# Patient Record
Sex: FEMALE | Race: WHITE | NOT HISPANIC OR LATINO | Employment: OTHER | URBAN - METROPOLITAN AREA
[De-identification: names, ages, dates, MRNs, and addresses within clinical notes are randomized per-mention and may not be internally consistent; named-entity substitution may affect disease eponyms.]

---

## 2017-09-19 ENCOUNTER — OFFICE VISIT (OUTPATIENT)
Dept: FAMILY MEDICINE | Facility: CLINIC | Age: 67
End: 2017-09-19
Payer: COMMERCIAL

## 2017-09-19 VITALS
SYSTOLIC BLOOD PRESSURE: 138 MMHG | WEIGHT: 129.6 LBS | TEMPERATURE: 97.7 F | HEIGHT: 63 IN | BODY MASS INDEX: 22.96 KG/M2 | OXYGEN SATURATION: 99 % | DIASTOLIC BLOOD PRESSURE: 72 MMHG | HEART RATE: 77 BPM

## 2017-09-19 DIAGNOSIS — H81.10 BPPV (BENIGN PAROXYSMAL POSITIONAL VERTIGO), UNSPECIFIED LATERALITY: ICD-10-CM

## 2017-09-19 DIAGNOSIS — E78.5 HYPERLIPIDEMIA LDL GOAL <130: Primary | ICD-10-CM

## 2017-09-19 DIAGNOSIS — Z12.31 ENCOUNTER FOR SCREENING MAMMOGRAM FOR BREAST CANCER: ICD-10-CM

## 2017-09-19 DIAGNOSIS — Z11.59 ENCOUNTER FOR HEPATITIS C SCREENING TEST FOR LOW RISK PATIENT: ICD-10-CM

## 2017-09-19 DIAGNOSIS — M85.89 OSTEOPENIA OF MULTIPLE SITES: ICD-10-CM

## 2017-09-19 DIAGNOSIS — Z23 NEED FOR PROPHYLACTIC VACCINATION AND INOCULATION AGAINST INFLUENZA: ICD-10-CM

## 2017-09-19 DIAGNOSIS — Z12.11 SPECIAL SCREENING FOR MALIGNANT NEOPLASMS, COLON: ICD-10-CM

## 2017-09-19 LAB
CHOLEST SERPL-MCNC: 183 MG/DL
GLUCOSE SERPL-MCNC: 94 MG/DL (ref 70–99)
HDLC SERPL-MCNC: 81 MG/DL
LDLC SERPL CALC-MCNC: 89 MG/DL
NONHDLC SERPL-MCNC: 102 MG/DL
TRIGL SERPL-MCNC: 67 MG/DL

## 2017-09-19 PROCEDURE — 86803 HEPATITIS C AB TEST: CPT | Performed by: FAMILY MEDICINE

## 2017-09-19 PROCEDURE — G0008 ADMIN INFLUENZA VIRUS VAC: HCPCS | Performed by: FAMILY MEDICINE

## 2017-09-19 PROCEDURE — 82947 ASSAY GLUCOSE BLOOD QUANT: CPT | Performed by: FAMILY MEDICINE

## 2017-09-19 PROCEDURE — 80061 LIPID PANEL: CPT | Performed by: FAMILY MEDICINE

## 2017-09-19 PROCEDURE — 90662 IIV NO PRSV INCREASED AG IM: CPT | Performed by: FAMILY MEDICINE

## 2017-09-19 PROCEDURE — 99214 OFFICE O/P EST MOD 30 MIN: CPT | Performed by: FAMILY MEDICINE

## 2017-09-19 PROCEDURE — 36415 COLL VENOUS BLD VENIPUNCTURE: CPT | Performed by: FAMILY MEDICINE

## 2017-09-19 RX ORDER — ALENDRONATE SODIUM 35 MG/1
35 TABLET ORAL
Qty: 12 TABLET | Refills: 3 | Status: SHIPPED | OUTPATIENT
Start: 2017-09-19 | End: 2018-09-21

## 2017-09-19 RX ORDER — SIMVASTATIN 10 MG
10 TABLET ORAL AT BEDTIME
Qty: 90 TABLET | Refills: 3 | Status: SHIPPED | OUTPATIENT
Start: 2017-09-19 | End: 2018-09-21

## 2017-09-19 NOTE — PROGRESS NOTES
Injectable Influenza Immunization Documentation    1.  Is the person to be vaccinated sick today? no    2. Does the person to be vaccinated have an allergy to a component   of the vaccine? no    3. Has the person to be vaccinated ever had a serious reaction   to influenza vaccine in the past? no  4. Has the person to be vaccinated ever had Guillain-Barré syndrome? no    Form completed by Katie Sutton [facility]

## 2017-09-19 NOTE — MR AVS SNAPSHOT
After Visit Summary   9/19/2017    Tri Walden    MRN: [id number]           Patient Information     Date Of Birth          1950        Visit Information        Provider Department      9/19/2017 9:40 AM Nehemias Wallace MD [facility]        Today's Diagnoses     Encounter for screening mammogram for breast cancer    -  1    Hyperlipidemia LDL goal <130        Special screening for malignant neoplasms, colon        Osteopenia of multiple sites          Care Instructions    1. To lab for blood work  Call if the vertigo worsens  Can do the home vertigo exercises Sit on Bed then go side to side with head down to the bed. (start slow, then increase speed as you can)  Call if you would like the referral to Physical therapy.    Benign Paroxysmal Positional Vertigo     Your health care provider may move your head in certain ways to treat your BPPV.     Benign paroxysmal positional vertigo (BPPV) is a problem with the inner ear. The inner ear contains the vestibular system. This system is what helps you keep your balance. BPPV causes a feeling of spinning. It is a common problem of the vestibular system.  Understanding the vestibular system  The vestibular system of the ear is made up of very tiny parts. They include the utricle, saccule, and semicircular canals. The utricle is a tiny organ that contains calcium crystals. In some people, the crystals can move into the semicircular canals. When this happens, the system no longer works as it should. This causes BPPV. Benign means it is not life-threatening. Paroxysmal means it happens suddenly. Positional means that it happens when you move your head. Vertigo is a feeling of spinning.  What causes BPPV?  Causes include injury to your head or neck. Other problems with the vestibular system may cause BPPV. In many people, the cause of BPPV is not known.  Symptoms of BPPV  You many have repeated feelings of spinning (vertigo). The vertigo  usually lasts less than 1 minute. Some movements, suchas rolling over in bed, can bring on vertigo.  Diagnosing BPPV  Your primary health care provider may diagnose and treat your BPPV. Or you may see an ear, nose, and throat doctor (otolaryngologist). In some cases, you may see a nervous system doctor (neurologist).  The health care provider will ask about your symptoms and your medical history. He or she will examine you. You may have hearing and balance tests. As part of the exam, your health care provider may have you move your head and body in certain ways. If you have BPPV, the movements can bring on vertigo. Your provider will also look for abnormal movements of your eyes. You may have other tests to check your vestibular or nervous systems.  Treatment for BPPV  Your health care provider may try to move the calcium crystals. This is done by having you move your head and neck in certain ways. This treatment is safe and often works well. You may also be told to do these movements at home. You may still have vertigo for a few weeks. Your health care provider will recheck your symptoms, usually in about a month. Special physical therapy may also be part of treatment. In rare cases surgery may be needed for BPPV that does not go away.     When to call the health care provider  Call your health care provider right away if you have any of these:    Symptoms that do not go away with treatment    Symptoms that get worse    New symptoms   Date Last Reviewed: 3/19/2015    4449-8286 The [employer]. [address] [place] [address]. All rights reserved. This information is not intended as a substitute for professional medical care. Always follow your healthcare professional's instructions.                Follow-ups after your visit        Future tests that were ordered for you today     Open Future Orders        Priority Expected Expires Ordered    *MA Screening Digital Bilateral Routine  9/19/2018  "9/19/2017    Fecal colorectal cancer screen (FIT) Routine 10/10/2017 12/12/2017 9/19/2017            Who to contact     If you have questions or need follow up information about today's clinic visit or your schedule please contact [facility] directly at [phone].  Normal or non-critical lab and imaging results will be communicated to you by MyChart, letter or phone within 4 business days after the clinic has received the results. If you do not hear from us within 7 days, please contact the clinic through [employer] or phone. If you have a critical or abnormal lab result, we will notify you by phone as soon as possible.  Submit refill requests through [employer] or call your pharmacy and they will forward the refill request to us. Please allow 3 business days for your refill to be completed.          Additional Information About Your Visit        MyChart Information     [employer] gives you secure access to your electronic health record. If you see a primary care provider, you can also send messages to your care team and make appointments. If you have questions, please call your primary care clinic.  If you do not have a primary care provider, please call [phone] and they will assist you.        Care EveryWhere ID     This is your Care EveryWhere ID. This could be used by other organizations to access your [place] medical records  [id number]        Your Vitals Were     Pulse Temperature Height Pulse Oximetry BMI (Body Mass Index)       77 97.7  F (36.5  C) (Tympanic) 5' 2.75\" (1.594 m) 99% 23.14 kg/m2        Blood Pressure from Last 3 Encounters:   09/19/17 138/72   08/29/16 150/82   04/21/16 (!) 156/92    Weight from Last 3 Encounters:   09/19/17 129 lb 9.6 oz (58.8 kg)   08/29/16 131 lb (59.4 kg)   04/21/16 139 lb 12.8 oz (63.4 kg)              We Performed the Following     Glucose     Lipid panel reflex to direct LDL          Where to get your medicines      These medications were sent to " [facility] [place], [place] - [address]  [address] [facility] [address]     Phone:  [phone]     simvastatin 10 MG tablet         Some of these will need a paper prescription and others can be bought over the counter.  Ask your nurse if you have questions.     Bring a paper prescription for each of these medications     alendronate 35 MG tablet          Primary Care Provider Office Phone # Fax #    BHARAT Funez [phone]       [address] [address]        Equal Access to Services     [facility] ZITA : Hadii aad ku hadasho Soomaali, waaxda luqadaha, qaybta kaalmada adeegyada, liza gutierrez haytimothy cherry . So [facility] [phone].    ATENCIÓN: Si habla español, tiene a joseph disposición servicios gratuitos de asistencia lingüística. [facility] [phone].    We comply with applicable federal civil rights laws and [place] laws. We do not discriminate on the basis of race, color, national origin, age, disability sex, sexual orientation or gender identity.            Thank you!     Thank you for choosing [facility]  for your care. Our goal is always to provide you with excellent care. Hearing back from our patients is one way we can continue to improve our services. Please take a few minutes to complete the written survey that you may receive in the mail after your visit with us. Thank you!             Your Updated Medication List - Protect others around you: Learn how to safely use, store and throw away your medicines at www.disposemymeds.org.          This list is accurate as of: 9/19/17 10:05 AM.  Always use your most recent med list.                   Brand Name Dispense Instructions for use Diagnosis    alendronate 35 MG tablet    FOSAMAX    12 tablet    Take 1 tablet (35 mg) by mouth every 7 days Take with over 8 ounces water and stay upright for at least 30 minutes after dose.  Take at least 60 minutes before  breakfast    Osteopenia of multiple sites       ASPIRIN PO      Take 81 mg by mouth daily        CALTRATE 600+D PLUS MINERALS 600-800 MG-UNIT Tabs      Take 1 tablet by mouth 2 times daily    Osteopenia       CRANBERRY PO           GORDON PO           simvastatin 10 MG tablet    ZOCOR    90 tablet    Take 1 tablet (10 mg) by mouth At Bedtime at bedtime.    Hyperlipidemia LDL goal <130       VITAMIN D (CHOLECALCIFEROL) PO      Take by mouth daily

## 2017-09-19 NOTE — PROGRESS NOTES
"  SUBJECTIVE:   Tri Walden is a 67 year old female who presents to clinic today for the following health issues:    Chief Complaint   Patient presents with     Refill Request     fosamax, simvastatin; FASTING       Hyperlipidemia Follow-Up      Rate your low fat/cholesterol diet?: good    Taking statin?  Yes, no muscle aches from statin    Other lipid medications/supplements?:  none        Amount of exercise or physical activity: 6-7 days/week for an average of greater than 60 minutes    Problems taking medications regularly: No    Medication side effects: none  Diet: regular (no restrictions)      2. Osteopenia: Severe in 2014 so started fosamax 35mg weekly, last DEXA 2016 showed slight improvement.    3. Room Spinning: rarely comes on with certain movements with looking up or laying flat in bed then feels like the room is spinning around her and only lasts few seconds.  No related hearing loss or ear ringing or headaches.    Problem list and histories reviewed & adjusted, as indicated.  Additional history: as documented    Labs reviewed in EPIC    Reviewed and updated as needed this visit by clinical staffTobacco  Allergies  Med Hx  Surg Hx  Fam Hx  Soc Hx      Reviewed and updated as needed this visit by Provider         ROS:  C: NEGATIVE for fever, chills, change in weight  E/M: NEGATIVE for ear, mouth and throat problems  R: NEGATIVE for significant cough or SOB  CV: NEGATIVE for chest pain, palpitations or peripheral edema    OBJECTIVE:     /72  Pulse 77  Temp 97.7  F (36.5  C) (Tympanic)  Ht 5' 2.75\" (1.594 m)  Wt 129 lb 9.6 oz (58.8 kg)  SpO2 99%  BMI 23.14 kg/m2  Body mass index is 23.14 kg/(m^2).  GENERAL: healthy, alert and no distress  NECK: no adenopathy, no asymmetry, masses, or scars and thyroid normal to palpation  RESP: lungs clear to auscultation - no rales, rhonchi or wheezes  CV: regular rate and rhythm, normal S1 S2, no S3 or S4, no murmur, click or rub, no peripheral " edema and peripheral pulses strong  MS: no gross musculoskeletal defects noted, no edema    Diagnostic Test Results:  none     ASSESSMENT/PLAN:       Tri was seen today for refill request, flu shot and health maintenance.    Diagnoses and all orders for this visit:    Hyperlipidemia LDL goal <130: stable, refill  -     simvastatin (ZOCOR) 10 MG tablet; Take 1 tablet (10 mg) by mouth At Bedtime at bedtime.  -     Lipid panel reflex to direct LDL  -     Glucose    Osteopenia of multiple sites: stable and improved on fosamax for almost 3 years now  -plan 1 more year then plan to stop medication for few years.  -     alendronate (FOSAMAX) 35 MG tablet; Take 1 tablet (35 mg) by mouth every 7 days Take with over 8 ounces water and stay upright for at least 30 minutes after dose.  Take at least 60 minutes before breakfast    Encounter for screening mammogram for breast cancer  -     *MA Screening Digital Bilateral; Future    Special screening for malignant neoplasms, colon  -     Fecal colorectal cancer screen (FIT); Future    Encounter for hepatitis C screening test for low risk patient  -     Hepatitis C antibody    BPPV:   -discussed home exercises  -patient will call if wanting formal Physical therapy referral  -patient will call if it gets worse or any associated changes like hearing loss or ear ringing.      See Patient Instructions    Nehemias Wallace MD  [facility]

## 2017-09-19 NOTE — PATIENT INSTRUCTIONS
1. To lab for blood work  Call if the vertigo worsens  Can do the home vertigo exercises Sit on Bed then go side to side with head down to the bed. (start slow, then increase speed as you can)  Call if you would like the referral to Physical therapy.    Benign Paroxysmal Positional Vertigo     Your health care provider may move your head in certain ways to treat your BPPV.     Benign paroxysmal positional vertigo (BPPV) is a problem with the inner ear. The inner ear contains the vestibular system. This system is what helps you keep your balance. BPPV causes a feeling of spinning. It is a common problem of the vestibular system.  Understanding the vestibular system  The vestibular system of the ear is made up of very tiny parts. They include the utricle, saccule, and semicircular canals. The utricle is a tiny organ that contains calcium crystals. In some people, the crystals can move into the semicircular canals. When this happens, the system no longer works as it should. This causes BPPV. Benign means it is not life-threatening. Paroxysmal means it happens suddenly. Positional means that it happens when you move your head. Vertigo is a feeling of spinning.  What causes BPPV?  Causes include injury to your head or neck. Other problems with the vestibular system may cause BPPV. In many people, the cause of BPPV is not known.  Symptoms of BPPV  You many have repeated feelings of spinning (vertigo). The vertigo usually lasts less than 1 minute. Some movements, suchas rolling over in bed, can bring on vertigo.  Diagnosing BPPV  Your primary health care provider may diagnose and treat your BPPV. Or you may see an ear, nose, and throat doctor (otolaryngologist). In some cases, you may see a nervous system doctor (neurologist).  The health care provider will ask about your symptoms and your medical history. He or she will examine you. You may have hearing and balance tests. As part of the exam, your health care provider may  have you move your head and body in certain ways. If you have BPPV, the movements can bring on vertigo. Your provider will also look for abnormal movements of your eyes. You may have other tests to check your vestibular or nervous systems.  Treatment for BPPV  Your health care provider may try to move the calcium crystals. This is done by having you move your head and neck in certain ways. This treatment is safe and often works well. You may also be told to do these movements at home. You may still have vertigo for a few weeks. Your health care provider will recheck your symptoms, usually in about a month. Special physical therapy may also be part of treatment. In rare cases surgery may be needed for BPPV that does not go away.     When to call the health care provider  Call your health care provider right away if you have any of these:    Symptoms that do not go away with treatment    Symptoms that get worse    New symptoms   Date Last Reviewed: 3/19/2015    8142-8665 [employer]. [address] [place] [address], [place] [address]. All rights reserved. This information is not intended as a substitute for professional medical care. Always follow your healthcare professional's instructions.

## 2017-09-19 NOTE — LETTER
[facility]  [address] [facility] [place] [address]  Phone: [phone]      9/21/2017     Tri Walden  [address] [facility] [address]      Dear Tri:    Thank you for allowing me to participate in your care. Your recent test results were reviewed and listed below.      No Hepatitis C.   Nehemias Wallace MD     Results for orders placed or performed in visit on 09/19/17   Lipid panel reflex to direct LDL   Result Value Ref Range    Cholesterol 183 <200 mg/dL    Triglycerides 67 <150 mg/dL    HDL Cholesterol 81 >49 mg/dL    LDL Cholesterol Calculated 89 <100 mg/dL    Non HDL Cholesterol 102 <130 mg/dL   Glucose   Result Value Ref Range    Glucose 94 70 - 99 mg/dL   Hepatitis C antibody   Result Value Ref Range    Hepatitis C Antibody Nonreactive NR^Nonreactive             Thank you for choosing [facility]. As a result, please continue with the treatment plan discussed in the office. Return as discussed or sooner if symptoms worsen or fail to improve. If you have any further questions or concerns, please do not hesitate to contact us.      Sincerely,        Dr. Wallace

## 2017-09-19 NOTE — NURSING NOTE
"Chief Complaint   Patient presents with     Refill Request     fosamax, simvastatin; FASTING       Initial /72  Pulse 77  Temp 97.7  F (36.5  C) (Tympanic)  Ht 5' 2.75\" (1.594 m)  Wt 129 lb 9.6 oz (58.8 kg)  SpO2 99%  BMI 23.14 kg/m2 Estimated body mass index is 23.14 kg/(m^2) as calculated from the following:    Height as of this encounter: 5' 2.75\" (1.594 m).    Weight as of this encounter: 129 lb 9.6 oz (58.8 kg).  Medication Reconciliation: complete  "

## 2017-09-20 LAB — HCV AB SERPL QL IA: NONREACTIVE

## 2017-09-23 PROCEDURE — 82274 ASSAY TEST FOR BLOOD FECAL: CPT | Performed by: FAMILY MEDICINE

## 2017-09-28 DIAGNOSIS — Z12.11 SPECIAL SCREENING FOR MALIGNANT NEOPLASMS, COLON: ICD-10-CM

## 2017-09-28 LAB — HEMOCCULT STL QL IA: NEGATIVE

## 2017-10-13 ENCOUNTER — HOSPITAL ENCOUNTER (OUTPATIENT)
Dept: MAMMOGRAPHY | Facility: CLINIC | Age: 67
Discharge: HOME | End: 2017-10-13
Attending: FAMILY MEDICINE | Admitting: FAMILY MEDICINE
Payer: COMMERCIAL

## 2017-10-13 DIAGNOSIS — Z12.31 ENCOUNTER FOR SCREENING MAMMOGRAM FOR BREAST CANCER: ICD-10-CM

## 2017-10-13 PROCEDURE — G0202 SCR MAMMO BI INCL CAD: HCPCS

## 2017-10-13 PROCEDURE — 77063 BREAST TOMOSYNTHESIS BI: CPT

## 2018-07-10 ENCOUNTER — TRANSFERRED RECORDS (OUTPATIENT)
Dept: HEALTH INFORMATION MANAGEMENT | Facility: CLINIC | Age: 68
End: 2018-07-10

## 2018-09-21 ENCOUNTER — OFFICE VISIT (OUTPATIENT)
Dept: FAMILY MEDICINE | Facility: CLINIC | Age: 68
End: 2018-09-21
Payer: COMMERCIAL

## 2018-09-21 VITALS
HEIGHT: 63 IN | SYSTOLIC BLOOD PRESSURE: 162 MMHG | DIASTOLIC BLOOD PRESSURE: 88 MMHG | BODY MASS INDEX: 23.21 KG/M2 | OXYGEN SATURATION: 98 % | HEART RATE: 78 BPM | WEIGHT: 131 LBS | TEMPERATURE: 97.7 F

## 2018-09-21 DIAGNOSIS — M85.89 OSTEOPENIA OF MULTIPLE SITES: Primary | ICD-10-CM

## 2018-09-21 DIAGNOSIS — R39.9 SYMPTOMS INVOLVING URINARY SYSTEM: ICD-10-CM

## 2018-09-21 DIAGNOSIS — Z12.11 SPECIAL SCREENING FOR MALIGNANT NEOPLASMS, COLON: ICD-10-CM

## 2018-09-21 DIAGNOSIS — E78.5 HYPERLIPIDEMIA LDL GOAL <130: ICD-10-CM

## 2018-09-21 DIAGNOSIS — Z23 NEED FOR PROPHYLACTIC VACCINATION AND INOCULATION AGAINST INFLUENZA: ICD-10-CM

## 2018-09-21 LAB
ALBUMIN UR-MCNC: NEGATIVE MG/DL
APPEARANCE UR: CLEAR
BILIRUB UR QL STRIP: NEGATIVE
CHOLEST SERPL-MCNC: 177 MG/DL
COLOR UR AUTO: YELLOW
GLUCOSE SERPL-MCNC: 91 MG/DL (ref 70–99)
GLUCOSE UR STRIP-MCNC: NEGATIVE MG/DL
HDLC SERPL-MCNC: 71 MG/DL
HGB UR QL STRIP: ABNORMAL
KETONES UR STRIP-MCNC: NEGATIVE MG/DL
LDLC SERPL CALC-MCNC: 84 MG/DL
LEUKOCYTE ESTERASE UR QL STRIP: ABNORMAL
NITRATE UR QL: NEGATIVE
NONHDLC SERPL-MCNC: 106 MG/DL
PH UR STRIP: 6 PH (ref 5–7)
RBC #/AREA URNS AUTO: NORMAL /HPF
SOURCE: ABNORMAL
SP GR UR STRIP: 1.01 (ref 1–1.03)
TRIGL SERPL-MCNC: 111 MG/DL
UROBILINOGEN UR STRIP-ACNC: 0.2 EU/DL (ref 0.2–1)
WBC #/AREA URNS AUTO: NORMAL /HPF

## 2018-09-21 PROCEDURE — G0008 ADMIN INFLUENZA VIRUS VAC: HCPCS | Performed by: FAMILY MEDICINE

## 2018-09-21 PROCEDURE — 80061 LIPID PANEL: CPT | Performed by: FAMILY MEDICINE

## 2018-09-21 PROCEDURE — 82947 ASSAY GLUCOSE BLOOD QUANT: CPT | Performed by: FAMILY MEDICINE

## 2018-09-21 PROCEDURE — 99214 OFFICE O/P EST MOD 30 MIN: CPT | Performed by: FAMILY MEDICINE

## 2018-09-21 PROCEDURE — 36415 COLL VENOUS BLD VENIPUNCTURE: CPT | Performed by: FAMILY MEDICINE

## 2018-09-21 PROCEDURE — 90662 IIV NO PRSV INCREASED AG IM: CPT | Performed by: FAMILY MEDICINE

## 2018-09-21 PROCEDURE — 81001 URINALYSIS AUTO W/SCOPE: CPT | Performed by: FAMILY MEDICINE

## 2018-09-21 RX ORDER — SIMVASTATIN 10 MG
10 TABLET ORAL AT BEDTIME
Qty: 90 TABLET | Refills: 3 | Status: SHIPPED | OUTPATIENT
Start: 2018-09-21 | End: 2019-09-27

## 2018-09-21 RX ORDER — ALENDRONATE SODIUM 35 MG/1
35 TABLET ORAL
Qty: 12 TABLET | Refills: 3 | Status: CANCELLED | OUTPATIENT
Start: 2018-09-21

## 2018-09-21 NOTE — PATIENT INSTRUCTIONS
1. To lab for blood work    No UTI.  Push fluids, avoid bladder irritants like caffeine, high citrus foods.    Stop fosamax for 3-5 years and plan to recheck the bone density in 3-4 years.    Thank you for choosing [facility].  You may be receiving a survey in the mail from Carmen Olivo regarding your visit today.  Please take a few minutes to complete and return the survey to let us know how we are doing.      If you have questions or concerns, please contact us via [employer] or you can contact your care team at [phone].    Our Clinic hours are:  Monday 6:40 am  to 7:00 pm  Tuesday -Friday 6:40 am to 5:00 pm    The [place] outpatient lab hours are:  Monday - Friday 6:10 am to 4:45 pm  Saturdays 7:00 am to 11:00 am  Appointments are required, call [phone]    If you have clinical questions after hours or would like to schedule an appointment,  call the clinic at [phone].

## 2018-09-21 NOTE — MR AVS SNAPSHOT
After Visit Summary   9/21/2018    Tri Walden    MRN: [id number]           Patient Information     Date Of Birth          1950        Visit Information        Provider Department      9/21/2018 8:00 AM Nehemias Wallace MD [facility]        Today's Diagnoses     Special screening for malignant neoplasms, colon    -  1    Osteopenia of multiple sites        Hyperlipidemia LDL goal <130        Symptoms involving urinary system          Care Instructions    1. To lab for blood work    No UTI.  Push fluids, avoid bladder irritants like caffeine, high citrus foods.    Stop fosamax for 3-5 years and plan to recheck the bone density in 3-4 years.    Thank you for choosing [facility].  You may be receiving a survey in the mail from [employer] regarding your visit today.  Please take a few minutes to complete and return the survey to let us know how we are doing.      If you have questions or concerns, please contact us via [employer] or you can contact your care team at [phone].    Our Clinic hours are:  Monday 6:40 am  to 7:00 pm  Tuesday -Friday 6:40 am to 5:00 pm    The [place] outpatient lab hours are:  Monday - Friday 6:10 am to 4:45 pm  Saturdays 7:00 am to 11:00 am  Appointments are required, call [phone]    If you have clinical questions after hours or would like to schedule an appointment,  call the clinic at [phone].          Follow-ups after your visit        Future tests that were ordered for you today     Open Future Orders        Priority Expected Expires Ordered    Fecal colorectal cancer screen (FIT) Routine 10/12/2018 12/14/2018 9/21/2018            Who to contact     If you have questions or need follow up information about today's clinic visit or your schedule please contact [facility] directly at [phone].  Normal or non-critical lab and imaging results will be communicated to you by MyChart, letter or phone within 4 business  "days after the clinic has received the results. If you do not hear from us within 7 days, please contact the clinic through [employer] or phone. If you have a critical or abnormal lab result, we will notify you by phone as soon as possible.  Submit refill requests through [employer] or call your pharmacy and they will forward the refill request to us. Please allow 3 business days for your refill to be completed.          Additional Information About Your Visit        [employer] Information     [employer] gives you secure access to your electronic health record. If you see a primary care provider, you can also send messages to your care team and make appointments. If you have questions, please call your primary care clinic.  If you do not have a primary care provider, please call [phone] and they will assist you.        Care EveryWhere ID     This is your Care EveryWhere ID. This could be used by other organizations to access your [place] medical records  [id number]        Your Vitals Were     Pulse Temperature Height Pulse Oximetry BMI (Body Mass Index)       78 97.7  F (36.5  C) (Tympanic) 5' 2.75\" (1.594 m) 98% 23.39 kg/m2        Blood Pressure from Last 3 Encounters:   09/21/18 170/82   09/19/17 138/72   08/29/16 150/82    Weight from Last 3 Encounters:   09/21/18 131 lb (59.4 kg)   09/19/17 129 lb 9.6 oz (58.8 kg)   08/29/16 131 lb (59.4 kg)              We Performed the Following     *UA reflex to Microscopic and Culture ([place] and [facility] (except [place] and [place])     Glucose     Lipid panel reflex to direct LDL Fasting     Urine Microscopic          Today's Medication Changes          These changes are accurate as of 9/21/18  9:02 AM.  If you have any questions, ask your nurse or doctor.               Stop taking these medicines if you haven't already. Please contact your care team if you have questions.     alendronate 35 MG tablet   Commonly known as:  FOSAMAX   Stopped by:  Nehemias Wallace, " MD                Where to get your medicines      These medications [place] [facility] [place] [address]  [address] [address]     Phone:  [phone]     simvastatin 10 MG tablet                Primary Care Provider Office Phone # Fax BHARAT Garcia [phone]       [address] [facility] [address]        Equal Access to Services     BILL AHUMADA : Hadii aad ku hadasho Soomaali, waaxda luqadaha, qaybta kaalmada adeegyada, waxay idiin hayaan adeeg kharash la'timothy . So [facility] [phone].    ATENCIÓN: Si habla español, tiene a joseph disposición servicios gratuitos de asistencia lingüística. Tala al [phone].    We comply with applicable federal civil rights laws and [place] laws. We do not discriminate on the basis of race, color, national origin, age, disability, sex, sexual orientation, or gender identity.            Thank you!     Thank you for choosing [facility]  for your care. Our goal is always to provide you with excellent care. Hearing back from our patients is one way we can continue to improve our services. Please take a few minutes to complete the written survey that you may receive in the mail after your visit with us. Thank you!             Your Updated Medication List - Protect others around you: Learn how to safely use, store and throw away your medicines at www.disposemymeds.org.          This list is accurate as of 9/21/18  9:02 AM.  Always use your most recent med list.                   Brand Name Dispense Instructions for use Diagnosis    ASPIRIN PO      Take 81 mg by mouth daily        CALTRATE 600+D PLUS MINERALS 600-800 MG-UNIT Tabs      Take 1 tablet by mouth 2 times daily    Osteopenia       CRANBERRY PO           GORDON PO           simvastatin 10 MG tablet    ZOCOR    90 tablet    Take 1 tablet (10 mg) by mouth At Bedtime at bedtime.    Hyperlipidemia LDL goal <130       VITAMIN  D (CHOLECALCIFEROL) PO      Take by mouth daily

## 2018-09-21 NOTE — LETTER
September 21, 2018      Tri Walden  [address] [facility] [address]        Dear ,    We are writing to inform you of your test results.    Cholesterol was good on the simvastatin.   Glucose was normal.   Normal urine discussed at appt. Call or return to clinic if not improving.   Nehemias Wallace MD     Resulted Orders   *UA reflex to Microscopic and Culture ([place] and [facility] (except [place] and [place])   Result Value Ref Range    Color Urine Yellow     Appearance Urine Clear     Glucose Urine Negative NEG^Negative mg/dL    Bilirubin Urine Negative NEG^Negative    Ketones Urine Negative NEG^Negative mg/dL    Specific Gravity Urine 1.010 1.003 - 1.035    Blood Urine Moderate (A) NEG^Negative    pH Urine 6.0 5.0 - 7.0 pH    Protein Albumin Urine Negative NEG^Negative mg/dL    Urobilinogen Urine 0.2 0.2 - 1.0 EU/dL    Nitrite Urine Negative NEG^Negative    Leukocyte Esterase Urine Trace (A) NEG^Negative    Source Midstream Urine    Urine Microscopic   Result Value Ref Range    WBC Urine 0 - 5 OTO5^0 - 5 /HPF      Comment:      Urine was tested unconcentrated because <10 ml was received.    RBC Urine O - 2 OTO2^O - 2 /HPF      Comment:      Urine was tested unconcentrated because <10 ml was received.   Lipid panel reflex to direct LDL Fasting   Result Value Ref Range    Cholesterol 177 <200 mg/dL    Triglycerides 111 <150 mg/dL      Comment:      Fasting specimen    HDL Cholesterol 71 >49 mg/dL    LDL Cholesterol Calculated 84 <100 mg/dL      Comment:      Desirable:       <100 mg/dl    Non HDL Cholesterol 106 <130 mg/dL   Glucose   Result Value Ref Range    Glucose 91 70 - 99 mg/dL      Comment:      Fasting specimen       If you have any questions or concerns, please call the clinic at the number listed above.       Sincerely,        Nehemias Wallace MD

## 2018-09-21 NOTE — PROGRESS NOTES

## 2018-09-21 NOTE — PROGRESS NOTES
SUBJECTIVE:   Tri Walden is a 68 year old female who presents to clinic today for the following health issues:      Medication Followup of fosamax    Taking Medication as prescribed: yes    Side Effects:  None    Medication Helping Symptoms:  yes       Medication Followup of simvastatin    Taking Medication as prescribed: yes    Side Effects:  None    Medication Helping Symptoms:  yes     URINARY TRACT SYMPTOMS  Onset: 2 days    Description:   Painful urination (Dysuria): YES- somewhat pressure  Blood in urine (Hematuria): no   Delay in urine (Hesitency): no     Intensity: moderate    Progression of Symptoms:  worsening    Accompanying Signs & Symptoms:  Fever/chills: no   Flank pain YES. Lower back cramping.  Nausea and vomiting: no   Any vaginal symptoms: none  Abdominal/Pelvic Pain: no     History:   History of frequent UTI's: no   History of kidney stones: no   Sexually Active: no   Possibility of pregnancy: No    Precipitating factors:   none    Therapies Tried and outcome: Cranberry juice prn (contraindicated in Coumadin patients) and Increase fluid intake     BP checks at home 120/70s.  Recently with not feeling well having a little higher BP.    Problem list and histories reviewed & adjusted, as indicated.  Additional history: as documented    BP Readings from Last 3 Encounters:   09/21/18 170/82   09/19/17 138/72   08/29/16 150/82    Wt Readings from Last 3 Encounters:   09/21/18 131 lb (59.4 kg)   09/19/17 129 lb 9.6 oz (58.8 kg)   08/29/16 131 lb (59.4 kg)                    Reviewed and updated as needed this visit by clinical staff  Tobacco  Allergies  Meds  Med Hx  Surg Hx  Fam Hx  Soc Hx      Reviewed and updated as needed this visit by Provider         ROS:  CONSTITUTIONAL: NEGATIVE for fever, chills, change in weight  ENT/MOUTH: NEGATIVE for ear, mouth and throat problems  RESP: NEGATIVE for significant cough or SOB  CV: NEGATIVE for chest pain, palpitations or peripheral  "edema    OBJECTIVE:     /88  Pulse 78  Temp 97.7  F (36.5  C) (Tympanic)  Ht 5' 2.75\" (1.594 m)  Wt 131 lb (59.4 kg)  SpO2 98%  BMI 23.39 kg/m2  Body mass index is 23.39 kg/(m^2).  GENERAL: healthy, alert and no distress  NECK: no adenopathy, no asymmetry, masses, or scars and thyroid normal to palpation  RESP: lungs clear to auscultation - no rales, rhonchi or wheezes  CV: regular rate and rhythm, normal S1 S2, no S3 or S4, mild systolic murmur, click or rub, no peripheral edema and peripheral pulses strong  ABDOMEN: soft, nontender, no hepatosplenomegaly, no masses and bowel sounds normal  MS: no gross musculoskeletal defects noted, no edema    Diagnostic Test Results:  Results for orders placed or performed in visit on 09/21/18 (from the past 24 hour(s))   *UA reflex to Microscopic and Culture ([place] and [facility] (except [place] and [place])   Result Value Ref Range    Color Urine Yellow     Appearance Urine Clear     Glucose Urine Negative NEG^Negative mg/dL    Bilirubin Urine Negative NEG^Negative    Ketones Urine Negative NEG^Negative mg/dL    Specific Gravity Urine 1.010 1.003 - 1.035    Blood Urine Moderate (A) NEG^Negative    pH Urine 6.0 5.0 - 7.0 pH    Protein Albumin Urine Negative NEG^Negative mg/dL    Urobilinogen Urine 0.2 0.2 - 1.0 EU/dL    Nitrite Urine Negative NEG^Negative    Leukocyte Esterase Urine Trace (A) NEG^Negative    Source Midstream Urine    Urine Microscopic   Result Value Ref Range    WBC Urine 0 - 5 OTO5^0 - 5 /HPF    RBC Urine O - 2 OTO2^O - 2 /HPF       ASSESSMENT/PLAN:       Tri was seen today for recheck medication, uti, health maintenance and flu shot.    Diagnoses and all orders for this visit:    Osteopenia of multiple sites: plan to stop fosamax  -plan dexa in 2021    Hyperlipidemia LDL goal <130: stable, refill  -     simvastatin (ZOCOR) 10 MG tablet; Take 1 tablet (10 mg) by mouth At Bedtime at bedtime.  -     Lipid panel reflex to direct LDL " Fasting  -     Glucose    Special screening for malignant neoplasms, colon  -     Fecal colorectal cancer screen (FIT); Future  -     Urine Microscopic    Symptoms involving urinary system  -     *UA reflex to Microscopic and Culture ([place] and [facility] (except [place] and [place])    Other orders  -     Cancel: alendronate (FOSAMAX) 35 MG tablet; Take 1 tablet (35 mg) by mouth every 7 days Take with over 8 ounces water and stay upright for at least 30 minutes after dose.  Take at least 60 minutes before breakfast        Patient Instructions   1. To lab for blood work    No UTI.  Push fluids, avoid bladder irritants like caffeine, high citrus foods.    Stop fosamax for 3-5 years and plan to recheck the bone density in 3-4 years.    Thank you for choosing [facility].  You may be receiving a survey in the mail from [employer] regarding your visit today.  Please take a few minutes to complete and return the survey to let us know how we are doing.      If you have questions or concerns, please contact us via [employer] or you can contact your care team at [phone].    Our Clinic hours are:  Monday 6:40 am  to 7:00 pm  Tuesday -Friday 6:40 am to 5:00 pm    The [place] outpatient lab hours are:  Monday - Friday 6:10 am to 4:45 pm  Saturdays 7:00 am to 11:00 am  Appointments are required, call [phone]    If you have clinical questions after hours or would like to schedule an appointment,  call the clinic at [phone].      Nehemias Wallace MD  [facility]

## 2018-10-18 ENCOUNTER — HOSPITAL ENCOUNTER (OUTPATIENT)
Dept: MAMMOGRAPHY | Facility: CLINIC | Age: 68
Discharge: HOME | End: 2018-10-18
Attending: FAMILY MEDICINE | Admitting: FAMILY MEDICINE
Payer: COMMERCIAL

## 2018-10-18 DIAGNOSIS — Z12.31 VISIT FOR SCREENING MAMMOGRAM: ICD-10-CM

## 2018-10-18 PROCEDURE — 77067 SCR MAMMO BI INCL CAD: CPT

## 2018-11-02 DIAGNOSIS — Z12.11 SPECIAL SCREENING FOR MALIGNANT NEOPLASMS, COLON: ICD-10-CM

## 2018-11-02 LAB — HEMOCCULT STL QL IA: NEGATIVE

## 2018-11-02 PROCEDURE — 82274 ASSAY TEST FOR BLOOD FECAL: CPT | Performed by: FAMILY MEDICINE

## 2019-09-27 ENCOUNTER — OFFICE VISIT (OUTPATIENT)
Dept: FAMILY MEDICINE | Facility: CLINIC | Age: 69
End: 2019-09-27
Payer: COMMERCIAL

## 2019-09-27 VITALS
RESPIRATION RATE: 16 BRPM | DIASTOLIC BLOOD PRESSURE: 70 MMHG | OXYGEN SATURATION: 97 % | SYSTOLIC BLOOD PRESSURE: 148 MMHG | TEMPERATURE: 97.9 F | HEIGHT: 63 IN | HEART RATE: 76 BPM | BODY MASS INDEX: 21.97 KG/M2 | WEIGHT: 124 LBS

## 2019-09-27 DIAGNOSIS — Z23 NEED FOR PROPHYLACTIC VACCINATION AND INOCULATION AGAINST INFLUENZA: ICD-10-CM

## 2019-09-27 DIAGNOSIS — Z12.11 SPECIAL SCREENING FOR MALIGNANT NEOPLASMS, COLON: ICD-10-CM

## 2019-09-27 DIAGNOSIS — E78.5 HYPERLIPIDEMIA LDL GOAL <130: Primary | ICD-10-CM

## 2019-09-27 LAB
CHOLEST SERPL-MCNC: 183 MG/DL
HDLC SERPL-MCNC: 79 MG/DL
LDLC SERPL CALC-MCNC: 85 MG/DL
NONHDLC SERPL-MCNC: 104 MG/DL
TRIGL SERPL-MCNC: 93 MG/DL

## 2019-09-27 PROCEDURE — 36415 COLL VENOUS BLD VENIPUNCTURE: CPT | Performed by: FAMILY MEDICINE

## 2019-09-27 PROCEDURE — 99213 OFFICE O/P EST LOW 20 MIN: CPT | Mod: 25 | Performed by: FAMILY MEDICINE

## 2019-09-27 PROCEDURE — 90662 IIV NO PRSV INCREASED AG IM: CPT | Performed by: FAMILY MEDICINE

## 2019-09-27 PROCEDURE — G0008 ADMIN INFLUENZA VIRUS VAC: HCPCS | Performed by: FAMILY MEDICINE

## 2019-09-27 PROCEDURE — 80061 LIPID PANEL: CPT | Performed by: FAMILY MEDICINE

## 2019-09-27 RX ORDER — SIMVASTATIN 10 MG
10 TABLET ORAL AT BEDTIME
Qty: 90 TABLET | Refills: 3 | Status: SHIPPED | OUTPATIENT
Start: 2019-09-27 | End: 2020-09-21

## 2019-09-27 ASSESSMENT — MIFFLIN-ST. JEOR: SCORE: 1043.65

## 2019-09-27 NOTE — PATIENT INSTRUCTIONS
1. To lab    I sent refills for the year.    In a year schedule a medicare physical for refills.    Schedule a free nurse BP check and bring in your home BP cuff to ensure accuracy.

## 2019-09-27 NOTE — PROGRESS NOTES
"Subjective     Tri Walden is a 69 year old female who presents to clinic today for the following health issues:    HPI   Hyperlipidemia Follow-Up      Are you having any of the following symptoms? (Select all that apply)  No complaints of shortness of breath, chest pain or pressure.  No increased sweating or nausea with activity.  No left-sided neck or arm pain.  No complaints of pain in calves when walking 1-2 blocks.    Are you regularly taking any medication or supplement to lower your cholesterol?   Yes- Simvastatin.    Are you having muscle aches or other side effects that you think could be caused by your cholesterol lowering medication?  No        How many servings of fruits and vegetables do you eat daily?  2-3    On average, how many sweetened beverages do you drink each day (soda, juice, sweet tea, etc)?   0    How many days per week do you miss taking your medication? 0    Home BPs are in 120/70s      BP Readings from Last 3 Encounters:   09/27/19 (!) 148/70   09/21/18 162/88   09/19/17 138/72    Wt Readings from Last 3 Encounters:   09/27/19 56.2 kg (124 lb)   09/21/18 59.4 kg (131 lb)   09/19/17 58.8 kg (129 lb 9.6 oz)                 Reviewed and updated as needed this visit by Provider         Review of Systems   ROS COMP: Constitutional, HEENT, cardiovascular, pulmonary, gi and gu systems are negative, except as otherwise noted.      Objective    BP (!) 148/70   Pulse 76   Temp 97.9  F (36.6  C) (Tympanic)   Resp 16   Ht 1.588 m (5' 2.5\")   Wt 56.2 kg (124 lb)   SpO2 97%   BMI 22.32 kg/m    Body mass index is 22.32 kg/m .  Physical Exam   GENERAL: healthy, alert and no distress  NECK: no adenopathy, no asymmetry, masses, or scars and thyroid normal to palpation  RESP: lungs clear to auscultation - no rales, rhonchi or wheezes  CV: regular rate and rhythm, normal S1 S2, no S3 or S4, no murmur, click or rub, no peripheral edema and peripheral pulses strong  ABDOMEN: soft, nontender, no " hepatosplenomegaly, no masses and bowel sounds normal  MS: no gross musculoskeletal defects noted, no edema    Diagnostic Test Results:  Labs reviewed in Epic        Assessment & Plan     Tri was seen today for hyperlipidemia and imm/inj.    Diagnoses and all orders for this visit:    Hyperlipidemia LDL goal <130: stable, refill  -     Lipid Profile (Chol, Trig, HDL, LDL calc)  -     simvastatin (ZOCOR) 10 MG tablet; Take 1 tablet (10 mg) by mouth At Bedtime at bedtime.    Special screening for malignant neoplasms, colon  -     Fecal colorectal cancer screen (FIT); Future    Need for prophylactic vaccination and inoculation against influenza  -     INFLUENZA (HIGH DOSE) 3 VALENT VACCINE [25758]  -     ADMIN INFLUENZA (For MEDICARE Patients ONLY) []           Patient Instructions   1. To lab    I sent refills for the year.    In a year schedule a medicare physical for refills.    Schedule a free nurse BP check and bring in your home BP cuff to ensure accuracy.          Return in about 1 year (around 9/27/2020) for Medicare physical.    Nehemias Wallace MD  [facility]

## 2019-11-02 ENCOUNTER — HEALTH MAINTENANCE LETTER (OUTPATIENT)
Age: 69
End: 2019-11-02

## 2019-11-04 DIAGNOSIS — Z12.11 SPECIAL SCREENING FOR MALIGNANT NEOPLASMS, COLON: ICD-10-CM

## 2019-11-04 PROCEDURE — 82274 ASSAY TEST FOR BLOOD FECAL: CPT | Performed by: FAMILY MEDICINE

## 2019-11-08 LAB — HEMOCCULT STL QL IA: NEGATIVE

## 2019-11-18 ENCOUNTER — HOSPITAL ENCOUNTER (OUTPATIENT)
Dept: MAMMOGRAPHY | Facility: CLINIC | Age: 69
Discharge: HOME | End: 2019-11-18
Attending: FAMILY MEDICINE | Admitting: FAMILY MEDICINE
Payer: COMMERCIAL

## 2019-11-18 DIAGNOSIS — Z12.31 VISIT FOR SCREENING MAMMOGRAM: ICD-10-CM

## 2019-11-18 PROCEDURE — 77063 BREAST TOMOSYNTHESIS BI: CPT

## 2020-02-10 ENCOUNTER — HEALTH MAINTENANCE LETTER (OUTPATIENT)
Age: 70
End: 2020-02-10

## 2020-09-20 DIAGNOSIS — E78.5 HYPERLIPIDEMIA LDL GOAL <130: ICD-10-CM

## 2020-09-21 RX ORDER — SIMVASTATIN 10 MG
TABLET ORAL
Qty: 90 TABLET | Refills: 0 | Status: SHIPPED | OUTPATIENT
Start: 2020-09-21 | End: 2020-10-01

## 2020-09-21 NOTE — TELEPHONE ENCOUNTER
"Requested Prescriptions   Pending Prescriptions Disp Refills     simvastatin (ZOCOR) 10 MG tablet [Pharmacy Med Name: SIMVASTATIN 10MG TABS] 90 tablet 3     Sig: TAKE ONE TABLET BY MOUTH EVERY NIGHT AT BEDTIME       Statins Protocol Passed - 9/20/2020  6:11 PM        Passed - LDL on file in past 12 months     Recent Labs   Lab Test 09/27/19  0846   LDL 85             Passed - No abnormal creatine kinase in past 12 months     No lab results found.             Passed - Recent (12 mo) or future (30 days) visit within the authorizing provider's specialty     Patient has had an office visit with the authorizing provider or a provider within the authorizing providers department within the previous 12 mos or has a future within next 30 days. See \"Patient Info\" tab in inbasket, or \"Choose Columns\" in Meds & Orders section of the refill encounter.              Passed - Medication is active on med list        Passed - Patient is age 18 or older        Passed - No active pregnancy on record        Passed - No positive pregnancy test in past 12 months             "

## 2020-10-01 ENCOUNTER — VIRTUAL VISIT (OUTPATIENT)
Dept: FAMILY MEDICINE | Facility: CLINIC | Age: 70
End: 2020-10-01
Payer: COMMERCIAL

## 2020-10-01 DIAGNOSIS — E78.5 HYPERLIPIDEMIA LDL GOAL <130: ICD-10-CM

## 2020-10-01 PROCEDURE — 99441 PR PHYSICIAN TELEPHONE EVALUATION 5-10 MIN: CPT | Mod: 95 | Performed by: NURSE PRACTITIONER

## 2020-10-01 RX ORDER — SIMVASTATIN 10 MG
TABLET ORAL
Qty: 90 TABLET | Refills: 3 | Status: SHIPPED | OUTPATIENT
Start: 2020-10-01 | End: 2021-10-20

## 2020-10-01 NOTE — PROGRESS NOTES
"Tri Walden is a 70 year old female who is being evaluated via a billable telephone visit.      The patient has been notified of following:     \"This telephone visit will be conducted via a call between you and your physician/provider. We have found that certain health care needs can be provided without the need for a physical exam.  This service lets us provide the care you need with a short phone conversation.  If a prescription is necessary we can send it directly to your pharmacy.  If lab work is needed we can place an order for that and you can then stop by our lab to have the test done at a later time.    Telephone visits are billed at different rates depending on your insurance coverage. During this emergency period, for some insurers they may be billed the same as an in-person visit.  Please reach out to your insurance provider with any questions.    If during the course of the call the physician/provider feels a telephone visit is not appropriate, you will not be charged for this service.\"    Patient has given verbal consent for Telephone visit?  Yes    What phone number would you like to be contacted at? [phone]    How would you like to obtain your AVS? Zhou Martinez     Tri Walden is a 70 year old female who presents via phone visit today for the following health issues:    HPI     Hyperlipidemia Follow-Up      Are you regularly taking any medication or supplement to lower your cholesterol?  Yes simvastatin    Are you having muscle aches or other side effects that you think could be caused by your cholesterol lowering medication?  No      How many servings of fruits and vegetables do you eat daily?  2-3    On average, how many sweetened beverages do you drink each day (Examples: soda, juice, sweet tea, etc.  Do NOT count diet or artificially sweetened beverages)?   0    How many days per week do you exercise enough to make your heart beat faster? 4    How many minutes a day do you " exercise enough to make your heart beat faster? 10 - 19    How many days per week do you miss taking your medication? 0    Medication Followup of simvastatin    Taking Medication as prescribed: yes    Side Effects:  None    Medication Helping Symptoms:  yes           Review of Systems   Constitutional, HEENT, cardiovascular, pulmonary, GI, , musculoskeletal, neuro, skin, endocrine and psych systems are negative, except as otherwise noted.       Objective          Vitals:  No vitals were obtained today due to virtual visit.    healthy, alert and no distress  PSYCH: Alert and oriented times 3; coherent speech, normal   rate and volume, able to articulate logical thoughts, able   to abstract reason, no tangential thoughts, no hallucinations   or delusions  Her affect is normal  RESP: No cough, no audible wheezing, able to talk in full sentences  Remainder of exam unable to be completed due to telephone visits          Assessment/Plan:    Assessment & Plan     Hyperlipidemia LDL goal <130  Tolerating statin  - simvastatin (ZOCOR) 10 MG tablet; TAKE ONE TABLET BY MOUTH EVERY NIGHT AT BEDTIME  - Lipid panel reflex to direct LDL Fasting  - Glucose  - **ALT FUTURE anytime; Future    Reminded patient to get influenza vaccine- she will schedule a nurse visit        No follow-ups on file.    BHARAT Corrigan [facility]    Phone call duration:  5 minutes

## 2020-10-08 ENCOUNTER — MYC MEDICAL ADVICE (OUTPATIENT)
Dept: FAMILY MEDICINE | Facility: CLINIC | Age: 70
End: 2020-10-08

## 2020-10-08 ENCOUNTER — IMMUNIZATION (OUTPATIENT)
Dept: FAMILY MEDICINE | Facility: CLINIC | Age: 70
End: 2020-10-08
Payer: COMMERCIAL

## 2020-10-08 DIAGNOSIS — Z12.11 SPECIAL SCREENING FOR MALIGNANT NEOPLASMS, COLON: Primary | ICD-10-CM

## 2020-10-08 DIAGNOSIS — E78.5 HYPERLIPIDEMIA LDL GOAL <130: ICD-10-CM

## 2020-10-08 LAB
ALT SERPL W P-5'-P-CCNC: 22 U/L (ref 0–50)
CHOLEST SERPL-MCNC: 185 MG/DL
GLUCOSE SERPL-MCNC: 90 MG/DL (ref 70–99)
HDLC SERPL-MCNC: 90 MG/DL
LDLC SERPL CALC-MCNC: 73 MG/DL
NONHDLC SERPL-MCNC: 95 MG/DL
TRIGL SERPL-MCNC: 108 MG/DL

## 2020-10-08 PROCEDURE — G0008 ADMIN INFLUENZA VIRUS VAC: HCPCS

## 2020-10-08 PROCEDURE — 82947 ASSAY GLUCOSE BLOOD QUANT: CPT | Performed by: NURSE PRACTITIONER

## 2020-10-08 PROCEDURE — 80061 LIPID PANEL: CPT | Performed by: NURSE PRACTITIONER

## 2020-10-08 PROCEDURE — 90662 IIV NO PRSV INCREASED AG IM: CPT

## 2020-10-08 PROCEDURE — 84460 ALANINE AMINO (ALT) (SGPT): CPT | Performed by: NURSE PRACTITIONER

## 2020-10-08 NOTE — TELEPHONE ENCOUNTER
Pt asked in MyChart for FIT test?  Last FIT test was 11/4/19.  Is due for updated fit test.    Pt had virtual visit with Rosaura Choi on 10/1/20.    Routed to provider to sign order.    Shannan Montanez RN

## 2020-10-13 ENCOUNTER — TELEPHONE (OUTPATIENT)
Dept: FAMILY MEDICINE | Facility: CLINIC | Age: 70
End: 2020-10-13

## 2020-10-13 NOTE — TELEPHONE ENCOUNTER
Panel Management Review      Patient has the following on her problem list:       Composite cancer screening  Chart review shows that this patient is due/due soon for the following   Summary:    Patient is due/failing the following:   FIT    Action needed:   Patient was sent fit test to complete     Type of outreach:    Phone, spoke to patient.  Patient was sent fit test will call in 1 mo to see if completed     Questions for provider review:    None                                                                                                                                    Kiarra Morfin CMA     Chart routed to Care Team .

## 2020-11-12 NOTE — TELEPHONE ENCOUNTER
Left message for patient to return call.  Remind pt to complete FIT test.  Check expiration date on container and mail inside post office or drop off at clinic location.

## 2020-11-13 DIAGNOSIS — Z12.11 SPECIAL SCREENING FOR MALIGNANT NEOPLASMS, COLON: ICD-10-CM

## 2020-11-13 PROCEDURE — 82274 ASSAY TEST FOR BLOOD FECAL: CPT | Performed by: NURSE PRACTITIONER

## 2020-11-16 LAB — HEMOCCULT STL QL IA: NEGATIVE

## 2020-12-22 ENCOUNTER — HOSPITAL ENCOUNTER (OUTPATIENT)
Dept: MAMMOGRAPHY | Facility: CLINIC | Age: 70
Discharge: HOME | End: 2020-12-22
Attending: FAMILY MEDICINE | Admitting: FAMILY MEDICINE
Payer: COMMERCIAL

## 2020-12-22 DIAGNOSIS — Z12.31 VISIT FOR SCREENING MAMMOGRAM: ICD-10-CM

## 2020-12-22 PROCEDURE — 77067 SCR MAMMO BI INCL CAD: CPT

## 2021-01-06 ENCOUNTER — VIRTUAL VISIT (OUTPATIENT)
Dept: FAMILY MEDICINE | Facility: CLINIC | Age: 71
End: 2021-01-06
Payer: COMMERCIAL

## 2021-01-06 DIAGNOSIS — H10.32 ACUTE CONJUNCTIVITIS OF LEFT EYE, UNSPECIFIED ACUTE CONJUNCTIVITIS TYPE: Primary | ICD-10-CM

## 2021-01-06 PROCEDURE — 99441 PR PHYSICIAN TELEPHONE EVALUATION 5-10 MIN: CPT | Mod: 95 | Performed by: INTERNAL MEDICINE

## 2021-01-06 RX ORDER — POLYMYXIN B SULFATE AND TRIMETHOPRIM 1; 10000 MG/ML; [USP'U]/ML
1-2 SOLUTION OPHTHALMIC 4 TIMES DAILY
Qty: 3 ML | Refills: 0 | Status: SHIPPED | OUTPATIENT
Start: 2021-01-06 | End: 2021-01-13

## 2021-01-06 NOTE — PATIENT INSTRUCTIONS
1.  Symptoms are most consistent with a viral pinkeye.  Because the symptoms are improving, we decided to wait a few more days before starting treatment.  If the symptoms worsen, or do not improve over the next 2 to 3 days, call the pharmacy to fill the antibacterial eyedrop.  2.  Avoid close contact with others, practice good handwashing, avoid touching your eye

## 2021-01-06 NOTE — PROGRESS NOTES
Tri Walden is a 70 year old female who is being evaluated via a billable telephone visit.      What phone number would you like to be contacted at? [phone]  How would you like to obtain your AVS? [facility]  Assessment & Plan   Problem List Items Addressed This Visit     None      Visit Diagnoses     Acute conjunctivitis of left eye, unspecified acute conjunctivitis type    -  Primary    Relevant Medications    trimethoprim-polymyxin b (POLYTRIM) 45431-6.1 UNIT/ML-% ophthalmic solution                      Patient Instructions   1.  Symptoms are most consistent with a viral pinkeye.  Because the symptoms are improving, we decided to wait a few more days before starting treatment.  If the symptoms worsen, or do not improve over the next 2 to 3 days, call the pharmacy to fill the antibacterial eyedrop.  2.  Avoid close contact with others, practice good handwashing, avoid touching your eye      No follow-ups on file.    Geovanna Maier, [facility]    Subjective     Tri Walden is a 70 year old who presents to clinic today for the following health issues     HPI       Eye(s) Problem  Onset/Duration: woke up Monday Morning 1/4 and felt sore and Tuesday got worse with redness, swelling mattering  Description:   Location: YES- left  Pain: YES  Redness: YES  Accompanying Signs & Symptoms:  Discharge/mattering: YES  Swelling: YES  Visual changes: no  Fever: no  Nasal Congestion: no  Bothered by bright lights: no  History:  Trauma: no  Foreign body exposure: no  Wearing contacts: YES  Precipitating or alleviating factors: None  Therapies tried and outcome: None  Has been around 3 year and 6 year old grandchildren daily due to .  No history of indoor or outdoor allergies.  No URI symptoms.          Review of Systems   Constitutional, HEENT, cardiovascular, pulmonary, gi and gu systems are negative, except as otherwise noted.      Objective           Vitals:  No vitals were  obtained today due to virtual visit.    Physical Exam   healthy, alert and no distress  PSYCH: Alert and oriented times 3; coherent speech, normal   rate and volume, able to articulate logical thoughts, able   to abstract reason, no tangential thoughts, no hallucinations   or delusions  Her affect is normal  RESP: No cough, no audible wheezing, able to talk in full sentences  Remainder of exam unable to be completed due to telephone visits                Phone call duration: 8 minutes

## 2021-03-05 ENCOUNTER — E-VISIT (OUTPATIENT)
Dept: FAMILY MEDICINE | Facility: CLINIC | Age: 71
End: 2021-03-05
Payer: COMMERCIAL

## 2021-03-05 ENCOUNTER — TELEPHONE (OUTPATIENT)
Dept: FAMILY MEDICINE | Facility: CLINIC | Age: 71
End: 2021-03-05

## 2021-03-05 DIAGNOSIS — B96.89 BACTERIAL VAGINOSIS: Primary | ICD-10-CM

## 2021-03-05 DIAGNOSIS — N76.0 BACTERIAL VAGINOSIS: Primary | ICD-10-CM

## 2021-03-05 PROCEDURE — 99421 OL DIG E/M SVC 5-10 MIN: CPT | Performed by: FAMILY MEDICINE

## 2021-03-05 RX ORDER — METRONIDAZOLE 7.5 MG/G
1 GEL VAGINAL AT BEDTIME
Qty: 70 G | Refills: 0 | Status: SHIPPED | OUTPATIENT
Start: 2021-03-05 | End: 2021-03-11

## 2021-03-05 NOTE — PATIENT INSTRUCTIONS
Thank you for choosing us for your care. I have placed an order for a prescription so that you can start treatment. View your full visit summary for details by clicking on the link below. Your pharmacist will able to address any questions you may have about the medication.     If you re not feeling better within 2-3 days, please schedule an appointment.  You can schedule an appointment right here in [facility], or call [phone]  If the visit is for the same symptoms as your eVisit, we ll refund the cost of your eVisit if seen within seven days.      Preventing Vaginitis     Use mild, unscented soap when you bathe or shower to avoid irritating your vagina.    Vaginitis is irritation or infection of the vagina or the outside opening of it (vulva). Vaginitis can be caused by bacteria, viruses, parasites, or yeast. Chemicals such as in perfumes or soaps or in spermicides can sometimes be a cause. Vaginitis can be caused by hormone changes in pregnancy or with menopause. You can help prevent vaginitis. Follow the tips below. And see your healthcare provider if you have any symptoms.   Hygiene    Stay away from chemicals. Don't use vaginal sprays. Don't use scented toilet paper or tampons that are scented. Sprays and scents have chemicals that can irritate your vagina.    Don't douche unless you are told to by your healthcare provider. Douching is rarely needed. And it upsets the normal balance in the vagina.    Wash yourself well. Wash the outer vaginal area (vulva) every day with mild, unscented soap. Keep it as dry as possible.    Wipe correctly. Make sure to wipe from front to back after a bowel movement. This helps keep from spreading bacteria from your anus to your vagina.    Change your tampon often. During your period, make sure to change your tampon as often as directed on the package. This allows the normal flow of vaginal discharge and blood.    Lifestyle    Limit your number of sexual partners. The more  partners you have, the greater your risk of infection. Using condoms helps reduce your risk.    Get enough sleep. Sleep helps keep your body s immune system healthy. This helps you fight infection.    Lose weight, if needed. Excess weight can reduce air circulation around your vagina. This can increase your risk of infection.    Exercise regularly. Regular activity helps keep your body healthy.    Take antibiotics only as directed. Antibiotics can change the normal chemical balance in the vagina.      Clothing    Don t sit in wet clothes. Yeast thrives when it s warm and damp.    Don t wear tight pants. And don t wear tights, leggings, or hose without a cotton crotch. These types of clothing trap warmth and moisture.    Wear cotton underwear. Cotton lets air circulate around the vagina.    Symptoms of vaginitis    Irritation, swelling, or itching of the genital area    Vaginal discharge    Bad vaginal odor    Pain or burning during urination    [employer] last reviewed this educational content on 11/1/2019 2000-2020 [employer]. All rights reserved. This information is not intended as a substitute for professional medical care. Always follow your healthcare professional's instructions.    If not improving after 3 days please notify clinic and plan to do the vaginal swab.

## 2021-03-05 NOTE — TELEPHONE ENCOUNTER
Reason for Call:  Medication refill:    Do you use a [facility]?  Name of the pharmacy and phone number for the current request:  [facility] [phone]    Name of the medication requested: For UTI    Other request: Patient had e-visit with Dr. Wallace. Dr. Wallace thought patient had BV, but patient says she has UTI. Dr. Wallace prescribed med for BV. Patient wants med for UTI.    Can we leave a detailed message on this number? YES    Phone number patient can be reached at: Home number on file [phone] (home)    Best Time: Any    Call taken on 3/5/2021 at 1:42 PM by Yesika Dsouza

## 2021-03-11 ENCOUNTER — OFFICE VISIT (OUTPATIENT)
Dept: FAMILY MEDICINE | Facility: CLINIC | Age: 71
End: 2021-03-11
Payer: COMMERCIAL

## 2021-03-11 VITALS
BODY MASS INDEX: 24.8 KG/M2 | HEART RATE: 89 BPM | WEIGHT: 134.8 LBS | HEIGHT: 62 IN | TEMPERATURE: 98.5 F | OXYGEN SATURATION: 99 % | SYSTOLIC BLOOD PRESSURE: 172 MMHG | DIASTOLIC BLOOD PRESSURE: 80 MMHG | RESPIRATION RATE: 12 BRPM

## 2021-03-11 DIAGNOSIS — I10 ESSENTIAL HYPERTENSION: ICD-10-CM

## 2021-03-11 DIAGNOSIS — R82.90 NONSPECIFIC FINDING ON EXAMINATION OF URINE: ICD-10-CM

## 2021-03-11 DIAGNOSIS — N30.00 ACUTE CYSTITIS WITHOUT HEMATURIA: Primary | ICD-10-CM

## 2021-03-11 LAB
ALBUMIN UR-MCNC: NEGATIVE MG/DL
APPEARANCE UR: CLEAR
BACTERIA #/AREA URNS HPF: ABNORMAL /HPF
BILIRUB UR QL STRIP: NEGATIVE
COLOR UR AUTO: YELLOW
GLUCOSE UR STRIP-MCNC: NEGATIVE MG/DL
HGB UR QL STRIP: ABNORMAL
KETONES UR STRIP-MCNC: NEGATIVE MG/DL
LEUKOCYTE ESTERASE UR QL STRIP: ABNORMAL
NITRATE UR QL: NEGATIVE
NON-SQ EPI CELLS #/AREA URNS LPF: ABNORMAL /LPF
PH UR STRIP: 5 PH (ref 5–7)
RBC #/AREA URNS AUTO: ABNORMAL /HPF
SOURCE: ABNORMAL
SP GR UR STRIP: <=1.005 (ref 1–1.03)
SPECIMEN SOURCE: ABNORMAL
UROBILINOGEN UR STRIP-ACNC: 0.2 EU/DL (ref 0.2–1)
WBC #/AREA URNS AUTO: ABNORMAL /HPF
WET PREP SPEC: ABNORMAL

## 2021-03-11 PROCEDURE — 99213 OFFICE O/P EST LOW 20 MIN: CPT | Performed by: INTERNAL MEDICINE

## 2021-03-11 PROCEDURE — 87086 URINE CULTURE/COLONY COUNT: CPT | Performed by: INTERNAL MEDICINE

## 2021-03-11 PROCEDURE — 81001 URINALYSIS AUTO W/SCOPE: CPT | Performed by: INTERNAL MEDICINE

## 2021-03-11 PROCEDURE — 87210 SMEAR WET MOUNT SALINE/INK: CPT | Performed by: INTERNAL MEDICINE

## 2021-03-11 RX ORDER — SULFAMETHOXAZOLE/TRIMETHOPRIM 800-160 MG
1 TABLET ORAL 2 TIMES DAILY
Qty: 6 TABLET | Refills: 0 | Status: SHIPPED | OUTPATIENT
Start: 2021-03-11 | End: 2021-03-14

## 2021-03-11 ASSESSMENT — MIFFLIN-ST. JEOR: SCORE: 1079.7

## 2021-03-11 NOTE — NURSING NOTE
"Initial BP (!) 172/80   Pulse 89   Temp 98.5  F (36.9  C) (Tympanic)   Resp 12   Ht 1.575 m (5' 2\")   Wt 61.1 kg (134 lb 12.8 oz)   SpO2 99%   BMI 24.66 kg/m   Estimated body mass index is 24.66 kg/m  as calculated from the following:    Height as of this encounter: 1.575 m (5' 2\").    Weight as of this encounter: 61.1 kg (134 lb 12.8 oz). .      "

## 2021-03-11 NOTE — PATIENT INSTRUCTIONS
Check some home blood pressures and let us know if they are above 130/80.  Send us a [employer] message in a couple of weeks with those numbers so we can review.      Patient Education     Controlling High Blood Pressure  High blood pressure (hypertension) is often called the silent killer. This is because many people who have it, don t know it. It can be very dangerous. High blood pressure can raise your risk of heart attack, stroke, heart disease, and heart failure. Controlling your blood pressure can decrease your risk of these problems. It's important to know the appropriate blood pressure range and remember to check your blood pressure regularly. Doing so can save your life.  Blood pressure measurements are given as 2 numbers. Systolic blood pressure is the upper number. This is the pressure when the heart contracts. Diastolic blood pressure is the lower number. This is the pressure when the heart relaxes between beats.  Blood pressure is categorized as normal, elevated, or stage 1 or stage 2 high blood pressure:    Normal blood pressure is systolic of less than 120 and diastolic of less than 80 (120/80)    Elevated blood pressure is systolic of 120 to 129 and diastolic less than 80    Stage 1 high blood pressure is systolic of 130 to 139 or diastolic between 80 to 89    Stage 2 high blood pressure is when systolic is 140 or higher or the diastolic is 90 or higher  A heart-healthy lifestyle can help you control your blood pressure without medicines. Here are some things you can do to pursue a heart-healthy lifestyle:    Choose heart-healthy foods    Select low-salt, low-fat foods. Limit sodium intake to 2,400 mg per day or the amount suggested by your healthcare provider.    Limit canned, dried, cured, packaged, and fast foods. These can contain a lot of salt.    Eat 8 to 10 servings of fruits and vegetables every day.    Choose lean meats, fish, or chicken.    Eat whole-grain pasta, brown rice, and beans.    Eat  2 to 3 servings of low-fat or fat-free dairy products.    Ask your doctor about the DASH eating plan. This plan helps reduce blood pressure.    When you go to a restaurant, ask that your meal be prepared with no added salt.    Stay at a healthy weight    Ask your healthcare provider how many calories to eat a day. Then stick to that number.    Ask your healthcare provider what weight range is healthiest for you. If you are overweight, a weight loss of only 3% to 5% of your body weight can help lower blood pressure. Generally, a good weight loss goal is to lose 10% of your body weight in a year.    Limit snacks and sweets.    Get regular exercise.    Get up and get active    Find activities you enjoy that can be done alone or with friends or family. Such activities might include bicycling, dancing, walking, or jogging.    Park farther away from building entrances to walk more.    Use stairs instead of the elevator.    When you can, walk or bike instead of driving.    [place] leaves, garden, or do household repairs.    Be active at a moderate to vigorous level of physical activity for at least 40 minutes for a minimum of 3 to 4 days a week.     Manage stress    Make time to relax and enjoy life. Find time to laugh.    Communicate your concerns with your loved ones and your healthcare provider.    Visit with family and friends, and keep up with hobbies.    Limit alcohol and quit smoking    Men should have no more than 2 drinks per day.    Women should have no more than 1 drink per day.    Talk with your healthcare provider about quitting smoking. Smoking significantly increases your risk for heart disease and stroke. Ask your healthcare provider about community smoking cessation programs and other options.    Medicines  If lifestyle changes aren t enough, your healthcare provider may prescribe high blood pressure medicine. Take all medicines as prescribed. If you have any questions about your medicines, ask your  healthcare provider before stopping or changing them.  [employer] last reviewed this educational content on 6/1/2019 2000-2020 [employer]. All rights reserved. This information is not intended as a substitute for professional medical care. Always follow your healthcare professional's instructions.

## 2021-03-11 NOTE — PROGRESS NOTES
Assessment & Plan     Acute cystitis without hematuria  UA consistent with UTI. Will increase fluid intake. Responded well to bactrim in past. Will repeat and await culture. She will call if sx's do not resolve.     - *UA reflex to Microscopic and Culture ([place] and [facility] (except [place] and [place])  - Wet prep- rare clue cells, just completed vaginal metronidazole gel. Will not treat further. Aaron will call if symptoms persist.  - Urine Microscopic  - sulfamethoxazole-trimethoprim (BACTRIM DS) 800-160 MG tablet; Take 1 tablet by mouth 2 times daily for 3 days    Hypertension:   BP elevated today with past trends rising. Education provided on lifestyle behaviors which she is doing well on in terms of diet/activity/nonsmoker/rare alcohol. High stress environment at home- encouraged stress reduction and self-care. Denies family history of HTN.   -She will take and log BP's for 2 weeks and send over [employer]. Discussed possible options for med- likely start with chlorthalidone, if needed.         Follow up if symptoms do not resolve  Send BP's via [employer] in 2 weeks    Blanche Serrano NP Student  [facility]    Juan Walker is a 70 year old who presents for the following health issues     HPI   Sx's last Thursday of vaginal pain, frequency and flank pain, treated for vaginitis. Completed treatment, today more low pelvic pain, increased frequency and dysyuria. No fevers, mild flank pain continues. Hx UTI 3 years ago, resolved with 1 course of abx. Similar symptoms. Takes cranberry tabs, 2 per day. Drinks lots of fluids.     Genitourinary - Female  Was evaluated virtually 3/5/21, prescribed Metrogel to little effect  Onset/Duration: 1 week  Description:   Painful urination (Dysuria): YES           Frequency: YES  Blood in urine (Hematuria): no  Delay in urine (Hesitency): no  Intensity: moderate  Progression of Symptoms:  intermittent  Accompanying Signs &  "Symptoms:  Fever/chills: no  Flank pain: YES  Nausea and vomiting: no  Vaginal symptoms: none  Abdominal/Pelvic Pain: YES  History:   History of frequent UTI s: no, last one was 3 years ago  History of kidney stones: no  Sexually Active: no  Possibility of pregnancy: No  Precipitating or alleviating factors: None  Therapies tried and outcome: MetroGel 0.75% intermittently helpful. Cranberry pills.     HTN: BP elevated today. Reports increase in stress at home. Has access to neighbor with BP cuff who can take at home.     Review of Systems   Constitutional, HEENT, cardiovascular, pulmonary, gi systems are negative, except as otherwise noted.      Objective    BP (!) 172/80   Pulse 89   Temp 98.5  F (36.9  C) (Tympanic)   Resp 12   Ht 1.575 m (5' 2\")   Wt 61.1 kg (134 lb 12.8 oz)   SpO2 99%   BMI 24.66 kg/m    Body mass index is 24.66 kg/m .     Physical Exam   GENERAL: healthy, alert, pleasant female in no acute distress  RESP: easy, regular RR  MSK; normal gait/movements  NEURO: mentation intact and speech normal  PSYCH: readily engaging, good historian    Results for orders placed or performed in visit on 03/11/21 (from the past 24 hour(s))   *UA reflex to Microscopic and Culture ([place] and [facility] (except [place] and [place])    Specimen: Midstream Urine   Result Value Ref Range    Color Urine Yellow     Appearance Urine Clear     Glucose Urine Negative NEG^Negative mg/dL    Bilirubin Urine Negative NEG^Negative    Ketones Urine Negative NEG^Negative mg/dL    Specific Gravity Urine <=1.005 1.003 - 1.035    Blood Urine Small (A) NEG^Negative    pH Urine 5.0 5.0 - 7.0 pH    Protein Albumin Urine Negative NEG^Negative mg/dL    Urobilinogen Urine 0.2 0.2 - 1.0 EU/dL    Nitrite Urine Negative NEG^Negative    Leukocyte Esterase Urine Moderate (A) NEG^Negative    Source Midstream Urine    Wet prep    Specimen: Vagina   Result Value Ref Range    Specimen Description Vagina     Wet Prep No Trichomonas " seen     Wet Prep Clue cells seen  Rare   (A)     Wet Prep No yeast seen     Wet Prep No WBC's seen    Urine Microscopic   Result Value Ref Range    WBC Urine 10-25 (A) OTO5^0 - 5 /HPF    RBC Urine 2-5 (A) OTO2^O - 2 /HPF    Squamous Epithelial /LPF Urine Few FEW^Few /LPF    Bacteria Urine Few (A) NEG^Negative /HPF     Rare clue cells on wet prep. Just completed vaginal metronidazole gel. Will not treat further. Amanda will let us know if sx's persist after tx today.     Physician Attestation   I, Ravi Lee, was present with the medical/[employer]EN[employer] student who participated in the service and in the documentation of the note.  I have verified the history and personally performed the physical exam and medical decision making.  I agree with the assessment and plan of care as documented in the note.      Items personally reviewed: vitals and labs.    Ravi Lee MD

## 2021-03-12 LAB
BACTERIA SPEC CULT: NORMAL
Lab: NORMAL
SPECIMEN SOURCE: NORMAL

## 2021-04-03 ENCOUNTER — HEALTH MAINTENANCE LETTER (OUTPATIENT)
Age: 71
End: 2021-04-03

## 2021-06-21 ENCOUNTER — OFFICE VISIT (OUTPATIENT)
Dept: FAMILY MEDICINE | Facility: CLINIC | Age: 71
End: 2021-06-21
Payer: COMMERCIAL

## 2021-06-21 VITALS
SYSTOLIC BLOOD PRESSURE: 180 MMHG | BODY MASS INDEX: 24.73 KG/M2 | RESPIRATION RATE: 17 BRPM | HEIGHT: 62 IN | DIASTOLIC BLOOD PRESSURE: 94 MMHG | TEMPERATURE: 98.1 F | WEIGHT: 134.4 LBS | HEART RATE: 89 BPM | OXYGEN SATURATION: 98 %

## 2021-06-21 DIAGNOSIS — I10 ESSENTIAL HYPERTENSION: ICD-10-CM

## 2021-06-21 DIAGNOSIS — R07.0 THROAT PAIN: Primary | ICD-10-CM

## 2021-06-21 LAB
DEPRECATED S PYO AG THROAT QL EIA: NEGATIVE
SPECIMEN SOURCE: NORMAL
SPECIMEN SOURCE: NORMAL
STREP GROUP A PCR: NOT DETECTED

## 2021-06-21 PROCEDURE — 99N1174 PR STATISTIC STREP A RAPID: Performed by: NURSE PRACTITIONER

## 2021-06-21 PROCEDURE — 99213 OFFICE O/P EST LOW 20 MIN: CPT | Performed by: NURSE PRACTITIONER

## 2021-06-21 PROCEDURE — 87651 STREP A DNA AMP PROBE: CPT | Performed by: NURSE PRACTITIONER

## 2021-06-21 ASSESSMENT — MIFFLIN-ST. JEOR: SCORE: 1072.88

## 2021-06-21 NOTE — LETTER
"June 25, 2021      Tri Walden  [address] [facility] [address]        Dear ,        We are writing to inform you of your test results. You have not yet viewed these results on [facility].           \"Your throat culture is negative.   Paula Hanson NP on 6/21/2021 at 5:05 P          Resulted Orders   Streptococcus A Rapid Scr w Reflx to PCR   Result Value Ref Range    Strep Specimen Description Throat     Streptococcus Group A Rapid Screen Negative NEG^Negative      Comment:      No Group A streptococcal antigen detected by immunoassay. Confirmatory testing   in progress.     Group A Streptococcus PCR Throat Swab   Result Value Ref Range    Specimen Description Throat     Strep Group A PCR Not Detected NDET^Not Detected      Comment:      Group A Streptococcus DNA is not detected.  FDA approved assay performed using [employer] GeneXpert real-time PCR.         If you have any questions or concerns, please call the clinic at the number listed above.       Sincerely,      Paula Hanson NP/Sindy MELGAR CMA            "

## 2021-06-21 NOTE — PROGRESS NOTES
Assessment & Plan     Throat pain  Rapid strep is negative, culture is pending.  Patient will be notified if culture is positive for treatment. Recommend use of mucinex/robitussin for congestion and cough, rest and pushing fluids.  Handouts given on sore throat management and Upper Respiratory Infections symptoms management.      - Streptococcus A Rapid Scr w Reflx to PCR    Essential hypertension  Patient tends to have elevated BP in clinic.  It also goes up when she is sick per patient.  Recommending that she takes her BP at home daily for 1 week and if persistently over 140/90 to follow-up with PCP for evaluation and treatment of high blood pressure.    See Patient Instructions    Return in about 1 week (around 6/28/2021), or if symptoms worsen or fail to improve.    Paula Hanson NP  [facility] ALLIE Walker is a 71 year old who presents for the following health issues;     HPI     ENT Symptoms             Symptoms: cc Present Absent Comment   Fever/Chills   x    Fatigue  x     Muscle Aches   x    Eye Irritation   x    Sneezing   x    Nasal Beau/Drg   x    Sinus Pressure/Pain  x     Loss of smell   x    Dental pain   x    Sore Throat x x     Swollen Glands   x    Ear Pain/Fullness   x    Cough  x  Tickle cough, gets really bad (sometimes wet or dry, no sputum coming up)   Wheeze   x    Chest Pain  x  Little bit pain with cough   Shortness of breath   x    Rash   x    Other  x  nausea     Symptom duration:  4 days    Symptom severity:  moderate    Treatments tried:  tylenol and ibuprofen     Contacts:  grandson was tested positive for strep and she baby sits him.      Review of Systems   CONSTITUTIONAL: NEGATIVE for fever, chills, change in weight  ENT/MOUTH: POSITIVE for nasal congestion, postnasal drainage, sinus pressure and sore throat  RESP:POSITIVE for cough-non productive  CV: NEGATIVE for chest pain, palpitations or peripheral edema  GI: POSITIVE for  "nausea  PSYCHIATRIC: NEGATIVE for changes in mood or affect  ROS otherwise negative      Objective    BP (!) 180/94 (BP Location: Right arm, Patient Position: Sitting, Cuff Size: Adult Regular)   Pulse 89   Temp 98.1  F (36.7  C) (Tympanic)   Resp 17   Ht 1.575 m (5' 2\")   Wt 61 kg (134 lb 6.4 oz)   SpO2 98%   BMI 24.58 kg/m    Body mass index is 24.58 kg/m .  Physical Exam   GENERAL: healthy, alert and no distress  HENT: normal cephalic/atraumatic, ear canals and TM's normal, nose and mouth without ulcers or lesions, oropharynx clear, oral mucous membranes moist, sinuses: maxillary tenderness on bilateral and mild peripheral oropharyngeal erythema  NECK: no adenopathy and no asymmetry, masses, or scars  RESP: lungs clear to auscultation - no rales, rhonchi or wheezes  CV: regular rates and rhythm, normal S1 S2, no S3 or S4 and grade 3/6 murmur heard best over the PCI  MS: no gross musculoskeletal defects noted, no edema  PSYCH: mentation appears normal, affect normal/bright    Results for orders placed or performed in visit on 06/21/21 (from the past 24 hour(s))   Streptococcus A Rapid Scr w Reflx to PCR    Specimen: Throat   Result Value Ref Range    Strep Specimen Description Throat     Streptococcus Group A Rapid Screen Negative NEG^Negative       "

## 2021-06-21 NOTE — PATIENT INSTRUCTIONS
1.  Strep testing is negative today.  We do culture this and will be in touch with you if that is positive for treatment tomorrow.  2.  Handout given on viral upper respiratory infection.    3.  Use Mucinex/Robitusson for congestion and cough.  4.  Follow-up with me on Friday if symptoms are not improving to start antibiotic treatment over the weekend if needed.      Patient Education     Self-Care for Sore Throats     Sore throats happen for many reasons, such as colds, allergies, cigarette smoke, air pollution, and infections caused by viruses or bacteria. In any case, your throat becomes red and sore. Your goal for self-care is to reduce your discomfort while giving your throat a chance to heal.  Moisten and soothe your throat  Tips include the following:    Try a sip of water first thing after waking up.    Keep your throat moist by drinking 6 or more glasses of clear liquids every day.    Run a cool-air humidifier in your room overnight.    Stay away from cigarette smoke.     Check the air quality index,if air pollution gives you a sore throat. On high pollution days, try to limit outdoor time.    Suck on throat lozenges, cough drops, hard candy, ice chips, or frozen fruit-juice bars. Use the sugar-free versions if your diet or medical condition requires them.  Gargle to ease irritation  Gargling every hour or 2 can ease irritation. Try gargling with 1 of these solutions:    1/4 teaspoon of salt in 1/2 cup of warm water    An over-the-counter anesthetic gargle  Use medicine for more relief  Over-the-counter medicine can reduce sore throat symptoms. Ask your pharmacist if you have questions about which medicine to use. To prevent possible medicine interactions, let the pharmacist know what medicines you take. To decrease symptoms:    Ease pain with anesthetic sprays. Aspirin or an aspirin substitute also helps. Remember, never give aspirin to anyone 18 or younger. Don't take aspirin if you are already taking  blood thinners.     For sore throats caused by allergies, try antihistamines to block the allergic reaction.  Unless a sore throat is caused by a bacterial infection, antibiotics won t help you.  Prevent future sore throats  Prevention tips include:    Stop smoking or reduce contact with secondhand smoke. Smoke irritates the tender throat lining.    Limit contact with pets and with allergy-causing substances, such as pollen and mold.    Wash your hands often when you re around someone with a sore throat or cold. This will keep viruses or bacteria from spreading.    Limit outdoor time when air pollution is bad.    Don t strain your vocal cords.  When to call your healthcare provider  Contact your healthcare provider if you have:    Fever of 100.4 F (38.0 C) or higher, or as directed by your healthcare provider    White spots on the throat    Great Trouble swallowing    A skin rash    Recent exposure to someone else with strep bacteria    Severe hoarseness and swollen glands in the neck or jaw  Call 911  Call 911 if any of the following occur:    Trouble breathing or catching your breath    Drooling and problems swallowing    Wheezing    Unable to talk    Feeling dizzy or faint    Feeling of doom  Amber last reviewed this educational content on 9/1/2019 2000-2021 [employer]. All rights reserved. This information is not intended as a substitute for professional medical care. Always follow your healthcare professional's instructions.           Patient Education     Viral Upper Respiratory Illness (Adult)    You have a viral upper respiratory illness (URI), which is another term for the common cold. This illness is contagious during the first few days. It is spread through the air by coughing and sneezing. It may also be spread by direct contact (touching the sick person and then touching your own eyes, nose, or mouth). Frequent handwashing will decrease risk of spread. Most viral illnesses go away  within 7 to 10 days with rest and simple home remedies. Sometimes the illness may last for several weeks. Antibiotics will not kill a virus, and they are generally not prescribed for this condition.  Home care    If symptoms are severe, rest at home for the first 2 to 3 days. When you resume activity, don't let yourself get too tired.    Don't smoke. If you need help stopping, talk with your healthcare provider.    Avoid being exposed to cigarette smoke (yours or others ).    You may use acetaminophen or ibuprofen to control pain and fever, unless another medicine was prescribed. If you have chronic liver or kidney disease, have ever had a stomach ulcer or gastrointestinal bleeding, or are taking blood-thinning medicines, talk with your healthcare provider before using these medicines. Aspirin should never be given to anyone under 18 years of age who is ill with a viral infection or fever. It may cause severe liver or brain damage.    Your appetite may be poor, so a light diet is fine. Stay well hydrated by drinking 6 to 8 glasses of fluids per day (water, soft drinks, juices, tea, or soup). Extra fluids will help loosen secretions in the nose and lungs.    Over-the-counter cold medicines will not shorten the length of time you re sick, but they may be helpful for the following symptoms: cough, sore throat, and nasal and sinus congestion. If you take prescription medicines, ask your healthcare provider or pharmacist which over-the-counter medicines are safe to use. (Note: Don't use decongestants if you have high blood pressure.)  Follow-up care  Follow up with your healthcare provider, or as advised.  When to seek medical advice  Call your healthcare provider right away if any of these occur:    Cough with lots of colored sputum (mucus)    Severe headache; face, neck, or ear pain    Difficulty swallowing due to throat pain    Fever of 100.4 F (38 C) or higher, or as directed by your healthcare provider  Call  911  Call 911 if any of these occur:    Chest pain, shortness of breath, wheezing, or difficulty breathing    Coughing up blood    Very severe pain with swallowing, especially if it goes along with a muffled voice   Amber last reviewed this educational content on 6/1/2018 2000-2021 [employer]. All rights reserved. This information is not intended as a substitute for professional medical care. Always follow your healthcare professional's instructions.

## 2021-06-27 ENCOUNTER — NURSE TRIAGE (OUTPATIENT)
Dept: NURSING | Facility: CLINIC | Age: 71
End: 2021-06-27

## 2021-06-27 NOTE — TELEPHONE ENCOUNTER
"\"I was seen about a week ago and th doctor was concerned about my blood pressure.  I was to monitor it for a week , then make an appt.\"   Denies triage, denies any sx   /89 and 154/85   Transferred to scheduling for appt  Call back if needed.  Yanni Todd RN [facility]          Reason for Disposition    Requesting regular office appointment    Protocols used: INFORMATION ONLY CALL - NO TRIAGE-A-AH      "

## 2021-07-07 ENCOUNTER — MEDICAL CORRESPONDENCE (OUTPATIENT)
Dept: HEALTH INFORMATION MANAGEMENT | Facility: CLINIC | Age: 71
End: 2021-07-07

## 2021-07-07 ENCOUNTER — OFFICE VISIT (OUTPATIENT)
Dept: FAMILY MEDICINE | Facility: CLINIC | Age: 71
End: 2021-07-07
Payer: COMMERCIAL

## 2021-07-07 VITALS
SYSTOLIC BLOOD PRESSURE: 142 MMHG | RESPIRATION RATE: 16 BRPM | DIASTOLIC BLOOD PRESSURE: 86 MMHG | BODY MASS INDEX: 24.58 KG/M2 | HEART RATE: 91 BPM | OXYGEN SATURATION: 98 % | TEMPERATURE: 97.2 F | WEIGHT: 134.4 LBS

## 2021-07-07 DIAGNOSIS — I10 ESSENTIAL HYPERTENSION: Primary | ICD-10-CM

## 2021-07-07 DIAGNOSIS — I35.0 AORTIC VALVE STENOSIS, ETIOLOGY OF CARDIAC VALVE DISEASE UNSPECIFIED: ICD-10-CM

## 2021-07-07 DIAGNOSIS — R01.1 SYSTOLIC MURMUR: ICD-10-CM

## 2021-07-07 PROCEDURE — 99214 OFFICE O/P EST MOD 30 MIN: CPT | Performed by: FAMILY MEDICINE

## 2021-07-07 RX ORDER — LOSARTAN POTASSIUM 25 MG/1
25 TABLET ORAL DAILY
Qty: 90 TABLET | Refills: 3 | Status: SHIPPED | OUTPATIENT
Start: 2021-07-07 | End: 2021-07-23

## 2021-07-07 NOTE — PROGRESS NOTES
Assessment & Plan     Essential hypertension  New diagnosis after many years of hovering the borderline HTN, plan to treat with home BPs averaging over 140s systolic  Start losartan 25mg once daily  Check labs in 2 weeks after starting medication.  - **Basic metabolic panel FUTURE 14d; Future  - losartan (COZAAR) 25 MG tablet; Take 1 tablet (25 mg) by mouth daily  - Echocardiogram Complete; Future    Systolic murmur: not new, plan to recheck  Check ECHO. Last check 2006.  - Echocardiogram Complete; Future           Return in about 2 weeks (around 7/21/2021) for nurse only blood prssure recheck and lab recheck appt..    Nehemias Wallace MD  [facility] ALLIE Walker is a 71 year old who presents for the following health issues     HPI     Chief Complaint   Patient presents with     Hypertension     high readings   - Patient brought readings in that Dr. Wallace had told her to keep track of. Patient took it for about 2 weeks. Patients blood pressures are still looking high or right at that borderline. Averaging around 140/82. Most over 140 systolic with few in 130-120.    No swelling in feet or ankles.  No chest pain or shortness of breath with activity.      Review of Systems   Constitutional, HEENT, cardiovascular, pulmonary, gi and gu systems are negative, except as otherwise noted.      Objective    BP (!) 142/86   Pulse 91   Temp 97.2  F (36.2  C) (Tympanic)   Resp 16   Wt 61 kg (134 lb 6.4 oz)   SpO2 98%   BMI 24.58 kg/m    Body mass index is 24.58 kg/m .  Physical Exam   GENERAL: healthy, alert and no distress  RESP: lungs clear to auscultation - no rales, rhonchi or wheezes  CV: regular rate and rhythm, normal S1 S2, no S3 or S4, systolic murmur, click or rub, no peripheral edema and peripheral pulses strong

## 2021-07-07 NOTE — PATIENT INSTRUCTIONS
Start blood pressures lowering medication losartan 25mg once a day, best at nighttime.  In 2 weeks schedule a lab only appt and nurse only appt for BP recheck and bring in your home BP cuff and number.    Call to schedule the ECHO (ultrasound of the heart) at [phone]

## 2021-07-22 ENCOUNTER — HOSPITAL ENCOUNTER (OUTPATIENT)
Dept: CARDIOLOGY | Facility: CLINIC | Age: 71
Discharge: HOME | End: 2021-07-22
Attending: FAMILY MEDICINE | Admitting: FAMILY MEDICINE
Payer: COMMERCIAL

## 2021-07-22 ENCOUNTER — ALLIED HEALTH/NURSE VISIT (OUTPATIENT)
Dept: FAMILY MEDICINE | Facility: CLINIC | Age: 71
End: 2021-07-22
Payer: COMMERCIAL

## 2021-07-22 ENCOUNTER — TELEPHONE (OUTPATIENT)
Dept: FAMILY MEDICINE | Facility: CLINIC | Age: 71
End: 2021-07-22

## 2021-07-22 ENCOUNTER — LAB (OUTPATIENT)
Dept: LAB | Facility: CLINIC | Age: 71
End: 2021-07-22
Payer: COMMERCIAL

## 2021-07-22 VITALS — DIASTOLIC BLOOD PRESSURE: 86 MMHG | SYSTOLIC BLOOD PRESSURE: 154 MMHG | HEART RATE: 68 BPM

## 2021-07-22 DIAGNOSIS — R01.1 SYSTOLIC MURMUR: ICD-10-CM

## 2021-07-22 DIAGNOSIS — I10 ESSENTIAL HYPERTENSION: ICD-10-CM

## 2021-07-22 DIAGNOSIS — I10 ESSENTIAL HYPERTENSION: Primary | ICD-10-CM

## 2021-07-22 LAB
ANION GAP SERPL CALCULATED.3IONS-SCNC: 3 MMOL/L (ref 3–14)
BUN SERPL-MCNC: 23 MG/DL (ref 7–30)
CALCIUM SERPL-MCNC: 9.1 MG/DL (ref 8.5–10.1)
CHLORIDE BLD-SCNC: 107 MMOL/L (ref 94–109)
CO2 SERPL-SCNC: 30 MMOL/L (ref 20–32)
CREAT SERPL-MCNC: 0.73 MG/DL (ref 0.52–1.04)
GFR SERPL CREATININE-BSD FRML MDRD: 83 ML/MIN/1.73M2
GLUCOSE BLD-MCNC: 101 MG/DL (ref 70–99)
LVEF ECHO: NORMAL
POTASSIUM BLD-SCNC: 3.6 MMOL/L (ref 3.4–5.3)
SODIUM SERPL-SCNC: 140 MMOL/L (ref 133–144)

## 2021-07-22 PROCEDURE — 99207 PR NO CHARGE NURSE ONLY: CPT

## 2021-07-22 PROCEDURE — 36415 COLL VENOUS BLD VENIPUNCTURE: CPT

## 2021-07-22 PROCEDURE — 93306 TTE W/DOPPLER COMPLETE: CPT | Mod: 26 | Performed by: INTERNAL MEDICINE

## 2021-07-22 PROCEDURE — 80048 BASIC METABOLIC PNL TOTAL CA: CPT

## 2021-07-22 PROCEDURE — 93306 TTE W/DOPPLER COMPLETE: CPT

## 2021-07-22 NOTE — TELEPHONE ENCOUNTER
Tri Walden is a 71 year old patient who comes in today for a Blood Pressure check because of medication change and ongoing blood pressure monitoring.  Losartan 25 mg started 2 weeks ago. Patient also had BMP drawn today-in process.   Vital Signs as repeated by RN today: /84, 154/86 5 minutes later; Pulse 68.  Patient reports BP was also 150/80 range this morning when she had her echo done. She states she gets nervous coming into the hospital/clinic.  Patient is taking medication as prescribed  Patient is tolerating medications well.  Patient is monitoring Blood Pressure at home.  Average readings are 130's/70's.   Current complaints: none. Patient reports feeling better than previously.  She used to be able to feel her heart pound, and doesn't anymore.   Disposition:  Forwarding to PCP to advise regarding ongoing HTN.     Kandy Murrell RN  [facility]

## 2021-07-22 NOTE — PROGRESS NOTES
Tri Walden is a 71 year old patient who comes in today for a Blood Pressure check because of medication change and ongoing blood pressure monitoring.  Losartan 25 mg started 2 weeks ago. Patient also had BMP drawn today-in process.   Vital Signs as repeated by RN today: /84, 154/86 5 minutes later; Pulse 68.  Patient reports BP was also 150/80 range this morning when she had her echo done.   Patient is taking medication as prescribed  Patient is tolerating medications well.  Patient is monitoring Blood Pressure at home.  Average readings are 130's/70's.   Current complaints: none. Patient reports feeling better than previously.  She used to be able to feel her heart pound, and doesn't anymore.   Disposition:  Forwarding to PCP to advise regarding ongoing HTN.     Kandy Murrell RN  [facility]

## 2021-07-23 RX ORDER — LOSARTAN POTASSIUM 50 MG/1
50 TABLET ORAL DAILY
Qty: 90 TABLET | Refills: 3 | Status: SHIPPED | OUTPATIENT
Start: 2021-07-23 | End: 2021-10-20

## 2021-07-23 NOTE — TELEPHONE ENCOUNTER
Patient notified with understanding voiced, and f/u RN BP appt scheduled for 8/6/21.  Echo results and recommendations also relayed.     Kandy Murrell RN  [facility]

## 2021-07-23 NOTE — TELEPHONE ENCOUNTER
Home blood pressures are improving.  Ideal is <130/<80 so I would like to increase the losartan to 50mg daily.  Best taken before bedtime.  With what you have left take 2 pills of the 25mgs to equal 50mg daily and the next prescription will be for the 50mg pill, one pill per day.    Please schedule a RN BP recheck in 2 weeks and bring in home cuff to compare.    Thank you,  Nehemias Wallace MD

## 2021-08-06 ENCOUNTER — TELEPHONE (OUTPATIENT)
Dept: FAMILY MEDICINE | Facility: CLINIC | Age: 71
End: 2021-08-06

## 2021-08-06 ENCOUNTER — ALLIED HEALTH/NURSE VISIT (OUTPATIENT)
Dept: FAMILY MEDICINE | Facility: CLINIC | Age: 71
End: 2021-08-06
Payer: COMMERCIAL

## 2021-08-06 VITALS — DIASTOLIC BLOOD PRESSURE: 80 MMHG | SYSTOLIC BLOOD PRESSURE: 144 MMHG | HEART RATE: 64 BPM

## 2021-08-06 DIAGNOSIS — I10 ESSENTIAL HYPERTENSION: Primary | ICD-10-CM

## 2021-08-06 PROCEDURE — 99207 PR NO CHARGE NURSE ONLY: CPT

## 2021-08-06 NOTE — TELEPHONE ENCOUNTER
Provider's recommendation relayed, and RN advised continuing to check BP at home and call us if consistently above 140/90. Understanding voiced with no further questions at this time.     Kandy Murrell RN  [facility] [place]

## 2021-08-06 NOTE — TELEPHONE ENCOUNTER
So wrist cuff is accurate.  Home numbers vary, but all in goal, so continue current dose.    Thank you,  Nehemias Wallace MD

## 2021-08-06 NOTE — PROGRESS NOTES
Tri Walden is a 71 year old patient who comes in today for a Blood Pressure check because of medication change and ongoing blood pressure monitoring.  Losartan increased from 25 mg to 50 mg.    Vital Signs as repeated by RN today: /78, 144/80; Pulse 64.  Patient is taking medication as prescribed.  Patient is tolerating medications well.  Patient is monitoring Blood Pressure at home with wrist cuff with varying results, which she attributes to being more stressed some days.  Some levels have been 90's/60's, other days low 100's/70's, highest appears 130's/80's. She did bring her wrist cuff today for comparison with result of: 150/87; Pulse 64.   Current complaints: none  Disposition:  Forwarding to PCP to advise.     Kandy Murrell RN  [facility]

## 2021-08-06 NOTE — TELEPHONE ENCOUNTER
Tri Walden is a 71 year old patient who comes in today for a Blood Pressure check because of medication change and ongoing blood pressure monitoring.  Losartan increased from 25 mg to 50 mg.    Vital Signs as repeated by RN today: /78, 144/80; Pulse 64.  Patient is taking medication as prescribed.  Patient is tolerating medications well.  Patient is monitoring Blood Pressure at home with wrist cuff with varying results, which she attributes to being more stressed some days.  Some levels have been 90's/60's, other days low 100's/70's, highest appears 130's/80's. She did bring her wrist cuff today for comparison with result of: 150/87; Pulse 64.   Current complaints: none  Disposition:  Forwarding to PCP to advise.     Kandy Murrell RN  [facility] [place]

## 2021-09-07 NOTE — PROGRESS NOTES
CARDIOLOGY CONSULT    REASON FOR CONSULT: Aortic stenosis    PRIMARY CARE PHYSICIAN:  Nehemias Wallace    HISTORY OF PRESENT ILLNESS:  71-year-old female seen for aortic stenosis.  She has dyslipidemia, GERD, irritable bowel.    The baseline she is quite active.  She does all the housework and yard work.  She does snowblowing and some shoveling over the winter as well.  She has been feeling quite good this summer.  She does admit to a little more fatigue compared to last summer, but nothing dramatically different.  She has no lightheadedness, dizziness, syncope, chest pain, or edema.    Echo July 2021 showed EF 60%, normal RV, aortic stenosis with mean 21 mmHg, V-max 2.1 m/s, area 0.9 cm , DI 0.27, SVI 37 ml/m2.    PAST MEDICAL HISTORY:  Past Medical History:   Diagnosis Date     Basal cell carcinoma        MEDICATIONS:  Current Outpatient Medications   Medication     ASPIRIN PO     Calcium Carbonate-Vit D-Min (CALTRATE 600+D PLUS MINERALS) 600-800 MG-UNIT TABS     CRANBERRY PO     Erin, Zingiber officinalis, (ERIN PO)     losartan (COZAAR) 50 MG tablet     simvastatin (ZOCOR) 10 MG tablet     VITAMIN D, CHOLECALCIFEROL, PO     No current facility-administered medications for this visit.       ALLERGIES:  Allergies   Allergen Reactions     Nkda [No Known Drug Allergies]        SOCIAL HISTORY:  I have reviewed this patient's social history and updated it with pertinent information if needed. Tri Walden  reports that she has never smoked. She has never used smokeless tobacco. She reports that she does not drink alcohol and does not use drugs.    FAMILY HISTORY:  I have reviewed this patient's family history and updated it with pertinent information if needed.   Family History   Problem Relation Age of Onset     Arthritis Mother      Diabetes Mother         type 2     Heart Disease Father      Coronary Artery Disease Father      Cancer Paternal Grandmother         breast cancer     Heart Disease Paternal  Grandfather      Cancer Sister         right kindney       REVIEW OF SYSTEMS:  Constitutional:  No weight loss, fever, chills, weakness or fatigue.  HEENT:  Eyes:  No visual loss, blurred vision, double vision or yellow sclerae. No hearing loss, sneezing, congestion, runny nose or sore throat.  Skin:  No rash or itching.  Cardiovascular: per HPI  Respiratory: per HPI  GI:  No anorexia, nausea, vomiting or diarrhea. No abdominal pain or blood.  :  No dysurea, hematuria  Neurologic:  No headache, dizziness, syncope, paralysis, ataxia, numbness or tingling in the extremities. No change in bowel or bladder control.  Musculoskeletal:  No muscle, back pain, joint pain or stiffness.  Hematologic:  No anemia, bleeding or bruising.  Lymphatics:  No enlarged nodes. No history of splenectomy.  Psychiatric:  No history of depression or anxiety.  Endocrine:  No reports of sweating, cold or heat intolerance. No polyuria or polydipsia.  Allergies:  No history of asthma, hives, eczema or rhinitis.    PHYSICAL EXAM:  BP (!) 179/88 (BP Location: Left arm, Patient Position: Sitting, Cuff Size: Adult Regular)   Pulse 80   Wt 60.7 kg (133 lb 12.8 oz)   SpO2 99%   BMI 24.47 kg/m      Constitutional: awake, alert, no distress  Eyes: PERRL, sclera nonicteric  ENT: trachea midline  Respiratory: Lungs clear  Cardiovascular: Regular rate and rhythm, 3/6 mid peaking systolic murmur at the upper sternal border  GI: nondistended, nontender, bowel sounds present  Lymph/Hematologic: no lymphadenopathy  Skin: dry, no rash  Musculoskeletal: good muscle tone, strength 5/5 in upper and lower extremities  Neurologic: no focal deficits  Neuropsychiatric: appropriate affact    DATA:  Labs:     Recent Labs   Lab Test 10/08/20  0751 09/27/19  0846 08/24/15  0737 08/21/14  0744   CHOL 185 183 173 166   HDL 90 79 80 75   LDL 73 85 81 79   TRIG 108 93 58 59   CHOLHDLRATIO  --   --  2.2 2.2     EKG: September 8, 2021: Sinus rhythm, normal  EKG    ASSESSMENT:  71-year-old female seen for aortic stenosis.  The aortic stenosis is in the moderately advanced range.  She has no symptoms.  We talked about the natural history of aortic stenosis.  She is probably 1 to 2 years away from needing aortic valve replacement.  She was educated about symptoms to watch out for.  Echo will be repeated in 6 months.    RECOMMENDATIONS:  1.  Moderate aortic stenosis  -Repeat echo in 6 months    Follow-up in 6 months after echo.    Raymundo You MD  Cardiology - [facility]  Pager:  [phone]  Text Page  September 8, 2021

## 2021-09-08 ENCOUNTER — HOSPITAL ENCOUNTER (OUTPATIENT)
Dept: CARDIOLOGY | Facility: CLINIC | Age: 71
Discharge: HOME | End: 2021-09-08
Attending: INTERNAL MEDICINE | Admitting: INTERNAL MEDICINE
Payer: COMMERCIAL

## 2021-09-08 ENCOUNTER — OFFICE VISIT (OUTPATIENT)
Dept: CARDIOLOGY | Facility: CLINIC | Age: 71
End: 2021-09-08
Attending: FAMILY MEDICINE
Payer: COMMERCIAL

## 2021-09-08 VITALS
SYSTOLIC BLOOD PRESSURE: 179 MMHG | OXYGEN SATURATION: 99 % | DIASTOLIC BLOOD PRESSURE: 88 MMHG | BODY MASS INDEX: 24.47 KG/M2 | WEIGHT: 133.8 LBS | HEART RATE: 80 BPM

## 2021-09-08 DIAGNOSIS — I35.0 AORTIC VALVE STENOSIS, ETIOLOGY OF CARDIAC VALVE DISEASE UNSPECIFIED: ICD-10-CM

## 2021-09-08 DIAGNOSIS — R01.1 SYSTOLIC MURMUR: Primary | ICD-10-CM

## 2021-09-08 DIAGNOSIS — R01.1 SYSTOLIC MURMUR: ICD-10-CM

## 2021-09-08 PROCEDURE — 99203 OFFICE O/P NEW LOW 30 MIN: CPT | Performed by: INTERNAL MEDICINE

## 2021-09-08 PROCEDURE — 93005 ELECTROCARDIOGRAM TRACING: CPT | Performed by: CLINICAL EXERCISE PHYSIOLOGIST

## 2021-09-08 PROCEDURE — 93010 ELECTROCARDIOGRAM REPORT: CPT | Performed by: INTERNAL MEDICINE

## 2021-09-08 NOTE — LETTER
9/8/2021    Nehemias Wallace MD  [address] [facility] [address]    RE: Tri FAHAD Walden       Dear Colleague,    I had the pleasure of seeing Tri Walden in the [facility].    CARDIOLOGY CONSULT    REASON FOR CONSULT: Aortic stenosis    PRIMARY CARE PHYSICIAN:  Nehemias Wallace    HISTORY OF PRESENT ILLNESS:  71-year-old female seen for aortic stenosis.  She has dyslipidemia, GERD, irritable bowel.    The baseline she is quite active.  She does all the housework and yard work.  She does snowblowing and some shoveling over the winter as well.  She has been feeling quite good this summer.  She does admit to a little more fatigue compared to last summer, but nothing dramatically different.  She has no lightheadedness, dizziness, syncope, chest pain, or edema.    Echo July 2021 showed EF 60%, normal RV, aortic stenosis with mean 21 mmHg, V-max 2.1 m/s, area 0.9 cm , DI 0.27, SVI 37 ml/m2.    PAST MEDICAL HISTORY:  Past Medical History:   Diagnosis Date     Basal cell carcinoma        MEDICATIONS:  Current Outpatient Medications   Medication     ASPIRIN PO     Calcium Carbonate-Vit D-Min (CALTRATE 600+D PLUS MINERALS) 600-800 MG-UNIT TABS     CRANBERRY PO     Gordon, Zingiber officinalis, (GORDON PO)     losartan (COZAAR) 50 MG tablet     simvastatin (ZOCOR) 10 MG tablet     VITAMIN D, CHOLECALCIFEROL, PO     No current facility-administered medications for this visit.       ALLERGIES:  Allergies   Allergen Reactions     Nkda [No Known Drug Allergies]        SOCIAL HISTORY:  I have reviewed this patient's social history and updated it with pertinent information if needed. Tri Walden  reports that she has never smoked. She has never used smokeless tobacco. She reports that she does not drink alcohol and does not use drugs.    FAMILY HISTORY:  I have reviewed this patient's family history and updated it with pertinent information if needed.    Family History   Problem Relation Age of Onset     Arthritis Mother      Diabetes Mother         type 2     Heart Disease Father      Coronary Artery Disease Father      Cancer Paternal Grandmother         breast cancer     Heart Disease Paternal Grandfather      Cancer Sister         right kindney       REVIEW OF SYSTEMS:  Constitutional:  No weight loss, fever, chills, weakness or fatigue.  HEENT:  Eyes:  No visual loss, blurred vision, double vision or yellow sclerae. No hearing loss, sneezing, congestion, runny nose or sore throat.  Skin:  No rash or itching.  Cardiovascular: per HPI  Respiratory: per HPI  GI:  No anorexia, nausea, vomiting or diarrhea. No abdominal pain or blood.  :  No dysurea, hematuria  Neurologic:  No headache, dizziness, syncope, paralysis, ataxia, numbness or tingling in the extremities. No change in bowel or bladder control.  Musculoskeletal:  No muscle, back pain, joint pain or stiffness.  Hematologic:  No anemia, bleeding or bruising.  Lymphatics:  No enlarged nodes. No history of splenectomy.  Psychiatric:  No history of depression or anxiety.  Endocrine:  No reports of sweating, cold or heat intolerance. No polyuria or polydipsia.  Allergies:  No history of asthma, hives, eczema or rhinitis.    PHYSICAL EXAM:  BP (!) 179/88 (BP Location: Left arm, Patient Position: Sitting, Cuff Size: Adult Regular)   Pulse 80   Wt 60.7 kg (133 lb 12.8 oz)   SpO2 99%   BMI 24.47 kg/m      Constitutional: awake, alert, no distress  Eyes: PERRL, sclera nonicteric  ENT: trachea midline  Respiratory: Lungs clear  Cardiovascular: Regular rate and rhythm, 3/6 mid peaking systolic murmur at the upper sternal border  GI: nondistended, nontender, bowel sounds present  Lymph/Hematologic: no lymphadenopathy  Skin: dry, no rash  Musculoskeletal: good muscle tone, strength 5/5 in upper and lower extremities  Neurologic: no focal deficits  Neuropsychiatric: appropriate affact    DATA:  Labs:     Recent  Labs   Lab Test 10/08/20  0751 09/27/19  0846 08/24/15  0737 08/21/14  0744   CHOL 185 183 173 166   HDL 90 79 80 75   LDL 73 85 81 79   TRIG 108 93 58 59   CHOLHDLRATIO  --   --  2.2 2.2     EKG: September 8, 2021: Sinus rhythm, normal EKG    ASSESSMENT:  71-year-old female seen for aortic stenosis.  The aortic stenosis is in the moderately advanced range.  She has no symptoms.  We talked about the natural history of aortic stenosis.  She is probably 1 to 2 years away from needing aortic valve replacement.  She was educated about symptoms to watch out for.  Echo will be repeated in 6 months.    RECOMMENDATIONS:  1.  Moderate aortic stenosis  -Repeat echo in 6 months    Follow-up in 6 months after echo.    Raymundo You MD  Cardiology - [facility]  Pager:  [phone]  Text Page  September 8, 2021          Thank you for allowing me to participate in the care of your patient.      Sincerely,     Raymundo You MD     [facility]  cc:   Nehemias Wallace MD  [address] [place] [address]

## 2021-09-08 NOTE — PATIENT INSTRUCTIONS
Your echocardiogram shows that one of the valves in your heart, the aortic valve, is thickened and is not opening well.  The medical term for this is aortic stenosis.  This is in the advanced moderate range, meaning that the valve is tight, making it harder for the heart to pump the blood out.     The natural history of aortic stenosis is to gradually worsen over the years.  When it becomes severely tight, it can cause stress on the heart and cause symptoms of shortness of breath, chest pain, or fainting.    When the aortic stenosis becomes severe, the only treatment is to replace the valve with a new one.  There are two ways of doing this.  First option is an open heart surgery where a cardiovascular surgeon replaces the aortic valve.  The second option is a transcatheter aortic valve replacement (TAVR) where a cardiologist puts a new valve inside of the old valve, delivered by a catheter that usually goes through a blood vessel in the leg.     We have a team of people at [facility] who work together that order the necessary tests and then meet in a conference to determine the best option for replacing the aortic valve.

## 2021-09-12 ENCOUNTER — HEALTH MAINTENANCE LETTER (OUTPATIENT)
Age: 71
End: 2021-09-12

## 2021-10-18 NOTE — PROGRESS NOTES
Assessment & Plan     Hyperlipidemia LDL goal <130  Stable, recheck and refill  - simvastatin (ZOCOR) 10 MG tablet; TAKE ONE TABLET BY MOUTH EVERY NIGHT AT BEDTIME  - Lipid panel reflex to direct LDL Fasting; Future    Essential hypertension  Stable, refill and recheck  - Basic metabolic panel  (Ca, Cl, CO2, Creat, Gluc, K, Na, BUN); Future  - losartan (COZAAR) 50 MG tablet; Take 1 tablet (50 mg) by mouth daily    Colon cancer screening  - Fecal colorectal cancer screen (FIT); Future    Encounter for screening mammogram for breast cancer  - MA Screen Bilateral w/Andrew; Future    Asymptomatic menopausal state  - DX Hip/Pelvis/Spine; Future      See Patient Instructions    Return in about 1 year (around 10/20/2022) for Routine preventive, with me, in person.    Nehemias Wallace MD  [facility]UN[facility] Walker is a 71 year old who presents for the following health issues     HPI     Hyperlipidemia Follow-Up      Are you regularly taking any medication or supplement to lower your cholesterol?   Yes- simvastatin    Are you having muscle aches or other side effects that you think could be caused by your cholesterol lowering medication?  No    Hypertension Follow-up      Do you check your blood pressure regularly outside of the clinic? Yes     Are you following a low salt diet? Yes    Are your blood pressures ever more than 140 on the top number (systolic) OR more   than 90 on the bottom number (diastolic), for example 140/90? No   124-134/75-78      How many servings of fruits and vegetables do you eat daily?  2-3    On average, how many sweetened beverages do you drink each day (Examples: soda, juice, sweet tea, etc.  Do NOT count diet or artificially sweetened beverages)?   0    How many days per week do you exercise enough to make your heart beat faster? 3 or less    How many minutes a day do you exercise enough to make your heart beat faster? 20 - 29    How many days per week do you  "miss taking your medication? 0        Review of Systems   Constitutional, HEENT, cardiovascular, pulmonary, gi and gu systems are negative, except as otherwise noted.      Objective    /80   Pulse 66   Temp 97.2  F (36.2  C) (Tympanic)   Resp 12   Ht 1.588 m (5' 2.5\")   Wt 59.9 kg (132 lb)   SpO2 98%   BMI 23.76 kg/m    Body mass index is 23.76 kg/m .  Physical Exam   GENERAL: healthy, alert and no distress  RESP: lungs clear to auscultation - no rales, rhonchi or wheezes  CV: regular rates and rhythm, normal S1 S2, no S3 or S4, grade 3/6 systolic murmur heard best over the left sternal border, peripheral pulses strong and no peripheral edema  PSYCH: mentation appears normal, affect normal/bright                "

## 2021-10-20 ENCOUNTER — OFFICE VISIT (OUTPATIENT)
Dept: FAMILY MEDICINE | Facility: CLINIC | Age: 71
End: 2021-10-20
Payer: COMMERCIAL

## 2021-10-20 VITALS
HEIGHT: 63 IN | TEMPERATURE: 97.2 F | OXYGEN SATURATION: 98 % | DIASTOLIC BLOOD PRESSURE: 80 MMHG | RESPIRATION RATE: 12 BRPM | BODY MASS INDEX: 23.39 KG/M2 | SYSTOLIC BLOOD PRESSURE: 134 MMHG | WEIGHT: 132 LBS | HEART RATE: 66 BPM

## 2021-10-20 DIAGNOSIS — E78.5 HYPERLIPIDEMIA LDL GOAL <130: ICD-10-CM

## 2021-10-20 DIAGNOSIS — Z12.11 COLON CANCER SCREENING: ICD-10-CM

## 2021-10-20 DIAGNOSIS — Z78.0 ASYMPTOMATIC MENOPAUSAL STATE: ICD-10-CM

## 2021-10-20 DIAGNOSIS — I10 ESSENTIAL HYPERTENSION: Primary | ICD-10-CM

## 2021-10-20 DIAGNOSIS — Z12.31 ENCOUNTER FOR SCREENING MAMMOGRAM FOR BREAST CANCER: ICD-10-CM

## 2021-10-20 LAB
ANION GAP SERPL CALCULATED.3IONS-SCNC: 4 MMOL/L (ref 3–14)
BUN SERPL-MCNC: 17 MG/DL (ref 7–30)
CALCIUM SERPL-MCNC: 9.4 MG/DL (ref 8.5–10.1)
CHLORIDE BLD-SCNC: 110 MMOL/L (ref 94–109)
CHOLEST SERPL-MCNC: 176 MG/DL
CO2 SERPL-SCNC: 29 MMOL/L (ref 20–32)
CREAT SERPL-MCNC: 0.7 MG/DL (ref 0.52–1.04)
FASTING STATUS PATIENT QL REPORTED: YES
GFR SERPL CREATININE-BSD FRML MDRD: 87 ML/MIN/1.73M2
GLUCOSE BLD-MCNC: 96 MG/DL (ref 70–99)
HDLC SERPL-MCNC: 91 MG/DL
LDLC SERPL CALC-MCNC: 69 MG/DL
NONHDLC SERPL-MCNC: 85 MG/DL
POTASSIUM BLD-SCNC: 4.2 MMOL/L (ref 3.4–5.3)
SODIUM SERPL-SCNC: 143 MMOL/L (ref 133–144)
TRIGL SERPL-MCNC: 80 MG/DL

## 2021-10-20 PROCEDURE — 80048 BASIC METABOLIC PNL TOTAL CA: CPT | Performed by: FAMILY MEDICINE

## 2021-10-20 PROCEDURE — 80061 LIPID PANEL: CPT | Performed by: FAMILY MEDICINE

## 2021-10-20 PROCEDURE — 36415 COLL VENOUS BLD VENIPUNCTURE: CPT | Performed by: FAMILY MEDICINE

## 2021-10-20 PROCEDURE — 99214 OFFICE O/P EST MOD 30 MIN: CPT | Performed by: FAMILY MEDICINE

## 2021-10-20 RX ORDER — SIMVASTATIN 10 MG
TABLET ORAL
Qty: 90 TABLET | Refills: 4 | Status: SHIPPED | OUTPATIENT
Start: 2021-10-20 | End: 2022-11-15

## 2021-10-20 RX ORDER — LOSARTAN POTASSIUM 50 MG/1
50 TABLET ORAL DAILY
Qty: 90 TABLET | Refills: 4 | Status: SHIPPED | OUTPATIENT
Start: 2021-10-20 | End: 2022-11-15

## 2021-10-20 ASSESSMENT — MIFFLIN-ST. JEOR: SCORE: 1069.94

## 2021-10-20 NOTE — PATIENT INSTRUCTIONS
1. To lab  Go    FIT test screening after Nov 14, 2021    Bone density test [phone]    Shingles vaccination: check insurance pharmacy versus clinic? And cost?

## 2021-11-16 ENCOUNTER — HOSPITAL ENCOUNTER (OUTPATIENT)
Dept: BONE DENSITY | Facility: CLINIC | Age: 71
Discharge: HOME | End: 2021-11-16
Attending: FAMILY MEDICINE | Admitting: FAMILY MEDICINE
Payer: COMMERCIAL

## 2021-11-16 DIAGNOSIS — Z78.0 ASYMPTOMATIC MENOPAUSAL STATE: ICD-10-CM

## 2021-11-16 PROCEDURE — 77080 DXA BONE DENSITY AXIAL: CPT

## 2021-11-21 ENCOUNTER — E-VISIT (OUTPATIENT)
Dept: URGENT CARE | Facility: URGENT CARE | Age: 71
End: 2021-11-21
Payer: COMMERCIAL

## 2021-11-21 DIAGNOSIS — Z20.822 CLOSE EXPOSURE TO 2019 NOVEL CORONAVIRUS: Primary | ICD-10-CM

## 2021-11-21 PROCEDURE — 99421 OL DIG E/M SVC 5-10 MIN: CPT | Performed by: FAMILY MEDICINE

## 2021-11-21 NOTE — PATIENT INSTRUCTIONS
"  Dear Tri Walden,    Based on your exposure to COVID-19 (coronavirus), we would like to test you for this virus. I have placed an order for this test.The best time for testing is 5-7 days after the exposure.    How to schedule:  Go to your [facility] home page and scroll down to the section that says  You have an appointment that needs to be scheduled  and click the large green button that says  Schedule Now  and follow the steps to find the next available opening.     If you are unable to complete these [facility] scheduling steps, please call [phone] to schedule your testing.     Return to work/school/ guidance:   For people with high risk exposures outside the home    Please let your workplace manager and staffing office know when your quarantine ends.     We can not give you an exact date as it depends on the information below. You can calculate this on your own or work with your manager/staffing office to calculate this. (For example if you were exposed on 10/4, you would have to quarantine for 14 full days. That would be through 10/18. You could return on 10/19.)    Quarantine Guidelines:  Patients (\"contacts\") who have been in close prolonged contact of an infected person(s) (within six feet for at least 15 minutes within a 24 hour period), and remain asymptomatic should enter quarantine based on the following options:    14-day quarantine period (this remains the CDC recommendation for the greatest protection against spread of COVID-19) OR    Minimum 7-day quarantine with negative RT-PCR test collected on day 5 or later OR    10-day quarantine with no test  Quarantine Guideline exceptions are as follows:    People who have been fully vaccinated do not need to quarantine if the exposure was at least 2 weeks after the last vaccination. This includes vaccinated health care workers.    Not fully vaccinated and unvaccinated Individuals who work in health care, congregate care, or congregate living " should be off work for 14 days from their last date of exposure. Community activities for this group can be resumed based on options above. Fully vaccinated individuals in this group do not need to quarantine from work after exposure.    Not fully vaccinated and unvaccinated people whose high-risk exposure was a household member should always quarantine for 14 days from their last date of exposure. Fully vaccinated people in this category do not need to quarantine.    Not fully vaccinated or unvaccinated residents of congregate care and congregate living settings should always quarantine for 14 days from their last date of exposure. Fully vaccinated residents do not need to quarantine.  Note: If you have ongoing exposure to the covid positive person, this quarantine period may be more than 14 days. (For example, if you are continued to be exposed to your child who tested positive and cannot isolate from them, then the quarantine of 7-14 days can't start until your child is no longer contagious. This is typically 10 days from onset of the child's symptoms. So the total duration may be 17-24 days in this case.)    You should continue symptom monitoring until day 14 post-exposure. If you develop signs or symptoms of COVID-19, isolate and get tested (even if you have been tested already).    How to quarantine:   Stay home and away from others. Don't go to school or anywhere else. Generally quarantine means staying home from work but there are some exceptions to this. Please contact your workplace.  No hugging, kissing or shaking hands.  Don't let anyone visit.  Cover your mouth and nose with a mask, tissue or washcloth to avoid spreading germs.  Wash your hands and face often. Use soap and water.    What are the symptoms of COVID-19?  The most common symptoms are cough, fever and trouble breathing. Less common symptoms include headache, body aches, fatigue (feeling very tired), chills, sore throat, stuffy or runny nose,  diarrhea (loose poop), loss of taste or smell, belly pain, and nausea or vomiting (feeling sick to your stomach or throwing up).  After 14 days, if you have still don't have symptoms, you likely don't have this virus.  If you develop symptoms, follow these guidelines.  If you're normally healthy: Please start another eVisit.  If you have a serious health problem (like cancer, heart failure, an organ transplant or kidney disease): Call your specialty clinic. Let them know that you might have COVID-19.    Where can I get more information?  [facility] - About COVID-19: [email or web address]  CDC - What to Do If You're Sick: www.cdc.gov/coronavirus/2019-ncov/about/steps-when-sick.html  CDC - Ending Home Isolation: www.cdc.gov/coronavirus/2019-ncov/hcp/disposition-in-home-patients.html  CDC - Caring for Someone: www.cdc.gov/coronavirus/2019-ncov/if-you-are-sick/care-for-someone.html  [facility] clinical trials (COVID-19 research studies): [email or web address]  Below are the COVID-19 hotlines at the [place] [facility] ([facility]). Interpreters are available.  For health questions: Call 934-110-9379 or 1-358.273.4130 (7 a.m. to 7 p.m.)  For questions about schools and childcare: Call 197-522-3191 or 1-938.961.3312 (7 a.m. to 7 p.m.)

## 2021-11-22 ENCOUNTER — LAB (OUTPATIENT)
Dept: LAB | Facility: CLINIC | Age: 71
End: 2021-11-22
Attending: FAMILY MEDICINE
Payer: COMMERCIAL

## 2021-11-22 DIAGNOSIS — Z20.822 CLOSE EXPOSURE TO 2019 NOVEL CORONAVIRUS: ICD-10-CM

## 2021-11-22 PROCEDURE — U0005 INFEC AGEN DETEC AMPLI PROBE: HCPCS

## 2021-11-22 PROCEDURE — U0003 INFECTIOUS AGENT DETECTION BY NUCLEIC ACID (DNA OR RNA); SEVERE ACUTE RESPIRATORY SYNDROME CORONAVIRUS 2 (SARS-COV-2) (CORONAVIRUS DISEASE [COVID-19]), AMPLIFIED PROBE TECHNIQUE, MAKING USE OF HIGH THROUGHPUT TECHNOLOGIES AS DESCRIBED BY CMS-2020-01-R: HCPCS

## 2021-11-23 LAB — SARS-COV-2 RNA RESP QL NAA+PROBE: NEGATIVE

## 2021-11-28 ENCOUNTER — OFFICE VISIT (OUTPATIENT)
Dept: URGENT CARE | Facility: URGENT CARE | Age: 71
End: 2021-11-28
Payer: COMMERCIAL

## 2021-11-28 VITALS
HEART RATE: 75 BPM | RESPIRATION RATE: 16 BRPM | DIASTOLIC BLOOD PRESSURE: 78 MMHG | WEIGHT: 133.4 LBS | SYSTOLIC BLOOD PRESSURE: 148 MMHG | TEMPERATURE: 97.6 F | BODY MASS INDEX: 24.01 KG/M2 | OXYGEN SATURATION: 97 %

## 2021-11-28 DIAGNOSIS — H10.9 BACTERIAL CONJUNCTIVITIS OF LEFT EYE: Primary | ICD-10-CM

## 2021-11-28 PROCEDURE — 99213 OFFICE O/P EST LOW 20 MIN: CPT | Performed by: FAMILY MEDICINE

## 2021-11-28 RX ORDER — CIPROFLOXACIN HYDROCHLORIDE 3.5 MG/ML
1-2 SOLUTION/ DROPS TOPICAL EVERY 4 HOURS
Qty: 3 ML | Refills: 0 | Status: SHIPPED | OUTPATIENT
Start: 2021-11-28 | End: 2021-12-03

## 2021-11-28 NOTE — PROGRESS NOTES
SUBJECTIVE:  Chief Complaint:   Chief Complaint   Patient presents with     Eye Problem     Left eye; running, unable to open eye last night due to drainage. Symptoms began yesterday afternoon.     History of Present Illness:  Tri Walden is a 71 year old female who presents complaining of mild left eye discharge, mattering, burning for 1 day(s).   Onset/timing: gradual.  IN THE AFTERNOON   Associated Signs and Symptoms: none  Treatment measures tried include: warm packs  Contact wearer : Yes     Past Medical History:   Diagnosis Date     Basal cell carcinoma      Current Outpatient Medications   Medication Sig Dispense Refill     ASPIRIN PO Take 81 mg by mouth daily       Calcium Carbonate-Vit D-Min (CALTRATE 600+D PLUS MINERALS) 600-800 MG-UNIT TABS Take 1 tablet by mouth 2 times daily       CRANBERRY PO Take by mouth 2 times daily        Erin, Zingiber officinalis, (ERIN PO) Take by mouth daily        losartan (COZAAR) 50 MG tablet Take 1 tablet (50 mg) by mouth daily 90 tablet 4     simvastatin (ZOCOR) 10 MG tablet TAKE ONE TABLET BY MOUTH EVERY NIGHT AT BEDTIME 90 tablet 4     VITAMIN D, CHOLECALCIFEROL, PO Take by mouth daily          ROS:  CONSTITUTIONAL:NEGATIVE for fever, chills, change in weight  INTEGUMENTARY/SKIN: NEGATIVE for worrisome rashes, moles or lesions  ENT/MOUTH: NEGATIVE for ear, mouth and throat problems  RESP:NEGATIVE for significant cough or SOB    OBJECTIVE:  BP (!) 148/78 (BP Location: Right arm, Patient Position: Sitting, Cuff Size: Adult Regular)   Pulse 75   Temp 97.6  F (36.4  C) (Tympanic)   Resp 16   Wt 60.5 kg (133 lb 6.4 oz)   SpO2 97%   BMI 24.01 kg/m    General: no acute distress  Eye exam: left eye abnormal findings: conjunctivitis with erythema, discharge and matting noted.  Ears: normal canals, TMs bilaterally, normal TM mobility  Nose: NORMAL - no drainage, turbinates normal in size.  Neck: supple, non-tender, free range of motion, no  adenopathy    ASSESSMENT:  Bacterial Conjunctivitis    PLAN:  Warm packs for comfort. Antibiotic ointment per order. Return to clinic in 24 hours if persistent foreign body sensation.  See orders in epic

## 2021-11-28 NOTE — PATIENT INSTRUCTIONS
Patient Education     Bacterial Conjunctivitis    You have an infection in the membranes covering the white part of the eye. This part of the eye is called the conjunctiva. The infection is called conjunctivitis. The most common symptoms of conjunctivitis include a thick, pus-like discharge from the eye, swollen eyelids, redness, eyelids sticking together upon awakening, and a gritty or scratchy feeling in the eye. Your infection was caused by bacteria. It may be treated with medicine. With treatment, the infection takes about 7 to 10 days to resolve.   Home care    Use prescribed antibiotic eye drops or ointment as directed to treat the infection.    Apply a warm compress (towel soaked in warm water) to the affected eye 3 to 4 times a day. Do this just before applying medicine to the eye.    Use a warm, wet cloth to wipe away crusting of the eyelids in the morning. This is caused by mucus drainage during the night. You may also use saline irrigating solution or artificial tears to rinse away mucus in the eye. Do not put a patch over the eye.    Wash your hands before and after touching the infected eye. This is to prevent spreading the infection to the other eye, and to other people. Don't share your towels or washcloths with others.    You may use acetaminophen or ibuprofen to control pain, unless another medicine was prescribed. Talk with your healthcare provider before using these medicines if you have chronic liver or kidney disease. Also talk with your provider if you have ever had a stomach ulcer or digestive bleeding.    Don't wear contact lenses until your eyes have healed and all symptoms are gone.    Follow-up care  Follow up with your healthcare provider, or as advised.  When to seek medical advice  Call your healthcare provider right away if any of these occur:    Worsening vision    Increasing pain in the eye    Increasing swelling or redness of the eyelid    Redness spreading around the  eye  [facility] last reviewed this educational content on 4/1/2020 2000-2021 [employer]. All rights reserved. This information is not intended as a substitute for professional medical care. Always follow your healthcare professional's instructions.

## 2021-11-30 ENCOUNTER — LAB (OUTPATIENT)
Dept: LAB | Facility: CLINIC | Age: 71
End: 2021-11-30
Payer: COMMERCIAL

## 2021-11-30 DIAGNOSIS — Z12.11 COLON CANCER SCREENING: ICD-10-CM

## 2021-11-30 PROCEDURE — 82274 ASSAY TEST FOR BLOOD FECAL: CPT

## 2021-12-04 LAB — HEMOCCULT STL QL IA: NEGATIVE

## 2021-12-23 ENCOUNTER — HOSPITAL ENCOUNTER (OUTPATIENT)
Dept: MAMMOGRAPHY | Facility: CLINIC | Age: 71
Discharge: HOME | End: 2021-12-23
Attending: FAMILY MEDICINE | Admitting: FAMILY MEDICINE
Payer: COMMERCIAL

## 2021-12-23 DIAGNOSIS — Z12.31 ENCOUNTER FOR SCREENING MAMMOGRAM FOR BREAST CANCER: ICD-10-CM

## 2021-12-23 PROCEDURE — 77063 BREAST TOMOSYNTHESIS BI: CPT

## 2022-01-03 ENCOUNTER — VIRTUAL VISIT (OUTPATIENT)
Dept: FAMILY MEDICINE | Facility: CLINIC | Age: 72
End: 2022-01-03
Payer: COMMERCIAL

## 2022-01-03 ENCOUNTER — LAB (OUTPATIENT)
Dept: URGENT CARE | Facility: URGENT CARE | Age: 72
End: 2022-01-03
Attending: FAMILY MEDICINE
Payer: COMMERCIAL

## 2022-01-03 DIAGNOSIS — Z20.822 SUSPECTED 2019 NOVEL CORONAVIRUS INFECTION: ICD-10-CM

## 2022-01-03 DIAGNOSIS — Z20.822 SUSPECTED 2019 NOVEL CORONAVIRUS INFECTION: Primary | ICD-10-CM

## 2022-01-03 PROCEDURE — 99442 PR PHYSICIAN TELEPHONE EVALUATION 11-20 MIN: CPT | Mod: 95 | Performed by: FAMILY MEDICINE

## 2022-01-03 PROCEDURE — U0005 INFEC AGEN DETEC AMPLI PROBE: HCPCS

## 2022-01-03 PROCEDURE — U0003 INFECTIOUS AGENT DETECTION BY NUCLEIC ACID (DNA OR RNA); SEVERE ACUTE RESPIRATORY SYNDROME CORONAVIRUS 2 (SARS-COV-2) (CORONAVIRUS DISEASE [COVID-19]), AMPLIFIED PROBE TECHNIQUE, MAKING USE OF HIGH THROUGHPUT TECHNOLOGIES AS DESCRIBED BY CMS-2020-01-R: HCPCS

## 2022-01-03 NOTE — PATIENT INSTRUCTIONS
Thank you for chatting today Aaron    I hope you feel better    Your symptoms began on January 1  Presuming you have a positive Covid test, you should likely isolate for 5 days at least.  If your symptoms are completely resolved after 5 days you can continue to social distance wash your hands and wear a mask for another 5 days after    I would hold off on sitting your grandchildren for the full 10 days    Let us know if you have any worsening symptoms    Symptomatic therapy such as acetaminophen or Advil may be used for your headache    Get plenty of rest  Drink plenty of water    Let us know if there is any change in your breathing      I hope you are feeling well soon!       Jesse

## 2022-01-04 LAB — SARS-COV-2 RNA RESP QL NAA+PROBE: NEGATIVE

## 2022-02-01 ENCOUNTER — TELEPHONE (OUTPATIENT)
Dept: DERMATOLOGY | Facility: CLINIC | Age: 72
End: 2022-02-01

## 2022-02-01 ENCOUNTER — OFFICE VISIT (OUTPATIENT)
Dept: DERMATOLOGY | Facility: CLINIC | Age: 72
End: 2022-02-01
Payer: COMMERCIAL

## 2022-02-01 VITALS — DIASTOLIC BLOOD PRESSURE: 77 MMHG | SYSTOLIC BLOOD PRESSURE: 150 MMHG | HEART RATE: 83 BPM | OXYGEN SATURATION: 96 %

## 2022-02-01 DIAGNOSIS — L81.4 LENTIGO: ICD-10-CM

## 2022-02-01 DIAGNOSIS — L82.0 INFLAMED SEBORRHEIC KERATOSIS: ICD-10-CM

## 2022-02-01 DIAGNOSIS — D22.9 MULTIPLE BENIGN NEVI: ICD-10-CM

## 2022-02-01 DIAGNOSIS — D18.01 CHERRY ANGIOMA: ICD-10-CM

## 2022-02-01 DIAGNOSIS — C44.619 BASAL CELL CARCINOMA (BCC) OF LEFT UPPER ARM: Primary | ICD-10-CM

## 2022-02-01 DIAGNOSIS — L66.12 FRONTAL FIBROSING ALOPECIA: Primary | ICD-10-CM

## 2022-02-01 DIAGNOSIS — D48.5 NEOPLASM OF UNCERTAIN BEHAVIOR OF SKIN: ICD-10-CM

## 2022-02-01 DIAGNOSIS — L82.1 SEBORRHEIC KERATOSIS: ICD-10-CM

## 2022-02-01 PROCEDURE — 99203 OFFICE O/P NEW LOW 30 MIN: CPT | Mod: 25 | Performed by: PHYSICIAN ASSISTANT

## 2022-02-01 PROCEDURE — 88331 PATH CONSLTJ SURG 1 BLK 1SPC: CPT | Performed by: DERMATOLOGY

## 2022-02-01 PROCEDURE — 88341 IMHCHEM/IMCYTCHM EA ADD ANTB: CPT | Performed by: DERMATOLOGY

## 2022-02-01 PROCEDURE — 88305 TISSUE EXAM BY PATHOLOGIST: CPT | Performed by: DERMATOLOGY

## 2022-02-01 PROCEDURE — 11102 TANGNTL BX SKIN SINGLE LES: CPT | Mod: 59 | Performed by: PHYSICIAN ASSISTANT

## 2022-02-01 PROCEDURE — 88342 IMHCHEM/IMCYTCHM 1ST ANTB: CPT | Performed by: DERMATOLOGY

## 2022-02-01 PROCEDURE — 11103 TANGNTL BX SKIN EA SEP/ADDL: CPT | Mod: 59 | Performed by: PHYSICIAN ASSISTANT

## 2022-02-01 PROCEDURE — 17110 DESTRUCTION B9 LES UP TO 14: CPT | Performed by: PHYSICIAN ASSISTANT

## 2022-02-01 RX ORDER — CLOBETASOL PROPIONATE 0.5 MG/ML
SOLUTION TOPICAL
Qty: 50 ML | Refills: 4 | Status: SHIPPED | OUTPATIENT
Start: 2022-02-01

## 2022-02-01 NOTE — PROGRESS NOTES
Tri Walden is an extremely pleasant 71 year old year old female patient here today for spot on left arm. Present for a few months. She notes that it will scab up, not healing.   Patient has no other skin complaints today.  Remainder of the HPI, Meds, PMH, Allergies, FH, and SH was reviewed in chart.    Pertinent Hx:   History of BCC  Past Medical History:   Diagnosis Date     Basal cell carcinoma        Past Surgical History:   Procedure Laterality Date     ABLATE VEIN VARICOSE RADIO FREQUENCY WITHOUT PHLEBECTOMY MULTIPLE STAB  12/6/2012    Procedure: ABLATE VEIN VARICOSE RADIO FREQUENCY WITHOUT PHLEBECTOMY MULTIPLE STAB;  Bilateral Ablation of Varicose Veins;  Surgeon: Fredis Marks MD;  Location: [facility] OR        Family History   Problem Relation Age of Onset     Arthritis Mother      Diabetes Mother         type 2     Heart Disease Father      Coronary Artery Disease Father      Cancer Paternal Grandmother         breast cancer     Heart Disease Paternal Grandfather      Cancer Sister         right kindney       Social History     Socioeconomic History     Marital status:      Spouse name: Not on file     Number of children: Not on file     Years of education: Not on file     Highest education level: Not on file   Occupational History     Occupation:      Employer: [employer]   Tobacco Use     Smoking status: Never Smoker     Smokeless tobacco: Never Used   Vaping Use     Vaping Use: Never used   Substance and Sexual Activity     Alcohol use: No     Drug use: No     Sexual activity: Not Currently     Partners: Male     Birth control/protection: Surgical     Comment: tubal ligation   Other Topics Concern     Parent/sibling w/ CABG, MI or angioplasty before 65F 55M? Yes   Social History Narrative     Not on file     Social Determinants of Health     Financial Resource Strain: Not on file   Food Insecurity: Not on file   Transportation Needs: Not on file   Physical  Activity: Not on file   Stress: Not on file   Social Connections: Not on file   Intimate Partner Violence: Not on file   Housing Stability: Not on file       Outpatient Encounter Medications as of 2/1/2022   Medication Sig Dispense Refill     ASPIRIN PO Take 81 mg by mouth daily       Calcium Carbonate-Vit D-Min (CALTRATE 600+D PLUS MINERALS) 600-800 MG-UNIT TABS Take 1 tablet by mouth 2 times daily       clobetasol (TEMOVATE) 0.05 % external solution Apply once to twice daily as needed. 50 mL 4     CRANBERRY PO Take by mouth 2 times daily        Ginger, Zingiber officinalis, (GINGER PO) Take by mouth daily        losartan (COZAAR) 50 MG tablet Take 1 tablet (50 mg) by mouth daily 90 tablet 4     simvastatin (ZOCOR) 10 MG tablet TAKE ONE TABLET BY MOUTH EVERY NIGHT AT BEDTIME 90 tablet 4     VITAMIN D, CHOLECALCIFEROL, PO Take by mouth daily       No facility-administered encounter medications on file as of 2/1/2022.             O:   NAD, WDWN, Alert & Oriented, Mood & Affect wnl, Vitals stable   Here today alone   BP (!) 150/77 (BP Location: Left arm, Patient Position: Sitting, Cuff Size: Adult Regular)   Pulse 83   SpO2 96%    General appearance normal   Vitals stable   Alert, oriented and in no acute distress     Brown stuck on papules on nose x 2  Scarring, with perifollicular scaly on frontal scalp  0.8 cm pink scabbed papule on left upper arm  0.7 cm brown irregular macule on right upper arm   Stuck on papules and brown macules on trunk and ext   Red papules on trunk     The remainder of skin exam is normal       Eyes: Conjunctivae/lids:Normal     ENT: Lipsnormal    MSK:Normal    Cardiovascular: peripheral edema none    Pulm: Breathing Normal     Neuro/Psych: Orientation:Alert and Orientedx3 ; Mood/Affect:normal   A/P:  1. R/O MIS vs macular sk on right upper arm  TANGENTIAL BIOPSY SENT OUT:  After consent, anesthesia with LEC and prep, tangential excision performed and specimen sent out for permanent  section histology.  No complications and routine wound care. Patient told to call our office in 1-2 weeks for result.      2. R/O NMSC on left upper arm  TANGENTIAL BIOPSY IN HOUSE:  After consent, anesthesia with LEC and prep, tangential excision performed.  No complications and routine wound care.      3. Inflamed seborrheic keratosis on nose x 2  LN2:  Treated with LN2 for 5s for 1-2 cycles. Warned risks of blistering, pain, pigment change, scarring, and incomplete resolution.  Advised patient to return if lesions do not completely resolve.  Wound care sheet given.  4. Frontal fibrosing alopecia  IL TAC: PGACAC discussed.  Risks including but not limited to injection site reaction, bruising, no resolution.  All questions answered and entertained to patient s satisfaction.  Informed consent obtained.  IL TAC in concentration of 5 mg/ml was injected ID to frontal fibrosing alopecia.  Total injected was  1 ml.  Patient tolerated without complications and given wound care instructions, including not to move product around.  Return in 4 weeks for follow-up and possible additional IL TAC.    5. Seborrheic keratosis, lentigo, angioma,, history of NMSC   BENIGN LESIONS DISCUSSED WITH PATIENT:  I discussed the specifics of tumor, prognosis, and genetics of benign lesions.  I explained that treatment of these lesions would be purely cosmetic and not medically neccessary.  I discussed with patient different removal options including excision, cautery and /or laser.      Nature and genetics of benign skin lesions dicussed with patient.  Signs and Symptoms of skin cancer discussed with patient.  ABCDEs of melanoma reviewed with patient.  Patient encouraged to perform monthly skin exams.  UV precautions reviewed with patient.  Risks of non-melanoma skin cancer discussed with patient   Return to clinic pending biopsy results.

## 2022-02-01 NOTE — PROGRESS NOTES
L upper arm:Orthokeratosis of epidermis with a proliferation of nests of basaloid cells, with peripheral palisading and a haphazard arrangement in the center extending into the dermis, forming nodules.  The tumor cells have hyperchromatic nuclei. Poor cytoplasm and intercellular bridging.      L upper arm basal cell carcinoma schedule excision

## 2022-02-01 NOTE — PATIENT INSTRUCTIONS
Wound Care Instructions     FOR SUPERFICIAL WOUNDS     [facility] [phone]  [facility] [phone]      AFTER 24 HOURS YOU SHOULD REMOVE THE BANDAGE AND BEGIN DAILY DRESSING CHANGES AS FOLLOWS:     1) Remove Dressing.     2) Clean and dry the area with tap water using a Q-tip or sterile gauze pad.     3) Apply Vaseline, Aquaphor, Polysporin ointment or Bacitracin ointment over entire wound.  Do NOT use Neosporin ointment.     4) Cover the wound with a band-aid, or a sterile non-stick gauze pad and micropore paper tape      REPEAT THESE INSTRUCTIONS AT LEAST ONCE A DAY UNTIL THE WOUND HAS COMPLETELY HEALED.    It is an old wives tale that a wound heals better when it is exposed to air and allowed to dry out. The wound will heal faster with a better cosmetic result if it is kept moist with ointment and covered with a bandage.    **Do not let the wound dry out.**      Supplies Needed:      *Cotton tipped applicators (Q-tips)    *Polysporin Ointment or Bacitracin Ointment (NOT NEOSPORIN)    *Band-aids or non-stick gauze pads and micropore paper tape.      PATIENT INFORMATION:    During the healing process you will notice a number of changes. All wounds develop a small halo of redness surrounding the wound.  This means healing is occurring. Severe itching with extensive redness usually indicates sensitivity to the ointment or bandage tape used to dress the wound.  You should call our office if this develops.      Swelling  and/or discoloration around your surgical site is common, particularly when performed around the eye.    All wounds normally drain.  The larger the wound the more drainage there will be.  After 7-10 days, you will notice the wound beginning to shrink and new skin will begin to grow.  The wound is healed when you can see skin has formed over the entire area.  A healed wound has a healthy, shiny look to the surface and is red to dark pink in color to normalize.   Wounds may take approximately 4-6 weeks to heal.  Larger wounds may take 6-8 weeks.  After the wound is healed you may discontinue dressing changes.    You may experience a sensation of tightness as your wound heals. This is normal and will gradually subside.    Your healed wound may be sensitive to temperature changes. This sensitivity improves with time, but if you re having a lot of discomfort, try to avoid temperature extremes.    Patients frequently experience itching after their wound appears to have healed because of the continue healing under the skin.  Plain Vaseline will help relieve the itching.        POSSIBLE COMPLICATIONS    BLEEDIN. Leave the bandage in place.  2. Use tightly rolled up gauze or a cloth to apply direct pressure over the bandage for 30  minutes.  3. Reapply pressure for an additional 30 minutes if necessary  4. Use additional gauze and tape to maintain pressure once the bleeding has stopped.

## 2022-02-01 NOTE — NURSING NOTE
Chief Complaint   Patient presents with     Skin Check     spot left upper arm, nose, and left thigh        Vitals:    02/01/22 0818   BP: (!) 150/77   BP Location: Left arm   Patient Position: Sitting   Cuff Size: Adult Regular   Pulse: 83   SpO2: 96%     Wt Readings from Last 1 Encounters:   11/28/21 60.5 kg (133 lb 6.4 oz)       Cecilia Santana LPN .................2/1/2022

## 2022-02-01 NOTE — LETTER
2/1/2022         RE: Tri Walden  [address]  [facility] [address]        Dear Colleague,    Thank you for referring your patient, Tri Walden, to the [facility]. Please see a copy of my visit note below.    L upper arm:Orthokeratosis of epidermis with a proliferation of nests of basaloid cells, with peripheral palisading and a haphazard arrangement in the center extending into the dermis, forming nodules.  The tumor cells have hyperchromatic nuclei. Poor cytoplasm and intercellular bridging.      L upper arm basal cell carcinoma schedule excision       Again, thank you for allowing me to participate in the care of your patient.        Sincerely,        Armand Wilson MD

## 2022-02-01 NOTE — TELEPHONE ENCOUNTER
----- Message from Armand Wilson MD sent at 2/1/2022 10:01 AM CST -----  L upper arm basal cell carcinoma schedule excision

## 2022-02-01 NOTE — LETTER
[facility] [place]  [address]  [facility] [address]  Phone: [phone]    February 1, 2022    Tri Walden                                                                                                                [address]  [facility] [address]            Dear MsMindy Win,    You are scheduled for Mohs Surgery on:         Please check in at Dermatology Clinic.   (2nd Floor, last  Clinic on right up staircase or elevator -past OB/GYN clinic)    You don't need to arrive more than 5-10 minutes prior to your appointment time.     Be sure to eat a good breakfast and bathe and wash your hair prior to Surgery.    If you are taking any anti-coagulants that are prescribed by your Doctor (such as Coumadin/warfarin, Plavix, Aspirin, Ibuprofen), please continue taking them.     However, If you are taking anti-coagulants over the counter without  a Doctor's order for a Medical condition, please discontinue them 10 days prior to Surgery.      Please wear loose comfortable clothing as it could possibly be 4-6 hours until your surgery is completed depending upon how many layers of tissue need to be removed.     Wi-fi access is available.     Thank you,      Armand Wilson MD/ Jennifer Norwood RN

## 2022-02-01 NOTE — TELEPHONE ENCOUNTER
Called patient and gave results. All questions were answered. Patient was scheduled for Mohs with Dr. Wilson.   Cecilia Santana LPN

## 2022-02-01 NOTE — LETTER
2/1/2022         RE: Tri Walden  [address]  [facility] [address]        Dear Colleague,    Thank you for referring your patient, Tri Walden, to the [facility]. Please see a copy of my visit note below.    Tri Walden is an extremely pleasant 71 year old year old female patient here today for spot on left arm. Present for a few months. She notes that it will scab up, not healing.   Patient has no other skin complaints today.  Remainder of the HPI, Meds, PMH, Allergies, FH, and SH was reviewed in chart.    Pertinent Hx:   History of BCC  Past Medical History:   Diagnosis Date     Basal cell carcinoma        Past Surgical History:   Procedure Laterality Date     ABLATE VEIN VARICOSE RADIO FREQUENCY WITHOUT PHLEBECTOMY MULTIPLE STAB  12/6/2012    Procedure: ABLATE VEIN VARICOSE RADIO FREQUENCY WITHOUT PHLEBECTOMY MULTIPLE STAB;  Bilateral Ablation of Varicose Veins;  Surgeon: Fredis Marks MD;  Location: [facility] OR        Family History   Problem Relation Age of Onset     Arthritis Mother      Diabetes Mother         type 2     Heart Disease Father      Coronary Artery Disease Father      Cancer Paternal Grandmother         breast cancer     Heart Disease Paternal Grandfather      Cancer Sister         right kindney       Social History     Socioeconomic History     Marital status:      Spouse name: Not on file     Number of children: Not on file     Years of education: Not on file     Highest education level: Not on file   Occupational History     Occupation:      Employer: [employer]   Tobacco Use     Smoking status: Never Smoker     Smokeless tobacco: Never Used   Vaping Use     Vaping Use: Never used   Substance and Sexual Activity     Alcohol use: No     Drug use: No     Sexual activity: Not Currently     Partners: Male     Birth control/protection: Surgical     Comment: tubal ligation   Other Topics Concern     Parent/sibling  w/ CABG, MI or angioplasty before 65F 55M? Yes   Social History Narrative     Not on file     Social Determinants of Health     Financial Resource Strain: Not on file   Food Insecurity: Not on file   Transportation Needs: Not on file   Physical Activity: Not on file   Stress: Not on file   Social Connections: Not on file   Intimate Partner Violence: Not on file   Housing Stability: Not on file       Outpatient Encounter Medications as of 2/1/2022   Medication Sig Dispense Refill     ASPIRIN PO Take 81 mg by mouth daily       Calcium Carbonate-Vit D-Min (CALTRATE 600+D PLUS MINERALS) 600-800 MG-UNIT TABS Take 1 tablet by mouth 2 times daily       clobetasol (TEMOVATE) 0.05 % external solution Apply once to twice daily as needed. 50 mL 4     CRANBERRY PO Take by mouth 2 times daily        Ginger, Zingiber officinalis, (GINGER PO) Take by mouth daily        losartan (COZAAR) 50 MG tablet Take 1 tablet (50 mg) by mouth daily 90 tablet 4     simvastatin (ZOCOR) 10 MG tablet TAKE ONE TABLET BY MOUTH EVERY NIGHT AT BEDTIME 90 tablet 4     VITAMIN D, CHOLECALCIFEROL, PO Take by mouth daily       No facility-administered encounter medications on file as of 2/1/2022.             O:   NAD, WDWN, Alert & Oriented, Mood & Affect wnl, Vitals stable   Here today alone   BP (!) 150/77 (BP Location: Left arm, Patient Position: Sitting, Cuff Size: Adult Regular)   Pulse 83   SpO2 96%    General appearance normal   Vitals stable   Alert, oriented and in no acute distress     Brown stuck on papules on nose x 2  Scarring, with perifollicular scaly on frontal scalp  0.8 cm pink scabbed papule on left upper arm  0.7 cm brown irregular macule on right upper arm   Stuck on papules and brown macules on trunk and ext   Red papules on trunk     The remainder of skin exam is normal       Eyes: Conjunctivae/lids:Normal     ENT: Lipsnormal    MSK:Normal    Cardiovascular: peripheral edema none    Pulm: Breathing Normal     Neuro/Psych:  Orientation:Alert and Orientedx3 ; Mood/Affect:normal   A/P:  1. R/O MIS vs macular sk on right upper arm  TANGENTIAL BIOPSY SENT OUT:  After consent, anesthesia with LEC and prep, tangential excision performed and specimen sent out for permanent section histology.  No complications and routine wound care. Patient told to call our office in 1-2 weeks for result.      2. R/O NMSC on left upper arm  TANGENTIAL BIOPSY IN HOUSE:  After consent, anesthesia with LEC and prep, tangential excision performed.  No complications and routine wound care.      3. Inflamed seborrheic keratosis on nose x 2  LN2:  Treated with LN2 for 5s for 1-2 cycles. Warned risks of blistering, pain, pigment change, scarring, and incomplete resolution.  Advised patient to return if lesions do not completely resolve.  Wound care sheet given.  4. Frontal fibrosing alopecia  IL TAC: PGACAC discussed.  Risks including but not limited to injection site reaction, bruising, no resolution.  All questions answered and entertained to patient s satisfaction.  Informed consent obtained.  IL TAC in concentration of 5 mg/ml was injected ID to frontal fibrosing alopecia.  Total injected was  1 ml.  Patient tolerated without complications and given wound care instructions, including not to move product around.  Return in 4 weeks for follow-up and possible additional IL TAC.    5. Seborrheic keratosis, lentigo, angioma,, history of NMSC   BENIGN LESIONS DISCUSSED WITH PATIENT:  I discussed the specifics of tumor, prognosis, and genetics of benign lesions.  I explained that treatment of these lesions would be purely cosmetic and not medically neccessary.  I discussed with patient different removal options including excision, cautery and /or laser.      Nature and genetics of benign skin lesions dicussed with patient.  Signs and Symptoms of skin cancer discussed with patient.  ABCDEs of melanoma reviewed with patient.  Patient encouraged to perform monthly skin  exams.  UV precautions reviewed with patient.  Risks of non-melanoma skin cancer discussed with patient   Return to clinic pending biopsy results.         Again, thank you for allowing me to participate in the care of your patient.        Sincerely,        Jackelyn Erickson PA-C

## 2022-02-02 LAB
PATH REPORT.COMMENTS IMP SPEC: ABNORMAL
PATH REPORT.COMMENTS IMP SPEC: YES
PATH REPORT.FINAL DX SPEC: ABNORMAL
PATH REPORT.GROSS SPEC: ABNORMAL
PATH REPORT.MICROSCOPIC SPEC OTHER STN: ABNORMAL
PATH REPORT.RELEVANT HX SPEC: ABNORMAL

## 2022-02-14 ENCOUNTER — OFFICE VISIT (OUTPATIENT)
Dept: DERMATOLOGY | Facility: CLINIC | Age: 72
End: 2022-02-14
Payer: COMMERCIAL

## 2022-02-14 VITALS — SYSTOLIC BLOOD PRESSURE: 146 MMHG | OXYGEN SATURATION: 97 % | HEART RATE: 90 BPM | DIASTOLIC BLOOD PRESSURE: 79 MMHG

## 2022-02-14 DIAGNOSIS — C43.61 MELANOMA OF RIGHT UPPER ARM (H): Primary | ICD-10-CM

## 2022-02-14 PROCEDURE — 12032 INTMD RPR S/A/T/EXT 2.6-7.5: CPT | Performed by: DERMATOLOGY

## 2022-02-14 PROCEDURE — 17313 MOHS 1 STAGE T/A/L: CPT | Performed by: DERMATOLOGY

## 2022-02-14 PROCEDURE — 88342 IMHCHEM/IMCYTCHM 1ST ANTB: CPT | Mod: 59 | Performed by: DERMATOLOGY

## 2022-02-14 PROCEDURE — 88341 IMHCHEM/IMCYTCHM EA ADD ANTB: CPT | Mod: 59 | Performed by: DERMATOLOGY

## 2022-02-14 PROCEDURE — 99207 PR NO CHARGE LOS: CPT | Performed by: DERMATOLOGY

## 2022-02-14 NOTE — PROGRESS NOTES
Tri Walden is an extremely pleasant 71 year old year old female patient here today for evaluation and managment of 0.2mm melanoma right arm. Patient has no other skin complaints today.  Remainder of the HPI, Meds, PMH, Allergies, FH, and SH was reviewed in chart.      Past Medical History:   Diagnosis Date     Basal cell carcinoma        Past Surgical History:   Procedure Laterality Date     ABLATE VEIN VARICOSE RADIO FREQUENCY WITHOUT PHLEBECTOMY MULTIPLE STAB  12/6/2012    Procedure: ABLATE VEIN VARICOSE RADIO FREQUENCY WITHOUT PHLEBECTOMY MULTIPLE STAB;  Bilateral Ablation of Varicose Veins;  Surgeon: Fredis Marks MD;  Location: [facility] OR        Family History   Problem Relation Age of Onset     Arthritis Mother      Diabetes Mother         type 2     Heart Disease Father      Coronary Artery Disease Father      Cancer Paternal Grandmother         breast cancer     Heart Disease Paternal Grandfather      Cancer Sister         right kindney       Social History     Socioeconomic History     Marital status:      Spouse name: Not on file     Number of children: Not on file     Years of education: Not on file     Highest education level: Not on file   Occupational History     Occupation:      Employer: [employer]   Tobacco Use     Smoking status: Never Smoker     Smokeless tobacco: Never Used   Vaping Use     Vaping Use: Never used   Substance and Sexual Activity     Alcohol use: No     Drug use: No     Sexual activity: Not Currently     Partners: Male     Birth control/protection: Surgical     Comment: tubal ligation   Other Topics Concern     Parent/sibling w/ CABG, MI or angioplasty before 65F 55M? Yes   Social History Narrative     Not on file     Social Determinants of Health     Financial Resource Strain: Not on file   Food Insecurity: Not on file   Transportation Needs: Not on file   Physical Activity: Not on file   Stress: Not on file   Social Connections: Not on  file   Intimate Partner Violence: Not on file   Housing Stability: Not on file       Outpatient Encounter Medications as of 2/14/2022   Medication Sig Dispense Refill     ASPIRIN PO Take 81 mg by mouth daily       Calcium Carbonate-Vit D-Min (CALTRATE 600+D PLUS MINERALS) 600-800 MG-UNIT TABS Take 1 tablet by mouth 2 times daily       clobetasol (TEMOVATE) 0.05 % external solution Apply once to twice daily as needed. 50 mL 4     CRANBERRY PO Take by mouth 2 times daily        Ginger, Zingiber officinalis, (GINGER PO) Take by mouth daily        losartan (COZAAR) 50 MG tablet Take 1 tablet (50 mg) by mouth daily 90 tablet 4     simvastatin (ZOCOR) 10 MG tablet TAKE ONE TABLET BY MOUTH EVERY NIGHT AT BEDTIME 90 tablet 4     VITAMIN D, CHOLECALCIFEROL, PO Take by mouth daily       No facility-administered encounter medications on file as of 2/14/2022.             O:   NAD, WDWN, Alert & Oriented, Mood & Affect wnl, Vitals stable   Here today alone   General appearance normal   Vitals stable   Alert, oriented and in no acute distress      Following lymph nodes palpated: Occipital, Cervical, Supraclavicular ,a xilla no lad  R arm 7mm red macule      Eyes: Conjunctivae/lids:Normal     ENT: Lips, buccal mucosa, tongue: normal    MSK:Normal    Cardiovascular: peripheral edema none    Pulm: Breathing Normal    Neuro/Psych: Orientation:Alert and Orientedx3 ; Mood/Affect:normal       A/P:  1. R arm 0.2mm melanoma stage 1A  MELANOMA DISCUSSED WITH PATIENT:  I discussed the specifics of tumor, prognosis, metachronous melanoma, self exam, and genetics with the patient. I explained the need for monthly skin exams including and taught the patient how to do this. Patient was asked about new or changing moles . I discussed with patient signs and symptoms that could arise in the setting of recurrent locoregional or metastatic disease. In addition, the need to undergo every 4 month dermatologic full skin survey and evaluation given that  patients with a diagnosis of melanoma are at risk of recurrence (local and distant) and of subsequent de rosalee melanoma.    MELANOMA DISCUSSED WITH PATIENT:  I discussed the specifics of tumor, prognosis, metachronous melanoma, self exam, and genetics with the patient. I explained the need for monthly skin exams including and taught the patient how to do this. Patient was asked about new or changing moles . I discussed with patient signs and symptoms that could arise in the setting of recurrent locoregional or metastatic disease. In addition, the need to undergo every 4 month dermatologic full skin survey and evaluation given that patients with a diagnosis of melanoma are at risk of recurrence (local and distant) and of subsequent de rosalee melanoma.  . I reviewed treatment options, including a discussion of wide excision (the gold standard) versus Mohs surgery with MART-1 immunostains.     Note: MART-1 (Melanoma Antigen Recognized by T-cells) antibody immunostaining was used during Mohs surgery as per standard protocol, in addition to routine processing of all specimens with hematoxylin and eosin. The peripheral margins/edges were processed with the MART-1 stain (4 specimens total). The center was examined with hematoxylin & eosin and MART-1 immunostains. The patient was informed of the procedure and its risk/benefits during the consent for the procedure.    One or more of the reagents used in immunohistochemical testing in this case may not have been cleared or approved by the U.S. Food and Drug Administration (FDA). The FDA has determined that such clearance or approval is not necessary. These tests are used for clinical purposes. They should not be regarded as investigational or for research. These reagents  performance characteristics have been determined by [facility]. This laboratory is certified under the Clinical Laboratory Improvement Amendments of 1988 (CLIA-88) as qualified to perform high  complexity clinical laboratory testing.      MOHS:   Aggressive histology    The rationale for Mohs surgery was discussed with the patient and consent was obtained.  The risks and benefits as well as alternatives to therapy were discussed, in detail.  Specifically, the risks of infection, scarring, bleeding, prolonged wound healing, incomplete removal, allergy to anesthesia, nerve injury and recurrence were addressed.  Indication for Mohs was Aggressive histology. Prior to the procedure, the treatment site was clearly identified and, if available, confirmed with previous photos and confirmed by the patient   All components of the Universal Protocol/PAUSE rule were completed.  The Mohs surgeon operated in two distinct and integrated capacities as the surgeon and pathologist.      The area was prepped with Betasept.  A rim of normal appearing skin was marked circumferentially around the lesion.  The area was infiltrated with local anesthesia.  The tumor was first debulked to remove all clinically apparent tumor.  An incision following the standard Mohs approach was done and the specimen was oriented,mapped and placed in 5 block(s).  Each specimen was then chromacoded and processed in the Mohs laboratory using standard Mohs technique and submitted for frozen section histology.  Frozen section analysis showed no residual tumor but CLEAR MARGINS.      The tumor was excised using standard Mohs technique in 1 stages(s).  MART 1 stains were performed on 4 specimens. CLEAR MARGINS OBTAINED and Final defect size was 2 x 1.7 cm.     We discussed the options for wound management in full with the patient including risks/benefits/ possible outcomes.        REPAIR INT: Because of the tightness of the surrounding skin and Because of the size and full thickness nature of the defect, a int closure was planned. After LEC anesthesia and prep, Burow's triangles were excised in the relaxed skin tension lines. Hemostasis was obtained. The  wound edges were closed in a layered fashion using Vicryl and Fast Absorbing Plain Gut sutures. Postoperative length was 5 cm.   EBL minimal; complications none; wound care routine.  The patient was discharged in good condition and will return in one week for wound evaluation.  It was a pleasure speaking to Tri Walden today.  Previous clinic notes and pertinent laboratory tests were reviewed prior to Tri Walden's visit.  Nature and genetics of benign skin lesions dicussed with patient.  Signs and Symptoms of skin cancer discussed with patient.  Patient encouraged to perform monthly skin exams.  UV precautions reviewed with patient.  Return to clinic 4 months     No

## 2022-02-14 NOTE — LETTER
2/14/2022         RE: Tri Walden  [address]  [facility] [address]        Dear Colleague,    Thank you for referring your patient, Tri Walden, to the [facility]. Please see a copy of my visit note below.    Surgical Office Location :   [facility]  [address] [place] [address]      Tri Walden is an extremely pleasant 71 year old year old female patient here today for evaluation and managment of 0.2mm melanoma right arm. Patient has no other skin complaints today.  Remainder of the HPI, Meds, PMH, Allergies, FH, and SH was reviewed in chart.      Past Medical History:   Diagnosis Date     Basal cell carcinoma        Past Surgical History:   Procedure Laterality Date     ABLATE VEIN VARICOSE RADIO FREQUENCY WITHOUT PHLEBECTOMY MULTIPLE STAB  12/6/2012    Procedure: ABLATE VEIN VARICOSE RADIO FREQUENCY WITHOUT PHLEBECTOMY MULTIPLE STAB;  Bilateral Ablation of Varicose Veins;  Surgeon: Fredis Marks MD;  Location: [facility] OR        Family History   Problem Relation Age of Onset     Arthritis Mother      Diabetes Mother         type 2     Heart Disease Father      Coronary Artery Disease Father      Cancer Paternal Grandmother         breast cancer     Heart Disease Paternal Grandfather      Cancer Sister         right kindney       Social History     Socioeconomic History     Marital status:      Spouse name: Not on file     Number of children: Not on file     Years of education: Not on file     Highest education level: Not on file   Occupational History     Occupation:      Employer: [employer]   Tobacco Use     Smoking status: Never Smoker     Smokeless tobacco: Never Used   Vaping Use     Vaping Use: Never used   Substance and Sexual Activity     Alcohol use: No     Drug use: No     Sexual activity: Not Currently     Partners: Male     Birth control/protection: Surgical     Comment: tubal ligation    Other Topics Concern     Parent/sibling w/ CABG, MI or angioplasty before 65F 55M? Yes   Social History Narrative     Not on file     Social Determinants of Health     Financial Resource Strain: Not on file   Food Insecurity: Not on file   Transportation Needs: Not on file   Physical Activity: Not on file   Stress: Not on file   Social Connections: Not on file   Intimate Partner Violence: Not on file   Housing Stability: Not on file       Outpatient Encounter Medications as of 2/14/2022   Medication Sig Dispense Refill     ASPIRIN PO Take 81 mg by mouth daily       Calcium Carbonate-Vit D-Min (CALTRATE 600+D PLUS MINERALS) 600-800 MG-UNIT TABS Take 1 tablet by mouth 2 times daily       clobetasol (TEMOVATE) 0.05 % external solution Apply once to twice daily as needed. 50 mL 4     CRANBERRY PO Take by mouth 2 times daily        Ginger, Zingiber officinalis, (GINGER PO) Take by mouth daily        losartan (COZAAR) 50 MG tablet Take 1 tablet (50 mg) by mouth daily 90 tablet 4     simvastatin (ZOCOR) 10 MG tablet TAKE ONE TABLET BY MOUTH EVERY NIGHT AT BEDTIME 90 tablet 4     VITAMIN D, CHOLECALCIFEROL, PO Take by mouth daily       No facility-administered encounter medications on file as of 2/14/2022.             O:   NAD, WDWN, Alert & Oriented, Mood & Affect wnl, Vitals stable   Here today alone   General appearance normal   Vitals stable   Alert, oriented and in no acute distress      Following lymph nodes palpated: Occipital, Cervical, Supraclavicular ,a xilla no lad  R arm 7mm red macule      Eyes: Conjunctivae/lids:Normal     ENT: Lips, buccal mucosa, tongue: normal    MSK:Normal    Cardiovascular: peripheral edema none    Pulm: Breathing Normal    Neuro/Psych: Orientation:Alert and Orientedx3 ; Mood/Affect:normal       A/P:  1. R arm 0.2mm melanoma stage 1A  MELANOMA DISCUSSED WITH PATIENT:  I discussed the specifics of tumor, prognosis, metachronous melanoma, self exam, and genetics with the patient. I  explained the need for monthly skin exams including and taught the patient how to do this. Patient was asked about new or changing moles . I discussed with patient signs and symptoms that could arise in the setting of recurrent locoregional or metastatic disease. In addition, the need to undergo every 4 month dermatologic full skin survey and evaluation given that patients with a diagnosis of melanoma are at risk of recurrence (local and distant) and of subsequent de rosalee melanoma.    MELANOMA DISCUSSED WITH PATIENT:  I discussed the specifics of tumor, prognosis, metachronous melanoma, self exam, and genetics with the patient. I explained the need for monthly skin exams including and taught the patient how to do this. Patient was asked about new or changing moles . I discussed with patient signs and symptoms that could arise in the setting of recurrent locoregional or metastatic disease. In addition, the need to undergo every 4 month dermatologic full skin survey and evaluation given that patients with a diagnosis of melanoma are at risk of recurrence (local and distant) and of subsequent de rosalee melanoma.  . I reviewed treatment options, including a discussion of wide excision (the gold standard) versus Mohs surgery with MART-1 immunostains.     Note: MART-1 (Melanoma Antigen Recognized by T-cells) antibody immunostaining was used during Mohs surgery as per standard protocol, in addition to routine processing of all specimens with hematoxylin and eosin. The peripheral margins/edges were processed with the MART-1 stain (4 specimens total). The center was examined with hematoxylin & eosin and MART-1 immunostains. The patient was informed of the procedure and its risk/benefits during the consent for the procedure.    One or more of the reagents used in immunohistochemical testing in this case may not have been cleared or approved by the U.S. Food and Drug Administration (FDA). The FDA has determined that such  clearance or approval is not necessary. These tests are used for clinical purposes. They should not be regarded as investigational or for research. These reagents  performance characteristics have been determined by [facility]. This laboratory is certified under the Clinical Laboratory Improvement Amendments of 1988 (CLIA-88) as qualified to perform high complexity clinical laboratory testing.      MOHS:   Aggressive histology    The rationale for Mohs surgery was discussed with the patient and consent was obtained.  The risks and benefits as well as alternatives to therapy were discussed, in detail.  Specifically, the risks of infection, scarring, bleeding, prolonged wound healing, incomplete removal, allergy to anesthesia, nerve injury and recurrence were addressed.  Indication for Mohs was Aggressive histology. Prior to the procedure, the treatment site was clearly identified and, if available, confirmed with previous photos and confirmed by the patient   All components of the Universal Protocol/PAUSE rule were completed.  The Mohs surgeon operated in two distinct and integrated capacities as the surgeon and pathologist.      The area was prepped with Betasept.  A rim of normal appearing skin was marked circumferentially around the lesion.  The area was infiltrated with local anesthesia.  The tumor was first debulked to remove all clinically apparent tumor.  An incision following the standard Mohs approach was done and the specimen was oriented,mapped and placed in 5 block(s).  Each specimen was then chromacoded and processed in the Mohs laboratory using standard Mohs technique and submitted for frozen section histology.  Frozen section analysis showed no residual tumor but CLEAR MARGINS.      The tumor was excised using standard Mohs technique in 1 stages(s).  MART 1 stains were performed on 4 specimens. CLEAR MARGINS OBTAINED and Final defect size was 2 x 1.7 cm.     We discussed the options for  wound management in full with the patient including risks/benefits/ possible outcomes.        REPAIR INT: Because of the tightness of the surrounding skin and Because of the size and full thickness nature of the defect, a int closure was planned. After LEC anesthesia and prep, Burow's triangles were excised in the relaxed skin tension lines. Hemostasis was obtained. The wound edges were closed in a layered fashion using Vicryl and Fast Absorbing Plain Gut sutures. Postoperative length was 5 cm.   EBL minimal; complications none; wound care routine.  The patient was discharged in good condition and will return in one week for wound evaluation.  It was a pleasure speaking to Tri Walden today.  Previous clinic notes and pertinent laboratory tests were reviewed prior to Tri Walden's visit.  Nature and genetics of benign skin lesions dicussed with patient.  Signs and Symptoms of skin cancer discussed with patient.  Patient encouraged to perform monthly skin exams.  UV precautions reviewed with patient.  Return to clinic 4 months        Again, thank you for allowing me to participate in the care of your patient.        Sincerely,        Armand Wilson MD

## 2022-02-14 NOTE — PATIENT INSTRUCTIONS
Sutured Wound Care     [facility]: [phone]    [facility]: [phone]          ? No strenuous activity for 48 hours. Resume moderate activity in 48 hours. No heavy exercising until you are seen for follow up in one week.     ? Take Tylenol as needed for discomfort.                         ? Do not drink alcoholic beverages for 48 hours.     ? Keep the pressure bandage in place for 24 hours. If the bandage becomes blood tinged or loose, reinforce it with gauze and tape.        (Refer to the reverse side of this page for management of bleeding).    ? Remove pressure bandage in 24 hours     ? Leave the flat bandage in place until your follow up appointment.    ? Keep the bandage dry. Wash around it carefully.    ? If the tape becomes soiled or starts to come off, reinforce it with additional paper tape.    ? Do not smoke for 3 weeks; smoking is detrimental to wound healing.    ? It is normal to have swelling and bruising around the surgical site. The bruising will fade in approximately 10-14 days. Elevate the area to reduce swelling.    ? Numbness, itchiness and sensitivity to temperature changes can occur after surgery and may take up to 18 months to normalize.      POSSIBLE COMPLICATIONS    BLEEDIN. Leave the bandage in place.  2. Use tightly rolled up gauze or a cloth to apply direct pressure over the bandage for 20   minutes.  3. Reapply pressure for an additional 20 minutes if necessary  4. Call the office or go to the nearest emergency room if pressure fails to stop the bleeding.  5. Use additional gauze and tape to maintain pressure once the bleeding has stopped.        PAIN:    1. Post operative pain should slowly get better, never worse.  2. A severe increase in pain may indicate a problem. Call the office if this occurs.    In case of emergency phone:Dr Wilson [phone]

## 2022-02-22 ENCOUNTER — ALLIED HEALTH/NURSE VISIT (OUTPATIENT)
Dept: DERMATOLOGY | Facility: CLINIC | Age: 72
End: 2022-02-22
Payer: COMMERCIAL

## 2022-02-22 ENCOUNTER — OFFICE VISIT (OUTPATIENT)
Dept: DERMATOLOGY | Facility: CLINIC | Age: 72
End: 2022-02-22
Payer: COMMERCIAL

## 2022-02-22 VITALS — DIASTOLIC BLOOD PRESSURE: 79 MMHG | OXYGEN SATURATION: 99 % | HEART RATE: 76 BPM | SYSTOLIC BLOOD PRESSURE: 168 MMHG

## 2022-02-22 DIAGNOSIS — C44.612 BASAL CELL CARCINOMA (BCC) OF RIGHT UPPER ARM: Primary | ICD-10-CM

## 2022-02-22 DIAGNOSIS — Z48.01 ENCOUNTER FOR CHANGE OR REMOVAL OF SURGICAL WOUND DRESSING: Primary | ICD-10-CM

## 2022-02-22 PROCEDURE — 99207 PR NO CHARGE LOS: CPT | Performed by: DERMATOLOGY

## 2022-02-22 PROCEDURE — 88332 PATH CONSLTJ SURG EA ADD BLK: CPT | Performed by: DERMATOLOGY

## 2022-02-22 PROCEDURE — 12031 INTMD RPR S/A/T/EXT 2.5 CM/<: CPT | Mod: 79 | Performed by: DERMATOLOGY

## 2022-02-22 PROCEDURE — 11602 EXC TR-EXT MAL+MARG 1.1-2 CM: CPT | Mod: 79 | Performed by: DERMATOLOGY

## 2022-02-22 PROCEDURE — 99207 PR NO CHARGE NURSE ONLY: CPT

## 2022-02-22 PROCEDURE — 88331 PATH CONSLTJ SURG 1 BLK 1SPC: CPT | Performed by: DERMATOLOGY

## 2022-02-22 NOTE — PROGRESS NOTES
Pt returned to clinic for post surgery 1 week follow up bandage change. Pt has no complaints, denies pain. Bandage removed from right upper arm, area cleansed with normal saline. Site is healing and wound edges approximating well. Reapplied new steri strips and paper tape.    Advised to watch for signs/sx of infection; spreading redness, drainage, odor, fever. Call or report promptly to clinic. Pt given written instructions and informed to rtc as needed. Patient verbalized understanding.     Lenora Zurita, CMA

## 2022-02-22 NOTE — PROGRESS NOTES
Surgical Office Location :   [facility]  [address] [place] [address]

## 2022-02-22 NOTE — PATIENT INSTRUCTIONS
Open Wound Care     for left upper arm        ? No strenuous activity for 48 hours    ? Take Tylenol as needed for discomfort.                                                .         ? Do not drink alcoholic beverages for 48 hours.    ? Keep the pressure bandage in place for 24 hours. If the bandage becomes blood tinged or loose, reinforce it with gauze and tape.        (Refer to the reverse side of this page for management of bleeding).    ? Remove bandage in 24 hours and begin wound care as follows:     1. Clean area with tap water using a Q tip or gauze pad, (shower / bathe normally)  2. Dry wound with Q tip or gauze pad  3. Apply Aquaphor, Vaseline, Polysporin or Bacitracin Ointment with a Q tip    Do NOT use Neosporin Ointment *  4. Cover the wound with a band-aid or nonstick gauze pad and paper tape.  5. Repeat wound care once a day until wound is completely healed.    It is an old wives tale that a wound heals better when it is exposed to air and allowed to dry out. The wound will heal faster with a better cosmetic result if it is kept moist with ointment and covered with a bandage.  Do not let the wound dry out.      Supplies Needed:                Qtips or gauze pads                Polysporin or Bacitracin Ointment                Bandaids or nonstick gauze pads and paper tape    Wound care kits and brown paper tape are available for purchase at   the pharmacy.    BLEEDIN. Use tightly rolled up gauze or cloth to apply direct pressure over the bandage for 20   minutes.  2. Reapply pressure for an additional 20 minutes if necessary  3. Call the office or go to the nearest emergency room if pressure fails to stop the bleeding.  4. Use additional gauze and tape to maintain pressure once the bleeding has stopped.  5. Begin wound care 24 hours after surgery as directed.                  WOUND HEALING    1. One week after surgery a pink / red halo will form around the outside of the wound.   This is new  skin.  2. The center of the wound will appear yellowish white and produce some drainage.  3. The pink halo will slowly migrate in toward the center of the wound until the wound is covered with new shiny pink skin.  4. There will be no more drainage when the wound is completely healed.  5. It will take six months to one year for the redness to fade.  6. The scar may be itchy, tight and sensitive to extreme temperatures for a year after the surgery.  7. Massaging the area several times a day for several minutes after the wound is completely healed will help the scar soften and normalize faster. Begin massage only after healing is complete.      In case of emergency call: Dr Wilson: [phone]    [facility]: [phone]    [facility]:[phone]       WOUND CARE INSTRUCTIONS  for  ONE WEEK AFTER SURGERY - right arm          1) Leave flat bandage on your skin for one week after today s bandage change.  2) In one week when you remove the bandage, you may resume your regular skin care routine, including washing with mild soap and water, applying moisturizer, make-up and sunscreen.    3) If there are any open or bleeding areas at the incision/graft site you should begin to cover the area with a bandage daily as follows:    1) Clean and dry the area with plain tap water using a Q-tip or sterile gauze pad.  2) Apply Polysporin or Bacitracin ointment to the open area.  3) Cover the wound with a band-aid or a sterile non-stick gauze pad and micropore paper tape.         SIGNS OF INFECTION  - If you notice any of these signs of infection, call your doctor right away: expanding redness around the wound.  - Yellow or greenish-colored pus or cloudy wound drainage.    - Red streaking spreading from the wound.  - Increased swelling, tenderness, or pain around the wound.   - Fever.    Please remember that yellow and clear drainage from a wound can be normal and related to normal wound healing.  Isolated  drainage from a wound without a combination of the above features does not indicate infection.       *Once the bandages are removed, the scar will be red and firm (especially in the lip/chin area). This is normal and will fade in time. It might take 6-12 months for this to happen.     *Massaging the area will help the scar soften and fade quicker. Begin to massage the area one month after the bandages have been removed. To massage apply pressure directly and firmly over the scar with the fingertips and move in a circular motion. Massage the area for a few minutes several times a day. Continue to massage the site for several months.    *Approximately 6-8 weeks after surgery it is not uncommon to see the formation of  tender pimple-like  bump along the scar. This is normal. As the scar continues to mature and the stitches underneath the skin begin to dissolve, this might occur. Do not pick or squeeze, this will resolve on it s own. Should one break open producing a small amount of drainage, apply Polysporin or Bacitracin ointment a few times a day until the wound is completely healed.    *Numbness in the surgical area is expected. It might take 12-18 months for the feeling to return to normal. During this time sensations of itchiness, tingling and occasional sharp pains might be noted. These feelings are normal and will subside once the nerves have completely healed.         IN CASE OF EMERGENCY: Dr Wilson [phone]     If you were seen in [place] call: [phone]    If you were seen in [place] call: [phone]

## 2022-02-22 NOTE — LETTER
2/22/2022         RE: Tri Walden  [address]  [facility] [address]        Dear Colleague,    Thank you for referring your patient, Tri Walden, to the [facility]. Please see a copy of my visit note below.    Surgical Office Location :   [facility]  [address] [place] [address]      Tri Walden is an extremely pleasant 71 year old year old female patient here today for evaluation and managment of basal cell carcinoma on left arm, R arm healing well.  Patient has no other skin complaints today.  Remainder of the HPI, Meds, PMH, Allergies, FH, and SH was reviewed in chart.      Past Medical History:   Diagnosis Date     Basal cell carcinoma      Malignant melanoma (H)        Past Surgical History:   Procedure Laterality Date     ABLATE VEIN VARICOSE RADIO FREQUENCY WITHOUT PHLEBECTOMY MULTIPLE STAB  12/6/2012    Procedure: ABLATE VEIN VARICOSE RADIO FREQUENCY WITHOUT PHLEBECTOMY MULTIPLE STAB;  Bilateral Ablation of Varicose Veins;  Surgeon: Fredis Marks MD;  Location: [facility] OR        Family History   Problem Relation Age of Onset     Arthritis Mother      Diabetes Mother         type 2     Heart Disease Father      Coronary Artery Disease Father      Cancer Paternal Grandmother         breast cancer     Heart Disease Paternal Grandfather      Cancer Sister         right kindney       Social History     Socioeconomic History     Marital status:      Spouse name: Not on file     Number of children: Not on file     Years of education: Not on file     Highest education level: Not on file   Occupational History     Occupation:      Employer: [employer]   Tobacco Use     Smoking status: Never Smoker     Smokeless tobacco: Never Used   Vaping Use     Vaping Use: Never used   Substance and Sexual Activity     Alcohol use: No     Drug use: No     Sexual activity: Not Currently     Partners: Male     Birth  control/protection: Surgical     Comment: tubal ligation   Other Topics Concern     Parent/sibling w/ CABG, MI or angioplasty before 65F 55M? Yes   Social History Narrative     Not on file     Social Determinants of Health     Financial Resource Strain: Not on file   Food Insecurity: Not on file   Transportation Needs: Not on file   Physical Activity: Not on file   Stress: Not on file   Social Connections: Not on file   Intimate Partner Violence: Not on file   Housing Stability: Not on file       Outpatient Encounter Medications as of 2/22/2022   Medication Sig Dispense Refill     ASPIRIN PO Take 81 mg by mouth daily       Calcium Carbonate-Vit D-Min (CALTRATE 600+D PLUS MINERALS) 600-800 MG-UNIT TABS Take 1 tablet by mouth 2 times daily       clobetasol (TEMOVATE) 0.05 % external solution Apply once to twice daily as needed. 50 mL 4     CRANBERRY PO Take by mouth 2 times daily        Ginger, Zingiber officinalis, (GINGER PO) Take by mouth daily        losartan (COZAAR) 50 MG tablet Take 1 tablet (50 mg) by mouth daily 90 tablet 4     simvastatin (ZOCOR) 10 MG tablet TAKE ONE TABLET BY MOUTH EVERY NIGHT AT BEDTIME 90 tablet 4     VITAMIN D, CHOLECALCIFEROL, PO Take by mouth daily       No facility-administered encounter medications on file as of 2/22/2022.             O:   NAD, WDWN, Alert & Oriented, Mood & Affect wnl, Vitals stable   Here today alone   BP (!) 168/79   Pulse 76   SpO2 99%    General appearance normal   Vitals stable   Alert, oriented and in no acute distress     L arm 8mm pink pearly papule       Eyes: Conjunctivae/lids:Normal     ENT: Lips, buccal mucosa, tongue: normal    MSK:Normal    Cardiovascular: peripheral edema none    Pulm: Breathing Normal    Neuro/Psych: Orientation:Alert and Orientedx3 ; Mood/Affect:normal       MICRO:   L arm:Unremarkable epidermis, dermis and superficial subcutis.  No concerning areas for malignancy.     A/P:  1. L arm basal cell carcinoma   EXCISION OF BASAL CELL  CARCINOMA, Margins confirmed with FROZEN SECTIONS AND Second intent: After thorough discussion of PGACAC, consent obtained, anesthesia and prep, the margins of the lesion were identified and an elliptical incision was made encompassing the lesion with 4mm margin. The incisions were made through the skin and down to and including the superficial subcutis.  The lesion was removed en bloc and submitted for frozen section pathologic review. Clear margins obtained (1.4cm).    REPAIR SECOND INTENT: We discussed the options for wound management in full with the patient including risks/benefits/possible outcomes. Decision made to allow the wound to heal by second intention. EBL minimal; complications none; wound care routine.  The patient was discharged in good condition and will return in one month or prn for wound evaluation.       It was a pleasure speaking to Tri Walden today.  Previous clinic notes and pertinent laboratory tests were reviewed prior to Tri Walden's visit.  Nature and genetics of benign skin lesions dicussed with patient.  Signs and Symptoms of skin cancer discussed with patient.  Patient encouraged to perform monthly skin exams.  UV precautions reviewed with patient.  Risks of non-melanoma skin cancer discussed with patient   Return to clinic 6 months        Again, thank you for allowing me to participate in the care of your patient.        Sincerely,        Armand Wilson MD

## 2022-02-22 NOTE — PROGRESS NOTES
Tri Walden is an extremely pleasant 71 year old year old female patient here today for evaluation and managment of basal cell carcinoma on left arm, R arm healing well.  Patient has no other skin complaints today.  Remainder of the HPI, Meds, PMH, Allergies, FH, and SH was reviewed in chart.      Past Medical History:   Diagnosis Date     Basal cell carcinoma      Malignant melanoma (H)        Past Surgical History:   Procedure Laterality Date     ABLATE VEIN VARICOSE RADIO FREQUENCY WITHOUT PHLEBECTOMY MULTIPLE STAB  12/6/2012    Procedure: ABLATE VEIN VARICOSE RADIO FREQUENCY WITHOUT PHLEBECTOMY MULTIPLE STAB;  Bilateral Ablation of Varicose Veins;  Surgeon: Fredis Marks MD;  Location: [facility] OR        Family History   Problem Relation Age of Onset     Arthritis Mother      Diabetes Mother         type 2     Heart Disease Father      Coronary Artery Disease Father      Cancer Paternal Grandmother         breast cancer     Heart Disease Paternal Grandfather      Cancer Sister         right kindney       Social History     Socioeconomic History     Marital status:      Spouse name: Not on file     Number of children: Not on file     Years of education: Not on file     Highest education level: Not on file   Occupational History     Occupation:      Employer: [employer]   Tobacco Use     Smoking status: Never Smoker     Smokeless tobacco: Never Used   Vaping Use     Vaping Use: Never used   Substance and Sexual Activity     Alcohol use: No     Drug use: No     Sexual activity: Not Currently     Partners: Male     Birth control/protection: Surgical     Comment: tubal ligation   Other Topics Concern     Parent/sibling w/ CABG, MI or angioplasty before 65F 55M? Yes   Social History Narrative     Not on file     Social Determinants of Health     Financial Resource Strain: Not on file   Food Insecurity: Not on file   Transportation Needs: Not on file   Physical Activity: Not on  file   Stress: Not on file   Social Connections: Not on file   Intimate Partner Violence: Not on file   Housing Stability: Not on file       Outpatient Encounter Medications as of 2/22/2022   Medication Sig Dispense Refill     ASPIRIN PO Take 81 mg by mouth daily       Calcium Carbonate-Vit D-Min (CALTRATE 600+D PLUS MINERALS) 600-800 MG-UNIT TABS Take 1 tablet by mouth 2 times daily       clobetasol (TEMOVATE) 0.05 % external solution Apply once to twice daily as needed. 50 mL 4     CRANBERRY PO Take by mouth 2 times daily        Ginger, Zingiber officinalis, (GINGER PO) Take by mouth daily        losartan (COZAAR) 50 MG tablet Take 1 tablet (50 mg) by mouth daily 90 tablet 4     simvastatin (ZOCOR) 10 MG tablet TAKE ONE TABLET BY MOUTH EVERY NIGHT AT BEDTIME 90 tablet 4     VITAMIN D, CHOLECALCIFEROL, PO Take by mouth daily       No facility-administered encounter medications on file as of 2/22/2022.             O:   NAD, WDWN, Alert & Oriented, Mood & Affect wnl, Vitals stable   Here today alone   BP (!) 168/79   Pulse 76   SpO2 99%    General appearance normal   Vitals stable   Alert, oriented and in no acute distress     L arm 8mm pink pearly papule       Eyes: Conjunctivae/lids:Normal     ENT: Lips, buccal mucosa, tongue: normal    MSK:Normal    Cardiovascular: peripheral edema none    Pulm: Breathing Normal    Neuro/Psych: Orientation:Alert and Orientedx3 ; Mood/Affect:normal       MICRO:   #1L arm:Orthokeratosis of epidermis with a proliferation of nests of basaloid cells, with peripheral palisading and a haphazard arrangement in the center extending into the dermis, forming nodules.  The tumor cells have hyperchromatic nuclei. Poor cytoplasm and intercellular bridging.    Deep margin positive  #2L arm:Unremarkable epidermis, dermis and superficial subcutis.  No concerning areas for malignancy.     A/P:  1. L arm basal cell carcinoma   EXCISION OF BASAL CELL CARCINOMA, Margins confirmed with FROZEN  SECTIONS AND Second intent: After thorough discussion of PGACAC, consent obtained, anesthesia and prep, the margins of the lesion were identified and an elliptical incision was made encompassing the lesion with 4mm margin. The incisions were made through the skin and down to and including the superficial subcutis.  The lesion was removed en bloc and submitted for frozen section pathologic review. Positive deep margin      the margins of the lesion were identified and an elliptical incision was made encompassing the lesion with 4mm margin. The incisions were made through the skin and down to and including the superficial subcutis.  The lesion was removed en bloc and submitted for frozen section pathologic review.   Clear margins obtained (2.1cm).    Because of the relatively superficial nature of the wound and patient s preference, an intermediate repair was planned.  Guiding sutures were carefully placed across the wound to control the direction of wound closure, to prevent distortion from wound contraction, and to minimize wound size to facilitate wound care and healing.  No complications; EBL minimal.  Routine wound care discussed with patient.          It was a pleasure speaking to Tri Walden today.  Previous clinic notes and pertinent laboratory tests were reviewed prior to Tri Walden's visit.  Nature and genetics of benign skin lesions dicussed with patient.  Signs and Symptoms of skin cancer discussed with patient.  Patient encouraged to perform monthly skin exams.  UV precautions reviewed with patient.  Risks of non-melanoma skin cancer discussed with patient   Return to clinic 6 months

## 2022-02-27 ENCOUNTER — HOSPITAL ENCOUNTER (EMERGENCY)
Facility: CLINIC | Age: 72
Discharge: HOME | End: 2022-02-27
Attending: PHYSICIAN ASSISTANT | Admitting: PHYSICIAN ASSISTANT
Payer: COMMERCIAL

## 2022-02-27 VITALS
WEIGHT: 130 LBS | TEMPERATURE: 97.4 F | DIASTOLIC BLOOD PRESSURE: 101 MMHG | SYSTOLIC BLOOD PRESSURE: 197 MMHG | RESPIRATION RATE: 16 BRPM | OXYGEN SATURATION: 98 % | HEART RATE: 94 BPM | BODY MASS INDEX: 23.4 KG/M2

## 2022-02-27 DIAGNOSIS — T14.8XXA BLEEDING FROM WOUND: ICD-10-CM

## 2022-02-27 PROCEDURE — 12001 RPR S/N/AX/GEN/TRNK 2.5CM/<: CPT | Performed by: PHYSICIAN ASSISTANT

## 2022-02-27 PROCEDURE — G0463 HOSPITAL OUTPT CLINIC VISIT: HCPCS | Performed by: PHYSICIAN ASSISTANT

## 2022-02-27 PROCEDURE — 99213 OFFICE O/P EST LOW 20 MIN: CPT | Mod: 25 | Performed by: PHYSICIAN ASSISTANT

## 2022-02-27 ASSESSMENT — ENCOUNTER SYMPTOMS
NEUROLOGICAL NEGATIVE: 1
WOUND: 1
MUSCULOSKELETAL NEGATIVE: 1

## 2022-02-28 NOTE — DISCHARGE INSTRUCTIONS
Recommend discontinuing aspirin for now.     May remove the Surgicel gauze in 2 days but make sure the gauze is wet prior to removal.  Removing the Surgicel dry may reopen the wound.  Recommend following up with dermatology given bleeding from the wound.  Do not apply Vaseline to the wound while Surgicel is in place.  May apply Vaseline to the wound after Surgicel was removed in 2 days.    Recommend urgent medical evaluation if the patient develops worsening pain, pain out of proportion to injury, numbness or tingling or loss of feeling, or redness/tenderness/swelling around the wound site in the left upper arm.

## 2022-02-28 NOTE — ED PROVIDER NOTES
History     Chief Complaint   Patient presents with     Bleeding/Bruising     from site of mole removal     HPI  Tri Walden is a 71 year old female who presents with bleeding from a lesion on her left upper forearm which began today.  She recently underwent surgery to remove a basal cell carcinoma in the left upper arm.  Had sutures placed deep in the wound and has been placing Vaseline over the wound daily as instructed.  Takes aspirin on a routine basis.  She developed bleeding today which ran down her arm and soaked several gauze pads.  She has had no syncope or near syncope, no lightheadedness or dizziness, no palpitations, no chest pain or shortness of breath.  Wound is mildly painful, but she feels this is due to just having an excision in the area.  No redness around the wound.  She has managed her pain well with ibuprofen and Tylenol.    Allergies:  Allergies   Allergen Reactions     Nkda [No Known Drug Allergies]        Problem List:    Patient Active Problem List    Diagnosis Date Noted     Osteopenia 09/25/2014     Priority: Medium     35mg weekly fosamax started for severe osteopenia 10/2014.  Dexa 2016 slightly improved.  Stop Fosamax 9/2018.  Plan repeat Dexa in 2021.       Advanced directives, counseling/discussion 08/13/2013     Priority: Medium     Advance Care Planning 9/23/2015: ACP Review and Resources Provided:  Reviewed chart for advance care plan.  Tri Walden has no plan or code status on file. Discussed available resources and provided with information. Confirmed code status reflects current choices pending further ACP discussions.  Confirmed/documented legally designated decision maker(s). Added by Talisha Bolivar    8/13/13 pt states she does not have a advanced directive, information given to patient today.       Basal cell carcinoma of nose 07/02/2012     Priority: Medium     HYPERLIPIDEMIA LDL GOAL <130 10/31/2010     Priority: Medium     Undiagnosed cardiac murmurs 05/30/2006      Priority: Medium     1/06 Echo .  Technically adequate study.    2.  Borderline left atrial enlargement.   3.  Normal left ventricular size and systolic function.  Estimated   ejection fraction 55%.    4.  Mild to moderate nonspecific thickening of the mitral valve   leaflets with trace mitral regurgitation.   5.  Normal right atrial and right ventricular size and function.   6.  Trileaflet aortic valve, mild aortic sclerosis.    7.  Mild tricuspid regurgitation with normal pulmonary artery   pressure estimates.   8.  No evidence of pericardial effusion, intracardiac mass or   thrombus seen.        Peptic ulcer 05/17/2005     Priority: Medium     Problem list name updated by automated process. Provider to review       Irritable bowel syndrome 05/17/2005     Priority: Medium     5/17/2005 On Imipramine and no symptoms at all for months.  Flairs with stress       GERD 05/17/2005     Priority: Medium     5/17/2005  Gets rare episodes.       Pure hypercholesterolemia 05/17/2005     Priority: Medium        Past Medical History:    Past Medical History:   Diagnosis Date     Basal cell carcinoma      Malignant melanoma (H)        Past Surgical History:    Past Surgical History:   Procedure Laterality Date     ABLATE VEIN VARICOSE RADIO FREQUENCY WITHOUT PHLEBECTOMY MULTIPLE STAB  12/6/2012    Procedure: ABLATE VEIN VARICOSE RADIO FREQUENCY WITHOUT PHLEBECTOMY MULTIPLE STAB;  Bilateral Ablation of Varicose Veins;  Surgeon: Fredis Marks MD;  Location: [facility] OR       Family History:    Family History   Problem Relation Age of Onset     Arthritis Mother      Diabetes Mother         type 2     Heart Disease Father      Coronary Artery Disease Father      Cancer Paternal Grandmother         breast cancer     Heart Disease Paternal Grandfather      Cancer Sister         right kindney       Social History:  Marital Status:   [2]  Social History     Tobacco Use     Smoking status: Never Smoker     Smokeless  tobacco: Never Used   Vaping Use     Vaping Use: Never used   Substance Use Topics     Alcohol use: No     Drug use: No        Medications:    ASPIRIN PO  Calcium Carbonate-Vit D-Min (CALTRATE 600+D PLUS MINERALS) 600-800 MG-UNIT TABS  clobetasol (TEMOVATE) 0.05 % external solution  CRANBERRY PO  Erin, Zingiber officinalis, (ERIN PO)  losartan (COZAAR) 50 MG tablet  simvastatin (ZOCOR) 10 MG tablet  VITAMIN D, CHOLECALCIFEROL, PO          Review of Systems   Musculoskeletal: Negative.    Skin: Positive for wound.   Neurological: Negative.        Physical Exam   BP: (!) 197/101  Pulse: 94  Temp: 97.4  F (36.3  C)  Resp: 16  Weight: 59 kg (130 lb)  SpO2: 98 %      Physical Exam  Constitutional:       General: She is not in acute distress.     Appearance: Normal appearance. She is not ill-appearing, toxic-appearing or diaphoretic.   HENT:      Head: Normocephalic and atraumatic.   Cardiovascular:      Pulses: Normal pulses.           Radial pulses are 2+ on the left side.   Musculoskeletal:         General: No swelling, tenderness, deformity or signs of injury. Normal range of motion.   Skin:     General: Skin is warm and dry.      Findings: Wound present. No erythema, lesion or rash.             Comments: Surgical wound on lateral aspect of left upper arm at the location shown in the diagram.  Active bleeding from the wound, no pulsating blood flow.  Mild tenderness over the wounds, no erythema, no fluctuance.   Neurological:      General: No focal deficit present.      Mental Status: She is alert and oriented to person, place, and time.      Sensory: No sensory deficit.      Motor: No weakness.      Coordination: Coordination normal.      Gait: Gait normal.   Psychiatric:         Mood and Affect: Mood normal.         Behavior: Behavior normal.         Thought Content: Thought content normal.         Judgment: Judgment normal.         ED [facility]    -Laceration  Repair    Date/Time: 2/27/2022 8:53 PM  Performed by: Ankush Franz PA-C  Authorized by: Ankush Franz PA-C     Risks, benefits and alternatives discussed.      ANESTHESIA (see MAR for exact dosages):     Anesthesia method:  None  LACERATION DETAILS     Location:  Shoulder/arm    Shoulder/arm location:  L upper arm    Length (cm):  1    Depth (mm):  0  EXPLORATION:     Hemostasis achieved with:  Direct pressure (Surgicel Gauze)    Wound exploration: wound explored through full range of motion and entire depth of wound probed and visualized      TREATMENT:     Area cleansed with:  Hibiclens    Amount of cleaning:  Standard    Irrigation solution:  Sterile saline    Visualized foreign bodies/material removed: no      POST-PROCEDURE DETAILS     Dressing:  Non-adherent dressing and sterile dressing (Surgicel Gauze)        PROCEDURE    Patient Tolerance:  Patient tolerated the procedure well with no immediate complications               No results found for this or any previous visit (from the past 24 hour(s)).    Medications - No data to display    Assessments & Plan (with Medical Decision Making)   The patient is experiencing bleeding from surgical incision site on upper left arm.  Cleaned the wound and applied Surgicel as described above.  Bleeding was well controlled with Surgicel and direct pressure at the time of discharge.  Recommend discontinuing aspirin for now.    May remove the Surgicel gauze in 2 days but make sure the gauze is wet prior to removal.  Removing the Surgicel dry may reopen the wound.  Recommend following up with dermatology given bleeding from the wound. Do not apply Vaseline to the wound while Surgicel is in place.  May apply Vaseline to the wound after Surgicel was removed in 2 days.    Continue pain control with Tylenol as directed.    Blood pressure was elevated while in clinic today, however the patient had no symptoms consistent with ACS today.  States that her blood  pressure is typically high when coming to clinic.  Was 130s over 80s this morning.  Recommended rechecking blood pressure, make an appointment with primary care provider if elevated at 160/100 or greater.    Recommend urgent medical evaluation if the patient develops worsening pain, pain out of proportion to injury, numbness or tingling or loss of feeling, or redness/tenderness/swelling around the wound site in the left upper arm.     I have reviewed the nursing notes.    I have reviewed the findings, diagnosis, plan and need for follow up with the patient.    New Prescriptions    No medications on file       Final diagnoses:   Bleeding from wound - Bleeding from postsurgical wound site on the left upper arm       2/27/2022   [facility] EMERGENCY DEPT     Ankush Franz PA-C  02/27/22 2132       Ankush Franz PA-C  02/27/22 2134

## 2022-03-02 ENCOUNTER — TRANSCRIBE ORDERS (OUTPATIENT)
Dept: OTHER | Age: 72
End: 2022-03-02
Payer: COMMERCIAL

## 2022-03-03 ENCOUNTER — HOSPITAL ENCOUNTER (OUTPATIENT)
Dept: CARDIOLOGY | Facility: CLINIC | Age: 72
Discharge: HOME | End: 2022-03-03
Attending: INTERNAL MEDICINE | Admitting: INTERNAL MEDICINE
Payer: COMMERCIAL

## 2022-03-03 DIAGNOSIS — I35.0 AORTIC VALVE STENOSIS, ETIOLOGY OF CARDIAC VALVE DISEASE UNSPECIFIED: ICD-10-CM

## 2022-03-03 PROCEDURE — 93306 TTE W/DOPPLER COMPLETE: CPT | Mod: 26 | Performed by: INTERNAL MEDICINE

## 2022-03-03 PROCEDURE — 93306 TTE W/DOPPLER COMPLETE: CPT

## 2022-03-04 NOTE — RESULT ENCOUNTER NOTE
EF normal; mild to mod LVH; mild MR, mod to severe AS with mean gradient 25.8 mmHg and NAYELI 0.93 cm2. Follow up with Dr You on 3/8/22

## 2022-03-07 NOTE — PROGRESS NOTES
CARDIOLOGY VISIT    REASON FOR VISIT: aortic stenosis    SUBJECTIVE:  71-year-old female seen for aortic stenosis.  She has dyslipidemia, GERD, irritable bowel.     Echo July 2021 showed EF 60%, normal RV, aortic stenosis with mean 21 mmHg, V-max 2.1 m/s, area 0.9 cm , DI 0.27, SVI 37 ml/m2.    Echo March 2022 showed EF 55%, aortic stenosis with mean 26 mmHg, V-max 3.3 m/s, area 0.9 cm , DI 0.28, SVI 41 ml/m2.    She has been feeling well.  She did some shoveling snow and other activity over the winter.  She denies any shortness of breath, chest pain, lightheadedness, syncope, or edema.    MEDICATIONS:  Current Outpatient Medications   Medication     ASPIRIN PO     Calcium Carbonate-Vit D-Min (CALTRATE 600+D PLUS MINERALS) 600-800 MG-UNIT TABS     clobetasol (TEMOVATE) 0.05 % external solution     CRANBERRY PO     Erin, Zingiber officinalis, (ERIN PO)     losartan (COZAAR) 50 MG tablet     simvastatin (ZOCOR) 10 MG tablet     VITAMIN D, CHOLECALCIFEROL, PO     No current facility-administered medications for this visit.       ALLERGIES:  Allergies   Allergen Reactions     Nkda [No Known Drug Allergies]        REVIEW OF SYSTEMS:  Constitutional:  No weight loss, fever, chills  HEENT:  Eyes:  No visual loss, blurred vision, double vision or yellow sclerae. No hearing loss, sneezing, congestion, runny nose or sore throat.  Skin:  No rash or itching.  Cardiovascular: per HPI  Respiratory: per HPI  GI:  No anorexia, nausea, vomiting or diarrhea. No abdominal pain or blood.  :  No dysurea, hematuria  Neurologic:  No headache, paralysis, ataxia, numbness or tingling in the extremities. No change in bowel or bladder control.  Musculoskeletal:  No muscle pain  Hematologic:  No bleeding or bruising.  Lymphatics:  No enlarged nodes. No history of splenectomy.  Endocrine:  No reports of sweating, cold or heat intolerance. No polyuria or polydipsia.  Allergies:  No history of asthma, hives, eczema or rhinitis.    PHYSICAL  EXAM:  BP (!) 159/83 (BP Location: Right arm, Patient Position: Sitting, Cuff Size: Adult Regular)   Pulse 75   Resp 12   Wt 57.7 kg (127 lb 3.2 oz)   SpO2 100%   BMI 22.89 kg/m      Constitutional: awake, alert, no distress  Eyes: PERRL, sclera nonicteric  ENT: trachea midline  Respiratory: Lungs clear  Cardiovascular: Regular rate and rhythm, 3/6 mid peaking systolic murmur at the upper sternal border  GI: nondistended, nontender, bowel sounds present  Lymph/Hematologic: no lymphadenopathy  Skin: dry, no rash  Musculoskeletal: good muscle tone, strength 5/5 in upper and lower extremities  Neurologic: no focal deficits  Neuropsychiatric: appropriate affact    DATA:  Lab: October 2021: Creatinine 0.7  Recent Labs   Lab Test 10/20/21  0901 10/08/20  0751 08/29/16  0843 08/24/15  0737 08/21/14  0744   CHOL 176 185   < > 173 166   HDL 91 90   < > 80 75   LDL 69 73   < > 81 79   TRIG 80 108   < > 58 59   CHOLHDLRATIO  --   --   --  2.2 2.2    < > = values in this interval not displayed.     ASSESSMENT:  71-year-old female seen for aortic stenosis.  The aortic stenosis is in the moderate range, it progressed slightly in the past 6 months.  She has no concerning symptoms.  Echo will be repeated in 9 months.  She will probably need aortic valve replacement in the next 1 to 3 years.    RECOMMENDATIONS:  1.  Moderate aortic stenosis  -Repeat echo 9 months    Follow-up in 9 months after echo.    Raymundo You MD  Cardiology - [facility]  Pager:  [phone]  Text Page  March 8, 2022       prior major surgery

## 2022-03-08 ENCOUNTER — OFFICE VISIT (OUTPATIENT)
Dept: CARDIOLOGY | Facility: CLINIC | Age: 72
End: 2022-03-08
Attending: INTERNAL MEDICINE
Payer: COMMERCIAL

## 2022-03-08 VITALS
RESPIRATION RATE: 12 BRPM | HEART RATE: 75 BPM | WEIGHT: 127.2 LBS | DIASTOLIC BLOOD PRESSURE: 83 MMHG | BODY MASS INDEX: 22.89 KG/M2 | SYSTOLIC BLOOD PRESSURE: 159 MMHG | OXYGEN SATURATION: 100 %

## 2022-03-08 DIAGNOSIS — I35.0 AORTIC VALVE STENOSIS, ETIOLOGY OF CARDIAC VALVE DISEASE UNSPECIFIED: ICD-10-CM

## 2022-03-08 PROCEDURE — 99213 OFFICE O/P EST LOW 20 MIN: CPT | Performed by: INTERNAL MEDICINE

## 2022-03-08 NOTE — PATIENT INSTRUCTIONS
Your recent echo showed that your heart function is normal.  There is been some progression of the aortic stenosis, or tightening of the aortic valve.  It is still in the moderate range.    I would like to repeat another echo in about 9 months.    If you become more short of breath, start having chest pain, or any fainting episodes, please call the cardiology clinic sooner.

## 2022-03-08 NOTE — LETTER
3/8/2022    Nehemias Wallace MD  [address] [facility] [address]    RE: Tri Walden       Dear Colleague,     I had the pleasure of seeing Tri Walden in the [facility].  CARDIOLOGY VISIT    REASON FOR VISIT: aortic stenosis    SUBJECTIVE:  71-year-old female seen for aortic stenosis.  She has dyslipidemia, GERD, irritable bowel.     Echo July 2021 showed EF 60%, normal RV, aortic stenosis with mean 21 mmHg, V-max 2.1 m/s, area 0.9 cm , DI 0.27, SVI 37 ml/m2.    Echo March 2022 showed EF 55%, aortic stenosis with mean 26 mmHg, V-max 3.3 m/s, area 0.9 cm , DI 0.28, SVI 41 ml/m2.    She has been feeling well.  She did some shoveling snow and other activity over the winter.  She denies any shortness of breath, chest pain, lightheadedness, syncope, or edema.    MEDICATIONS:  Current Outpatient Medications   Medication     ASPIRIN PO     Calcium Carbonate-Vit D-Min (CALTRATE 600+D PLUS MINERALS) 600-800 MG-UNIT TABS     clobetasol (TEMOVATE) 0.05 % external solution     CRANBERRY PO     Erin, Zingiber officinalis, (ERIN PO)     losartan (COZAAR) 50 MG tablet     simvastatin (ZOCOR) 10 MG tablet     VITAMIN D, CHOLECALCIFEROL, PO     No current facility-administered medications for this visit.       ALLERGIES:  Allergies   Allergen Reactions     Nkda [No Known Drug Allergies]        REVIEW OF SYSTEMS:  Constitutional:  No weight loss, fever, chills  HEENT:  Eyes:  No visual loss, blurred vision, double vision or yellow sclerae. No hearing loss, sneezing, congestion, runny nose or sore throat.  Skin:  No rash or itching.  Cardiovascular: per HPI  Respiratory: per HPI  GI:  No anorexia, nausea, vomiting or diarrhea. No abdominal pain or blood.  :  No dysurea, hematuria  Neurologic:  No headache, paralysis, ataxia, numbness or tingling in the extremities. No change in bowel or bladder control.  Musculoskeletal:  No muscle pain  Hematologic:  No bleeding or bruising.  Lymphatics:  No  enlarged nodes. No history of splenectomy.  Endocrine:  No reports of sweating, cold or heat intolerance. No polyuria or polydipsia.  Allergies:  No history of asthma, hives, eczema or rhinitis.    PHYSICAL EXAM:  BP (!) 159/83 (BP Location: Right arm, Patient Position: Sitting, Cuff Size: Adult Regular)   Pulse 75   Resp 12   Wt 57.7 kg (127 lb 3.2 oz)   SpO2 100%   BMI 22.89 kg/m      Constitutional: awake, alert, no distress  Eyes: PERRL, sclera nonicteric  ENT: trachea midline  Respiratory: Lungs clear  Cardiovascular: Regular rate and rhythm, 3/6 mid peaking systolic murmur at the upper sternal border  GI: nondistended, nontender, bowel sounds present  Lymph/Hematologic: no lymphadenopathy  Skin: dry, no rash  Musculoskeletal: good muscle tone, strength 5/5 in upper and lower extremities  Neurologic: no focal deficits  Neuropsychiatric: appropriate affact    DATA:  Lab: October 2021: Creatinine 0.7  Recent Labs   Lab Test 10/20/21  0901 10/08/20  0751 08/29/16  0843 08/24/15  0737 08/21/14  0744   CHOL 176 185   < > 173 166   HDL 91 90   < > 80 75   LDL 69 73   < > 81 79   TRIG 80 108   < > 58 59   CHOLHDLRATIO  --   --   --  2.2 2.2    < > = values in this interval not displayed.     ASSESSMENT:  71-year-old female seen for aortic stenosis.  The aortic stenosis is in the moderate range, it progressed slightly in the past 6 months.  She has no concerning symptoms.  Echo will be repeated in 9 months.  She will probably need aortic valve replacement in the next 1 to 3 years.    RECOMMENDATIONS:  1.  Moderate aortic stenosis  -Repeat echo 9 months    Follow-up in 9 months after echo.    Raymundo You MD  Cardiology - [facility]  Pager:  [phone]  Text Page  March 8, 2022      Thank you for allowing me to participate in the care of your patient.      Sincerely,     Raymundo You MD     [facility]  cc:   Raymundo You,  MD  [facility]  [address] [place] [address]

## 2022-03-11 ENCOUNTER — HOSPITAL ENCOUNTER (OUTPATIENT)
Dept: PHYSICAL THERAPY | Facility: CLINIC | Age: 72
Setting detail: THERAPIES SERIES
Discharge: HOME | End: 2022-03-11
Attending: ORTHOPAEDIC SURGERY
Payer: COMMERCIAL

## 2022-03-11 PROCEDURE — 97110 THERAPEUTIC EXERCISES: CPT | Mod: GP | Performed by: PHYSICAL THERAPIST

## 2022-03-11 PROCEDURE — 97161 PT EVAL LOW COMPLEX 20 MIN: CPT | Mod: GP | Performed by: PHYSICAL THERAPIST

## 2022-03-11 PROCEDURE — 97140 MANUAL THERAPY 1/> REGIONS: CPT | Mod: GP | Performed by: PHYSICAL THERAPIST

## 2022-03-11 NOTE — PROGRESS NOTES
[facility] Shoulder Evaluation    03/11/22 1400   General Information   Type of Visit Initial OP Ortho PT Evaluation   Start of Care Date 03/11/22   Referring Physician Escobar Magana MD   Patient/Family Goals Statement to be able to reach up overhead to her microwave and cupboard and improve her pain levels.     Orders Evaluate and Treat   Date of Order 03/02/22   Certification Required? Yes   Medical Diagnosis Right shoulder pain, possible rotator cuff tear s/p fall   Surgical/Medical history reviewed Yes   Precautions/Limitations no known precautions/limitations       Present No   Body Part(s)   Body Part(s) Shoulder   Presentation and Etiology   Pertinent history of current problem (include personal factors and/or comorbidities that impact the POC) Pt comes to therapy today for right shoulder pain beginning in December while slipping and falling on the ice. Got a cortizone injection in her shoulder which helped significantly and is now able ot sleep on that side at night. Increased pain with reaching. Is utilizing ice intermittently. utilizing ibuprofen as needed. denies any shoulder pain prior to the fall. no neural symptoms   Impairments A. Pain;E. Decreased flexibility;F. Decreased strength and endurance   Functional Limitations perform activities of daily living   Symptom Location right shoulder, superior and posterior aspect   How/Where did it occur With a fall   Onset date of current episode/exacerbation 12/10/21   Chronicity New   Best (/10) 0   Worst (/10) 5   Pain quality B. Dull;C. Aching;F. Stabbing   Frequency of pain/symptoms B. Intermittent   Pain/symptoms are: Other   Pain symptoms comment worse with movement overhead   Pain/symptoms exacerbated by C. Lifting;G. Certain positions;H. Overhead reach   Pain/symptoms eased by C. Rest;E. Changing positions;G. Heat;H. Cold   Progression of symptoms since onset: Improved   Prior Level of Function   Prior  Level of Function-Mobility independent   Prior Level of Function-ADLs independent   Current Level of Function   Current Community Support Family/friend caregiver   Patient role/employment history F. Retired   Current equipment-Gait/Locomotion None   Current equipment-ADL None   Fall Risk Screen   Fall screen completed by PT   Have you fallen 2 or more times in the past year? No   Have you fallen and had an injury in the past year? No   Is patient a fall risk? No   Abuse Screen (yes response referral indicated)   Feels Unsafe at Home or Work/School no   Feels Threatened by Someone no   Does Anyone Try to Keep You From Having Contact with Others or Doing Things Outside Your Home? no   Physical Signs of Abuse Present no   System Outcome Measures   Outcome Measures   (SPADI pain scale 34/50, disability scale 15/80)   Shoulder Objective Findings   Side (if bilateral, select both right and left) Right   Observation pt in no acute distress, equal arm swing with gait    Integumentary  normal appreance    Posture protracted shoulders, increased upper thoracic kyphosis   Cervical Screen (ROM, quadrant) very tight bilateral lateral flexion    Scapulothoracic Rhythm decreased upward rotation of scapula    Pec Minor (supine) Flexibility impaired    Palpation tender to right UT, LS, posterior cuff and deltoid    Right Shoulder Flexion AROM 140 pain end range   Right Shoulder Flexion PROM 165   Right Shoulder Abduction AROM 115 pain end range    Right Shoulder Abduction PROM 125   Right Shoulder ER AROM 58 pain end range    Right Shoulder ER PROM WNL   Right Shoulder IR AROM T10   Right Shoulder IR PROM WNL pain free end range    Right Shoulder Flexion Strength 4+/5 painful    Right Shoulder Abduction Strength 4+/5 painful    Right Shoulder ER Strength 4/5 painful    Right Shoulder IR Strength 5/5 painful    Right Shoulder Extension Strength 5/5 pain free    Right Mid Trapezius Strength impaired   Right Lower Trapezius Strength  impaired    Planned Therapy Interventions   Planned Therapy Interventions joint mobilization;manual therapy;neuromuscular re-education;ROM;strengthening;stretching   Planned Modality Interventions   Planned Modality Interventions Cryotherapy;Electrical stimulation;Hot packs;TENS;Ultrasound   Planned Modality Interventions Comments as needed    Clinical Impression   Criteria for Skilled Therapeutic Interventions Met yes, treatment indicated   PT Diagnosis Rigth shoulder pain   Influenced by the following impairments impaired posture, decreased strength of scapular stabilizing muscualture and increased pain    Functional limitations due to impairments difficulty and pain with reaching overhead and with lifting and carrying tasks    Clinical Presentation Stable/Uncomplicated   Clinical Presentation Rationale improved pain and mobility/activity tolerance since injection, motivated to improve    Clinical Decision Making (Complexity) Low complexity   Therapy Frequency 1 time/week   Predicted Duration of Therapy Intervention (days/wks) 6-8 weeks, decreasing as appropriate    Risk & Benefits of therapy have been explained Yes   Patient, Family & other staff in agreement with plan of care Yes   Clinical Impression Comments pt has reasonably well maintained ROM and strength. If a cuff tear is present I would be suprised if it was a complete or large tear   Education Assessment   Preferred Learning Style Listening;Demonstration;Pictures/video   Barriers to Learning No barriers   ORTHO GOALS   PT Ortho Eval Goals 1;3;2   Ortho Goal 1   Goal Identifier STG1   Goal Description Pt will be indepenent with HEP to ensure adequate volume of therapeutic exercise to return to previous level of function   Target Date 04/01/22   Ortho Goal 2   Goal Identifier LTG1   Goal Description Pt will be able to reach top shelf with shoulder pain no greater thatn 1/10   Target Date 04/22/22   Ortho Goal 3   Goal Identifier LTG2   Goal Description Pt  will be able to lift 5# overhead (as if moving a dish into the microwave) with shoulder pain no greater than 1/10   Target Date 05/06/22   Total Evaluation Time   PT Eval, Low Complexity Minutes (27630) 15   Therapy Certification   Certification date from 03/11/22   Certification date to 05/06/22   Medical Diagnosis Right shoulder pain, possible rotator cuff tear s/p fall

## 2022-03-11 NOTE — PROGRESS NOTES
[facility]    OUTPATIENT PHYSICAL THERAPY ORTHOPEDIC EVALUATION  PLAN OF TREATMENT FOR OUTPATIENT REHABILITATION  (COMPLETE FOR INITIAL CLAIMS ONLY)  Patient's Last Name, First Name, M.I.  YOB: 1950  Tri Walden    Provider s Name:  [facility]   Medical Record No.  [id number]   Start of Care Date:  03/11/22   Onset Date:  12/10/21   Type:     _X__PT   ___OT   ___SLP Medical Diagnosis:  Right shoulder pain, possible rotator cuff tear s/p fall     PT Diagnosis:  Rigth shoulder pain   Visits from SOC:  1      _________________________________________________________________________________  Plan of Treatment/Functional Goals:  joint mobilization, manual therapy, neuromuscular re-education, ROM, strengthening, stretching     Cryotherapy, Electrical stimulation, Hot packs, TENS, Ultrasound  as needed   Goals  Goal Identifier: STG1  Goal Description: (P) Pt will be indepenent with HEP to ensure adequate volume of therapeutic exercise to return to previous level of function  Target Date: (P) 04/01/22    Goal Identifier: LTG1  Goal Description: (P) Pt will be able to reach top shelf with shoulder pain no greater thatn 1/10  Target Date: (P) 04/22/22    Goal Identifier: LTG2  Goal Description: (P) Pt will be able to lift 5# overhead (as if moving a dish into the microwave) with shoulder pain no greater than 1/10  Target Date: (P) 05/06/22       Therapy Frequency:  1 time/week  Predicted Duration of Therapy Intervention:  6-8 weeks, decreasing as appropriate     Ian Dent, PT                 I CERTIFY THE NEED FOR THESE SERVICES FURNISHED UNDER        THIS PLAN OF TREATMENT AND WHILE UNDER MY CARE .             Physician Signature               Date    X_____________________________________________________                             Certification Date From:   03/11/22   Certification Date To:  05/06/22    Referring Provider:  Escobar Magana MD    Initial Assessment        See Epic Evaluation Start of Care Date: 03/11/22

## 2022-03-17 ENCOUNTER — HOSPITAL ENCOUNTER (OUTPATIENT)
Dept: PHYSICAL THERAPY | Facility: CLINIC | Age: 72
Setting detail: THERAPIES SERIES
Discharge: HOME | End: 2022-03-17
Attending: ORTHOPAEDIC SURGERY
Payer: COMMERCIAL

## 2022-03-17 PROCEDURE — 97110 THERAPEUTIC EXERCISES: CPT | Mod: GP | Performed by: PHYSICAL THERAPIST

## 2022-03-17 PROCEDURE — 97140 MANUAL THERAPY 1/> REGIONS: CPT | Mod: GP | Performed by: PHYSICAL THERAPIST

## 2022-03-31 ENCOUNTER — OFFICE VISIT (OUTPATIENT)
Dept: DERMATOLOGY | Facility: CLINIC | Age: 72
End: 2022-03-31
Payer: COMMERCIAL

## 2022-03-31 ENCOUNTER — TRANSFERRED RECORDS (OUTPATIENT)
Dept: HEALTH INFORMATION MANAGEMENT | Facility: CLINIC | Age: 72
End: 2022-03-31

## 2022-03-31 VITALS — OXYGEN SATURATION: 100 % | SYSTOLIC BLOOD PRESSURE: 147 MMHG | HEART RATE: 76 BPM | DIASTOLIC BLOOD PRESSURE: 83 MMHG

## 2022-03-31 DIAGNOSIS — L66.12 FRONTAL FIBROSING ALOPECIA: Primary | ICD-10-CM

## 2022-03-31 PROCEDURE — 11900 INJECT SKIN LESIONS </W 7: CPT | Performed by: PHYSICIAN ASSISTANT

## 2022-03-31 PROCEDURE — 99207 PR DROP WITH A PROCEDURE: CPT | Performed by: PHYSICIAN ASSISTANT

## 2022-03-31 NOTE — LETTER
3/31/2022         RE: Tri Walden  [address]  [facility] [address]        Dear Colleague,    Thank you for referring your patient, Tri Wladen, to the [facility]. Please see a copy of my visit note below.    Tri Walden is an extremely pleasant 72 year old year old female patient here today for recheck frontal fibrosing alopecia. She has had injections lov. She note hair loss has stabilized. Patient has no other skin complaints today.  Remainder of the HPI, Meds, PMH, Allergies, FH, and SH was reviewed in chart.    Past Medical History:   Diagnosis Date     Basal cell carcinoma      Malignant melanoma (H)        Past Surgical History:   Procedure Laterality Date     ABLATE VEIN VARICOSE RADIO FREQUENCY WITHOUT PHLEBECTOMY MULTIPLE STAB  12/6/2012    Procedure: ABLATE VEIN VARICOSE RADIO FREQUENCY WITHOUT PHLEBECTOMY MULTIPLE STAB;  Bilateral Ablation of Varicose Veins;  Surgeon: Fredis Marks MD;  Location: [facility] OR        Family History   Problem Relation Age of Onset     Arthritis Mother      Diabetes Mother         type 2     Heart Disease Father      Coronary Artery Disease Father      Cancer Paternal Grandmother         breast cancer     Heart Disease Paternal Grandfather      Cancer Sister         right kindney       Social History     Socioeconomic History     Marital status:      Spouse name: Not on file     Number of children: Not on file     Years of education: Not on file     Highest education level: Not on file   Occupational History     Occupation:      Employer: [employer]   Tobacco Use     Smoking status: Never Smoker     Smokeless tobacco: Never Used   Vaping Use     Vaping Use: Never used   Substance and Sexual Activity     Alcohol use: No     Drug use: No     Sexual activity: Not Currently     Partners: Male     Birth control/protection: Surgical     Comment: tubal ligation   Other Topics Concern      Parent/sibling w/ CABG, MI or angioplasty before 65F 55M? Yes   Social History Narrative     Not on file     Social Determinants of Health     Financial Resource Strain: Not on file   Food Insecurity: Not on file   Transportation Needs: Not on file   Physical Activity: Not on file   Stress: Not on file   Social Connections: Not on file   Intimate Partner Violence: Not on file   Housing Stability: Not on file       Outpatient Encounter Medications as of 3/31/2022   Medication Sig Dispense Refill     ASPIRIN PO Take 81 mg by mouth daily       Calcium Carbonate-Vit D-Min (CALTRATE 600+D PLUS MINERALS) 600-800 MG-UNIT TABS Take 1 tablet by mouth 2 times daily       clobetasol (TEMOVATE) 0.05 % external solution Apply once to twice daily as needed. 50 mL 4     CRANBERRY PO Take by mouth daily        Ginger, Zingiber officinalis, (GINGER PO) Take by mouth daily        losartan (COZAAR) 50 MG tablet Take 1 tablet (50 mg) by mouth daily 90 tablet 4     simvastatin (ZOCOR) 10 MG tablet TAKE ONE TABLET BY MOUTH EVERY NIGHT AT BEDTIME 90 tablet 4     VITAMIN D, CHOLECALCIFEROL, PO Take by mouth daily       No facility-administered encounter medications on file as of 3/31/2022.             O:   NAD, WDWN, Alert & Oriented, Mood & Affect wnl, Vitals stable   Here today alone   BP (!) 147/83   Pulse 76   SpO2 100%    General appearance normal   Vitals stable   Alert, oriented and in no acute distress   Scarring, with perifollicular scaly on frontal scalp      Eyes: Conjunctivae/lids:Normal     ENT: Lips: normal    MSK:[place]    Pulm: Breathing Normal    Neuro/Psych: Orientation:Alert and Orientedx3 ; Mood/Affect:normal   A/P:  1. Frontal fibrosing alopecia    IL TAC: PGACAC discussed.  Risks including but not limited to injection site reaction, bruising, no resolution.  All questions answered and entertained to patient s satisfaction.  Informed consent obtained.  IL TAC in concentration of 5 mg/ml was injected ID to frontal  fibrosing alopecia.  Total injected was  1 ml.  Patient tolerated without complications and given wound care instructions, including not to move product around.  Return in 4 weeks for follow-up and possible additional IL TAC.        Again, thank you for allowing me to participate in the care of your patient.        Sincerely,        Jackelyn Erickson PA-C

## 2022-04-02 NOTE — PROGRESS NOTES
Tri Walden is an extremely pleasant 72 year old year old female patient here today for recheck frontal fibrosing alopecia. She has had injections lov. She note hair loss has stabilized. Patient has no other skin complaints today.  Remainder of the HPI, Meds, PMH, Allergies, FH, and SH was reviewed in chart.    Past Medical History:   Diagnosis Date     Basal cell carcinoma      Malignant melanoma (H)        Past Surgical History:   Procedure Laterality Date     ABLATE VEIN VARICOSE RADIO FREQUENCY WITHOUT PHLEBECTOMY MULTIPLE STAB  12/6/2012    Procedure: ABLATE VEIN VARICOSE RADIO FREQUENCY WITHOUT PHLEBECTOMY MULTIPLE STAB;  Bilateral Ablation of Varicose Veins;  Surgeon: Fredis Marks MD;  Location: [facility] OR        Family History   Problem Relation Age of Onset     Arthritis Mother      Diabetes Mother         type 2     Heart Disease Father      Coronary Artery Disease Father      Cancer Paternal Grandmother         breast cancer     Heart Disease Paternal Grandfather      Cancer Sister         right kindney       Social History     Socioeconomic History     Marital status:      Spouse name: Not on file     Number of children: Not on file     Years of education: Not on file     Highest education level: Not on file   Occupational History     Occupation:      Employer: [employer]   Tobacco Use     Smoking status: Never Smoker     Smokeless tobacco: Never Used   Vaping Use     Vaping Use: Never used   Substance and Sexual Activity     Alcohol use: No     Drug use: No     Sexual activity: Not Currently     Partners: Male     Birth control/protection: Surgical     Comment: tubal ligation   Other Topics Concern     Parent/sibling w/ CABG, MI or angioplasty before 65F 55M? Yes   Social History Narrative     Not on file     Social Determinants of Health     Financial Resource Strain: Not on file   Food Insecurity: Not on file   Transportation Needs: Not on file   Physical  Activity: Not on file   Stress: Not on file   Social Connections: Not on file   Intimate Partner Violence: Not on file   Housing Stability: Not on file       Outpatient Encounter Medications as of 3/31/2022   Medication Sig Dispense Refill     ASPIRIN PO Take 81 mg by mouth daily       Calcium Carbonate-Vit D-Min (CALTRATE 600+D PLUS MINERALS) 600-800 MG-UNIT TABS Take 1 tablet by mouth 2 times daily       clobetasol (TEMOVATE) 0.05 % external solution Apply once to twice daily as needed. 50 mL 4     CRANBERRY PO Take by mouth daily        Ginger, Zingiber officinalis, (GINGER PO) Take by mouth daily        losartan (COZAAR) 50 MG tablet Take 1 tablet (50 mg) by mouth daily 90 tablet 4     simvastatin (ZOCOR) 10 MG tablet TAKE ONE TABLET BY MOUTH EVERY NIGHT AT BEDTIME 90 tablet 4     VITAMIN D, CHOLECALCIFEROL, PO Take by mouth daily       No facility-administered encounter medications on file as of 3/31/2022.             O:   NAD, WDWN, Alert & Oriented, Mood & Affect wnl, Vitals stable   Here today alone   BP (!) 147/83   Pulse 76   SpO2 100%    General appearance normal   Vitals stable   Alert, oriented and in no acute distress   Scarring, with perifollicular scaly on frontal scalp      Eyes: Conjunctivae/lids:Normal     ENT: Lips: normal    MSK:[place]    Pulm: Breathing Normal    Neuro/Psych: Orientation:Alert and Orientedx3 ; Mood/Affect:normal   A/P:  1. Frontal fibrosing alopecia    IL TAC: PGACAC discussed.  Risks including but not limited to injection site reaction, bruising, no resolution.  All questions answered and entertained to patient s satisfaction.  Informed consent obtained.  IL TAC in concentration of 5 mg/ml was injected ID to frontal fibrosing alopecia.  Total injected was  1 ml.  Patient tolerated without complications and given wound care instructions, including not to move product around.  Return in 4 weeks for follow-up and possible additional IL TAC.

## 2022-04-20 ENCOUNTER — LAB (OUTPATIENT)
Dept: FAMILY MEDICINE | Facility: CLINIC | Age: 72
End: 2022-04-20
Payer: COMMERCIAL

## 2022-04-20 DIAGNOSIS — Z20.822 ENCOUNTER FOR LABORATORY TESTING FOR COVID-19 VIRUS: ICD-10-CM

## 2022-04-20 LAB — SARS-COV-2 RNA RESP QL NAA+PROBE: NEGATIVE

## 2022-04-20 PROCEDURE — U0005 INFEC AGEN DETEC AMPLI PROBE: HCPCS

## 2022-04-20 PROCEDURE — U0003 INFECTIOUS AGENT DETECTION BY NUCLEIC ACID (DNA OR RNA); SEVERE ACUTE RESPIRATORY SYNDROME CORONAVIRUS 2 (SARS-COV-2) (CORONAVIRUS DISEASE [COVID-19]), AMPLIFIED PROBE TECHNIQUE, MAKING USE OF HIGH THROUGHPUT TECHNOLOGIES AS DESCRIBED BY CMS-2020-01-R: HCPCS

## 2022-04-20 PROCEDURE — 99207 PR NO CHARGE LOS: CPT

## 2022-04-24 ENCOUNTER — HEALTH MAINTENANCE LETTER (OUTPATIENT)
Age: 72
End: 2022-04-24

## 2022-05-03 ENCOUNTER — TRANSFERRED RECORDS (OUTPATIENT)
Dept: HEALTH INFORMATION MANAGEMENT | Facility: CLINIC | Age: 72
End: 2022-05-03
Payer: COMMERCIAL

## 2022-05-05 ENCOUNTER — TRANSFERRED RECORDS (OUTPATIENT)
Dept: HEALTH INFORMATION MANAGEMENT | Facility: CLINIC | Age: 72
End: 2022-05-05
Payer: COMMERCIAL

## 2022-06-14 ENCOUNTER — OFFICE VISIT (OUTPATIENT)
Dept: DERMATOLOGY | Facility: CLINIC | Age: 72
End: 2022-06-14
Payer: COMMERCIAL

## 2022-06-14 DIAGNOSIS — D22.9 MULTIPLE BENIGN NEVI: ICD-10-CM

## 2022-06-14 DIAGNOSIS — D18.01 CHERRY ANGIOMA: ICD-10-CM

## 2022-06-14 DIAGNOSIS — Z85.820 HISTORY OF MELANOMA: ICD-10-CM

## 2022-06-14 DIAGNOSIS — L81.4 LENTIGO: ICD-10-CM

## 2022-06-14 DIAGNOSIS — L66.12 FRONTAL FIBROSING ALOPECIA: Primary | ICD-10-CM

## 2022-06-14 DIAGNOSIS — L82.1 SEBORRHEIC KERATOSIS: ICD-10-CM

## 2022-06-14 DIAGNOSIS — Z85.828 HISTORY OF BASAL CELL CANCER: ICD-10-CM

## 2022-06-14 PROCEDURE — 99213 OFFICE O/P EST LOW 20 MIN: CPT | Performed by: PHYSICIAN ASSISTANT

## 2022-06-14 ASSESSMENT — PAIN SCALES - GENERAL: PAINLEVEL: NO PAIN (0)

## 2022-06-14 NOTE — PROGRESS NOTES
Tri Walden is an extremely pleasant 72 year old year old female patient here today for recheck frontal fibrosing alopecia.and skin check. She denies any new or changing nevi. No painful or bleeding skin lesions. She notes hair loss is controlled with topical steroid.  Patient has no other skin complaints today.  Remainder of the HPI, Meds, PMH, Allergies, FH, and SH was reviewed in chart.    Pertinent Hx:   History of melanoma on right upper arm 0.2 mm 2/2022, History of BCC on left upper arm.   Past Medical History:   Diagnosis Date     Basal cell carcinoma      Malignant melanoma (H)        Past Surgical History:   Procedure Laterality Date     ABLATE VEIN VARICOSE RADIO FREQUENCY WITHOUT PHLEBECTOMY MULTIPLE STAB  12/6/2012    Procedure: ABLATE VEIN VARICOSE RADIO FREQUENCY WITHOUT PHLEBECTOMY MULTIPLE STAB;  Bilateral Ablation of Varicose Veins;  Surgeon: Fredis Marks MD;  Location: [facility] OR        Family History   Problem Relation Age of Onset     Arthritis Mother      Diabetes Mother         type 2     Heart Disease Father      Coronary Artery Disease Father      Cancer Paternal Grandmother         breast cancer     Heart Disease Paternal Grandfather      Cancer Sister         right kindney       Social History     Socioeconomic History     Marital status:      Spouse name: Not on file     Number of children: Not on file     Years of education: Not on file     Highest education level: Not on file   Occupational History     Occupation:      Employer: [employer]   Tobacco Use     Smoking status: Never Smoker     Smokeless tobacco: Never Used   Vaping Use     Vaping Use: Never used   Substance and Sexual Activity     Alcohol use: No     Drug use: No     Sexual activity: Not Currently     Partners: Male     Birth control/protection: Surgical     Comment: tubal ligation   Other Topics Concern     Parent/sibling w/ CABG, MI or angioplasty before 65F 55M? Yes   Social  History Narrative     Not on file     Social Determinants of Health     Financial Resource Strain: Not on file   Food Insecurity: Not on file   Transportation Needs: Not on file   Physical Activity: Not on file   Stress: Not on file   Social Connections: Not on file   Intimate Partner Violence: Not on file   Housing Stability: Not on file       Outpatient Encounter Medications as of 6/14/2022   Medication Sig Dispense Refill     ASPIRIN PO Take 81 mg by mouth daily       Calcium Carbonate-Vit D-Min (CALTRATE 600+D PLUS MINERALS) 600-800 MG-UNIT TABS Take 1 tablet by mouth 2 times daily       clobetasol (TEMOVATE) 0.05 % external solution Apply once to twice daily as needed. 50 mL 4     CRANBERRY PO Take by mouth daily        Ginger, Zingiber officinalis, (GINGER PO) Take by mouth daily        losartan (COZAAR) 50 MG tablet Take 1 tablet (50 mg) by mouth daily 90 tablet 4     simvastatin (ZOCOR) 10 MG tablet TAKE ONE TABLET BY MOUTH EVERY NIGHT AT BEDTIME 90 tablet 4     VITAMIN D, CHOLECALCIFEROL, PO Take by mouth daily       No facility-administered encounter medications on file as of 6/14/2022.             O:   NAD, WDWN, Alert & Oriented, Mood & Affect wnl, Vitals stable   Here today alone   There were no vitals taken for this visit.   General appearance normal   Vitals stable   Alert, oriented and in no acute distress     Well healed scar on right and left arm   Stuck on papules and brown macules on trunk and ext   Red papules on trunk  Brown papules and macules with regular pigment network and borders on torso and extremities      The remainder of skin exam is normal       Eyes: Conjunctivae/lids:Normal     ENT: Lips: normal    MSK:Normal    Cardiovascular: peripheral edema none    Pulm: Breathing Normal    Lymph Nodes: No Head, Neck, axillary Lymphadenopathy     Neuro/Psych: Orientation:Alert and Orientedx3 ; Mood/Affect:normal   A/P:  1. FFA- controlled  Continue clobetasol solution as needed.  2. History of  Melanoma on right upper arm   MELANOMA DISCUSSED WITH PATIENT:  I discussed the specifics of tumor, prognosis, metachronous melanoma, self exam, and genetics with the patient. I explained the need for monthly skin exams including and taught the patient how to do this. Patient was asked about new or changing moles . I discussed with patient signs and symptoms that could arise in the setting of recurrent locoregional or metastatic disease. In addition, the need to undergo every 3-4 month dermatologic full skin survey and evaluation given that patients with a diagnosis of melanoma are at risk of recurrence (local and distant) and of subsequent de rosalee melanoma.     3. Seborrheic keratosis, lentigo, angioma, benign nevi, history of bcc  It was a pleasure speaking to Tri Walden today.  BENIGN LESIONS DISCUSSED WITH PATIENT:  I discussed the specifics of tumor, prognosis, and genetics of benign lesions.  I explained that treatment of these lesions would be purely cosmetic and not medically neccessary.  I discussed with patient different removal options including excision, cautery and /or laser.      Nature and genetics of benign skin lesions dicussed with patient.  Signs and Symptoms of skin cancer discussed with patient.  ABCDEs of melanoma reviewed with patient.  Patient encouraged to perform monthly skin exams.  UV precautions reviewed with patient.  Risks of non-melanoma skin cancer discussed with patient   Return to clinic in one year or sooner if needed.

## 2022-06-14 NOTE — NURSING NOTE
Chief Complaint   Patient presents with     Skin Check     No concerns       There were no vitals filed for this visit.  Wt Readings from Last 1 Encounters:   03/08/22 57.7 kg (127 lb 3.2 oz)       Cecilia Santana LPN .................6/14/2022

## 2022-06-14 NOTE — LETTER
6/14/2022         RE: Tri Walden  [address]  [facility] [place] [address]        Dear Colleague,    Thank you for referring your patient, Tri Walden, to the [facility]. Please see a copy of my visit note below.    Tri Walden is an extremely pleasant 72 year old year old female patient here today for recheck frontal fibrosing alopecia.and skin check. She denies any new or changing nevi. No painful or bleeding skin lesions. She notes hair loss is controlled with topical steroid.  Patient has no other skin complaints today.  Remainder of the HPI, Meds, PMH, Allergies, FH, and SH was reviewed in chart.    Pertinent Hx:   History of melanoma on right upper arm 0.2 mm 2/2022, History of BCC on left upper arm.   Past Medical History:   Diagnosis Date     Basal cell carcinoma      Malignant melanoma (H)        Past Surgical History:   Procedure Laterality Date     ABLATE VEIN VARICOSE RADIO FREQUENCY WITHOUT PHLEBECTOMY MULTIPLE STAB  12/6/2012    Procedure: ABLATE VEIN VARICOSE RADIO FREQUENCY WITHOUT PHLEBECTOMY MULTIPLE STAB;  Bilateral Ablation of Varicose Veins;  Surgeon: Fredis Marks MD;  Location: [facility] OR        Family History   Problem Relation Age of Onset     Arthritis Mother      Diabetes Mother         type 2     Heart Disease Father      Coronary Artery Disease Father      Cancer Paternal Grandmother         breast cancer     Heart Disease Paternal Grandfather      Cancer Sister         right kindney       Social History     Socioeconomic History     Marital status:      Spouse name: Not on file     Number of children: Not on file     Years of education: Not on file     Highest education level: Not on file   Occupational History     Occupation:      Employer: [employer]   Tobacco Use     Smoking status: Never Smoker     Smokeless tobacco: Never Used   Vaping Use     Vaping Use: Never used   Substance and Sexual Activity      Alcohol use: No     Drug use: No     Sexual activity: Not Currently     Partners: Male     Birth control/protection: Surgical     Comment: tubal ligation   Other Topics Concern     Parent/sibling w/ CABG, MI or angioplasty before 65F 55M? Yes   Social History Narrative     Not on file     Social Determinants of Health     Financial Resource Strain: Not on file   Food Insecurity: Not on file   Transportation Needs: Not on file   Physical Activity: Not on file   Stress: Not on file   Social Connections: Not on file   Intimate Partner Violence: Not on file   Housing Stability: Not on file       Outpatient Encounter Medications as of 6/14/2022   Medication Sig Dispense Refill     ASPIRIN PO Take 81 mg by mouth daily       Calcium Carbonate-Vit D-Min (CALTRATE 600+D PLUS MINERALS) 600-800 MG-UNIT TABS Take 1 tablet by mouth 2 times daily       clobetasol (TEMOVATE) 0.05 % external solution Apply once to twice daily as needed. 50 mL 4     CRANBERRY PO Take by mouth daily        Ginger, Zingiber officinalis, (GINGER PO) Take by mouth daily        losartan (COZAAR) 50 MG tablet Take 1 tablet (50 mg) by mouth daily 90 tablet 4     simvastatin (ZOCOR) 10 MG tablet TAKE ONE TABLET BY MOUTH EVERY NIGHT AT BEDTIME 90 tablet 4     VITAMIN D, CHOLECALCIFEROL, PO Take by mouth daily       No facility-administered encounter medications on file as of 6/14/2022.             O:   NAD, WDWN, Alert & Oriented, Mood & Affect wnl, Vitals stable   Here today alone   There were no vitals taken for this visit.   General appearance normal   Vitals stable   Alert, oriented and in no acute distress     Well healed scar on right and left arm   Stuck on papules and brown macules on trunk and ext   Red papules on trunk  Brown papules and macules with regular pigment network and borders on torso and extremities      The remainder of skin exam is normal       Eyes: Conjunctivae/lids:Normal     ENT: Lips: normal    MSK:Normal    Cardiovascular:  peripheral edema none    Pulm: Breathing Normal    Lymph Nodes: No Head, Neck, axillary Lymphadenopathy     Neuro/Psych: Orientation:Alert and Orientedx3 ; Mood/Affect:normal   A/P:  1. FFA- controlled  Continue clobetasol solution as needed.  2. History of Melanoma on right upper arm   MELANOMA DISCUSSED WITH PATIENT:  I discussed the specifics of tumor, prognosis, metachronous melanoma, self exam, and genetics with the patient. I explained the need for monthly skin exams including and taught the patient how to do this. Patient was asked about new or changing moles . I discussed with patient signs and symptoms that could arise in the setting of recurrent locoregional or metastatic disease. In addition, the need to undergo every 3-4 month dermatologic full skin survey and evaluation given that patients with a diagnosis of melanoma are at risk of recurrence (local and distant) and of subsequent de rosalee melanoma.     3. Seborrheic keratosis, lentigo, angioma, benign nevi, history of bcc  It was a pleasure speaking to Tri VÁQSUEZ Lancesybil today.  BENIGN LESIONS DISCUSSED WITH PATIENT:  I discussed the specifics of tumor, prognosis, and genetics of benign lesions.  I explained that treatment of these lesions would be purely cosmetic and not medically neccessary.  I discussed with patient different removal options including excision, cautery and /or laser.      Nature and genetics of benign skin lesions dicussed with patient.  Signs and Symptoms of skin cancer discussed with patient.  ABCDEs of melanoma reviewed with patient.  Patient encouraged to perform monthly skin exams.  UV precautions reviewed with patient.  Risks of non-melanoma skin cancer discussed with patient   Return to clinic in one year or sooner if needed.       Again, thank you for allowing me to participate in the care of your patient.        Sincerely,        Jackelyn Erickson PA-C

## 2022-10-10 ENCOUNTER — E-VISIT (OUTPATIENT)
Dept: FAMILY MEDICINE | Facility: CLINIC | Age: 72
End: 2022-10-10
Payer: COMMERCIAL

## 2022-10-10 DIAGNOSIS — J01.10 ACUTE NON-RECURRENT FRONTAL SINUSITIS: Primary | ICD-10-CM

## 2022-10-10 PROCEDURE — 99421 OL DIG E/M SVC 5-10 MIN: CPT | Performed by: FAMILY MEDICINE

## 2022-10-10 NOTE — PATIENT INSTRUCTIONS
Thank you for choosing us for your care. This sounds like a cold virus that started last week.  I would recommend, if you haven't already doing a home test for COVID.  If it was not COVID it certainly is another cold virus that started this.  If you are getting lots of mucous the other cold going around is RSV and it causes lots of sinus and chest congestion.  So do all the usual things for cold virus.  Common Cold/Viral Upper Respiratory Infection:   Last about 5 days and cough can last 2-3 weeks.  Come back to clinic or go to ER if difficulty breathing, persistent fevers over 100.4, or not able to stay hydrated.    Treating the Common Cold  Decongestants With or Without Antihistamines: Pseudoephedrine(at the pharmacy counter) or phenylephrine decrease nasal swelling to improve air intake and antihistamine (like Benadryl, doxyllamine, or chloripramine) help decrease sneezing and cough, but can make you sleepy so these are commonly in the nighttime cold/flu medications.  Nasal decongestant Afrin (oxymetolazone) twice a day for up to 3 days only (or if you use it longer your nose gets addicted to it)  Ibuprofen 200mg-400mg every 4 hours can help with body aches, fevers, cough, and sneezing.  Nasal saline rinse or NediPot for congestion, as often as needed.  Increase fluid intake, hot tea with honey can help with a sore throat or cough.    If after ten days of over the counter treatments the sinus congestion symptoms are not starting to improve then we'll treat for a sinus infection with an antibiotic for sinusitis. I sent this to your pharmacy to say you'll call if you need to start it.     View your full visit summary for details by clicking on the link below. Your pharmacist will able to address any questions you may have about the medication.     If you're not feeling better within 5-7 days, please schedule an appointment.  You can schedule an appointment right here in [place], or call [phone]  If the visit  is for the same symptoms as your eVisit, we'll refund the cost of your eVisit if seen within seven days.

## 2022-10-11 ENCOUNTER — OFFICE VISIT (OUTPATIENT)
Dept: DERMATOLOGY | Facility: CLINIC | Age: 72
End: 2022-10-11
Payer: COMMERCIAL

## 2022-10-11 DIAGNOSIS — L66.12 FRONTAL FIBROSING ALOPECIA: Primary | ICD-10-CM

## 2022-10-11 DIAGNOSIS — L81.4 LENTIGO: ICD-10-CM

## 2022-10-11 DIAGNOSIS — Z85.820 HISTORY OF MELANOMA: ICD-10-CM

## 2022-10-11 DIAGNOSIS — D22.9 MULTIPLE BENIGN NEVI: ICD-10-CM

## 2022-10-11 DIAGNOSIS — L82.1 SEBORRHEIC KERATOSIS: ICD-10-CM

## 2022-10-11 DIAGNOSIS — Z85.828 HISTORY OF BASAL CELL CANCER: ICD-10-CM

## 2022-10-11 DIAGNOSIS — D18.01 CHERRY ANGIOMA: ICD-10-CM

## 2022-10-11 PROCEDURE — 99213 OFFICE O/P EST LOW 20 MIN: CPT | Mod: 25 | Performed by: PHYSICIAN ASSISTANT

## 2022-10-11 PROCEDURE — 11900 INJECT SKIN LESIONS </W 7: CPT | Performed by: PHYSICIAN ASSISTANT

## 2022-10-11 ASSESSMENT — PAIN SCALES - GENERAL: PAINLEVEL: NO PAIN (0)

## 2022-10-11 NOTE — LETTER
10/11/2022         RE: Tri Walden  [address]  [facility] [address]        Dear Colleague,    Thank you for referring your patient, Tri Walden, to the [facility]. Please see a copy of my visit note below.    Tri Walden is an extremely pleasant 72 year old year old female patient here today for skin check. She denies any new or changing skin lesions. She notes hair loss is stable no increased loss. She is using topical steroids as needed.  Patient has no other skin complaints today.  Remainder of the HPI, Meds, PMH, Allergies, FH, and SH was reviewed in chart.    Pertinent Hx:      History of melanoma on right upper arm 0.2 mm 2/2022, History of BCC on left upper arm.   Past Medical History:   Diagnosis Date     Basal cell carcinoma      Malignant melanoma (H)        Past Surgical History:   Procedure Laterality Date     ABLATE VEIN VARICOSE RADIO FREQUENCY WITHOUT PHLEBECTOMY MULTIPLE STAB  12/6/2012    Procedure: ABLATE VEIN VARICOSE RADIO FREQUENCY WITHOUT PHLEBECTOMY MULTIPLE STAB;  Bilateral Ablation of Varicose Veins;  Surgeon: Fredis Marks MD;  Location: [facility] OR        Family History   Problem Relation Age of Onset     Arthritis Mother      Diabetes Mother         type 2     Heart Disease Father      Coronary Artery Disease Father      Cancer Paternal Grandmother         breast cancer     Heart Disease Paternal Grandfather      Cancer Sister         right kindney       Social History     Socioeconomic History     Marital status:      Spouse name: Not on file     Number of children: Not on file     Years of education: Not on file     Highest education level: Not on file   Occupational History     Occupation:      Employer: [employer]   Tobacco Use     Smoking status: Never     Smokeless tobacco: Never   Vaping Use     Vaping Use: Never used   Substance and Sexual Activity     Alcohol use: No     Drug use: No     Sexual  activity: Not Currently     Partners: Male     Birth control/protection: Surgical     Comment: tubal ligation   Other Topics Concern     Parent/sibling w/ CABG, MI or angioplasty before 65F 55M? Yes   Social History Narrative     Not on file     Social Determinants of Health     Financial Resource Strain: Not on file   Food Insecurity: Not on file   Transportation Needs: Not on file   Physical Activity: Not on file   Stress: Not on file   Social Connections: Not on file   Intimate Partner Violence: Not on file   Housing Stability: Not on file       Outpatient Encounter Medications as of 10/11/2022   Medication Sig Dispense Refill     [START ON 10/13/2022] amoxicillin-clavulanate (AUGMENTIN) 875-125 MG tablet Take 1 tablet by mouth 2 times daily for 7 days 14 tablet 0     ASPIRIN PO Take 81 mg by mouth daily       Calcium Carbonate-Vit D-Min (CALTRATE 600+D PLUS MINERALS) 600-800 MG-UNIT TABS Take 1 tablet by mouth 2 times daily       clobetasol (TEMOVATE) 0.05 % external solution Apply once to twice daily as needed. 50 mL 4     CRANBERRY PO Take by mouth daily        Ginger, Zingiber officinalis, (GINGER PO) Take by mouth daily        losartan (COZAAR) 50 MG tablet Take 1 tablet (50 mg) by mouth daily 90 tablet 4     simvastatin (ZOCOR) 10 MG tablet TAKE ONE TABLET BY MOUTH EVERY NIGHT AT BEDTIME 90 tablet 4     VITAMIN D, CHOLECALCIFEROL, PO Take by mouth daily       Facility-Administered Encounter Medications as of 10/11/2022   Medication Dose Route Frequency Provider Last Rate Last Admin     [COMPLETED] triamcinolone acetonide (KENALOG-10) injection 5 mg  5 mg Intra-Lesional Once Jaceklyn Fonseca PA-C   5 mg at 10/11/22 0820             O:   NAD, WDWN, Alert & Oriented, Mood & Affect wnl, Vitals stable   Here today alone   There were no vitals taken for this visit.   General appearance normal   Vitals stable   Alert, oriented and in no acute distress     Well healed scar on right and left arm   Stuck on  papules and brown macules on trunk and ext   Red papules on trunk  Brown papules and macules with regular pigment network and borders on torso and extremities   The remainder of skin exam is normal       Eyes: Conjunctivae/lids:Normal     ENT: Lips: normal    MSK:Normal    Cardiovascular: peripheral edema none    Pulm: Breathing Normal    Lymph Nodes: No Head and Neck and axillary Lymphadenopathy     Neuro/Psych: Orientation:Alert and Orientedx3 ; Mood/Affect:normal   A/P:  1. FFA- controlled  Continue clobetasol solution 3-4 times weekly.  IL TAC: PGACAC discussed.  Risks including but not limited to injection site reaction, bruising, no resolution.  All questions answered and entertained to patient s satisfaction.  Informed consent obtained.  IL TAC in concentration of 5 mg/ml was injected ID to frontal scalp.  Total injected was  0.5 ml.  Patient tolerated without complications and given wound care instructions, including not to move product around.  Return in 4 weeks for follow-up and possible additional IL TAC.    2. History of Melanoma on right upper arm   MELANOMA DISCUSSED WITH PATIENT:  I discussed the specifics of tumor, prognosis, metachronous melanoma, self exam, and genetics with the patient. I explained the need for monthly skin exams including and taught the patient how to do this. Patient was asked about new or changing moles . I discussed with patient signs and symptoms that could arise in the setting of recurrent locoregional or metastatic disease. In addition, the need to undergo every 3-4 month dermatologic full skin survey and evaluation given that patients with a diagnosis of melanoma are at risk of recurrence (local and distant) and of subsequent de rosalee melanoma.     3. Seborrheic keratosis, lentigo, angioma, benign nevi, history of bcc  It was a pleasure speaking to Tri Walden today.  BENIGN LESIONS DISCUSSED WITH PATIENT:  I discussed the specifics of tumor, prognosis, and genetics of  benign lesions.  I explained that treatment of these lesions would be purely cosmetic and not medically neccessary.  I discussed with patient different removal options including excision, cautery and /or laser.       Nature and genetics of benign skin lesions dicussed with patient.  Signs and Symptoms of skin cancer discussed with patient.  ABCDEs of melanoma reviewed with patient.  Patient encouraged to perform monthly skin exams.  UV precautions reviewed with patient.  Risks of non-melanoma skin cancer discussed with patient   Return to clinic in one year or sooner if needed.       Again, thank you for allowing me to participate in the care of your patient.        Sincerely,        Jackelyn Erickson PA-C

## 2022-10-11 NOTE — NURSING NOTE
Chief Complaint   Patient presents with     Skin Check     No concerns       There were no vitals filed for this visit.  Wt Readings from Last 1 Encounters:   03/08/22 57.7 kg (127 lb 3.2 oz)       Cecilia Santana LPN .................10/11/2022

## 2022-10-11 NOTE — PROGRESS NOTES
rTi Waledn is an extremely pleasant 72 year old year old female patient here today for skin check. She denies any new or changing skin lesions. She notes hair loss is stable no increased loss. She is using topical steroids as needed.  Patient has no other skin complaints today.  Remainder of the HPI, Meds, PMH, Allergies, FH, and SH was reviewed in chart.    Pertinent Hx:      History of melanoma on right upper arm 0.2 mm 2/2022, History of BCC on left upper arm.   Past Medical History:   Diagnosis Date     Basal cell carcinoma      Malignant melanoma (H)        Past Surgical History:   Procedure Laterality Date     ABLATE VEIN VARICOSE RADIO FREQUENCY WITHOUT PHLEBECTOMY MULTIPLE STAB  12/6/2012    Procedure: ABLATE VEIN VARICOSE RADIO FREQUENCY WITHOUT PHLEBECTOMY MULTIPLE STAB;  Bilateral Ablation of Varicose Veins;  Surgeon: Fredis Marks MD;  Location: [facility] OR        Family History   Problem Relation Age of Onset     Arthritis Mother      Diabetes Mother         type 2     Heart Disease Father      Coronary Artery Disease Father      Cancer Paternal Grandmother         breast cancer     Heart Disease Paternal Grandfather      Cancer Sister         right kindney       Social History     Socioeconomic History     Marital status:      Spouse name: Not on file     Number of children: Not on file     Years of education: Not on file     Highest education level: Not on file   Occupational History     Occupation:      Employer: [employer]   Tobacco Use     Smoking status: Never     Smokeless tobacco: Never   Vaping Use     Vaping Use: Never used   Substance and Sexual Activity     Alcohol use: No     Drug use: No     Sexual activity: Not Currently     Partners: Male     Birth control/protection: Surgical     Comment: tubal ligation   Other Topics Concern     Parent/sibling w/ CABG, MI or angioplasty before 65F 55M? Yes   Social History Narrative     Not on file     Social  Determinants of Health     Financial Resource Strain: Not on file   Food Insecurity: Not on file   Transportation Needs: Not on file   Physical Activity: Not on file   Stress: Not on file   Social Connections: Not on file   Intimate Partner Violence: Not on file   Housing Stability: Not on file       Outpatient Encounter Medications as of 10/11/2022   Medication Sig Dispense Refill     [START ON 10/13/2022] amoxicillin-clavulanate (AUGMENTIN) 875-125 MG tablet Take 1 tablet by mouth 2 times daily for 7 days 14 tablet 0     ASPIRIN PO Take 81 mg by mouth daily       Calcium Carbonate-Vit D-Min (CALTRATE 600+D PLUS MINERALS) 600-800 MG-UNIT TABS Take 1 tablet by mouth 2 times daily       clobetasol (TEMOVATE) 0.05 % external solution Apply once to twice daily as needed. 50 mL 4     CRANBERRY PO Take by mouth daily        Ginger, Zingiber officinalis, (GINGER PO) Take by mouth daily        losartan (COZAAR) 50 MG tablet Take 1 tablet (50 mg) by mouth daily 90 tablet 4     simvastatin (ZOCOR) 10 MG tablet TAKE ONE TABLET BY MOUTH EVERY NIGHT AT BEDTIME 90 tablet 4     VITAMIN D, CHOLECALCIFEROL, PO Take by mouth daily       Facility-Administered Encounter Medications as of 10/11/2022   Medication Dose Route Frequency Provider Last Rate Last Admin     [COMPLETED] triamcinolone acetonide (KENALOG-10) injection 5 mg  5 mg Intra-Lesional Once Jackelyn Fonseca PA-C   5 mg at 10/11/22 0820             O:   NAD, WDWN, Alert & Oriented, Mood & Affect wnl, Vitals stable   Here today alone   There were no vitals taken for this visit.   General appearance normal   Vitals stable   Alert, oriented and in no acute distress     Well healed scar on right and left arm   Stuck on papules and brown macules on trunk and ext   Red papules on trunk  Brown papules and macules with regular pigment network and borders on torso and extremities   The remainder of skin exam is normal       Eyes: Conjunctivae/lids:Normal     ENT: Lips:  normal    MSK:Normal    Cardiovascular: peripheral edema none    Pulm: Breathing Normal    Lymph Nodes: No Head and Neck and axillary Lymphadenopathy     Neuro/Psych: Orientation:Alert and Orientedx3 ; Mood/Affect:normal   A/P:  1. FFA- controlled  Continue clobetasol solution 3-4 times weekly.  IL TAC: PGACAC discussed.  Risks including but not limited to injection site reaction, bruising, no resolution.  All questions answered and entertained to patient s satisfaction.  Informed consent obtained.  IL TAC in concentration of 5 mg/ml was injected ID to frontal scalp.  Total injected was  0.5 ml.  Patient tolerated without complications and given wound care instructions, including not to move product around.  Return in 4 weeks for follow-up and possible additional IL TAC.    2. History of Melanoma on right upper arm   MELANOMA DISCUSSED WITH PATIENT:  I discussed the specifics of tumor, prognosis, metachronous melanoma, self exam, and genetics with the patient. I explained the need for monthly skin exams including and taught the patient how to do this. Patient was asked about new or changing moles . I discussed with patient signs and symptoms that could arise in the setting of recurrent locoregional or metastatic disease. In addition, the need to undergo every 3-4 month dermatologic full skin survey and evaluation given that patients with a diagnosis of melanoma are at risk of recurrence (local and distant) and of subsequent de rosalee melanoma.     3. Seborrheic keratosis, lentigo, angioma, benign nevi, history of bcc  It was a pleasure speaking to Tri Walden today.  BENIGN LESIONS DISCUSSED WITH PATIENT:  I discussed the specifics of tumor, prognosis, and genetics of benign lesions.  I explained that treatment of these lesions would be purely cosmetic and not medically neccessary.  I discussed with patient different removal options including excision, cautery and /or laser.       Nature and genetics of benign  skin lesions dicussed with patient.  Signs and Symptoms of skin cancer discussed with patient.  ABCDEs of melanoma reviewed with patient.  Patient encouraged to perform monthly skin exams.  UV precautions reviewed with patient.  Risks of non-melanoma skin cancer discussed with patient   Return to clinic in one year or sooner if needed.

## 2022-10-27 ENCOUNTER — ALLIED HEALTH/NURSE VISIT (OUTPATIENT)
Dept: FAMILY MEDICINE | Facility: CLINIC | Age: 72
End: 2022-10-27
Payer: COMMERCIAL

## 2022-10-27 VITALS — DIASTOLIC BLOOD PRESSURE: 72 MMHG | SYSTOLIC BLOOD PRESSURE: 158 MMHG

## 2022-10-27 DIAGNOSIS — Z01.30 BP CHECK: Primary | ICD-10-CM

## 2022-10-27 PROCEDURE — 99207 PR NO CHARGE NURSE ONLY: CPT | Performed by: FAMILY MEDICINE

## 2022-10-27 NOTE — PROGRESS NOTES
Tri Walden was evaluated at [facility] on October 27, 2022 at which time her blood pressure was:    BP Readings from Last 3 Encounters:   10/27/22 (!) 158/72   03/31/22 (!) 147/83   03/08/22 (!) 159/83     Pulse Readings from Last 3 Encounters:   03/31/22 76   03/08/22 75   02/27/22 94       Reviewed lifestyle modifications for blood pressure control and reduction: including making healthy food choices, managing weight, getting regular exercise,  monitoring blood pressure regularly.     Pt does not drink coffee nor alcohol. She drinks 1 diet coke per day and denies high salt intake in her diet. Pt states her average BP readings at home run 130-140/70 and is higher at the clinic / pharmacy due to white coat syndrome.     Symptoms: None    BP Goal:< 140/90 mmHg    BP Assessment:  BP too high    Potential Reasons for BP too high: Anxiety and Unknown/Other: pt states white coat syndrome    BP Follow-Up Plan: Referral to PCP    Recommended PCP follow up.    Thank you,    Javier Ang, PharmD  [address] [address]  Phone: [phone]  [email or web address]

## 2022-11-15 ENCOUNTER — TRANSFERRED RECORDS (OUTPATIENT)
Dept: HEALTH INFORMATION MANAGEMENT | Facility: CLINIC | Age: 72
End: 2022-11-15

## 2022-11-15 ENCOUNTER — OFFICE VISIT (OUTPATIENT)
Dept: FAMILY MEDICINE | Facility: CLINIC | Age: 72
End: 2022-11-15
Payer: COMMERCIAL

## 2022-11-15 VITALS
HEIGHT: 62 IN | HEART RATE: 98 BPM | TEMPERATURE: 98.3 F | DIASTOLIC BLOOD PRESSURE: 72 MMHG | RESPIRATION RATE: 16 BRPM | BODY MASS INDEX: 23.3 KG/M2 | SYSTOLIC BLOOD PRESSURE: 164 MMHG | OXYGEN SATURATION: 98 % | WEIGHT: 126.6 LBS

## 2022-11-15 DIAGNOSIS — Z12.11 SCREEN FOR COLON CANCER: ICD-10-CM

## 2022-11-15 DIAGNOSIS — I10 ESSENTIAL HYPERTENSION: ICD-10-CM

## 2022-11-15 DIAGNOSIS — M25.512 CHRONIC LEFT SHOULDER PAIN: ICD-10-CM

## 2022-11-15 DIAGNOSIS — Z12.11 COLON CANCER SCREENING: ICD-10-CM

## 2022-11-15 DIAGNOSIS — Z00.00 ENCOUNTER FOR ANNUAL WELLNESS EXAM IN MEDICARE PATIENT: Primary | ICD-10-CM

## 2022-11-15 DIAGNOSIS — G89.29 CHRONIC LEFT SHOULDER PAIN: ICD-10-CM

## 2022-11-15 DIAGNOSIS — E78.00 PURE HYPERCHOLESTEROLEMIA: ICD-10-CM

## 2022-11-15 LAB
ANION GAP SERPL CALCULATED.3IONS-SCNC: 8 MMOL/L (ref 7–15)
BUN SERPL-MCNC: 19.5 MG/DL (ref 8–23)
CALCIUM SERPL-MCNC: 9.6 MG/DL (ref 8.8–10.2)
CHLORIDE SERPL-SCNC: 104 MMOL/L (ref 98–107)
CHOLEST SERPL-MCNC: 180 MG/DL
CREAT SERPL-MCNC: 0.6 MG/DL (ref 0.51–0.95)
DEPRECATED HCO3 PLAS-SCNC: 30 MMOL/L (ref 22–29)
GFR SERPL CREATININE-BSD FRML MDRD: >90 ML/MIN/1.73M2
GLUCOSE SERPL-MCNC: 109 MG/DL (ref 70–99)
HDLC SERPL-MCNC: 69 MG/DL
LDLC SERPL CALC-MCNC: 86 MG/DL
NONHDLC SERPL-MCNC: 111 MG/DL
POTASSIUM SERPL-SCNC: 4.1 MMOL/L (ref 3.4–5.3)
SODIUM SERPL-SCNC: 142 MMOL/L (ref 136–145)
TRIGL SERPL-MCNC: 123 MG/DL

## 2022-11-15 PROCEDURE — 99214 OFFICE O/P EST MOD 30 MIN: CPT | Mod: 25 | Performed by: FAMILY MEDICINE

## 2022-11-15 PROCEDURE — 36415 COLL VENOUS BLD VENIPUNCTURE: CPT | Performed by: FAMILY MEDICINE

## 2022-11-15 PROCEDURE — 80048 BASIC METABOLIC PNL TOTAL CA: CPT | Performed by: FAMILY MEDICINE

## 2022-11-15 PROCEDURE — G0009 ADMIN PNEUMOCOCCAL VACCINE: HCPCS | Performed by: FAMILY MEDICINE

## 2022-11-15 PROCEDURE — 80061 LIPID PANEL: CPT | Performed by: FAMILY MEDICINE

## 2022-11-15 PROCEDURE — 90677 PCV20 VACCINE IM: CPT | Performed by: FAMILY MEDICINE

## 2022-11-15 PROCEDURE — 90662 IIV NO PRSV INCREASED AG IM: CPT | Performed by: FAMILY MEDICINE

## 2022-11-15 PROCEDURE — G0438 PPPS, INITIAL VISIT: HCPCS | Performed by: FAMILY MEDICINE

## 2022-11-15 PROCEDURE — G0008 ADMIN INFLUENZA VIRUS VAC: HCPCS | Performed by: FAMILY MEDICINE

## 2022-11-15 RX ORDER — CHLORTHALIDONE 25 MG/1
25 TABLET ORAL DAILY
Qty: 90 TABLET | Refills: 4 | Status: SHIPPED | OUTPATIENT
Start: 2022-11-15 | End: 2023-11-17

## 2022-11-15 RX ORDER — SIMVASTATIN 10 MG
TABLET ORAL
Qty: 90 TABLET | Refills: 4 | Status: SHIPPED | OUTPATIENT
Start: 2022-11-15 | End: 2023-11-17

## 2022-11-15 RX ORDER — LOSARTAN POTASSIUM 50 MG/1
50 TABLET ORAL DAILY
Qty: 90 TABLET | Refills: 4 | Status: SHIPPED | OUTPATIENT
Start: 2022-11-15 | End: 2023-11-17

## 2022-11-15 ASSESSMENT — PAIN SCALES - GENERAL: PAINLEVEL: NO PAIN (0)

## 2022-11-15 NOTE — PROGRESS NOTES
Assessment & Plan     Encounter for annual wellness exam in Medicare patient    Essential hypertension  Not controlled, add chlorthalidone  -discussed risks, benefits, and side effects of this new medication. Patient understands and is in agreement.  - losartan (COZAAR) 50 MG tablet; Take 1 tablet (50 mg) by mouth daily  - Basic metabolic panel  (Ca, Cl, CO2, Creat, Gluc, K, Na, BUN); Future  - chlorthalidone (HYGROTON) 25 MG tablet; Take 1 tablet (25 mg) by mouth daily  - **Basic metabolic panel FUTURE 14d; Future    Screen for colon cancer  - Fecal colorectal cancer screen FIT - Future (S+30); Future    Colon cancer screening  - Fecal colorectal cancer screen FIT - Future (S+30); Future    Pure hypercholesterolemia  Stable, refill  - simvastatin (ZOCOR) 10 MG tablet; TAKE ONE TABLET BY MOUTH EVERY NIGHT AT BEDTIME  - Lipid panel reflex to direct LDL Fasting; Future    Chronic left shoulder pain  Ongoing after fall onto shoulder with inability to raise above 90 degrees   - Physical Therapy Referral; Future       See Patient Instructions    Return in about 3 weeks (around 12/6/2022) for RN BP appt and lab only appt.    Nehemias Wallace MD  [facility] ALLIE Walker is a 72 year old, presenting for the following health issues:  Recheck Medication (Losartan and Simvastatin)      HPI     Hyperlipidemia Follow-Up      Are you regularly taking any medication or supplement to lower your cholesterol?   Yes- Simvastatin    Are you having muscle aches or other side effects that you think could be caused by your cholesterol lowering medication?  No    Hypertension Follow-up      Do you check your blood pressure regularly outside of the clinic? Yes     Are you following a low salt diet? Yes    Are your blood pressures ever more than 140 on the top number (systolic) OR more   than 90 on the bottom number (diastolic), for example 140/90? Yes. Kind of high at home but is normally high in  "clinic.  Usual home BPs are 130-150s/      Annual Wellness Visit  Patient has been advised of split billing requirements and indicates understanding: Yes     Are you in the first 12 months of your Medicare Part B coverage?  No    Physical Health:    In general, how would you rate your overall physical health? good    Outside of work, how many days during the week do you exercise?4-5 days/week    Outside of work, approximately how many minutes a day do you exercise?15-30 minutes    If you drink alcohol do you typically have >3 drinks per day or >7 drinks per week? No    Do you usually eat at least 4 servings of fruit and vegetables a day, include whole grains & fiber and avoid regularly eating high fat or \"junk\" foods? NO    Do you have any problems taking medications regularly? YES    Do you have any side effects from medications? none    Needs assistance for the following daily activities: no assistance needed    Which of the following safety concerns are present in your home?  none identified     Hearing impairment: No    In the past 6 months, have you been bothered by leaking of urine? no    Mental Health:    In general, how would you rate your overall mental or emotional health? good  PHQ-2 Score:      Do you feel safe in your environment? Yes    Have you ever done Advance Care Planning? (For example, a Health Directive, POLST, or a discussion with a medical provider or your loved ones about your wishes)? Yes, patient states has an Advance Care Planning document and will bring a copy to the clinic.    Fall risk:  Fallen 2 or more times in the past year?: No  Any fall with injury in the past year?: Yes (Tore a ligamnet in arm.)  Timed Up and Go Test (>13.5 is fall risk; contact physician) : 7      Cognitive Screenin) Repeat 3 items (Leader, Season, Table)    2) Clock draw: ABNORMAL Clock number were correct but clock hands were wrong.  3) 3 item recall: Recalls 3 objects  Results: Abnormal clock: recall 3 " "objects.    Mini-CogTM Copyright S [employer]. Licensed by the author for use in [facility]; reprinted with permission ([email or web address]). All rights reserved.      Do you have sleep apnea, excessive snoring or daytime drowsiness?: no    Current providers sharing in care for this patient include:   Patient Care Team:  Nehemias Wallace MD as PCP - General (Family Practice)  Nehemias Wallace MD as Assigned PCP  Raymundo You MD as Assigned Heart and Vascular Provider  Jackelyn Fonseca PA-C as Physician Assistant (Dermatology)  Armand Wilson MD as Assigned Surgical Provider    Patient has been advised of split billing requirements and indicates understanding: Yes    Review of Systems   Constitutional, HEENT, cardiovascular, pulmonary, gi and gu systems are negative, except as otherwise noted.      Objective    BP (!) 164/72 (BP Location: Right arm, Patient Position: Sitting, Cuff Size: Adult Regular)   Pulse 98   Temp 98.3  F (36.8  C) (Tympanic)   Resp 16   Ht 1.584 m (5' 2.36\")   Wt 57.4 kg (126 lb 9.6 oz)   SpO2 98%   BMI 22.89 kg/m    Body mass index is 22.89 kg/m .  Physical Exam   GENERAL: healthy, alert and no distress  NECK: no adenopathy, no asymmetry, masses, or scars and thyroid normal to palpation  RESP: lungs clear to auscultation - no rales, rhonchi or wheezes  CV: regular rate and rhythm, normal S1 S2, no S3 or S4, no murmur, click or rub, no peripheral edema and peripheral pulses strong  MS:left shoulder abduction to 80 degrees no gross musculoskeletal defects noted, no edema  PSYCH: mentation appears normal, affect normal/bright                "

## 2022-11-17 ENCOUNTER — HOSPITAL ENCOUNTER (OUTPATIENT)
Dept: PHYSICAL THERAPY | Facility: CLINIC | Age: 72
Setting detail: THERAPIES SERIES
Discharge: HOME | End: 2022-11-17
Attending: FAMILY MEDICINE
Payer: COMMERCIAL

## 2022-11-17 DIAGNOSIS — M25.512 CHRONIC LEFT SHOULDER PAIN: ICD-10-CM

## 2022-11-17 DIAGNOSIS — G89.29 CHRONIC LEFT SHOULDER PAIN: ICD-10-CM

## 2022-11-17 PROCEDURE — 97161 PT EVAL LOW COMPLEX 20 MIN: CPT | Mod: GP | Performed by: PHYSICAL THERAPIST

## 2022-11-17 PROCEDURE — 97110 THERAPEUTIC EXERCISES: CPT | Mod: GP | Performed by: PHYSICAL THERAPIST

## 2022-11-17 NOTE — PROGRESS NOTES
PHYSICAL THERAPY INITIAL EVALUATION  11/17/22 1500   General Information   Type of Visit Initial OP Ortho PT Evaluation   Start of Care Date 11/17/22   Referring Physician Nehemias Wallace MD   Patient/Family Goals Statement be able to reach into the cupboard, decrease pain   Orders Evaluate and Treat   Date of Order 11/15/22   Certification Required? Yes   Medical Diagnosis Chronic left shoulder pain (M25.512, G89.29)   Surgical/Medical history reviewed Yes   Precautions/Limitations no known precautions/limitations   Body Part(s)   Body Part(s) Shoulder   Presentation and Etiology   Pertinent history of current problem (include personal factors and/or comorbidities that impact the POC) In April was on vacation and stepped off a curb wrong and fell on [facility] shoulder. Saw Dr. Magana and had xray, no fracture but looked the start of some arthritis. Over the past few months it has gotten worse, a constant pain in the arm. Can't reach up into the cupboard or microwave.   Impairments A. Pain;E. Decreased flexibility;F. Decreased strength and endurance   Functional Limitations perform activities of daily living;perform desired leisure / sports activities   Symptom Location L shoulder   How/Where did it occur With a fall   Onset date of current episode/exacerbation 09/17/22   Chronicity New   Pain rating (0-10 point scale) Best (/10);Worst (/10)   Worst (/10) 8   Pain quality A. Sharp;C. Aching;E. Shooting;F. Stabbing   Frequency of pain/symptoms A. Constant   Pain/symptoms exacerbated by C. Lifting;D. Carrying;H. Overhead reach   Pain/symptoms eased by D. Nothing   Prior Level of Function   Prior Level of Function-Mobility independent   Prior Level of Function-ADLs independent   Current Level of Function   Patient role/employment history F. Retired   Fall Risk Screen   Fall screen completed by PT   Have you fallen 2 or more times in the past year? No   Have you fallen and had an injury in the past year? No   Is  patient a fall risk? No   Abuse Screen (yes response referral indicated)   Feels Unsafe at Home or Work/School no   Feels Threatened by Someone no   Does Anyone Try to Keep You From Having Contact with Others or Doing Things Outside Your Home? no   Physical Signs of Abuse Present no   Functional Scales   Functional Scales Other   Other Scales  SPADI: 55.38%   Shoulder Objective Findings   Side (if bilateral, select both right and left) Right;Left   Cervical Screen (ROM, quadrant) Flexion and Extension WNL, Rotation L 50% pain L side, R 75%.   Scapulothoracic Rhythm increased scapular elevation L   Neer's Test +   Cornelius-Ron Test +   Load and Shift Test -   Sulcus Test -   Palpation tender supraspinatus, infra and teres minor muscle bellies and tendons, L UT and levator muscles, L prox biceps tendon   Accessory Motion/Joint Mobility difficult to assess GH mobility due to guarding and pain   Right Shoulder Flexion AROM 132   Left Shoulder Flexion AROM 120   Left Shoulder Flexion PROM 115, pain   Right Shoulder Abduction AROM 135   Left Shoulder Abduction AROM 85   Left Shoulder Abduction PROM 80, pain   Right Shoulder ER AROM 50   Left Shoulder ER AROM 0   Left Shoulder ER PROM 35   Right Shoulder IR AROM T10   Left Shoulder IR AROM T12   Left Shoulder IR PROM 55   Right Shoulder Flexion Strength 4/5   Left Shoulder Flexion Strength 3+/5   Right Shoulder Abduction Strength 4/5   Left Shoulder Abduction Strength 3+/5   Right Shoulder ER Strength 4-/5   Left Shoulder ER Strength 3-/5   Right Shoulder IR Strength 5/5   Left Shoulder IR Strength 5/5   Planned Therapy Interventions   Planned Therapy Interventions joint mobilization;manual therapy;neuromuscular re-education;ROM;strengthening;stretching   Planned Modality Interventions   Planned Modality Interventions TENS   Clinical Impression   Criteria for Skilled Therapeutic Interventions Met yes, treatment indicated   PT Diagnosis L shoulder pain   Influenced by  the following impairments pain, decreased ROM, decreased strength, decreased flexibility   Functional limitations due to impairments lifting, reaching, carrying, dressing   Clinical Presentation Evolving/Changing   Clinical Presentation Rationale worsening sx   Clinical Decision Making (Complexity) Low complexity   Therapy Frequency 1 time/week   Predicted Duration of Therapy Intervention (days/wks) 12 weeks   Risk & Benefits of therapy have been explained Yes   Patient, Family & other staff in agreement with plan of care Yes   Clinical Impression Comments 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 Given ER weakness, possible RTC tear.   Education Assessment   Preferred Learning Style Listening;Demonstration;Pictures/video   Barriers to Learning No barriers   ORTHO GOALS   PT Ortho Eval Goals 1;2;3;4   Ortho Goal 1   Goal Identifier 1   Goal Description Patient will be able lift dishes in and out of microwave without pain or difficulty.   Target Date 02/09/23   Ortho Goal 2   Goal Identifier 2   Goal Description Patient will be able to perform dressing without pain or difficulty.   Target Date 02/09/23   Ortho Goal 3   Goal Identifier 3   Goal Description Patient will be able to lift a gallon of milk into the fridge with minimal pain or difficulty.   Target Date 02/09/23   Ortho Goal 4   Goal Identifier 4   Goal Description Patient will be independent and consistent with HEP at least 5x a week to aid functional recovery.   Target Date 02/09/23   Total Evaluation Time   PT Shannan, Low Complexity Minutes (18010) 30   Therapy Certification   Certification date from 11/17/22   Certification date to 02/09/23   Medical Diagnosis Chronic left shoulder pain (M25.512, G89.29)       Please contact me with any questions or concerns.  Thank you for your referral.    Rika Glass, PT, DPT, OCS  Physical Therapist, Orthopedic Certified Specialist    [facility]  [address]    [address]  [place] [address]  [email or web address]   Office: [phone]   Employed by [employer]

## 2022-11-17 NOTE — PROGRESS NOTES
PHYSICAL THERAPY INITIAL EVALUATION  11/17/22 1500   General Information   Type of Visit Initial OP Ortho PT Evaluation   Start of Care Date 11/17/22   Referring Physician Nehemias Wallace MD   Patient/Family Goals Statement be able to reach into the cupboard, decrease pain   Orders Evaluate and Treat   Date of Order 11/15/22   Certification Required? Yes   Medical Diagnosis Chronic left shoulder pain (M25.512, G89.29)   Surgical/Medical history reviewed Yes   Precautions/Limitations no known precautions/limitations   Body Part(s)   Body Part(s) Shoulder   Presentation and Etiology   Pertinent history of current problem (include personal factors and/or comorbidities that impact the POC) In April was on vacation and stepped off a curb wrong and fell on [facility] shoulder. Saw Dr. Magana and had xray, no fracture but looked the start of some arthritis. Over the past few months it has gotten worse, a constant pain in the arm. Can't reach up into the cupboard or microwave.   Impairments A. Pain;E. Decreased flexibility;F. Decreased strength and endurance   Functional Limitations perform activities of daily living;perform desired leisure / sports activities   Symptom Location L shoulder   How/Where did it occur With a fall   Onset date of current episode/exacerbation 09/17/22   Chronicity New   Pain rating (0-10 point scale) Best (/10);Worst (/10)   Worst (/10) 8   Pain quality A. Sharp;C. Aching;E. Shooting;F. Stabbing   Frequency of pain/symptoms A. Constant   Pain/symptoms exacerbated by C. Lifting;D. Carrying;H. Overhead reach   Pain/symptoms eased by D. Nothing   Prior Level of Function   Prior Level of Function-Mobility independent   Prior Level of Function-ADLs independent   Current Level of Function   Patient role/employment history F. Retired   Fall Risk Screen   Fall screen completed by PT   Have you fallen 2 or more times in the past year? No   Have you fallen and had an injury in the past year? No   Is  patient a fall risk? No   Abuse Screen (yes response referral indicated)   Feels Unsafe at Home or Work/School no   Feels Threatened by Someone no   Does Anyone Try to Keep You From Having Contact with Others or Doing Things Outside Your Home? no   Physical Signs of Abuse Present no   Functional Scales   Functional Scales Other   Other Scales  SPADI: 55.38%   Shoulder Objective Findings   Side (if bilateral, select both right and left) Right;Left   Cervical Screen (ROM, quadrant) Flexion and Extension WNL, Rotation L 50% pain L side, R 75%.   Scapulothoracic Rhythm increased scapular elevation L   Neer's Test +   Cornelius-Ron Test +   Load and Shift Test -   Sulcus Test -   Palpation tender supraspinatus, infra and teres minor muscle bellies and tendons, L UT and levator muscles, L prox biceps tendon   Accessory Motion/Joint Mobility difficult to assess GH mobility due to guarding and pain   Right Shoulder Flexion AROM 132   Left Shoulder Flexion AROM 120   Left Shoulder Flexion PROM 115, pain   Right Shoulder Abduction AROM 135   Left Shoulder Abduction AROM 85   Left Shoulder Abduction PROM 80, pain   Right Shoulder ER AROM 50   Left Shoulder ER AROM 0   Left Shoulder ER PROM 35   Right Shoulder IR AROM T10   Left Shoulder IR AROM T12   Left Shoulder IR PROM 55   Right Shoulder Flexion Strength 4/5   Left Shoulder Flexion Strength 3+/5   Right Shoulder Abduction Strength 4/5   Left Shoulder Abduction Strength 3+/5   Right Shoulder ER Strength 4-/5   Left Shoulder ER Strength 3-/5   Right Shoulder IR Strength 5/5   Left Shoulder IR Strength 5/5   Planned Therapy Interventions   Planned Therapy Interventions joint mobilization;manual therapy;neuromuscular re-education;ROM;strengthening;stretching   Planned Modality Interventions   Planned Modality Interventions TENS   Clinical Impression   Criteria for Skilled Therapeutic Interventions Met yes, treatment indicated   PT Diagnosis L shoulder pain   Influenced by  the following impairments pain, decreased ROM, decreased strength, decreased flexibility   Functional limitations due to impairments lifting, reaching, carrying, dressing   Clinical Presentation Evolving/Changing   Clinical Presentation Rationale worsening sx   Clinical Decision Making (Complexity) Low complexity   Therapy Frequency 1 time/week   Predicted Duration of Therapy Intervention (days/wks) 12 weeks   Risk & Benefits of therapy have been explained Yes   Patient, Family & other staff in agreement with plan of care Yes   Education Assessment   Preferred Learning Style Listening;Demonstration;Pictures/video   Barriers to Learning No barriers   ORTHO GOALS   PT Ortho Eval Goals 1;2;3;4   Ortho Goal 1   Goal Identifier 1   Goal Description Patient will be able lift dishes in and out of microwave without pain or difficulty.   Target Date 02/09/23   Ortho Goal 2   Goal Identifier 2   Goal Description Patient will be able to perform dressing without pain or difficulty.   Target Date 02/09/23   Ortho Goal 3   Goal Identifier 3   Goal Description Patient will be able to lift a gallon of milk into the fridge with minimal pain or difficulty.   Target Date 02/09/23   Ortho Goal 4   Goal Identifier 4   Goal Description Patient will be independent and consistent with HEP at least 5x a week to aid functional recovery.   Target Date 02/09/23   Total Evaluation Time   PT Shannan Low Complexity Minutes (22286) 30   Therapy Certification   Certification date from 11/17/22   Certification date to 02/09/23   Medical Diagnosis Chronic left shoulder pain (M25.512, G89.29)       Please contact me with any questions or concerns.  Thank you for your referral.    Rika Glass, PT, DPT, OCS  Physical Therapist, Orthopedic Certified Specialist    [facility]  [address] [place] [id number]  [email or web address]   Office: [phone]   Employed by  [employer]

## 2022-11-17 NOTE — PROGRESS NOTES
[facility]    OUTPATIENT PHYSICAL THERAPY ORTHOPEDIC EVALUATION  PLAN OF TREATMENT FOR OUTPATIENT REHABILITATION  (COMPLETE FOR INITIAL CLAIMS ONLY)  Patient's Last Name, First Name, M.I.  YOB: 1950  Tri Walden    Provider s Name:  [facility]   Medical Record No.  [id number]   Start of Care Date:  11/17/22   Onset Date:  09/17/22   Type:     _X__PT   ___OT   ___SLP Medical Diagnosis:  Chronic left shoulder pain (M25.512, G89.29)     PT Diagnosis:  L shoulder pain   Visits from SOC:  1      _________________________________________________________________________________  Plan of Treatment/Functional Goals:  joint mobilization, manual therapy, neuromuscular re-education, ROM, strengthening, stretching     TENS     Goals  Goal Identifier: 1  Goal Description: Patient will be able lift dishes in and out of microwave without pain or difficulty.  Target Date: 02/09/23    Goal Identifier: 2  Goal Description: Patient will be able to perform dressing without pain or difficulty.  Target Date: 02/09/23    Goal Identifier: 3  Goal Description: Patient will be able to lift a gallon of milk into the fridge with minimal pain or difficulty.  Target Date: 02/09/23    Goal Identifier: 4  Goal Description: Patient will be independent and consistent with HEP at least 5x a week to aid functional recovery.  Target Date: 02/09/23                Therapy Frequency:  1 time/week  Predicted Duration of Therapy Intervention:  12 weeks    Rika Glass, PT                 I CERTIFY THE NEED FOR THESE SERVICES FURNISHED UNDER        THIS PLAN OF TREATMENT AND WHILE UNDER MY CARE     (Physician co-signature of this document indicates review and certification of the therapy plan).                       Certification Date From:  11/17/22   Certification Date To:  02/09/23    Referring  Provider:  Nehemias Wallace MD    Initial Assessment        See Epic Evaluation Start of Care Date: 11/17/22

## 2022-11-22 ENCOUNTER — HOSPITAL ENCOUNTER (OUTPATIENT)
Dept: PHYSICAL THERAPY | Facility: CLINIC | Age: 72
Setting detail: THERAPIES SERIES
Discharge: HOME | End: 2022-11-22
Attending: FAMILY MEDICINE
Payer: COMMERCIAL

## 2022-11-22 PROCEDURE — 97110 THERAPEUTIC EXERCISES: CPT | Mod: GP | Performed by: PHYSICAL THERAPIST

## 2022-11-25 ENCOUNTER — ALLIED HEALTH/NURSE VISIT (OUTPATIENT)
Dept: FAMILY MEDICINE | Facility: CLINIC | Age: 72
End: 2022-11-25
Payer: COMMERCIAL

## 2022-11-25 DIAGNOSIS — Z01.30 BLOOD PRESSURE CHECK: Primary | ICD-10-CM

## 2022-11-25 PROCEDURE — 99207 PR NO CHARGE NURSE ONLY: CPT | Performed by: FAMILY MEDICINE

## 2022-11-25 NOTE — PROGRESS NOTES
Tri Walden was evaluated at [facility] on November 25, 2022 at which time her blood pressure was:    BP Readings from Last 3 Encounters:   11/15/22 (!) 164/72   10/27/22 (!) 158/72   03/31/22 (!) 147/83     Pulse Readings from Last 3 Encounters:   11/15/22 98   03/31/22 76   03/08/22 75       Reviewed lifestyle modifications for blood pressure control and reduction: including making healthy food choices, managing weight, getting regular exercise, smoking cessation, reducing alcohol consumption, monitoring blood pressure regularly.     Symptoms: None    BP Goal:< 140/90 mmHg    BP Assessment:  BP too high    Potential Reasons for BP too high: Dose of BP medication too low    BP Follow-Up Plan: Referral to PCP    Recommendation to Provider: Pharmacy called [facility] today to follow up with her new BP med and get a home reading.  Her BP today was still high but patient reports it is slowly coming down since starting and mentioned she has labs and a nurse BP check in the next few weeks. Pharmacist recommends waiting until that appt to determine if a dose increase is needed.     Note completed by: Thank you,  Tiesha Calderon, Pharm.D.  [facility]

## 2022-12-02 ENCOUNTER — HOSPITAL ENCOUNTER (OUTPATIENT)
Dept: PHYSICAL THERAPY | Facility: CLINIC | Age: 72
Setting detail: THERAPIES SERIES
Discharge: HOME | End: 2022-12-02
Attending: FAMILY MEDICINE
Payer: COMMERCIAL

## 2022-12-02 PROCEDURE — 97110 THERAPEUTIC EXERCISES: CPT | Mod: GP | Performed by: PHYSICAL THERAPIST

## 2022-12-06 ENCOUNTER — LAB (OUTPATIENT)
Dept: LAB | Facility: CLINIC | Age: 72
End: 2022-12-06
Payer: COMMERCIAL

## 2022-12-06 ENCOUNTER — ALLIED HEALTH/NURSE VISIT (OUTPATIENT)
Dept: FAMILY MEDICINE | Facility: CLINIC | Age: 72
End: 2022-12-06
Payer: COMMERCIAL

## 2022-12-06 ENCOUNTER — TELEPHONE (OUTPATIENT)
Dept: FAMILY MEDICINE | Facility: CLINIC | Age: 72
End: 2022-12-06

## 2022-12-06 VITALS — SYSTOLIC BLOOD PRESSURE: 126 MMHG | OXYGEN SATURATION: 98 % | HEART RATE: 80 BPM | DIASTOLIC BLOOD PRESSURE: 74 MMHG

## 2022-12-06 DIAGNOSIS — I10 ESSENTIAL HYPERTENSION: Primary | ICD-10-CM

## 2022-12-06 DIAGNOSIS — I10 ESSENTIAL HYPERTENSION: ICD-10-CM

## 2022-12-06 LAB
ANION GAP SERPL CALCULATED.3IONS-SCNC: 7 MMOL/L (ref 7–15)
BUN SERPL-MCNC: 17 MG/DL (ref 8–23)
CALCIUM SERPL-MCNC: 9.4 MG/DL (ref 8.8–10.2)
CHLORIDE SERPL-SCNC: 101 MMOL/L (ref 98–107)
CREAT SERPL-MCNC: 0.71 MG/DL (ref 0.51–0.95)
DEPRECATED HCO3 PLAS-SCNC: 34 MMOL/L (ref 22–29)
GFR SERPL CREATININE-BSD FRML MDRD: 89 ML/MIN/1.73M2
GLUCOSE SERPL-MCNC: 104 MG/DL (ref 70–99)
POTASSIUM SERPL-SCNC: 3.5 MMOL/L (ref 3.4–5.3)
SODIUM SERPL-SCNC: 142 MMOL/L (ref 136–145)

## 2022-12-06 PROCEDURE — 80048 BASIC METABOLIC PNL TOTAL CA: CPT

## 2022-12-06 PROCEDURE — 99207 PR NO CHARGE NURSE ONLY: CPT

## 2022-12-06 PROCEDURE — 36415 COLL VENOUS BLD VENIPUNCTURE: CPT

## 2022-12-06 NOTE — PROGRESS NOTES
Tri Walden is a 72 year old year old patient who comes in today for a Blood Pressure check because of ongoing blood pressure monitoring and new medication (chlorthalidone)    Vital Signs as repeated by /74 p 80    Patient is taking medication as prescribed    Patient is tolerating medications well.    Patient is monitoring Blood Pressure at home.  Average readings if yes are   11/28 137/84  11/29 128/84  11/30 122/78  12/01 113/78  12/02 112/78  12/03 128/78  12/04 114/79  12/05 116/78  12/06 122/78    Current complaints: none    Disposition:  patient to continue with the same medication. Getting labs drawn after bp check    Arturo Dozier RN

## 2022-12-07 ENCOUNTER — MYC MEDICAL ADVICE (OUTPATIENT)
Dept: FAMILY MEDICINE | Facility: CLINIC | Age: 72
End: 2022-12-07

## 2022-12-08 ENCOUNTER — HOSPITAL ENCOUNTER (OUTPATIENT)
Dept: PHYSICAL THERAPY | Facility: CLINIC | Age: 72
Setting detail: THERAPIES SERIES
Discharge: HOME | End: 2022-12-08
Attending: FAMILY MEDICINE
Payer: COMMERCIAL

## 2022-12-08 PROCEDURE — 97110 THERAPEUTIC EXERCISES: CPT | Mod: GP | Performed by: PHYSICAL THERAPIST

## 2022-12-08 NOTE — TELEPHONE ENCOUNTER
See [employer] message, routing to PCP for review.    Traci Grady RN  [facility]

## 2022-12-09 ENCOUNTER — LAB (OUTPATIENT)
Dept: LAB | Facility: CLINIC | Age: 72
End: 2022-12-09
Payer: COMMERCIAL

## 2022-12-09 DIAGNOSIS — Z12.11 SCREEN FOR COLON CANCER: ICD-10-CM

## 2022-12-09 DIAGNOSIS — Z12.11 COLON CANCER SCREENING: ICD-10-CM

## 2022-12-09 PROCEDURE — 82274 ASSAY TEST FOR BLOOD FECAL: CPT

## 2022-12-14 LAB — HEMOCCULT STL QL IA: NEGATIVE

## 2022-12-26 ENCOUNTER — HOSPITAL ENCOUNTER (OUTPATIENT)
Dept: MAMMOGRAPHY | Facility: CLINIC | Age: 72
Discharge: HOME | End: 2022-12-26
Attending: FAMILY MEDICINE | Admitting: FAMILY MEDICINE
Payer: COMMERCIAL

## 2022-12-26 DIAGNOSIS — Z12.31 VISIT FOR SCREENING MAMMOGRAM: ICD-10-CM

## 2022-12-26 PROCEDURE — 77067 SCR MAMMO BI INCL CAD: CPT

## 2023-01-02 ENCOUNTER — HOSPITAL ENCOUNTER (OUTPATIENT)
Dept: CARDIOLOGY | Facility: CLINIC | Age: 73
Discharge: HOME | End: 2023-01-02
Attending: INTERNAL MEDICINE | Admitting: INTERNAL MEDICINE
Payer: COMMERCIAL

## 2023-01-02 DIAGNOSIS — I35.0 AORTIC VALVE STENOSIS, ETIOLOGY OF CARDIAC VALVE DISEASE UNSPECIFIED: ICD-10-CM

## 2023-01-02 LAB — LVEF ECHO: NORMAL

## 2023-01-02 PROCEDURE — 93306 TTE W/DOPPLER COMPLETE: CPT | Mod: 26 | Performed by: INTERNAL MEDICINE

## 2023-01-02 PROCEDURE — 93306 TTE W/DOPPLER COMPLETE: CPT

## 2023-01-02 NOTE — RESULT ENCOUNTER NOTE
EF 55-60%; mod to severe AS with mean gradient of 27.9 mmHg and NAYELI 0.78 cm2. Follow up with Dr You on 1/10/23

## 2023-01-09 NOTE — PROGRESS NOTES
CARDIOLOGY VISIT    REASON FOR VISIT: Aortic stenosis    SUBJECTIVE:  72-year-old female seen for aortic stenosis.  She has dyslipidemia, GERD, irritable bowel.     Echo July 2021 showed EF 60%, normal RV, aortic stenosis with mean 21 mmHg, V-max 2.1 m/s, area 0.9 cm , DI 0.27, SVI 37 ml/m2.     Echo March 2022 showed EF 55%, aortic stenosis with mean 26 mmHg, V-max 3.3 m/s, area 0.9 cm , DI 0.28, SVI 41 ml/m2.    Echo January 2023 showed EF 55%, aortic stenosis with mean 28 mmHg, V-max 3.8 m/s, area 0.8 cm , DI 0.24, SVI 37 mls/m2.    She has been doing well recently.  She did some yard work in the fall and has done some light shoveling this winter.  She denies any exertional shortness of breath, chest pain, lightheadedness, or edema.  She feels the same as she did 1 year ago.    MEDICATIONS:  Current Outpatient Medications   Medication     ASPIRIN PO     Calcium Carbonate-Vit D-Min (CALTRATE 600+D PLUS MINERALS) 600-800 MG-UNIT TABS     chlorthalidone (HYGROTON) 25 MG tablet     clobetasol (TEMOVATE) 0.05 % external solution     CRANBERRY PO     Erin, Zingiber officinalis, (ERIN PO)     losartan (COZAAR) 50 MG tablet     simvastatin (ZOCOR) 10 MG tablet     VITAMIN D, CHOLECALCIFEROL, PO     No current facility-administered medications for this visit.       ALLERGIES:  Allergies   Allergen Reactions     Nkda [No Known Drug Allergies]        REVIEW OF SYSTEMS:  Constitutional:  No weight loss, fever, chills  HEENT:  Eyes:  No visual loss, blurred vision, double vision or yellow sclerae. No hearing loss, sneezing, congestion, runny nose or sore throat.  Skin:  No rash or itching.  Cardiovascular: per HPI  Respiratory: per HPI  GI:  No anorexia, nausea, vomiting or diarrhea. No abdominal pain or blood.  :  No dysurea, hematuria  Neurologic:  No headache, paralysis, ataxia, numbness or tingling in the extremities. No change in bowel or bladder control.  Musculoskeletal:  No muscle pain  Hematologic:  No bleeding  "or bruising.  Lymphatics:  No enlarged nodes. No history of splenectomy.  Endocrine:  No reports of sweating, cold or heat intolerance. No polyuria or polydipsia.  Allergies:  No history of asthma, hives, eczema or rhinitis.    PHYSICAL EXAM:  /77 (BP Location: Right arm, Patient Position: Sitting, Cuff Size: Adult Regular)   Pulse 83   Ht 1.6 m (5' 3\")   Wt 56.7 kg (125 lb)   SpO2 98%   BMI 22.14 kg/m      Constitutional: awake, alert, no distress  Eyes: PERRL, sclera nonicteric  ENT: trachea midline  Respiratory: lungs clear  Cardiovascular: Regular rate and rhythm, 3/6 mid peaking systolic murmur at the upper sternal border  GI: nondistended, nontender, bowel sounds present  Lymph/Hematologic: no lymphadenopathy  Skin: dry, no rash  Musculoskeletal: good muscle tone, strength 5/5 in upper and lower extremities  Neurologic: no focal deficits  Neuropsychiatric: appropriate affact    DATA:  Lab: December 2022: Creatinine 0.7  Recent Labs   Lab Test 11/15/22  0907 10/20/21  0901 08/29/16  0843 08/24/15  0737   CHOL 180 176   < > 173   HDL 69 91   < > 80   LDL 86 69   < > 81   TRIG 123 80   < > 58   CHOLHDLRATIO  --   --   --  2.2    < > = values in this interval not displayed.     ASSESSMENT:  72-year-old female seen for aortic stenosis.  The stenosis is in the moderate to severe range.  She has a good exercise capacity and no symptoms.  Echo will be repeated in 6 months.  She is probably 1 to 2 years away from needing valve replacement.    RECOMMENDATIONS:  1.  Moderate to severe aortic stenosis  -Repeat echo in 6 months    Follow-up in 6 months after echo.    Raymundo You MD  Cardiology - [facility]  Pager:  [phone]  Text Page  January 10, 2023    Addendum:  She is having shoulder arthroplasty in the next 1 month.  She has no high risk cardiac symptoms at this time.  She would be low to intermediate risk for any cardiac complications for this orthopedic procedure.  Caution with anesthesia to " avoid any significant hypotension or tachycardia.    Raymundo You MD  Cardiology - [facility]  Pager: [phone]  Text Page  February 7, 2023

## 2023-01-10 ENCOUNTER — OFFICE VISIT (OUTPATIENT)
Dept: CARDIOLOGY | Facility: CLINIC | Age: 73
End: 2023-01-10
Attending: INTERNAL MEDICINE
Payer: COMMERCIAL

## 2023-01-10 VITALS
HEART RATE: 83 BPM | SYSTOLIC BLOOD PRESSURE: 135 MMHG | WEIGHT: 125 LBS | OXYGEN SATURATION: 98 % | BODY MASS INDEX: 22.15 KG/M2 | DIASTOLIC BLOOD PRESSURE: 77 MMHG | HEIGHT: 63 IN

## 2023-01-10 DIAGNOSIS — I35.0 AORTIC VALVE STENOSIS, ETIOLOGY OF CARDIAC VALVE DISEASE UNSPECIFIED: ICD-10-CM

## 2023-01-10 PROCEDURE — 99214 OFFICE O/P EST MOD 30 MIN: CPT | Performed by: INTERNAL MEDICINE

## 2023-01-10 NOTE — PATIENT INSTRUCTIONS
Your recent echo showed that your heart function is normal.  The aortic stenosis, or tightening of the aortic valve, has progressed a little more, it is now in the moderate to severe range.    Your echocardiogram shows that one of the valves in your heart, the aortic valve, is thickened and is not opening well.  The medical term for this is aortic stenosis.  This is in the moderate-severe range, meaning that the valve is tight, making it harder for the heart to pump the blood out.     The natural history of aortic stenosis is to gradually worsen over the years.  When it becomes severely tight, it can cause stress on the heart and cause symptoms of shortness of breath, chest pain, or fainting.    When the aortic stenosis becomes severe, the only treatment is to replace the valve with a new one.  There are two ways of doing this.  First option is an open heart surgery where a cardiovascular surgeon replaces the aortic valve.  The second option is a transcatheter aortic valve replacement (TAVR) where a cardiologist puts a new valve inside of the old valve, delivered by a catheter that usually goes through a blood vessel in the leg.     We have a team of people at [facility] who work together that order the necessary tests and then meet in a conference to determine the best option for replacing the aortic valve.

## 2023-01-10 NOTE — LETTER
1/10/2023    Nehemias Wallace MD  [address] [facility] [address]    RE: Tri Walden       Dear Colleague,     I had the pleasure of seeing Tri Walden in the [facility].  CARDIOLOGY VISIT    REASON FOR VISIT: Aortic stenosis    SUBJECTIVE:  72-year-old female seen for aortic stenosis.  She has dyslipidemia, GERD, irritable bowel.     Echo July 2021 showed EF 60%, normal RV, aortic stenosis with mean 21 mmHg, V-max 2.1 m/s, area 0.9 cm , DI 0.27, SVI 37 ml/m2.     Echo March 2022 showed EF 55%, aortic stenosis with mean 26 mmHg, V-max 3.3 m/s, area 0.9 cm , DI 0.28, SVI 41 ml/m2.    Echo January 2023 showed EF 55%, aortic stenosis with mean 28 mmHg, V-max 3.8 m/s, area 0.8 cm , DI 0.24, SVI 37 mls/m2.    She has been doing well recently.  She did some yard work in the fall and has done some light shoveling this winter.  She denies any exertional shortness of breath, chest pain, lightheadedness, or edema.  She feels the same as she did 1 year ago.    MEDICATIONS:  Current Outpatient Medications   Medication     ASPIRIN PO     Calcium Carbonate-Vit D-Min (CALTRATE 600+D PLUS MINERALS) 600-800 MG-UNIT TABS     chlorthalidone (HYGROTON) 25 MG tablet     clobetasol (TEMOVATE) 0.05 % external solution     CRANBERRY PO     Erin, Zingiber officinalis, (ERIN PO)     losartan (COZAAR) 50 MG tablet     simvastatin (ZOCOR) 10 MG tablet     VITAMIN D, CHOLECALCIFEROL, PO     No current facility-administered medications for this visit.       ALLERGIES:  Allergies   Allergen Reactions     Nkda [No Known Drug Allergies]        REVIEW OF SYSTEMS:  Constitutional:  No weight loss, fever, chills  HEENT:  Eyes:  No visual loss, blurred vision, double vision or yellow sclerae. No hearing loss, sneezing, congestion, runny nose or sore throat.  Skin:  No rash or itching.  Cardiovascular: per HPI  Respiratory: per HPI  GI:  No anorexia, nausea, vomiting or diarrhea. No abdominal pain or blood.  :   "No dysurea, hematuria  Neurologic:  No headache, paralysis, ataxia, numbness or tingling in the extremities. No change in bowel or bladder control.  Musculoskeletal:  No muscle pain  Hematologic:  No bleeding or bruising.  Lymphatics:  No enlarged nodes. No history of splenectomy.  Endocrine:  No reports of sweating, cold or heat intolerance. No polyuria or polydipsia.  Allergies:  No history of asthma, hives, eczema or rhinitis.    PHYSICAL EXAM:  /77 (BP Location: Right arm, Patient Position: Sitting, Cuff Size: Adult Regular)   Pulse 83   Ht 1.6 m (5' 3\")   Wt 56.7 kg (125 lb)   SpO2 98%   BMI 22.14 kg/m      Constitutional: awake, alert, no distress  Eyes: PERRL, sclera nonicteric  ENT: trachea midline  Respiratory: lungs clear  Cardiovascular: Regular rate and rhythm, 3/6 mid peaking systolic murmur at the upper sternal border  GI: nondistended, nontender, bowel sounds present  Lymph/Hematologic: no lymphadenopathy  Skin: dry, no rash  Musculoskeletal: good muscle tone, strength 5/5 in upper and lower extremities  Neurologic: no focal deficits  Neuropsychiatric: appropriate affact    DATA:  Lab: December 2022: Creatinine 0.7  Recent Labs   Lab Test 11/15/22  0907 10/20/21  0901 08/29/16  0843 08/24/15  0737   CHOL 180 176   < > 173   HDL 69 91   < > 80   LDL 86 69   < > 81   TRIG 123 80   < > 58   CHOLHDLRATIO  --   --   --  2.2    < > = values in this interval not displayed.     ASSESSMENT:  72-year-old female seen for aortic stenosis.  The stenosis is in the moderate to severe range.  She has a good exercise capacity and no symptoms.  Echo will be repeated in 6 months.  She is probably 1 to 2 years away from needing valve replacement.    RECOMMENDATIONS:  1.  Moderate to severe aortic stenosis  -Repeat echo in 6 months    Follow-up in 6 months after echo.    Raymundo You MD  Cardiology - [facility]  Pager:  [phone]  Text Page  January 10, 2023          Thank you for allowing me to " participate in the care of your patient.      Sincerely,     Raymundo You MD     [facility]  cc:   Raymundo You MD  [facility]  [address] [place] [address]

## 2023-01-10 NOTE — NURSING NOTE
Chief Complaint   Patient presents with     Heart Problem     9 month follow up Aortic Stenosis,review echo results        There were no vitals filed for this visit.  Wt Readings from Last 1 Encounters:   11/15/22 57.4 kg (126 lb 9.6 oz)       Ly Andrea MA

## 2023-01-18 ENCOUNTER — TRANSFERRED RECORDS (OUTPATIENT)
Dept: HEALTH INFORMATION MANAGEMENT | Facility: CLINIC | Age: 73
End: 2023-01-18
Payer: COMMERCIAL

## 2023-01-24 ENCOUNTER — HOSPITAL ENCOUNTER (OUTPATIENT)
Dept: MRI IMAGING | Facility: CLINIC | Age: 73
Discharge: HOME | End: 2023-01-24
Attending: ORTHOPAEDIC SURGERY | Admitting: ORTHOPAEDIC SURGERY
Payer: COMMERCIAL

## 2023-01-24 DIAGNOSIS — M25.512 LEFT SHOULDER PAIN: ICD-10-CM

## 2023-01-24 PROCEDURE — 73221 MRI JOINT UPR EXTREM W/O DYE: CPT | Mod: LT

## 2023-01-24 PROCEDURE — 73221 MRI JOINT UPR EXTREM W/O DYE: CPT | Mod: 26 | Performed by: RADIOLOGY

## 2023-02-01 ENCOUNTER — TRANSFERRED RECORDS (OUTPATIENT)
Dept: HEALTH INFORMATION MANAGEMENT | Facility: CLINIC | Age: 73
End: 2023-02-01
Payer: COMMERCIAL

## 2023-02-01 ENCOUNTER — TELEPHONE (OUTPATIENT)
Dept: CARDIOLOGY | Facility: CLINIC | Age: 73
End: 2023-02-01
Payer: COMMERCIAL

## 2023-02-01 NOTE — TELEPHONE ENCOUNTER
"Reviewed pt chart she is being followed for aortic stenosis. Per last Cardiology note a couple weeks ago on 1/10/23 - \"The stenosis is in the moderate to severe range.  She has a good exercise capacity and no symptoms.  Echo will be repeated in 6 months.  She is probably 1 to 2 years away from needing valve replacement.\"    Called and spoke with pt. Notified her that it is up to the surgeon on whether they request Cardiac clearance and is usually indicated if she is receiving GEN anesthesia. Asked pt if her surgeon is aware she has mod-severe aortic stenosis and she did not think they did.     Pt will call her surgeons office and let them know and call back if they are requiring cardiac clearance.     Ree Melgar RN    "

## 2023-02-01 NOTE — TELEPHONE ENCOUNTER
[facility] Call Center    Phone Message    May a detailed message be left on voicemail: yes     Reason for Call: Other: Please call pt to discuss if she will need acardiac clearance for an upcoming shoulder surgery. Her surgeon did not mention it but she wanted to know. Thank you     Action Taken: Message routed to:  Other: Cardiology    Travel Screening: Not Applicable       Thank you!  [facility]

## 2023-02-01 NOTE — TELEPHONE ENCOUNTER
[facility] Call Center    Phone Message    May a detailed message be left on voicemail: yes     Reason for Call: Other: Pt states she is having shoulder surgery on 03/22/23 with Escobar Magana and he is requesting a letter giving patient clearance for surgery. Please call back to discuss.      Action Taken: Other: Cardiology    Travel Screening: Not Applicable     Thank you!  [facility]

## 2023-02-02 ENCOUNTER — TRANSCRIBE ORDERS (OUTPATIENT)
Dept: OTHER | Age: 73
End: 2023-02-02

## 2023-02-02 DIAGNOSIS — Z96.612 S/P REVERSE TOTAL SHOULDER ARTHROPLASTY, LEFT: Primary | ICD-10-CM

## 2023-02-07 ENCOUNTER — OFFICE VISIT (OUTPATIENT)
Dept: DERMATOLOGY | Facility: CLINIC | Age: 73
End: 2023-02-07
Payer: COMMERCIAL

## 2023-02-07 DIAGNOSIS — Z85.820 HISTORY OF MELANOMA: ICD-10-CM

## 2023-02-07 DIAGNOSIS — D18.01 CHERRY ANGIOMA: ICD-10-CM

## 2023-02-07 DIAGNOSIS — L82.1 SEBORRHEIC KERATOSIS: ICD-10-CM

## 2023-02-07 DIAGNOSIS — D22.9 MULTIPLE BENIGN NEVI: ICD-10-CM

## 2023-02-07 DIAGNOSIS — L66.12 FRONTAL FIBROSING ALOPECIA: Primary | ICD-10-CM

## 2023-02-07 DIAGNOSIS — L81.4 LENTIGO: ICD-10-CM

## 2023-02-07 DIAGNOSIS — Z85.828 HISTORY OF BASAL CELL CANCER: ICD-10-CM

## 2023-02-07 PROCEDURE — 99213 OFFICE O/P EST LOW 20 MIN: CPT | Performed by: PHYSICIAN ASSISTANT

## 2023-02-07 ASSESSMENT — PAIN SCALES - GENERAL: PAINLEVEL: NO PAIN (0)

## 2023-02-07 NOTE — TELEPHONE ENCOUNTER
"Routing to Dr. You.     You last saw pt on 1/20/23 for Aortic Stenosis. Per note - \"The stenosis is in the moderate to severe range.  She has a good exercise capacity and no symptoms.  Echo will be repeated in 6 months.  She is probably 1 to 2 years away from needing valve replacement.\"    Are you okay clearing pt for surgery or do you need to see her again?    Ree Melgar RN    "

## 2023-02-07 NOTE — PROGRESS NOTES
rTi Walden is an extremely pleasant 72 year old year old female patient here today for skin check. She denies any new or changing skin lesions. She notes hair loss is stable no increased loss. She is using topical steroids as needed. She notes bump on left popliteal fossa.  Patient has no other skin complaints today.  Remainder of the HPI, Meds, PMH, Allergies, FH, and SH was reviewed in chart.    Pertinent Hx:      History of melanoma on right upper arm 0.2 mm 2/2022, History of BCC on left upper arm.   Past Medical History:   Diagnosis Date     Basal cell carcinoma      Malignant melanoma (H)        Past Surgical History:   Procedure Laterality Date     ABLATE VEIN VARICOSE RADIO FREQUENCY WITHOUT PHLEBECTOMY MULTIPLE STAB  12/06/2012    Procedure: ABLATE VEIN VARICOSE RADIO FREQUENCY WITHOUT PHLEBECTOMY MULTIPLE STAB;  Bilateral Ablation of Varicose Veins;  Surgeon: Fredis Marks MD;  Location: [facility] OR        Family History   Problem Relation Age of Onset     Arthritis Mother      Diabetes Mother         type 2     Heart Disease Father      Coronary Artery Disease Father      Cancer Paternal Grandmother         breast cancer     Heart Disease Paternal Grandfather      Cancer Sister         right kindney       Social History     Socioeconomic History     Marital status:      Spouse name: Not on file     Number of children: Not on file     Years of education: Not on file     Highest education level: Not on file   Occupational History     Occupation:      Employer: [employer]   Tobacco Use     Smoking status: Never     Smokeless tobacco: Never   Vaping Use     Vaping Use: Never used   Substance and Sexual Activity     Alcohol use: No     Comment: very rare     Drug use: No     Sexual activity: Not Currently     Partners: Male     Birth control/protection: Surgical     Comment: tubal ligation   Other Topics Concern     Parent/sibling w/ CABG, MI or angioplasty before 65F  55M? Yes   Social History Narrative     Not on file     Social Determinants of Health     Financial Resource Strain: Not on file   Food Insecurity: Not on file   Transportation Needs: Not on file   Physical Activity: Not on file   Stress: Not on file   Social Connections: Not on file   Intimate Partner Violence: Not on file   Housing Stability: Not on file       Outpatient Encounter Medications as of 2/7/2023   Medication Sig Dispense Refill     ASPIRIN PO Take 81 mg by mouth daily       Calcium Carbonate-Vit D-Min (CALTRATE 600+D PLUS MINERALS) 600-800 MG-UNIT TABS Take 1 tablet by mouth 2 times daily       chlorthalidone (HYGROTON) 25 MG tablet Take 1 tablet (25 mg) by mouth daily 90 tablet 4     clobetasol (TEMOVATE) 0.05 % external solution Apply once to twice daily as needed. 50 mL 4     CRANBERRY PO Take by mouth daily        Ginger, Zingiber officinalis, (GINGER PO) Take by mouth daily        losartan (COZAAR) 50 MG tablet Take 1 tablet (50 mg) by mouth daily 90 tablet 4     simvastatin (ZOCOR) 10 MG tablet TAKE ONE TABLET BY MOUTH EVERY NIGHT AT BEDTIME 90 tablet 4     VITAMIN D, CHOLECALCIFEROL, PO Take by mouth daily       No facility-administered encounter medications on file as of 2/7/2023.             O:   NAD, WDWN, Alert & Oriented, Mood & Affect wnl, Vitals stable   Here today alone   There were no vitals taken for this visit.   General appearance normal   Vitals stable   Alert, oriented and in no acute distress     Well healed scar on right and left arm   Stuck on papules and brown macules on trunk and ext   Red papules on trunk  Brown papules and macules with regular pigment network and borders on torso and extremities   The remainder of skin exam is normal       Eyes: Conjunctivae/lids:Normal     ENT: Lips: normal    MSK:Normal    Cardiovascular: peripheral edema none    Pulm: Breathing Normal    Lymph Nodes: No Head and Neck and axillary Lymphadenopathy     Neuro/Psych: Orientation:Alert and  Orientedx3 ; Mood/Affect:normal   A/P:  1. FFA- controlled  Continue clobetasol solution 3-4 times weekly.  2. History of Melanoma on right upper arm   MELANOMA DISCUSSED WITH PATIENT:  I discussed the specifics of tumor, prognosis, metachronous melanoma, self exam, and genetics with the patient. I explained the need for monthly skin exams including and taught the patient how to do this. Patient was asked about new or changing moles . I discussed with patient signs and symptoms that could arise in the setting of recurrent locoregional or metastatic disease. In addition, the need to undergo every 3-4 month dermatologic full skin survey and evaluation given that patients with a diagnosis of melanoma are at risk of recurrence (local and distant) and of subsequent de rosalee melanoma.     3. Seborrheic keratosis, lentigo, angioma, benign nevi, history of bcc, hemangioma on left popliteal fossa, history of BCC  Will monitor hemangioma on left popliteal fossa, picture taken.   It was a pleasure speaking to Tri Walden today.  BENIGN LESIONS DISCUSSED WITH PATIENT:  I discussed the specifics of tumor, prognosis, and genetics of benign lesions.  I explained that treatment of these lesions would be purely cosmetic and not medically neccessary.  I discussed with patient different removal options including excision, cautery and /or laser.       Nature and genetics of benign skin lesions dicussed with patient.  Signs and Symptoms of skin cancer discussed with patient.  ABCDEs of melanoma reviewed with patient.  Patient encouraged to perform monthly skin exams.  UV precautions reviewed with patient.  Risks of non-melanoma skin cancer discussed with patient   Return to clinic in one year or sooner if needed.

## 2023-02-07 NOTE — TELEPHONE ENCOUNTER
Called and discussed with pt. Pt gave fax # to surgery office of [phone]. Updated visit note from 1/10/23 faxed to surgery center.    Ree Melgar RN

## 2023-02-07 NOTE — LETTER
2/7/2023         RE: Tri Walden  [address]  [place] [facility] [address]        Dear Colleague,    Thank you for referring your patient, Tri Walden, to the [facility]. Please see a copy of my visit note below.    Tri Walden is an extremely pleasant 72 year old year old female patient here today for skin check. She denies any new or changing skin lesions. She notes hair loss is stable no increased loss. She is using topical steroids as needed. She notes bump on left popliteal fossa.  Patient has no other skin complaints today.  Remainder of the HPI, Meds, PMH, Allergies, FH, and SH was reviewed in chart.    Pertinent Hx:      History of melanoma on right upper arm 0.2 mm 2/2022, History of BCC on left upper arm.   Past Medical History:   Diagnosis Date     Basal cell carcinoma      Malignant melanoma (H)        Past Surgical History:   Procedure Laterality Date     ABLATE VEIN VARICOSE RADIO FREQUENCY WITHOUT PHLEBECTOMY MULTIPLE STAB  12/06/2012    Procedure: ABLATE VEIN VARICOSE RADIO FREQUENCY WITHOUT PHLEBECTOMY MULTIPLE STAB;  Bilateral Ablation of Varicose Veins;  Surgeon: Fredis Marks MD;  Location: [facility] OR        Family History   Problem Relation Age of Onset     Arthritis Mother      Diabetes Mother         type 2     Heart Disease Father      Coronary Artery Disease Father      Cancer Paternal Grandmother         breast cancer     Heart Disease Paternal Grandfather      Cancer Sister         right kindney       Social History     Socioeconomic History     Marital status:      Spouse name: Not on file     Number of children: Not on file     Years of education: Not on file     Highest education level: Not on file   Occupational History     Occupation:      Employer: [employer]   Tobacco Use     Smoking status: Never     Smokeless tobacco: Never   Vaping Use     Vaping Use: Never used   Substance and Sexual Activity      Alcohol use: No     Comment: very rare     Drug use: No     Sexual activity: Not Currently     Partners: Male     Birth control/protection: Surgical     Comment: tubal ligation   Other Topics Concern     Parent/sibling w/ CABG, MI or angioplasty before 65F 55M? Yes   Social History Narrative     Not on file     Social Determinants of Health     Financial Resource Strain: Not on file   Food Insecurity: Not on file   Transportation Needs: Not on file   Physical Activity: Not on file   Stress: Not on file   Social Connections: Not on file   Intimate Partner Violence: Not on file   Housing Stability: Not on file       Outpatient Encounter Medications as of 2/7/2023   Medication Sig Dispense Refill     ASPIRIN PO Take 81 mg by mouth daily       Calcium Carbonate-Vit D-Min (CALTRATE 600+D PLUS MINERALS) 600-800 MG-UNIT TABS Take 1 tablet by mouth 2 times daily       chlorthalidone (HYGROTON) 25 MG tablet Take 1 tablet (25 mg) by mouth daily 90 tablet 4     clobetasol (TEMOVATE) 0.05 % external solution Apply once to twice daily as needed. 50 mL 4     CRANBERRY PO Take by mouth daily        Ginger, Zingiber officinalis, (GINGER PO) Take by mouth daily        losartan (COZAAR) 50 MG tablet Take 1 tablet (50 mg) by mouth daily 90 tablet 4     simvastatin (ZOCOR) 10 MG tablet TAKE ONE TABLET BY MOUTH EVERY NIGHT AT BEDTIME 90 tablet 4     VITAMIN D, CHOLECALCIFEROL, PO Take by mouth daily       No facility-administered encounter medications on file as of 2/7/2023.             O:   NAD, WDWN, Alert & Oriented, Mood & Affect wnl, Vitals stable   Here today alone   There were no vitals taken for this visit.   General appearance normal   Vitals stable   Alert, oriented and in no acute distress     Well healed scar on right and left arm   Stuck on papules and brown macules on trunk and ext   Red papules on trunk  Brown papules and macules with regular pigment network and borders on torso and extremities   The remainder of skin  exam is normal       Eyes: Conjunctivae/lids:Normal     ENT: Lips: normal    MSK:Normal    Cardiovascular: peripheral edema none    Pulm: Breathing Normal    Lymph Nodes: No Head and Neck and axillary Lymphadenopathy     Neuro/Psych: Orientation:Alert and Orientedx3 ; Mood/Affect:normal   A/P:  1. FFA- controlled  Continue clobetasol solution 3-4 times weekly.  2. History of Melanoma on right upper arm   MELANOMA DISCUSSED WITH PATIENT:  I discussed the specifics of tumor, prognosis, metachronous melanoma, self exam, and genetics with the patient. I explained the need for monthly skin exams including and taught the patient how to do this. Patient was asked about new or changing moles . I discussed with patient signs and symptoms that could arise in the setting of recurrent locoregional or metastatic disease. In addition, the need to undergo every 3-4 month dermatologic full skin survey and evaluation given that patients with a diagnosis of melanoma are at risk of recurrence (local and distant) and of subsequent de rosalee melanoma.     3. Seborrheic keratosis, lentigo, angioma, benign nevi, history of bcc, hemangioma on left popliteal fossa, history of BCC  Will monitor hemangioma on left popliteal fossa, picture taken.   It was a pleasure speaking to Tri Walden today.  BENIGN LESIONS DISCUSSED WITH PATIENT:  I discussed the specifics of tumor, prognosis, and genetics of benign lesions.  I explained that treatment of these lesions would be purely cosmetic and not medically neccessary.  I discussed with patient different removal options including excision, cautery and /or laser.       Nature and genetics of benign skin lesions dicussed with patient.  Signs and Symptoms of skin cancer discussed with patient.  ABCDEs of melanoma reviewed with patient.  Patient encouraged to perform monthly skin exams.  UV precautions reviewed with patient.  Risks of non-melanoma skin cancer discussed with patient   Return to clinic  in one year or sooner if needed.       Again, thank you for allowing me to participate in the care of your patient.        Sincerely,        Jackelyn Erickson PA-C

## 2023-02-07 NOTE — NURSING NOTE
Chief Complaint   Patient presents with     Skin Check     Spot on left post. Knee        There were no vitals filed for this visit.  Wt Readings from Last 1 Encounters:   01/10/23 56.7 kg (125 lb)       Cecilia Santana LPN .................2/7/2023

## 2023-02-07 NOTE — TELEPHONE ENCOUNTER
She would be low to intermediate risk for cardiac complications during her surgery.  No additional cardiac recommendations.  I addended my note.    Daniel

## 2023-03-14 ENCOUNTER — OFFICE VISIT (OUTPATIENT)
Dept: FAMILY MEDICINE | Facility: CLINIC | Age: 73
End: 2023-03-14
Payer: COMMERCIAL

## 2023-03-14 VITALS
TEMPERATURE: 97 F | SYSTOLIC BLOOD PRESSURE: 138 MMHG | OXYGEN SATURATION: 95 % | HEART RATE: 84 BPM | BODY MASS INDEX: 21.97 KG/M2 | RESPIRATION RATE: 18 BRPM | WEIGHT: 124 LBS | HEIGHT: 63 IN | DIASTOLIC BLOOD PRESSURE: 80 MMHG

## 2023-03-14 DIAGNOSIS — I10 BENIGN ESSENTIAL HYPERTENSION: ICD-10-CM

## 2023-03-14 DIAGNOSIS — Z01.818 PRE-OP EVALUATION: Primary | ICD-10-CM

## 2023-03-14 DIAGNOSIS — M25.512 CHRONIC LEFT SHOULDER PAIN: ICD-10-CM

## 2023-03-14 DIAGNOSIS — G89.29 CHRONIC LEFT SHOULDER PAIN: ICD-10-CM

## 2023-03-14 DIAGNOSIS — E78.00 PURE HYPERCHOLESTEROLEMIA: ICD-10-CM

## 2023-03-14 PROBLEM — I35.0 NONRHEUMATIC AORTIC VALVE STENOSIS: Status: ACTIVE | Noted: 2023-03-14

## 2023-03-14 LAB
ANION GAP SERPL CALCULATED.3IONS-SCNC: 9 MMOL/L (ref 7–15)
BUN SERPL-MCNC: 18 MG/DL (ref 8–23)
CALCIUM SERPL-MCNC: 9.7 MG/DL (ref 8.8–10.2)
CHLORIDE SERPL-SCNC: 97 MMOL/L (ref 98–107)
CREAT SERPL-MCNC: 0.77 MG/DL (ref 0.51–0.95)
DEPRECATED HCO3 PLAS-SCNC: 30 MMOL/L (ref 22–29)
ERYTHROCYTE [DISTWIDTH] IN BLOOD BY AUTOMATED COUNT: 13.1 % (ref 10–15)
GFR SERPL CREATININE-BSD FRML MDRD: 81 ML/MIN/1.73M2
GLUCOSE SERPL-MCNC: 108 MG/DL (ref 70–99)
HCT VFR BLD AUTO: 40.1 % (ref 35–47)
HGB BLD-MCNC: 13 G/DL (ref 11.7–15.7)
MCH RBC QN AUTO: 31 PG (ref 26.5–33)
MCHC RBC AUTO-ENTMCNC: 32.4 G/DL (ref 31.5–36.5)
MCV RBC AUTO: 96 FL (ref 78–100)
PLATELET # BLD AUTO: 240 10E3/UL (ref 150–450)
POTASSIUM SERPL-SCNC: 3.8 MMOL/L (ref 3.4–5.3)
RBC # BLD AUTO: 4.2 10E6/UL (ref 3.8–5.2)
SODIUM SERPL-SCNC: 136 MMOL/L (ref 136–145)
WBC # BLD AUTO: 3.9 10E3/UL (ref 4–11)

## 2023-03-14 PROCEDURE — 36415 COLL VENOUS BLD VENIPUNCTURE: CPT | Performed by: NURSE PRACTITIONER

## 2023-03-14 PROCEDURE — 80048 BASIC METABOLIC PNL TOTAL CA: CPT | Performed by: NURSE PRACTITIONER

## 2023-03-14 PROCEDURE — 85027 COMPLETE CBC AUTOMATED: CPT | Performed by: NURSE PRACTITIONER

## 2023-03-14 PROCEDURE — 99214 OFFICE O/P EST MOD 30 MIN: CPT | Performed by: NURSE PRACTITIONER

## 2023-03-14 ASSESSMENT — PAIN SCALES - GENERAL: PAINLEVEL: SEVERE PAIN (7)

## 2023-03-14 NOTE — H&P (VIEW-ONLY)
[facility]  [address] [address]  Phone: [phone]  Primary Provider: Nehemias Wallace  Pre-op Performing Provider: ROHITH FLYNN      PREOPERATIVE EVALUATION:  Today's date: 3/14/2023    Tri Walden is a 72 year old female who presents for a preoperative evaluation.    Surgical Information:  Surgery/Procedure: REVERSE Total Shoulder Arthroplasty, left  Surgery Location: [facility]  Surgeon: Escobar Magana MD  Surgery Date: 03/22/23  Time of Surgery: TBD  Where patient plans to recover: At home with family  Fax number for surgical facility: Note does not need to be faxed, will be available electronically in Epic.    Type of Anesthesia Anticipated: Choice    Assessment & Plan     The proposed surgical procedure is considered INTERMEDIATE risk.    Pre-op evaluation  - Basic metabolic panel  (Ca, Cl, CO2, Creat, Gluc, K, Na, BUN); Future  - CBC with platelets; Future    Chronic left shoulder pain  - Basic metabolic panel  (Ca, Cl, CO2, Creat, Gluc, K, Na, BUN); Future  - CBC with platelets; Future    Benign essential hypertension  - Basic metabolic panel  (Ca, Cl, CO2, Creat, Gluc, K, Na, BUN); Future  - CBC with platelets; Future    Pure hypercholesterolemia  - Basic metabolic panel  (Ca, Cl, CO2, Creat, Gluc, K, Na, BUN); Future  - CBC with platelets; Future        Risks and Recommendations:  The patient has the following additional risks and recommendations for perioperative complications:   - No identified additional risk factors other than previously addressed    Medication Instructions:  Patient is to take all scheduled medications on the day of surgery EXCEPT for modifications listed below:   - aspirin: Discontinue aspirin 7-10 days prior to procedure to reduce bleeding risk. It should be resumed postoperatively.    - ACE/ARB: May be continued on the day of surgery.    - Diuretics: HOLD on the day of surgery.   -  Statins: Continue taking on the day of surgery.     RECOMMENDATION:  APPROVAL GIVEN to proceed with proposed procedure, without further diagnostic evaluation.                Subjective     HPI related to upcoming procedure:   Fell - shoulder injury  Refractory to conservative therapies.      Preop Questions 3/7/2023   1. Have you ever had a heart attack or stroke? No   2. Have you ever had surgery on your heart or blood vessels, such as a stent placement, a coronary artery bypass, or surgery on an artery in your head, neck, heart, or legs? No   3. Do you have chest pain with activity? No   4. Do you have a history of  heart failure? No   5. Do you currently have a cold, bronchitis or symptoms of other infection? No   6. Do you have a cough, shortness of breath, or wheezing? No   7. Do you or anyone in your family have previous history of blood clots? No   8. Do you or does anyone in your family have a serious bleeding problem such as prolonged bleeding following surgeries or cuts? No   9. Have you ever had problems with anemia or been told to take iron pills? No   10. Have you had any abnormal blood loss such as black, tarry or bloody stools, or abnormal vaginal bleeding? No   11. Have you ever had a blood transfusion? No   12. Are you willing to have a blood transfusion if it is medically needed before, during, or after your surgery? Yes   13. Have you or any of your relatives ever had problems with anesthesia? No   14. Do you have sleep apnea, excessive snoring or daytime drowsiness? No   15. Do you have any artifical heart valves or other implanted medical devices like a pacemaker, defibrillator, or continuous glucose monitor? No   16. Do you have artificial joints? No   17. Are you allergic to latex? No       Health Care Directive:  Patient does not have a Health Care Directive or Living Will: Discussed advance care planning with patient; however, patient declined at this time.    Preoperative Review of  :   reviewed - no record of controlled substances prescribed.      Status of Chronic Conditions:  HYPERLIPIDEMIA - Patient has a long history of significant Hyperlipidemia requiring medication for treatment with recent good control. Patient reports no problems or side effects with the medication.     HYPERTENSION - Patient has longstanding history of HTN , currently denies any symptoms referable to elevated blood pressure. Specifically denies chest pain, palpitations, dyspnea, orthopnea, PND or peripheral edema. Blood pressure readings have been in normal range. Current medication regimen is as listed below. Patient denies any side effects of medication.       Review of Systems  CONSTITUTIONAL: NEGATIVE for fever, chills, change in weight  INTEGUMENTARY/SKIN: NEGATIVE for worrisome rashes, moles or lesions  EYES: NEGATIVE for vision changes or irritation  ENT/MOUTH: NEGATIVE for ear, mouth and throat problems  RESP: NEGATIVE for significant cough or SOB  CV: NEGATIVE for chest pain, palpitations or peripheral edema  GI: NEGATIVE for nausea, abdominal pain, heartburn, or change in bowel habits  : NEGATIVE for frequency, dysuria, or hematuria  MUSCULOSKELETAL: NEGATIVE for significant arthralgias or myalgia  NEURO: NEGATIVE for weakness, dizziness or paresthesias  ENDOCRINE: NEGATIVE for temperature intolerance, skin/hair changes  HEME: NEGATIVE for bleeding problems  PSYCHIATRIC: NEGATIVE for changes in mood or affect    Patient Active Problem List    Diagnosis Date Noted     Benign essential hypertension 03/14/2023     Priority: Medium     Nonrheumatic aortic valve stenosis 03/14/2023     Priority: Medium     Osteopenia 09/25/2014     Priority: Medium     35mg weekly fosamax started for severe osteopenia 10/2014.  Dexa 2016 slightly improved.  Stop Fosamax 9/2018.  Plan repeat Dexa in 2021.       Advanced directives, counseling/discussion 08/13/2013     Priority: Medium     Advance Care Planning 9/23/2015:  ACP Review and Resources Provided:  Reviewed chart for advance care plan.  Tri Walden has no plan or code status on file. Discussed available resources and provided with information. Confirmed code status reflects current choices pending further ACP discussions.  Confirmed/documented legally designated decision maker(s). Added by Talisha Bolivar    8/13/13 pt states she does not have a advanced directive, information given to patient today.       Basal cell carcinoma of nose 07/02/2012     Priority: Medium     Undiagnosed cardiac murmurs 05/30/2006     Priority: Medium     1/06 Echo .  Technically adequate study.    2.  Borderline left atrial enlargement.   3.  Normal left ventricular size and systolic function.  Estimated   ejection fraction 55%.    4.  Mild to moderate nonspecific thickening of the mitral valve   leaflets with trace mitral regurgitation.   5.  Normal right atrial and right ventricular size and function.   6.  Trileaflet aortic valve, mild aortic sclerosis.    7.  Mild tricuspid regurgitation with normal pulmonary artery   pressure estimates.   8.  No evidence of pericardial effusion, intracardiac mass or   thrombus seen.        Peptic ulcer 05/17/2005     Priority: Medium     Problem list name updated by automated process. Provider to review       Irritable bowel syndrome 05/17/2005     Priority: Medium     5/17/2005 On Imipramine and no symptoms at all for months.  Flairs with stress       GERD 05/17/2005     Priority: Medium     5/17/2005  Gets rare episodes.       Pure hypercholesterolemia 05/17/2005     Priority: Medium      Past Medical History:   Diagnosis Date     Basal cell carcinoma      Hypertension     Unsure if the date     Malignant melanoma (H)      Nonrheumatic aortic valve stenosis 3/14/2023     Past Surgical History:   Procedure Laterality Date     ABLATE VEIN VARICOSE RADIO FREQUENCY WITHOUT PHLEBECTOMY MULTIPLE STAB  12/06/2012    Procedure: ABLATE VEIN VARICOSE RADIO  "FREQUENCY WITHOUT PHLEBECTOMY MULTIPLE STAB;  Bilateral Ablation of Varicose Veins;  Surgeon: Fredis Marks MD;  Location: [facility]     Current Outpatient Medications   Medication Sig Dispense Refill     ASPIRIN PO Take 81 mg by mouth daily       Calcium Carbonate-Vit D-Min (CALTRATE 600+D PLUS MINERALS) 600-800 MG-UNIT TABS Take 1 tablet by mouth 2 times daily       chlorthalidone (HYGROTON) 25 MG tablet Take 1 tablet (25 mg) by mouth daily 90 tablet 4     clobetasol (TEMOVATE) 0.05 % external solution Apply once to twice daily as needed. 50 mL 4     CRANBERRY PO Take by mouth daily        Ginger, Zingiber officinalis, (GINGER PO) Take by mouth daily        losartan (COZAAR) 50 MG tablet Take 1 tablet (50 mg) by mouth daily 90 tablet 4     simvastatin (ZOCOR) 10 MG tablet TAKE ONE TABLET BY MOUTH EVERY NIGHT AT BEDTIME 90 tablet 4     VITAMIN D, CHOLECALCIFEROL, PO Take by mouth daily         Allergies   Allergen Reactions     Nkda [No Known Drug Allergies]         Social History     Tobacco Use     Smoking status: Never     Smokeless tobacco: Never   Substance Use Topics     Alcohol use: No     Comment: very rare       History   Drug Use Unknown         Objective     /80   Pulse 84   Temp 97  F (36.1  C) (Tympanic)   Resp 18   Ht 1.594 m (5' 2.75\")   Wt 56.2 kg (124 lb)   SpO2 95%   BMI 22.14 kg/m      Physical Exam    GENERAL APPEARANCE: healthy, alert and no distress     EYES: EOMI, PERRL     HENT: ear canals and TM's normal and nose and mouth without ulcers or lesions     NECK: no adenopathy, no asymmetry, masses, or scars and thyroid normal to palpation     RESP: lungs clear to auscultation - no rales, rhonchi or wheezes     CV: regular rates and rhythm, normal S1 S2, no S3 or S4 and no murmur, click or rub     MS: no peripheral edema     NEURO: Normal strength and tone, sensory exam grossly normal, mentation intact and speech normal     PSYCH: mentation appears normal. and affect " normal/bright     LYMPHATICS: No cervical adenopathy    Recent Labs   Lab Test 12/06/22  0843 11/15/22  0907    142   POTASSIUM 3.5 4.1   CR 0.71 0.60        Diagnostics:  Recent Results (from the past 24 hour(s))   Basic metabolic panel  (Ca, Cl, CO2, Creat, Gluc, K, Na, BUN)    Collection Time: 03/14/23  8:39 AM   Result Value Ref Range    Sodium 136 136 - 145 mmol/L    Potassium 3.8 3.4 - 5.3 mmol/L    Chloride 97 (L) 98 - 107 mmol/L    Carbon Dioxide (CO2) 30 (H) 22 - 29 mmol/L    Anion Gap 9 7 - 15 mmol/L    Urea Nitrogen 18.0 8.0 - 23.0 mg/dL    Creatinine 0.77 0.51 - 0.95 mg/dL    Calcium 9.7 8.8 - 10.2 mg/dL    Glucose 108 (H) 70 - 99 mg/dL    GFR Estimate 81 >60 mL/min/1.73m2   CBC with platelets    Collection Time: 03/14/23  8:39 AM   Result Value Ref Range    WBC Count 3.9 (L) 4.0 - 11.0 10e3/uL    RBC Count 4.20 3.80 - 5.20 10e6/uL    Hemoglobin 13.0 11.7 - 15.7 g/dL    Hematocrit 40.1 35.0 - 47.0 %    MCV 96 78 - 100 fL    MCH 31.0 26.5 - 33.0 pg    MCHC 32.4 31.5 - 36.5 g/dL    RDW 13.1 10.0 - 15.0 %    Platelet Count 240 150 - 450 10e3/uL        No EKG this visit, completed in the last 90 days.   ECHO 1/2/2023    Revised Cardiac Risk Index (RCRI):  The patient has the following serious cardiovascular risks for perioperative complications:   - No serious cardiac risks = 0 points     RCRI Interpretation: 0 points: Class I (very low risk - 0.4% complication rate)           Signed Electronically by: BHARAT Dumont CNP  Copy of this evaluation report is provided to requesting physician.

## 2023-03-14 NOTE — PROGRESS NOTES
[facility]  [address] [address]  Phone: [phone]  Primary Provider: Nehemias Wallace  Pre-op Performing Provider: ROHITH FLYNN      PREOPERATIVE EVALUATION:  Today's date: 3/14/2023    Tri Walden is a 72 year old female who presents for a preoperative evaluation.    Surgical Information:  Surgery/Procedure: REVERSE Total Shoulder Arthroplasty, left  Surgery Location: [facility]  Surgeon: Escobar Magana MD  Surgery Date: 03/22/23  Time of Surgery: TBD  Where patient plans to recover: At home with family  Fax number for surgical facility: Note does not need to be faxed, will be available electronically in Epic.    Type of Anesthesia Anticipated: Choice    Assessment & Plan     The proposed surgical procedure is considered INTERMEDIATE risk.    Pre-op evaluation  - Basic metabolic panel  (Ca, Cl, CO2, Creat, Gluc, K, Na, BUN); Future  - CBC with platelets; Future    Chronic left shoulder pain  - Basic metabolic panel  (Ca, Cl, CO2, Creat, Gluc, K, Na, BUN); Future  - CBC with platelets; Future    Benign essential hypertension  - Basic metabolic panel  (Ca, Cl, CO2, Creat, Gluc, K, Na, BUN); Future  - CBC with platelets; Future    Pure hypercholesterolemia  - Basic metabolic panel  (Ca, Cl, CO2, Creat, Gluc, K, Na, BUN); Future  - CBC with platelets; Future        Risks and Recommendations:  The patient has the following additional risks and recommendations for perioperative complications:   - No identified additional risk factors other than previously addressed    Medication Instructions:  Patient is to take all scheduled medications on the day of surgery EXCEPT for modifications listed below:   - aspirin: Discontinue aspirin 7-10 days prior to procedure to reduce bleeding risk. It should be resumed postoperatively.    - ACE/ARB: May be continued on the day of surgery.    - Diuretics: HOLD on the day of surgery.   -  Statins: Continue taking on the day of surgery.     RECOMMENDATION:  APPROVAL GIVEN to proceed with proposed procedure, without further diagnostic evaluation.                Subjective     HPI related to upcoming procedure:   Fell - shoulder injury  Refractory to conservative therapies.      Preop Questions 3/7/2023   1. Have you ever had a heart attack or stroke? No   2. Have you ever had surgery on your heart or blood vessels, such as a stent placement, a coronary artery bypass, or surgery on an artery in your head, neck, heart, or legs? No   3. Do you have chest pain with activity? No   4. Do you have a history of  heart failure? No   5. Do you currently have a cold, bronchitis or symptoms of other infection? No   6. Do you have a cough, shortness of breath, or wheezing? No   7. Do you or anyone in your family have previous history of blood clots? No   8. Do you or does anyone in your family have a serious bleeding problem such as prolonged bleeding following surgeries or cuts? No   9. Have you ever had problems with anemia or been told to take iron pills? No   10. Have you had any abnormal blood loss such as black, tarry or bloody stools, or abnormal vaginal bleeding? No   11. Have you ever had a blood transfusion? No   12. Are you willing to have a blood transfusion if it is medically needed before, during, or after your surgery? Yes   13. Have you or any of your relatives ever had problems with anesthesia? No   14. Do you have sleep apnea, excessive snoring or daytime drowsiness? No   15. Do you have any artifical heart valves or other implanted medical devices like a pacemaker, defibrillator, or continuous glucose monitor? No   16. Do you have artificial joints? No   17. Are you allergic to latex? No       Health Care Directive:  Patient does not have a Health Care Directive or Living Will: Discussed advance care planning with patient; however, patient declined at this time.    Preoperative Review of  :   reviewed - no record of controlled substances prescribed.      Status of Chronic Conditions:  HYPERLIPIDEMIA - Patient has a long history of significant Hyperlipidemia requiring medication for treatment with recent good control. Patient reports no problems or side effects with the medication.     HYPERTENSION - Patient has longstanding history of HTN , currently denies any symptoms referable to elevated blood pressure. Specifically denies chest pain, palpitations, dyspnea, orthopnea, PND or peripheral edema. Blood pressure readings have been in normal range. Current medication regimen is as listed below. Patient denies any side effects of medication.       Review of Systems  CONSTITUTIONAL: NEGATIVE for fever, chills, change in weight  INTEGUMENTARY/SKIN: NEGATIVE for worrisome rashes, moles or lesions  EYES: NEGATIVE for vision changes or irritation  ENT/MOUTH: NEGATIVE for ear, mouth and throat problems  RESP: NEGATIVE for significant cough or SOB  CV: NEGATIVE for chest pain, palpitations or peripheral edema  GI: NEGATIVE for nausea, abdominal pain, heartburn, or change in bowel habits  : NEGATIVE for frequency, dysuria, or hematuria  MUSCULOSKELETAL: NEGATIVE for significant arthralgias or myalgia  NEURO: NEGATIVE for weakness, dizziness or paresthesias  ENDOCRINE: NEGATIVE for temperature intolerance, skin/hair changes  HEME: NEGATIVE for bleeding problems  PSYCHIATRIC: NEGATIVE for changes in mood or affect    Patient Active Problem List    Diagnosis Date Noted     Benign essential hypertension 03/14/2023     Priority: Medium     Nonrheumatic aortic valve stenosis 03/14/2023     Priority: Medium     Osteopenia 09/25/2014     Priority: Medium     35mg weekly fosamax started for severe osteopenia 10/2014.  Dexa 2016 slightly improved.  Stop Fosamax 9/2018.  Plan repeat Dexa in 2021.       Advanced directives, counseling/discussion 08/13/2013     Priority: Medium     Advance Care Planning 9/23/2015:  ACP Review and Resources Provided:  Reviewed chart for advance care plan.  Tri Walden has no plan or code status on file. Discussed available resources and provided with information. Confirmed code status reflects current choices pending further ACP discussions.  Confirmed/documented legally designated decision maker(s). Added by Talisha Bolivar    8/13/13 pt states she does not have a advanced directive, information given to patient today.       Basal cell carcinoma of nose 07/02/2012     Priority: Medium     Undiagnosed cardiac murmurs 05/30/2006     Priority: Medium     1/06 Echo .  Technically adequate study.    2.  Borderline left atrial enlargement.   3.  Normal left ventricular size and systolic function.  Estimated   ejection fraction 55%.    4.  Mild to moderate nonspecific thickening of the mitral valve   leaflets with trace mitral regurgitation.   5.  Normal right atrial and right ventricular size and function.   6.  Trileaflet aortic valve, mild aortic sclerosis.    7.  Mild tricuspid regurgitation with normal pulmonary artery   pressure estimates.   8.  No evidence of pericardial effusion, intracardiac mass or   thrombus seen.        Peptic ulcer 05/17/2005     Priority: Medium     Problem list name updated by automated process. Provider to review       Irritable bowel syndrome 05/17/2005     Priority: Medium     5/17/2005 On Imipramine and no symptoms at all for months.  Flairs with stress       GERD 05/17/2005     Priority: Medium     5/17/2005  Gets rare episodes.       Pure hypercholesterolemia 05/17/2005     Priority: Medium      Past Medical History:   Diagnosis Date     Basal cell carcinoma      Hypertension     Unsure if the date     Malignant melanoma (H)      Nonrheumatic aortic valve stenosis 3/14/2023     Past Surgical History:   Procedure Laterality Date     ABLATE VEIN VARICOSE RADIO FREQUENCY WITHOUT PHLEBECTOMY MULTIPLE STAB  12/06/2012    Procedure: ABLATE VEIN VARICOSE RADIO  "FREQUENCY WITHOUT PHLEBECTOMY MULTIPLE STAB;  Bilateral Ablation of Varicose Veins;  Surgeon: Fredis Marks MD;  Location: [facility] OR     Current Outpatient Medications   Medication Sig Dispense Refill     ASPIRIN PO Take 81 mg by mouth daily       Calcium Carbonate-Vit D-Min (CALTRATE 600+D PLUS MINERALS) 600-800 MG-UNIT TABS Take 1 tablet by mouth 2 times daily       chlorthalidone (HYGROTON) 25 MG tablet Take 1 tablet (25 mg) by mouth daily 90 tablet 4     clobetasol (TEMOVATE) 0.05 % external solution Apply once to twice daily as needed. 50 mL 4     CRANBERRY PO Take by mouth daily        Ginger, Zingiber officinalis, (GINGER PO) Take by mouth daily        losartan (COZAAR) 50 MG tablet Take 1 tablet (50 mg) by mouth daily 90 tablet 4     simvastatin (ZOCOR) 10 MG tablet TAKE ONE TABLET BY MOUTH EVERY NIGHT AT BEDTIME 90 tablet 4     VITAMIN D, CHOLECALCIFEROL, PO Take by mouth daily         Allergies   Allergen Reactions     Nkda [No Known Drug Allergies]         Social History     Tobacco Use     Smoking status: Never     Smokeless tobacco: Never   Substance Use Topics     Alcohol use: No     Comment: very rare       History   Drug Use Unknown         Objective     /80   Pulse 84   Temp 97  F (36.1  C) (Tympanic)   Resp 18   Ht 1.594 m (5' 2.75\")   Wt 56.2 kg (124 lb)   SpO2 95%   BMI 22.14 kg/m      Physical Exam    GENERAL APPEARANCE: healthy, alert and no distress     EYES: EOMI, PERRL     HENT: ear canals and TM's normal and nose and mouth without ulcers or lesions     NECK: no adenopathy, no asymmetry, masses, or scars and thyroid normal to palpation     RESP: lungs clear to auscultation - no rales, rhonchi or wheezes     CV: regular rates and rhythm, normal S1 S2, no S3 or S4 and no murmur, click or rub     MS: no peripheral edema     NEURO: Normal strength and tone, sensory exam grossly normal, mentation intact and speech normal     PSYCH: mentation appears normal. and affect " normal/bright     LYMPHATICS: No cervical adenopathy    Recent Labs   Lab Test 12/06/22  0843 11/15/22  0907    142   POTASSIUM 3.5 4.1   CR 0.71 0.60        Diagnostics:  Recent Results (from the past 24 hour(s))   Basic metabolic panel  (Ca, Cl, CO2, Creat, Gluc, K, Na, BUN)    Collection Time: 03/14/23  8:39 AM   Result Value Ref Range    Sodium 136 136 - 145 mmol/L    Potassium 3.8 3.4 - 5.3 mmol/L    Chloride 97 (L) 98 - 107 mmol/L    Carbon Dioxide (CO2) 30 (H) 22 - 29 mmol/L    Anion Gap 9 7 - 15 mmol/L    Urea Nitrogen 18.0 8.0 - 23.0 mg/dL    Creatinine 0.77 0.51 - 0.95 mg/dL    Calcium 9.7 8.8 - 10.2 mg/dL    Glucose 108 (H) 70 - 99 mg/dL    GFR Estimate 81 >60 mL/min/1.73m2   CBC with platelets    Collection Time: 03/14/23  8:39 AM   Result Value Ref Range    WBC Count 3.9 (L) 4.0 - 11.0 10e3/uL    RBC Count 4.20 3.80 - 5.20 10e6/uL    Hemoglobin 13.0 11.7 - 15.7 g/dL    Hematocrit 40.1 35.0 - 47.0 %    MCV 96 78 - 100 fL    MCH 31.0 26.5 - 33.0 pg    MCHC 32.4 31.5 - 36.5 g/dL    RDW 13.1 10.0 - 15.0 %    Platelet Count 240 150 - 450 10e3/uL        No EKG this visit, completed in the last 90 days.   ECHO 1/2/2023    Revised Cardiac Risk Index (RCRI):  The patient has the following serious cardiovascular risks for perioperative complications:   - No serious cardiac risks = 0 points     RCRI Interpretation: 0 points: Class I (very low risk - 0.4% complication rate)           Signed Electronically by: BHARAT Dumont CNP  Copy of this evaluation report is provided to requesting physician.

## 2023-03-15 NOTE — PROVIDER NOTIFICATION
"   03/15/23 0932   General Information   Contact made? Yes   Which attempt is this? 1   Call date:  03/15/23   Call time: 0936   Communication Assessment   Patient's communication style spoken language (English or Bilingual)   Pre-op Phone Call   How to be Addressed Aaron   Relationship to Patient Self   Primary Caregiver Self   Notified of Need to Arrange Ride and Caregiver Yes   Stated Reason for Admission Reverse left shoulder   Surgery Time Verified Yes   Arrival Time Verified 0930   Facility Location Verified Yes   NPO Status Reinforced Yes   Solids 0130   Clear Liquids 0730   Physician's Name Brunfelt   Date of Physical 03/14/23   Clinic Name  [place]   Patient Knows to Bring List of Pre-op Medications Yes   Patient Reminded to Bring Eye Drops for Day of Surgery N/A   Do you have any of the following Medical Devices? Not Applicable   Patient/Family states an understanding of: Pre-op shower with antiseptic soap x2 instructions;Plan/Resources/Equipment needed for discharge;Reviewed visitor policy;Instructions not to shave surgical area;Need to leave valuables at home;Need to bring insurance card;Removing jewelry/ body piercings before surgery;No cosmetics including fragrances   Patient Contact Information    Contact (Name and Relationship) - Surgery/Procedure Torie Orr- daughter   Contact Number - Surgery/Procedure [phone]   Pre-op Implants (USE \"DEVICES\" GROUP TO DOCUMENT ASSESSMENT OF IMPLANT ON DOS)   Electronic Implant No   Advance Directives   Scanned docmt in ACP Activity? No scanned doc   Pt confirms current doc scanned No. Copy Requested.   HC Your Rights handout  Informed and given   Pt offered more information Pt declined   Malnutrition Screening Tool (MST)   Have you recently lost weight without trying? 0   Have you been eating poorly because of a decreased appetite? 0   Spiritual Beliefs   May our Spiritual Health Services staff meet with you or contact someone on your behalf? not at this " time   Disability/Function   Hearing Difficulty or Deaf no   Wear Glasses or Blind yes   Vision Management Glasses   Concentrating, Remembering or Making Decisions Difficulty no   Difficulty Communicating no   Difficulty Eating/Swallowing no   Walking or Climbing Stairs Difficulty no   Dressing/Bathing Difficulty no   Toileting issues no   Doing Errands Independently Difficulty (such as shopping) no   Equipment Currently Used at Home none   Fall history within last six months no   Change in Functional Status Since Onset of Current Illness/Injury no   Discharge Planning   People in Home spouse   Support Systems Spouse/Significant Other;Children   Assistance Needed None   Type of Residence Private Residence   Patient/Family Anticipates Transition to home   Coping/Stress/Tolerance   Major Change/Loss/Stressor/Fears denies

## 2023-03-21 ENCOUNTER — ANESTHESIA EVENT (OUTPATIENT)
Dept: SURGERY | Facility: CLINIC | Age: 73
End: 2023-03-21
Payer: COMMERCIAL

## 2023-03-21 NOTE — ANESTHESIA PREPROCEDURE EVALUATION
Anesthesia Pre-Procedure Evaluation    Patient: Tri Walden   MRN: [id number] : 1950        Procedure : Procedure(s):  REVERSE Total Shoulder Arthroplasty          Past Medical History:   Diagnosis Date     Basal cell carcinoma      Hypertension     Unsure if the date     Malignant melanoma (H)      Nonrheumatic aortic valve stenosis 3/14/2023      Past Surgical History:   Procedure Laterality Date     ABLATE VEIN VARICOSE RADIO FREQUENCY WITHOUT PHLEBECTOMY MULTIPLE STAB  2012    Procedure: ABLATE VEIN VARICOSE RADIO FREQUENCY WITHOUT PHLEBECTOMY MULTIPLE STAB;  Bilateral Ablation of Varicose Veins;  Surgeon: Fredis Marks MD;  Location: [facility] OR      Allergies   Allergen Reactions     Nkda [No Known Drug Allergies]       Social History     Tobacco Use     Smoking status: Never     Smokeless tobacco: Never   Substance Use Topics     Alcohol use: No     Comment: very rare      Wt Readings from Last 1 Encounters:   23 56.2 kg (124 lb)        Anesthesia Evaluation   Pt has had prior anesthetic. Type: General.    No history of anesthetic complications       ROS/MED HX  ENT/Pulmonary:  - neg pulmonary ROS  (-) tobacco use   Neurologic:  - neg neurologic ROS     Cardiovascular:     (+) Dyslipidemia hypertension-----valvular problems/murmurs type: AS Previous cardiac testing   Echo: Date: 1/10/2023 Results:     The left atrium is mild to moderately dilated.  Moderate to severe valvular aortic stenosis.  Mean gradient is mildly higher than echo 3/3/2022  Dimension index 0.27. NAYELI slightly lower than prior echo. By DI and mean  gradient this is moderate AS, by NAYELI it is mod to severe AS (stroke volume  index is low normal so less likely low flow low gradient severe AS on this  study)  ______________________________________________________________________________  Left Ventricle  The left ventricle is normal in size. There is mild concentric left  ventricular hypertrophy. Proximal septal  thickening is noted. The visual  ejection fraction is 55-60%. Grade I or early diastolic dysfunction. Normal  left ventricular wall motion.     Right Ventricle  The right ventricle is normal in size and function.     Atria  The left atrium is mild to moderately dilated. Right atrial size is normal.     Mitral Valve  Calcified mitral apparatus. There is mild mitral annular calcification. There  is trace to mild mitral regurgitation.     Tricuspid Valve  There is trace to mild tricuspid regurgitation. The right ventricular systolic  pressure is approximated at 28.3 mmHg plus the right atrial pressure. Doppler  findings do not suggest pulmonary hypertension. IVC diameter <2.1 cm  collapsing >50% with sniff suggests a normal RA pressure of 3 mmHg.     Aortic Valve  The aortic valve is not well visualized. Aortic valve likely trileaflet. There  is trace aortic regurgitation. Moderate to severe valvular aortic stenosis.  Mean gradient is mildly higher than echo 3/3/2022  Dimension index 0.27. NAYELI slightly lower than prior echo. By DI and mean  gradient this is moderate AS, by NAYELI it is mod to severe AS (stroke volume  index is normal so less likely low flow low gradient severe AS on this study).     Pulmonic Valve  The pulmonic valve is not well seen, but is grossly normal.     Vessels  The aortic root is normal size.     Pericardium  The pericardium appears normal.     Rhythm  Sinus rhythm was noted.  ______________________________________________________________________________  MMode/2D Measurements & Calculations  IVSd: 1.2 cm     LVIDd: 4.4 cm  LVIDs: 3.2 cm  LVPWd: 1.1 cm  FS: 27.9 %  LV mass(C)d: 184.0 grams  LV mass(C)dI: 113.4 grams/m2  Ao root diam: 3.3 cm  LA dimension: 3.7 cm  LA/Ao: 1.1  LVOT diam: 2.1 cm  LVOT area: 3.5 cm2  LA Volume (BP): 51.0 ml  LA Volume Index (BP): 31.5 ml/m2  RWT: 0.51     Doppler Measurements & Calculations  MV E max polly: 46.3 cm/sec  MV A max polly: 80.9 cm/sec  MV E/A: 0.57  MV dec  time: 0.36 sec  Ao V2 max: 375.8 cm/sec  Ao max P.5 mmHg  Ao V2 mean: 248.0 cm/sec  Ao mean P.9 mmHg  Ao V2 VTI: 76.8 cm  NAYELI(I,D): 0.78 cm2  NAYELI(V,D): 0.85 cm2  LV V1 max PG: 3.4 mmHg  LV V1 max: 91.8 cm/sec  LV V1 VTI: 17.3 cm  SV(LVOT): 59.8 ml  SI(LVOT): 36.8 ml/m2  PA acc time: 0.09 sec  TR max terrell: 266.0 cm/sec  TR max P.3 mmHg  AV Terrell Ratio (DI): 0.24  NAYELI Index (cm2/m2): 0.48  E/E' av.2  Lateral E/e': 8.6  Medial E/e': 9.8     Stress Test: Date: Results:    ECG Reviewed: Date: Results:    Cath: Date: Results:      METS/Exercise Tolerance: >4 METS    Hematologic:  - neg hematologic  ROS     Musculoskeletal:  - neg musculoskeletal ROS     GI/Hepatic: Comment: IBS    (+) GERD, Asymptomatic on medication,     Renal/Genitourinary:  - neg Renal ROS     Endo:  - neg endo ROS     Psychiatric/Substance Use:  - neg psychiatric ROS     Infectious Disease:  - neg infectious disease ROS     Malignancy:   (+) Malignancy, History of Skin.Skin CA Remission status post Surgery.        Other:  - neg other ROS          Physical Exam    Airway        Mallampati: II   TM distance: > 3 FB   Neck ROM: full   Mouth opening: > 3 cm    Respiratory Devices and Support         Dental       (+) Minor Abnormalities - some fillings, tiny chips      Cardiovascular   cardiovascular exam normal       Rhythm and rate: regular and normal     Pulmonary   pulmonary exam normal        breath sounds clear to auscultation           OUTSIDE LABS:  CBC:   Lab Results   Component Value Date    WBC 3.9 (L) 2023    WBC 11.7 (H) 2015    HGB 13.0 2023    HGB 12.0 2015    HCT 40.1 2023    HCT 37.5 2015     2023     2015     BMP:   Lab Results   Component Value Date     2023     2022    POTASSIUM 3.8 2023    POTASSIUM 3.5 2022    CHLORIDE 97 (L) 2023    CHLORIDE 101 2022    CO2 30 (H) 2023    CO2 34 (H) 2022    BUN  18.0 03/14/2023    BUN 17.0 12/06/2022    CR 0.77 03/14/2023    CR 0.71 12/06/2022     (H) 03/14/2023     (H) 12/06/2022     COAGS: No results found for: PTT, INR, FIBR  POC: No results found for: BGM, HCG, HCGS  HEPATIC:   Lab Results   Component Value Date    ALBUMIN 4.0 08/29/2016    PROTTOTAL 7.6 08/29/2016    ALT 22 10/08/2020    AST 27 08/29/2016    ALKPHOS 50 08/29/2016    BILITOTAL 0.4 08/29/2016     OTHER:   Lab Results   Component Value Date    IGNACIO 9.7 03/14/2023    TSH 1.79 06/05/2007       Anesthesia Plan    ASA Status:  3   NPO Status:  NPO Appropriate    Anesthesia Type: General.     - Airway: ETT   Induction: Intravenous, Propofol.   Maintenance: Balanced.        Consents    Anesthesia Plan(s) and associated risks, benefits, and realistic alternatives discussed. Questions answered and patient/representative(s) expressed understanding.     - Discussed: Risks, Benefits and Alternatives for BOTH SEDATION and the PROCEDURE were discussed     - Discussed with:  Patient      - Extended Intubation/Ventilatory Support Discussed: No.      - Patient is DNR/DNI Status: No    Use of blood products discussed: No .     Postoperative Care    Pain management: IV analgesics, Multi-modal analgesia, Peripheral nerve block (Single Shot).   PONV prophylaxis: Ondansetron (or other 5HT-3), Dexamethasone or Solumedrol     Comments:                BHARAT Young CRNA

## 2023-03-22 ENCOUNTER — HOSPITAL ENCOUNTER (OUTPATIENT)
Facility: CLINIC | Age: 73
Discharge: HOME | End: 2023-03-23
Attending: ORTHOPAEDIC SURGERY | Admitting: ORTHOPAEDIC SURGERY
Payer: COMMERCIAL

## 2023-03-22 ENCOUNTER — ANCILLARY PROCEDURE (OUTPATIENT)
Dept: ULTRASOUND IMAGING | Facility: CLINIC | Age: 73
End: 2023-03-22
Attending: NURSE ANESTHETIST, CERTIFIED REGISTERED
Payer: COMMERCIAL

## 2023-03-22 ENCOUNTER — ANESTHESIA (OUTPATIENT)
Dept: SURGERY | Facility: CLINIC | Age: 73
End: 2023-03-22
Payer: COMMERCIAL

## 2023-03-22 ENCOUNTER — APPOINTMENT (OUTPATIENT)
Dept: GENERAL RADIOLOGY | Facility: CLINIC | Age: 73
End: 2023-03-22
Attending: ORTHOPAEDIC SURGERY
Payer: COMMERCIAL

## 2023-03-22 DIAGNOSIS — Z96.612 STATUS POST REVERSE TOTAL ARTHROPLASTY OF LEFT SHOULDER: Primary | ICD-10-CM

## 2023-03-22 LAB
CREAT SERPL-MCNC: 0.67 MG/DL (ref 0.51–0.95)
ERYTHROCYTE [DISTWIDTH] IN BLOOD BY AUTOMATED COUNT: 13.2 % (ref 10–15)
GFR SERPL CREATININE-BSD FRML MDRD: >90 ML/MIN/1.73M2
HCT VFR BLD AUTO: 40.9 % (ref 35–47)
HGB BLD-MCNC: 13.4 G/DL (ref 11.7–15.7)
MCH RBC QN AUTO: 30.5 PG (ref 26.5–33)
MCHC RBC AUTO-ENTMCNC: 32.8 G/DL (ref 31.5–36.5)
MCV RBC AUTO: 93 FL (ref 78–100)
PLATELET # BLD AUTO: 271 10E3/UL (ref 150–450)
POTASSIUM SERPL-SCNC: 3.9 MMOL/L (ref 3.4–5.3)
RBC # BLD AUTO: 4.39 10E6/UL (ref 3.8–5.2)
WBC # BLD AUTO: 6.5 10E3/UL (ref 4–11)

## 2023-03-22 PROCEDURE — C1713 ANCHOR/SCREW BN/BN,TIS/BN: HCPCS | Performed by: ORTHOPAEDIC SURGERY

## 2023-03-22 PROCEDURE — 278N000051 HC OR IMPLANT GENERAL: Performed by: ORTHOPAEDIC SURGERY

## 2023-03-22 PROCEDURE — 250N000009 HC RX 250

## 2023-03-22 PROCEDURE — 271N000001 HC OR GENERAL SUPPLY NON-STERILE: Performed by: ORTHOPAEDIC SURGERY

## 2023-03-22 PROCEDURE — 360N000077 HC SURGERY LEVEL 4, PER MIN: Performed by: ORTHOPAEDIC SURGERY

## 2023-03-22 PROCEDURE — 250N000011 HC RX IP 250 OP 636: Performed by: PHYSICIAN ASSISTANT

## 2023-03-22 PROCEDURE — 258N000003 HC RX IP 258 OP 636: Performed by: NURSE ANESTHETIST, CERTIFIED REGISTERED

## 2023-03-22 PROCEDURE — 250N000011 HC RX IP 250 OP 636: Performed by: NURSE ANESTHETIST, CERTIFIED REGISTERED

## 2023-03-22 PROCEDURE — 250N000009 HC RX 250: Performed by: NURSE ANESTHETIST, CERTIFIED REGISTERED

## 2023-03-22 PROCEDURE — 999N000065 XR SHOULDER LEFT PORT G/E 2 VIEWS: Mod: LT

## 2023-03-22 PROCEDURE — 258N000003 HC RX IP 258 OP 636

## 2023-03-22 PROCEDURE — 272N000001 HC OR GENERAL SUPPLY STERILE: Performed by: ORTHOPAEDIC SURGERY

## 2023-03-22 PROCEDURE — 85027 COMPLETE CBC AUTOMATED: CPT | Performed by: PHYSICIAN ASSISTANT

## 2023-03-22 PROCEDURE — 250N000013 HC RX MED GY IP 250 OP 250 PS 637

## 2023-03-22 PROCEDURE — 370N000017 HC ANESTHESIA TECHNICAL FEE, PER MIN: Performed by: ORTHOPAEDIC SURGERY

## 2023-03-22 PROCEDURE — 250N000011 HC RX IP 250 OP 636

## 2023-03-22 PROCEDURE — 250N000025 HC SEVOFLURANE, PER MIN: Performed by: ORTHOPAEDIC SURGERY

## 2023-03-22 PROCEDURE — 250N000013 HC RX MED GY IP 250 OP 250 PS 637: Performed by: ORTHOPAEDIC SURGERY

## 2023-03-22 PROCEDURE — 999N000141 HC STATISTIC PRE-PROCEDURE NURSING ASSESSMENT: Performed by: ORTHOPAEDIC SURGERY

## 2023-03-22 PROCEDURE — 258N000003 HC RX IP 258 OP 636: Performed by: ORTHOPAEDIC SURGERY

## 2023-03-22 PROCEDURE — 36415 COLL VENOUS BLD VENIPUNCTURE: CPT | Performed by: PHYSICIAN ASSISTANT

## 2023-03-22 PROCEDURE — 250N000013 HC RX MED GY IP 250 OP 250 PS 637: Performed by: PHYSICIAN ASSISTANT

## 2023-03-22 PROCEDURE — C9290 INJ, BUPIVACAINE LIPOSOME: HCPCS | Performed by: NURSE ANESTHETIST, CERTIFIED REGISTERED

## 2023-03-22 PROCEDURE — C1776 JOINT DEVICE (IMPLANTABLE): HCPCS | Performed by: ORTHOPAEDIC SURGERY

## 2023-03-22 PROCEDURE — 710N000009 HC RECOVERY PHASE 1, LEVEL 1, PER MIN: Performed by: ORTHOPAEDIC SURGERY

## 2023-03-22 PROCEDURE — 84132 ASSAY OF SERUM POTASSIUM: CPT | Performed by: PHYSICIAN ASSISTANT

## 2023-03-22 PROCEDURE — 82565 ASSAY OF CREATININE: CPT | Performed by: PHYSICIAN ASSISTANT

## 2023-03-22 PROCEDURE — 250N000011 HC RX IP 250 OP 636: Performed by: ORTHOPAEDIC SURGERY

## 2023-03-22 DEVICE — IMP SCR CENTRAL BIOMET REVERSE SHLDR 6.5X25MM 115395: Type: IMPLANTABLE DEVICE | Site: SHOULDER | Status: FUNCTIONAL

## 2023-03-22 DEVICE — IMP BASEPLATE MINI GLENOSPHERE BIOM REV SHLDR 25MM 010000589: Type: IMPLANTABLE DEVICE | Site: SHOULDER | Status: FUNCTIONAL

## 2023-03-22 DEVICE — IMPLANTABLE DEVICE: Type: IMPLANTABLE DEVICE | Site: SHOULDER | Status: FUNCTIONAL

## 2023-03-22 DEVICE — IMP SCR LOCKING BIOM REV SHLDR 3.5 HEX 4.75X15MM 180550: Type: IMPLANTABLE DEVICE | Site: SHOULDER | Status: FUNCTIONAL

## 2023-03-22 DEVICE — IMP HUMERAL TRAY ZIM RVRS SHLDR STD 110031399: Type: IMPLANTABLE DEVICE | Site: SHOULDER | Status: FUNCTIONAL

## 2023-03-22 DEVICE — IMP SCR LOCKING BIOM REV SHLDR 3.5 HEX 4.75X25MM 180552: Type: IMPLANTABLE DEVICE | Site: SHOULDER | Status: FUNCTIONAL

## 2023-03-22 DEVICE — IMP GLENOSPHERE BIOM REV SHLDR 36MM STD 115310: Type: IMPLANTABLE DEVICE | Site: SHOULDER | Status: FUNCTIONAL

## 2023-03-22 RX ORDER — METHOCARBAMOL 500 MG/1
500 TABLET, FILM COATED ORAL 4 TIMES DAILY PRN
Qty: 60 TABLET | Refills: 1 | Status: SHIPPED | OUTPATIENT
Start: 2023-03-22 | End: 2023-03-23

## 2023-03-22 RX ORDER — NALOXONE HYDROCHLORIDE 0.4 MG/ML
0.4 INJECTION, SOLUTION INTRAMUSCULAR; INTRAVENOUS; SUBCUTANEOUS
Status: DISCONTINUED | OUTPATIENT
Start: 2023-03-22 | End: 2023-03-23 | Stop reason: HOSPADM

## 2023-03-22 RX ORDER — SODIUM CHLORIDE, SODIUM LACTATE, POTASSIUM CHLORIDE, CALCIUM CHLORIDE 600; 310; 30; 20 MG/100ML; MG/100ML; MG/100ML; MG/100ML
INJECTION, SOLUTION INTRAVENOUS CONTINUOUS
Status: DISCONTINUED | OUTPATIENT
Start: 2023-03-22 | End: 2023-03-23 | Stop reason: HOSPADM

## 2023-03-22 RX ORDER — HYDROMORPHONE HCL IN WATER/PF 6 MG/30 ML
0.4 PATIENT CONTROLLED ANALGESIA SYRINGE INTRAVENOUS
Status: DISCONTINUED | OUTPATIENT
Start: 2023-03-22 | End: 2023-03-23 | Stop reason: HOSPADM

## 2023-03-22 RX ORDER — ASPIRIN 81 MG/1
81 TABLET ORAL 2 TIMES DAILY
Status: DISCONTINUED | OUTPATIENT
Start: 2023-03-22 | End: 2023-03-23 | Stop reason: HOSPADM

## 2023-03-22 RX ORDER — BISACODYL 10 MG
10 SUPPOSITORY, RECTAL RECTAL DAILY PRN
Status: DISCONTINUED | OUTPATIENT
Start: 2023-03-22 | End: 2023-03-23 | Stop reason: HOSPADM

## 2023-03-22 RX ORDER — ONDANSETRON 2 MG/ML
4 INJECTION INTRAMUSCULAR; INTRAVENOUS EVERY 30 MIN PRN
Status: DISCONTINUED | OUTPATIENT
Start: 2023-03-22 | End: 2023-03-22

## 2023-03-22 RX ORDER — CEFAZOLIN SODIUM/WATER 2 G/20 ML
2 SYRINGE (ML) INTRAVENOUS
Status: COMPLETED | OUTPATIENT
Start: 2023-03-22 | End: 2023-03-22

## 2023-03-22 RX ORDER — ACETAMINOPHEN 325 MG/1
975 TABLET ORAL ONCE
Status: DISCONTINUED | OUTPATIENT
Start: 2023-03-22 | End: 2023-03-22

## 2023-03-22 RX ORDER — CHLORTHALIDONE 25 MG/1
25 TABLET ORAL DAILY
Status: DISCONTINUED | OUTPATIENT
Start: 2023-03-23 | End: 2023-03-23 | Stop reason: HOSPADM

## 2023-03-22 RX ORDER — POLYETHYLENE GLYCOL 3350 17 G/17G
17 POWDER, FOR SOLUTION ORAL DAILY
Status: DISCONTINUED | OUTPATIENT
Start: 2023-03-23 | End: 2023-03-23 | Stop reason: HOSPADM

## 2023-03-22 RX ORDER — FENTANYL CITRATE 50 UG/ML
25 INJECTION, SOLUTION INTRAMUSCULAR; INTRAVENOUS EVERY 5 MIN PRN
Status: DISCONTINUED | OUTPATIENT
Start: 2023-03-22 | End: 2023-03-22 | Stop reason: HOSPADM

## 2023-03-22 RX ORDER — FENTANYL CITRATE 50 UG/ML
25 INJECTION, SOLUTION INTRAMUSCULAR; INTRAVENOUS EVERY 5 MIN PRN
Status: DISCONTINUED | OUTPATIENT
Start: 2023-03-22 | End: 2023-03-22

## 2023-03-22 RX ORDER — EPHEDRINE SULFATE 50 MG/ML
INJECTION, SOLUTION INTRAMUSCULAR; INTRAVENOUS; SUBCUTANEOUS PRN
Status: DISCONTINUED | OUTPATIENT
Start: 2023-03-22 | End: 2023-03-22

## 2023-03-22 RX ORDER — HYDROMORPHONE HCL IN WATER/PF 6 MG/30 ML
0.4 PATIENT CONTROLLED ANALGESIA SYRINGE INTRAVENOUS EVERY 5 MIN PRN
Status: DISCONTINUED | OUTPATIENT
Start: 2023-03-22 | End: 2023-03-22

## 2023-03-22 RX ORDER — HYDROMORPHONE HCL IN WATER/PF 6 MG/30 ML
0.2 PATIENT CONTROLLED ANALGESIA SYRINGE INTRAVENOUS EVERY 5 MIN PRN
Status: DISCONTINUED | OUTPATIENT
Start: 2023-03-22 | End: 2023-03-22 | Stop reason: HOSPADM

## 2023-03-22 RX ORDER — DIMENHYDRINATE 50 MG/ML
25 INJECTION, SOLUTION INTRAMUSCULAR; INTRAVENOUS
Status: DISCONTINUED | OUTPATIENT
Start: 2023-03-22 | End: 2023-03-22 | Stop reason: HOSPADM

## 2023-03-22 RX ORDER — LOSARTAN POTASSIUM 50 MG/1
50 TABLET ORAL DAILY
Status: DISCONTINUED | OUTPATIENT
Start: 2023-03-23 | End: 2023-03-23 | Stop reason: HOSPADM

## 2023-03-22 RX ORDER — ONDANSETRON 2 MG/ML
4 INJECTION INTRAMUSCULAR; INTRAVENOUS EVERY 6 HOURS PRN
Status: DISCONTINUED | OUTPATIENT
Start: 2023-03-22 | End: 2023-03-23 | Stop reason: HOSPADM

## 2023-03-22 RX ORDER — GLYCOPYRROLATE 0.2 MG/ML
INJECTION, SOLUTION INTRAMUSCULAR; INTRAVENOUS PRN
Status: DISCONTINUED | OUTPATIENT
Start: 2023-03-22 | End: 2023-03-22

## 2023-03-22 RX ORDER — SIMVASTATIN 10 MG
10 TABLET ORAL AT BEDTIME
Status: DISCONTINUED | OUTPATIENT
Start: 2023-03-22 | End: 2023-03-23 | Stop reason: HOSPADM

## 2023-03-22 RX ORDER — OXYCODONE HYDROCHLORIDE 5 MG/1
5 TABLET ORAL EVERY 4 HOURS PRN
Status: DISCONTINUED | OUTPATIENT
Start: 2023-03-22 | End: 2023-03-23 | Stop reason: HOSPADM

## 2023-03-22 RX ORDER — OXYCODONE HYDROCHLORIDE 5 MG/1
5-10 TABLET ORAL EVERY 4 HOURS PRN
Qty: 30 TABLET | Refills: 0 | Status: SHIPPED | OUTPATIENT
Start: 2023-03-22 | End: 2023-03-23

## 2023-03-22 RX ORDER — FENTANYL CITRATE 50 UG/ML
50 INJECTION, SOLUTION INTRAMUSCULAR; INTRAVENOUS EVERY 5 MIN PRN
Status: DISCONTINUED | OUTPATIENT
Start: 2023-03-22 | End: 2023-03-22

## 2023-03-22 RX ORDER — ONDANSETRON 2 MG/ML
INJECTION INTRAMUSCULAR; INTRAVENOUS PRN
Status: DISCONTINUED | OUTPATIENT
Start: 2023-03-22 | End: 2023-03-22

## 2023-03-22 RX ORDER — OXYCODONE HYDROCHLORIDE 5 MG/1
10 TABLET ORAL EVERY 4 HOURS PRN
Status: DISCONTINUED | OUTPATIENT
Start: 2023-03-22 | End: 2023-03-22 | Stop reason: HOSPADM

## 2023-03-22 RX ORDER — MEPERIDINE HYDROCHLORIDE 25 MG/ML
12.5 INJECTION INTRAMUSCULAR; INTRAVENOUS; SUBCUTANEOUS EVERY 5 MIN PRN
Status: DISCONTINUED | OUTPATIENT
Start: 2023-03-22 | End: 2023-03-22 | Stop reason: HOSPADM

## 2023-03-22 RX ORDER — OXYCODONE HYDROCHLORIDE 5 MG/1
10 TABLET ORAL EVERY 4 HOURS PRN
Status: DISCONTINUED | OUTPATIENT
Start: 2023-03-22 | End: 2023-03-23 | Stop reason: HOSPADM

## 2023-03-22 RX ORDER — ONDANSETRON 2 MG/ML
4 INJECTION INTRAMUSCULAR; INTRAVENOUS EVERY 30 MIN PRN
Status: DISCONTINUED | OUTPATIENT
Start: 2023-03-22 | End: 2023-03-22 | Stop reason: HOSPADM

## 2023-03-22 RX ORDER — LIDOCAINE HYDROCHLORIDE 20 MG/ML
INJECTION, SOLUTION INFILTRATION; PERINEURAL PRN
Status: DISCONTINUED | OUTPATIENT
Start: 2023-03-22 | End: 2023-03-22

## 2023-03-22 RX ORDER — PROPOFOL 10 MG/ML
INJECTION, EMULSION INTRAVENOUS PRN
Status: DISCONTINUED | OUTPATIENT
Start: 2023-03-22 | End: 2023-03-22

## 2023-03-22 RX ORDER — ONDANSETRON 4 MG/1
4 TABLET, ORALLY DISINTEGRATING ORAL EVERY 30 MIN PRN
Status: DISCONTINUED | OUTPATIENT
Start: 2023-03-22 | End: 2023-03-22

## 2023-03-22 RX ORDER — SODIUM CHLORIDE, SODIUM LACTATE, POTASSIUM CHLORIDE, CALCIUM CHLORIDE 600; 310; 30; 20 MG/100ML; MG/100ML; MG/100ML; MG/100ML
INJECTION, SOLUTION INTRAVENOUS CONTINUOUS
Status: DISCONTINUED | OUTPATIENT
Start: 2023-03-22 | End: 2023-03-22 | Stop reason: HOSPADM

## 2023-03-22 RX ORDER — PROCHLORPERAZINE MALEATE 5 MG
5 TABLET ORAL EVERY 6 HOURS PRN
Status: DISCONTINUED | OUTPATIENT
Start: 2023-03-22 | End: 2023-03-23 | Stop reason: HOSPADM

## 2023-03-22 RX ORDER — NALOXONE HYDROCHLORIDE 0.4 MG/ML
0.2 INJECTION, SOLUTION INTRAMUSCULAR; INTRAVENOUS; SUBCUTANEOUS
Status: DISCONTINUED | OUTPATIENT
Start: 2023-03-22 | End: 2023-03-23 | Stop reason: HOSPADM

## 2023-03-22 RX ORDER — HYDROXYZINE HYDROCHLORIDE 10 MG/1
10 TABLET, FILM COATED ORAL EVERY 6 HOURS PRN
Status: DISCONTINUED | OUTPATIENT
Start: 2023-03-22 | End: 2023-03-22 | Stop reason: HOSPADM

## 2023-03-22 RX ORDER — BUPIVACAINE HYDROCHLORIDE 2.5 MG/ML
INJECTION, SOLUTION EPIDURAL; INFILTRATION; INTRACAUDAL PRN
Status: DISCONTINUED | OUTPATIENT
Start: 2023-03-22 | End: 2023-03-22

## 2023-03-22 RX ORDER — ACETAMINOPHEN 325 MG/1
650 TABLET ORAL EVERY 4 HOURS PRN
Status: DISCONTINUED | OUTPATIENT
Start: 2023-03-25 | End: 2023-03-23 | Stop reason: HOSPADM

## 2023-03-22 RX ORDER — FENTANYL CITRATE 50 UG/ML
INJECTION, SOLUTION INTRAMUSCULAR; INTRAVENOUS PRN
Status: DISCONTINUED | OUTPATIENT
Start: 2023-03-22 | End: 2023-03-22

## 2023-03-22 RX ORDER — CEFAZOLIN SODIUM 1 G/3ML
1 INJECTION, POWDER, FOR SOLUTION INTRAMUSCULAR; INTRAVENOUS EVERY 8 HOURS
Status: COMPLETED | OUTPATIENT
Start: 2023-03-22 | End: 2023-03-23

## 2023-03-22 RX ORDER — METHOCARBAMOL 500 MG/1
500 TABLET, FILM COATED ORAL EVERY 6 HOURS PRN
Status: DISCONTINUED | OUTPATIENT
Start: 2023-03-22 | End: 2023-03-23 | Stop reason: HOSPADM

## 2023-03-22 RX ORDER — OXYCODONE HYDROCHLORIDE 5 MG/1
5 TABLET ORAL EVERY 4 HOURS PRN
Status: DISCONTINUED | OUTPATIENT
Start: 2023-03-22 | End: 2023-03-22 | Stop reason: HOSPADM

## 2023-03-22 RX ORDER — FENTANYL CITRATE 50 UG/ML
25-100 INJECTION, SOLUTION INTRAMUSCULAR; INTRAVENOUS
Status: DISCONTINUED | OUTPATIENT
Start: 2023-03-22 | End: 2023-03-22 | Stop reason: HOSPADM

## 2023-03-22 RX ORDER — FENTANYL CITRATE 50 UG/ML
50 INJECTION, SOLUTION INTRAMUSCULAR; INTRAVENOUS EVERY 5 MIN PRN
Status: DISCONTINUED | OUTPATIENT
Start: 2023-03-22 | End: 2023-03-22 | Stop reason: HOSPADM

## 2023-03-22 RX ORDER — DIPHENHYDRAMINE HCL 12.5 MG/5ML
12.5 SOLUTION ORAL EVERY 6 HOURS PRN
Status: DISCONTINUED | OUTPATIENT
Start: 2023-03-22 | End: 2023-03-23 | Stop reason: HOSPADM

## 2023-03-22 RX ORDER — ACETAMINOPHEN 325 MG/1
650 TABLET ORAL EVERY 4 HOURS PRN
Qty: 100 TABLET | Refills: 0
Start: 2023-03-22

## 2023-03-22 RX ORDER — FENTANYL CITRATE-0.9 % NACL/PF 10 MCG/ML
PLASTIC BAG, INJECTION (ML) INTRAVENOUS CONTINUOUS PRN
Status: DISCONTINUED | OUTPATIENT
Start: 2023-03-22 | End: 2023-03-22

## 2023-03-22 RX ORDER — HYDROMORPHONE HCL IN WATER/PF 6 MG/30 ML
0.4 PATIENT CONTROLLED ANALGESIA SYRINGE INTRAVENOUS EVERY 5 MIN PRN
Status: DISCONTINUED | OUTPATIENT
Start: 2023-03-22 | End: 2023-03-22 | Stop reason: HOSPADM

## 2023-03-22 RX ORDER — ACETAMINOPHEN 325 MG/1
975 TABLET ORAL ONCE
Status: COMPLETED | OUTPATIENT
Start: 2023-03-22 | End: 2023-03-22

## 2023-03-22 RX ORDER — CEFAZOLIN SODIUM/WATER 2 G/20 ML
2 SYRINGE (ML) INTRAVENOUS SEE ADMIN INSTRUCTIONS
Status: DISCONTINUED | OUTPATIENT
Start: 2023-03-22 | End: 2023-03-22 | Stop reason: HOSPADM

## 2023-03-22 RX ORDER — ONDANSETRON 4 MG/1
4 TABLET, ORALLY DISINTEGRATING ORAL EVERY 30 MIN PRN
Status: DISCONTINUED | OUTPATIENT
Start: 2023-03-22 | End: 2023-03-22 | Stop reason: HOSPADM

## 2023-03-22 RX ORDER — TRANEXAMIC ACID 650 MG/1
1950 TABLET ORAL ONCE
Status: COMPLETED | OUTPATIENT
Start: 2023-03-22 | End: 2023-03-22

## 2023-03-22 RX ORDER — MAGNESIUM SULFATE HEPTAHYDRATE 40 MG/ML
2 INJECTION, SOLUTION INTRAVENOUS ONCE
Status: DISCONTINUED | OUTPATIENT
Start: 2023-03-22 | End: 2023-03-22 | Stop reason: HOSPADM

## 2023-03-22 RX ORDER — HYDROMORPHONE HCL IN WATER/PF 6 MG/30 ML
0.2 PATIENT CONTROLLED ANALGESIA SYRINGE INTRAVENOUS
Status: DISCONTINUED | OUTPATIENT
Start: 2023-03-22 | End: 2023-03-23 | Stop reason: HOSPADM

## 2023-03-22 RX ORDER — LIDOCAINE 40 MG/G
CREAM TOPICAL
Status: DISCONTINUED | OUTPATIENT
Start: 2023-03-22 | End: 2023-03-22 | Stop reason: HOSPADM

## 2023-03-22 RX ORDER — ACETAMINOPHEN 325 MG/1
975 TABLET ORAL EVERY 8 HOURS
Status: DISCONTINUED | OUTPATIENT
Start: 2023-03-22 | End: 2023-03-23 | Stop reason: HOSPADM

## 2023-03-22 RX ORDER — LIDOCAINE 40 MG/G
CREAM TOPICAL
Status: DISCONTINUED | OUTPATIENT
Start: 2023-03-22 | End: 2023-03-23 | Stop reason: HOSPADM

## 2023-03-22 RX ORDER — HYDROMORPHONE HCL IN WATER/PF 6 MG/30 ML
0.2 PATIENT CONTROLLED ANALGESIA SYRINGE INTRAVENOUS EVERY 5 MIN PRN
Status: DISCONTINUED | OUTPATIENT
Start: 2023-03-22 | End: 2023-03-22

## 2023-03-22 RX ORDER — AMOXICILLIN 250 MG
1 CAPSULE ORAL 2 TIMES DAILY
Status: DISCONTINUED | OUTPATIENT
Start: 2023-03-22 | End: 2023-03-23 | Stop reason: HOSPADM

## 2023-03-22 RX ORDER — FAMOTIDINE 20 MG/1
20 TABLET, FILM COATED ORAL 2 TIMES DAILY
Status: DISCONTINUED | OUTPATIENT
Start: 2023-03-22 | End: 2023-03-23 | Stop reason: HOSPADM

## 2023-03-22 RX ORDER — ASPIRIN 81 MG/1
81 TABLET ORAL 2 TIMES DAILY
Qty: 60 TABLET | Refills: 0
Start: 2023-03-22 | End: 2023-03-23

## 2023-03-22 RX ORDER — DEXAMETHASONE SODIUM PHOSPHATE 4 MG/ML
INJECTION, SOLUTION INTRA-ARTICULAR; INTRALESIONAL; INTRAMUSCULAR; INTRAVENOUS; SOFT TISSUE PRN
Status: DISCONTINUED | OUTPATIENT
Start: 2023-03-22 | End: 2023-03-22

## 2023-03-22 RX ORDER — ONDANSETRON 4 MG/1
4 TABLET, ORALLY DISINTEGRATING ORAL EVERY 6 HOURS PRN
Status: DISCONTINUED | OUTPATIENT
Start: 2023-03-22 | End: 2023-03-23 | Stop reason: HOSPADM

## 2023-03-22 RX ADMIN — MIDAZOLAM 2 MG: 1 INJECTION INTRAMUSCULAR; INTRAVENOUS at 10:41

## 2023-03-22 RX ADMIN — PHENYLEPHRINE HYDROCHLORIDE 200 MCG: 10 INJECTION INTRAVENOUS at 12:08

## 2023-03-22 RX ADMIN — BUPIVACAINE HYDROCHLORIDE 10 ML: 2.5 INJECTION, SOLUTION EPIDURAL; INFILTRATION; INTRACAUDAL at 10:47

## 2023-03-22 RX ADMIN — FENTANYL CITRATE 50 MCG: 50 INJECTION, SOLUTION INTRAMUSCULAR; INTRAVENOUS at 11:57

## 2023-03-22 RX ADMIN — ONDANSETRON 4 MG: 2 INJECTION INTRAMUSCULAR; INTRAVENOUS at 13:19

## 2023-03-22 RX ADMIN — DEXMEDETOMIDINE HYDROCHLORIDE 10 MCG: 100 INJECTION, SOLUTION INTRAVENOUS at 10:47

## 2023-03-22 RX ADMIN — DEXAMETHASONE SODIUM PHOSPHATE 4 MG: 4 INJECTION, SOLUTION INTRA-ARTICULAR; INTRALESIONAL; INTRAMUSCULAR; INTRAVENOUS; SOFT TISSUE at 10:47

## 2023-03-22 RX ADMIN — LIDOCAINE HYDROCHLORIDE 40 MG: 20 INJECTION, SOLUTION INFILTRATION; PERINEURAL at 11:57

## 2023-03-22 RX ADMIN — SODIUM CHLORIDE, POTASSIUM CHLORIDE, SODIUM LACTATE AND CALCIUM CHLORIDE: 600; 310; 30; 20 INJECTION, SOLUTION INTRAVENOUS at 10:17

## 2023-03-22 RX ADMIN — FENTANYL CITRATE 50 MCG: 50 INJECTION, SOLUTION INTRAMUSCULAR; INTRAVENOUS at 11:49

## 2023-03-22 RX ADMIN — Medication 5 MG: at 12:23

## 2023-03-22 RX ADMIN — Medication 40 MCG/MIN: at 12:04

## 2023-03-22 RX ADMIN — FENTANYL CITRATE 50 MCG: 50 INJECTION, SOLUTION INTRAMUSCULAR; INTRAVENOUS at 10:41

## 2023-03-22 RX ADMIN — SODIUM CHLORIDE, POTASSIUM CHLORIDE, SODIUM LACTATE AND CALCIUM CHLORIDE: 600; 310; 30; 20 INJECTION, SOLUTION INTRAVENOUS at 18:04

## 2023-03-22 RX ADMIN — SENNOSIDES AND DOCUSATE SODIUM 1 TABLET: 50; 8.6 TABLET ORAL at 20:32

## 2023-03-22 RX ADMIN — Medication 60 MCG/MIN: at 12:08

## 2023-03-22 RX ADMIN — TRANEXAMIC ACID 1950 MG: 650 TABLET ORAL at 09:54

## 2023-03-22 RX ADMIN — PROPOFOL 180 MG: 10 INJECTION, EMULSION INTRAVENOUS at 11:57

## 2023-03-22 RX ADMIN — BUPIVACAINE 10 ML: 13.3 INJECTION, SUSPENSION, LIPOSOMAL INFILTRATION at 10:47

## 2023-03-22 RX ADMIN — LIDOCAINE HYDROCHLORIDE 0.3 ML: 10 INJECTION, SOLUTION EPIDURAL; INFILTRATION; INTRACAUDAL; PERINEURAL at 10:17

## 2023-03-22 RX ADMIN — Medication 5 MG: at 12:10

## 2023-03-22 RX ADMIN — SODIUM CHLORIDE, POTASSIUM CHLORIDE, SODIUM LACTATE AND CALCIUM CHLORIDE: 600; 310; 30; 20 INJECTION, SOLUTION INTRAVENOUS at 15:08

## 2023-03-22 RX ADMIN — DEXAMETHASONE SODIUM PHOSPHATE 4 MG: 4 INJECTION, SOLUTION INTRA-ARTICULAR; INTRALESIONAL; INTRAMUSCULAR; INTRAVENOUS; SOFT TISSUE at 12:08

## 2023-03-22 RX ADMIN — ACETAMINOPHEN 975 MG: 325 TABLET, FILM COATED ORAL at 18:08

## 2023-03-22 RX ADMIN — ASPIRIN 81 MG: 81 TABLET, COATED ORAL at 20:31

## 2023-03-22 RX ADMIN — SIMVASTATIN 10 MG: 10 TABLET, FILM COATED ORAL at 21:53

## 2023-03-22 RX ADMIN — ROCURONIUM BROMIDE 40 MG: 50 INJECTION, SOLUTION INTRAVENOUS at 11:57

## 2023-03-22 RX ADMIN — SUGAMMADEX 100 MG: 100 INJECTION, SOLUTION INTRAVENOUS at 13:24

## 2023-03-22 RX ADMIN — GLYCOPYRROLATE 0.2 MG: 0.2 INJECTION, SOLUTION INTRAMUSCULAR; INTRAVENOUS at 12:27

## 2023-03-22 RX ADMIN — ACETAMINOPHEN 975 MG: 325 TABLET, FILM COATED ORAL at 09:54

## 2023-03-22 RX ADMIN — FAMOTIDINE 20 MG: 20 TABLET, FILM COATED ORAL at 20:32

## 2023-03-22 RX ADMIN — MIDAZOLAM 1 MG: 1 INJECTION INTRAMUSCULAR; INTRAVENOUS at 11:49

## 2023-03-22 RX ADMIN — Medication 30 MCG/MIN: at 12:20

## 2023-03-22 RX ADMIN — Medication 2 G: at 11:48

## 2023-03-22 RX ADMIN — CEFAZOLIN 1 G: 1 INJECTION, POWDER, FOR SOLUTION INTRAMUSCULAR; INTRAVENOUS at 20:29

## 2023-03-22 RX ADMIN — PHENYLEPHRINE HYDROCHLORIDE 100 MCG: 10 INJECTION INTRAVENOUS at 12:04

## 2023-03-22 ASSESSMENT — ACTIVITIES OF DAILY LIVING (ADL)
ADLS_ACUITY_SCORE: 20

## 2023-03-22 ASSESSMENT — LIFESTYLE VARIABLES: TOBACCO_USE: 0

## 2023-03-22 NOTE — PROVIDER NOTIFICATION
03/22/23 1447   Discharge/Handoff   Discharged with O2   Additional Transport Assist Brandi madrigal   Handoff given to Sammi RN   Oxygen Therapy   O2 Device Nasal cannula   Oxygen Delivery 2 LPM   Valuables   Patient Belongings remains with patient   Patient Belongings Remaining with Patient cell phone/electronics;clothing;glasses;purse/wallet   Number of Belongings Bags 1   Did you bring any home meds/supplements to the hospital?  No

## 2023-03-22 NOTE — PROGRESS NOTES
"[facility] [place]MISS[place]    Patient admitted to room 2311 at approximately 1500 via cart from surgery. Patient was accompanied by transport tech.     Verbal SBAR report received from Iliana prior to patient arrival.     Patient trasferred to bed via air aaron. Patient alert and oriented X 3. The patient is not having any pain.  . Admission vital signs: Blood pressure 126/61, pulse 90, temperature 98.1  F (36.7  C), temperature source Temporal, resp. rate 16, height 1.6 m (5' 3\"), weight 58.2 kg (128 lb 4.9 oz), SpO2 97 %. Patient was oriented to plan of care, call light, bed controls, tv, telephone, bathroom and visiting hours.     Risk Assessment    The following safety risks were identified during admission: fall. Yellow risk band applied: YES.     Skin Initial Assessment    This writer admitted this patient and completed a full skin assessment and Grover score in the Adult PCS flowsheet. Appropriate interventions initiated as needed.     Secondary skin check completed by to be completed when staff available.         Education    Patient has a Elizabeth to Observation order: No  Observation education completed and documented: N/A      Sammi Maddox RN    "

## 2023-03-22 NOTE — PROGRESS NOTES
[facility] [place] TRANSPORT NOTE  Data:   Reason for Transport:  Change in Level of Care    Tri Walden was transported to [place] [address] via cart at 1445.  Patient was accompanied by NST Brandi Hendricks. Equipment used for transport: Oxygen  Nasal Cannula. Family was aware of reason for transport: yes, daughter Torie    Action:  Report: given to NICHOLE Chapa    Response:  Patient's condition when transferred off unit was stable  .    Iliana Carolina RN

## 2023-03-22 NOTE — ANESTHESIA PROCEDURE NOTES
Airway       Patient location during procedure: OR       Procedure Start/Stop Times: 3/22/2023 12:01 PM  Staff -        CRNA: Odessa Oakley APRN CRNA       Other Anesthesia Staff: Donald Dubon       Performed By: CRNA and SRNAIndications and Patient Condition       Indications for airway management: rita-procedural       Induction type:intravenous       Mask difficulty assessment: 1 - vent by mask    Final Airway Details       Final airway type: endotracheal airway       Successful airway: ETT - single  Endotracheal Airway Details        ETT size (mm): 7.0       Cuffed: yes       Successful intubation technique: video laryngoscopy       VL Blade Size: Hoyt 3       Grade View of Cords: 1       Adjucts: stylet       Position: Right       Measured from: gums/teeth       Secured at (cm): 22       Bite block used: None    Post intubation assessment        Number of attempts at approach: 1       Secured with: silk tape       Ease of procedure: easy       Dentition: Intact and Unchanged    Medication(s) Administered   Medication Administration Time: 3/22/2023 12:01 PM

## 2023-03-22 NOTE — PROGRESS NOTES
Surgical arm without any feeling or movement of arm, hand or fingers.  Arm is essentially flaccid and supported in a sling or by patients non-surgical arm.  Pulse is 2 + and skin is warm.  Surgical dressing clean/dry and intact  Assisted patient on and off commode at this time to urinate.    Call placed to anesthesia and talked with Sanjuanita to see if this is an expected finding with the block she received.  Per anesthesia, it is not abnormal to have total loss of feeling or movement of the arm and it will slowly come back, but could last up to 3 days.  Patient reassured at this time.

## 2023-03-22 NOTE — OP NOTE
Procedure Date: 03/21/23     PREOPERATIVE DIAGNOSIS:  Left shoulder rotator cuff tear with shoulder degenerative arthritis.     POSTOPERATIVE DIAGNOSIS:  same     PROCEDURE PERFORMED:  Left Reverse  total shoulder arthroplasty utilizing a Biomet size 7 mini humeral stem, standard glenoid baseplate with central fixation screww and 4 locking screws, Standard 35mm glenosphere in offset postion C, 36mm  Standard glenoid with +3 Humeral tray.     SURGEON:  Escobar Magana MD     ASSISTANT:  Royce Chand PA-C.  Royce Chand PA-C, was present throughout the procedure, critical for patient positioning, prepping, draping, safe progression of procedure, wound closure, and sling placement.     DESCRIPTION OF PROCEDURE:  After informed risks, benefits, alternatives, and expected outcomes of the procedure, the patient desired to proceed.  She was brought to the operating suite where he was placed under general anesthesia, received 2 grams of Ancef, 1 gram of tranexamic acid.  A timeout verification step was completed.  He was positioned in low beach chair position.  Her right upper extremity was prepped and draped in a manner appropriate for procedure.     A deltopectoral approach was taken to the shoulder.  The cephalic vein was protected and retracted laterally.  Cobell retractor was placed.  Conjoined tendon was identified.  Clavipectoral fascia was divided, and the biceps tendon was not  identified within the groove.   Attention was then directed towards the subscapularis tendon, it was elevated off the lesser tuberosity, tagged with a #1 Ethibond stitches and retracted.  It was then divided inferiorly.  Leash of vessels was ligated.  Retractor was placed between the subscap and the joint capsule to protect the axillary nerve throughout these procedures.  External rotation was maintained to protect the axillary nerve.  The inferior capsule was then sharply divided with Metzenbaum scissors.  The subscap was  retracted, and attention was directed towards completing a proximal humeral resection.  A 30-degree retroverted x 45-degree proximal resection was completed using the Biomet guide.  This was followed by placement of Fukuda retractor, circumferential labral excision.      The anterior labrum was cleared of soft tissue, and a Bankart retractor was placed.  The modified rabbit ear retractor was placed posteriorly and excellent exposure of the glenoid was achieved.  The central pin was placed utilizing a  glenoid guide.  This was reamed to correct slight retroversion.  A standard baseplate was then positioned over the guidepin all seating was demonstrated in the central lobe it was utilized 25 mm central fixation screw was then secured there was taken to him and straight complete seating of the central screw.  Peripheral locking screws were then placed 25 mm inferior superior and 15 mm locking screws were placed utilizing the fixed we will guide for each.  Table fixation was felt to been achieved.  A standard 36 mm glenosphere in the C offset position was then assembled on the back table and secured to the glenoid baseplate and checked for stability.     The humeral head could then be fully exposed using a brown retractor and Artemio retractors, canal finder, sequential reaming up to 8 mm, followed by broaching up to 7 mm was completed.  A trial0 and +3  humeral tray were trialed and 36 mm standard bearing surface were positioned excellent range of motion stability and tensioning of the conjoined tendon with 90 degrees of internal rotation and 90 degrees of abduction demonstrated.  Final 7 mm mini humeral stem was secured the +3 humeral tray was standard bearing surface was assembled secured to the humeral stem.      Wounds were copiously irrigated and subcutaneous was closed with 2-0 Vicryl.  Skin was closed with a 3-0 Stratafix, Steri-Strips, and Aquacel dressing.  The patient tolerated the procedure well.  Returned  to recovery in stable condition.     ESTIMATED BLOOD LOSS:  100 mL.     Escobar Magana MD

## 2023-03-22 NOTE — PROGRESS NOTES
Skin affirmation note    Admitting nurse completed full skin assessment, Grover score and Grover interventions. This writer agrees with the initial skin assessment findings.

## 2023-03-22 NOTE — ANESTHESIA PROCEDURE NOTES
Brachial plexus Procedure Note    Pre-Procedure   Staff -        CRNA: Hyun Barkley APRN CRNA       Performed By: CRNA       Location: pre-op       Procedure Start/Stop Times: 3/22/2023 10:37 AM and 3/22/2023 10:49 AM       Pre-Anesthestic Checklist: patient identified, IV checked, site marked, risks and benefits discussed, informed consent, monitors and equipment checked, pre-op evaluation, at physician/surgeon's request and post-op pain management  Timeout:       Correct Patient: Yes        Correct Procedure: Yes        Correct Site: Yes        Correct Position: Yes        Correct Laterality: Yes        Site Marked: Yes  Procedure Documentation  Procedure: Brachial plexus       Diagnosis: LEFT BRACHIAL PLEXUS BLOCK VIA THE INTERSCALENE APPROACH       Laterality: left       Patient Position: sitting       Patient Prep/Sterile Barriers: sterile gloves, mask, patient draped       Skin prep: Chloraprep       Local skin infiltrated with 1 mL of 1% lidocaine.  (interscalene approach).       Needle Type: insulated and short bevel       Needle Gauge: 22.        Needle Length (Inches): 2        Ultrasound guided       1. Ultrasound was used to identify targeted nerve, plexus, vascular marker, or fascial plane and place a needle adjacent to it in real-time.       2. Ultrasound was used to visualize the spread of anesthetic in close proximity to the above referenced structure.       3. A permanent image is entered into the patient's record.       4. The visualized anatomic structures appeared normal.       5. There were no apparent abnormal pathologic findings.       Nerve Stim: Initial Level 1 mA.  Lowest motor response 0.4 mA.    Assessment/Narrative         The placement was negative for: blood aspirated, painful injection and site bleeding       Paresthesias: No.       Test dose of 3 mL at.         Test dose negative, 3 minutes after injection, for signs of intravascular, subdural, or intrathecal injection.        "Bolus given via needle. no blood aspirated via catheter.        Secured via.        Complications: none       Injection made incrementally with aspirations every 5 mL.    Medication(s) Administered   Medication Administration Time: 3/22/2023 10:37 AM      FOR [facility] ONLY:   Pain Team Contact information: please page the Pain Team Via [employer] Search \"Pain\". During daytime hours, please page the attending first. At night please page the resident first.    "

## 2023-03-22 NOTE — ANESTHESIA CARE TRANSFER NOTE
Patient: Tri Walden    Procedure: Procedure(s):  left REVERSE Total Shoulder Arthroplasty       Diagnosis: Osteoarthritis [M19.90]  Rotator cuff tear [M75.100]  Diagnosis Additional Information: No value filed.    Anesthesia Type:   General     Note:    Oropharynx: oropharynx clear of all foreign objects and spontaneously breathing  Level of Consciousness: drowsy  Oxygen Supplementation: room air    Independent Airway: airway patency satisfactory and stable  Dentition: dentition unchanged  Vital Signs Stable: post-procedure vital signs reviewed and stable  Report to RN Given: handoff report given  Patient transferred to: PACU    Handoff Report: Identifed the Patient, Identified the Reponsible Provider, Reviewed the pertinent medical history, Discussed the surgical course, Reviewed Intra-OP anesthesia mangement and issues during anesthesia, Set expectations for post-procedure period and Allowed opportunity for questions and acknowledgement of understanding      Vitals:  Vitals Value Taken Time   BP     Temp     Pulse 82 03/22/23 1345   Resp 42 03/22/23 1345   SpO2 92 % 03/22/23 1345   Vitals shown include unvalidated device data.    Electronically Signed By: BHARAT Lora CRNA  March 22, 2023  1:47 PM

## 2023-03-22 NOTE — ANESTHESIA POSTPROCEDURE EVALUATION
Patient: Tri Walden    Procedure: Procedure(s):  left REVERSE Total Shoulder Arthroplasty       Anesthesia Type:  General    Note:  Disposition: Admission   Postop Pain Control: Uneventful            Sign Out: Well controlled pain   PONV: No   Neuro/Psych: Uneventful            Sign Out: Acceptable/Baseline neuro status   Airway/Respiratory: Uneventful            Sign Out: Acceptable/Baseline resp. status   CV/Hemodynamics: Uneventful            Sign Out: Acceptable CV status; No obvious hypovolemia; No obvious fluid overload   Other NRE: NONE   DID A NON-ROUTINE EVENT OCCUR? No           Last vitals:  Vitals Value Taken Time   /60 03/22/23 1430   Temp 36.7  C (98.1  F) 03/22/23 1430   Pulse 101 03/22/23 1439   Resp 18 03/22/23 1439   SpO2 95 % 03/22/23 1441   Vitals shown include unvalidated device data.    Electronically Signed By: BHARAT Lora CRNA  March 22, 2023  2:49 PM

## 2023-03-22 NOTE — PLAN OF CARE
Goal Outcome Evaluation:    DATE & TIME: 3/22/2023 4171-4015    Cognitive Concerns/ Orientation : alert and oriented x 4   BEHAVIOR & AGGRESSION TOOL COLOR: green  ABNL VS/O2: stable, on frequent vitals due to surgical admission  MOBILITY: assist of one and transfer belt  PAIN MANAGMENT: none.  Provided scheduled tylenol   DIET: regular  BOWEL/BLADDER: continent and voided x 2 on bedside commode  ABNL LAB/BG: in epic  DRAIN/DEVICES: none  TELEMETRY RHYTHM: n/a  SKIN: incision aquacell clean, dry and intact. Left arm remains in sling.  Remains numb and she is unable to move left arm or fingers-reassurance provided by anesthesia that this is an expected occurrence with the combined long and short acting block.  Admission skin check completed with Estefania JOSEPH   TESTS/PROCEDURES: none  D/C DATE: 3/23/2023 midday  Discharge Barriers: needs to meet post operative discharge goals to go home.

## 2023-03-22 NOTE — BRIEF OP NOTE
[facility]    Brief Operative Note    Pre-operative diagnosis: Osteoarthritis [M19.90]  Rotator cuff tear [M75.100]  Post-operative diagnosis Same as pre-operative diagnosis    Procedure: Procedure(s):  left REVERSE Total Shoulder Arthroplasty  Surgeon: Surgeon(s) and Role:     * Escobar Magana MD - Primary     * Royce Chand PA-C - Assisting  Anesthesia: General with Block   Estimated Blood Loss: Less than 100 ml    Drains: None  Specimens: * No specimens in log *  Findings:   None.  Complications: None.  Implants:   Implant Name Type Inv. Item Serial No.  Lot No. LRB No. Used Action   IMP BASEPLATE MINI GLENOSPHERE BIOM REV SHLDR 25MM [id number] - [id number] Total Joint Component/Insert IMP BASEPLATE MINI GLENOSPHERE BIOM REV SHLDR 25MM [id number]  [employer] [id number] Left 1 Implanted   IMP GLENOSPHERE BIOM REV SHLDR 36MM STD [id number] - [id number] Total Joint Component/Insert IMP GLENOSPHERE BIOM REV SHLDR 36MM STD [id number]  [employer] [id number] Left 1 Implanted   IMP SCR CENTRAL BIOMET REVERSE SHLDR 6.5X25MM [id number] - [id number] Metallic Hardware/[employer]rose IMP SCR CENTRAL BIOMET REVERSE SHLDR 6.5X25MM [id number]  [employer] [id number] Left 1 Implanted   IMP SCR LOCKING BIOM REV SHLDR 3.5 HEX 4.01B77FG 422273 - [id number] Metallic Hardware/Penrose IMP SCR LOCKING BIOM REV SHLDR 3.5 HEX 4.89V97JC 686515  [employer] [id number] Left 1 Implanted   IMP SCR LOCKING BIOM REV SHLDR 3.5 HEX 4.62G24FC 138120 - [id number] Metallic Hardware/Penrose IMP SCR LOCKING BIOM REV SHLDR 3.5 HEX 4.45C15PM 482439  [employer] [id number] Left 1 Implanted   IMP SCR LOCKING BIOM REV SHLDR 3.5 HEX 4.13X36XU 125543 - [id number] Total Joint Component/Insert IMP SCR LOCKING BIOM REV SHLDR 3.5 HEX 4.69T22PZ 304941  [employer] [id number] Left 1 Implanted   IMP SCR LOCKING BIOM REV SHLDR 3.5 HEX 4.39H77HX 241813 - [id number] Total Joint Component/Insert IMP SCR LOCKING BIOM REV SHLDR 3.5  HEX 4.74F50EQ 253692  [employer] [id number] Left 1 Implanted   IMP STEM MINI HUMERAL BIOMET SHLDR 7MM 756991 - [id number] Total Joint Component/Insert IMP STEM MINI HUMERAL BIOMET SHLDR 7MM 308395  [employer] [id number] Left 1 Implanted   IMP BEARING HUMERAL ZIM 36MM  +3MM PRLNG [id number] - [id number] Total Joint Component/Insert IMP BEARING HUMERAL ZIM 36MM  +3MM PRLNG [id number]  [employer] [id number] Left 1 Implanted   IMP HUMERAL TRAY ZIM RVRS SHLDR STD [id number] - [id number] Total Joint Component/Insert IMP HUMERAL TRAY ZIM RVRS SHLDR STD [id number]  [employer] [id number] Left 1 Implanted

## 2023-03-23 ENCOUNTER — APPOINTMENT (OUTPATIENT)
Dept: OCCUPATIONAL THERAPY | Facility: CLINIC | Age: 73
End: 2023-03-23
Attending: ORTHOPAEDIC SURGERY
Payer: COMMERCIAL

## 2023-03-23 VITALS
DIASTOLIC BLOOD PRESSURE: 71 MMHG | HEIGHT: 63 IN | BODY MASS INDEX: 22.73 KG/M2 | SYSTOLIC BLOOD PRESSURE: 131 MMHG | WEIGHT: 128.31 LBS | RESPIRATION RATE: 18 BRPM | OXYGEN SATURATION: 98 % | HEART RATE: 87 BPM | TEMPERATURE: 98 F

## 2023-03-23 LAB
FASTING STATUS PATIENT QL REPORTED: ABNORMAL
GLUCOSE SERPL-MCNC: 106 MG/DL (ref 70–99)
HGB BLD-MCNC: 10.5 G/DL (ref 11.7–15.7)

## 2023-03-23 PROCEDURE — 250N000013 HC RX MED GY IP 250 OP 250 PS 637

## 2023-03-23 PROCEDURE — 97110 THERAPEUTIC EXERCISES: CPT | Mod: GO

## 2023-03-23 PROCEDURE — 82947 ASSAY GLUCOSE BLOOD QUANT: CPT | Performed by: ORTHOPAEDIC SURGERY

## 2023-03-23 PROCEDURE — 97165 OT EVAL LOW COMPLEX 30 MIN: CPT | Mod: GO

## 2023-03-23 PROCEDURE — 36415 COLL VENOUS BLD VENIPUNCTURE: CPT | Performed by: ORTHOPAEDIC SURGERY

## 2023-03-23 PROCEDURE — 250N000013 HC RX MED GY IP 250 OP 250 PS 637: Performed by: ORTHOPAEDIC SURGERY

## 2023-03-23 PROCEDURE — 97535 SELF CARE MNGMENT TRAINING: CPT | Mod: GO

## 2023-03-23 PROCEDURE — 250N000011 HC RX IP 250 OP 636: Performed by: ORTHOPAEDIC SURGERY

## 2023-03-23 PROCEDURE — 99202 OFFICE O/P NEW SF 15 MIN: CPT | Performed by: NURSE PRACTITIONER

## 2023-03-23 PROCEDURE — 85018 HEMOGLOBIN: CPT | Performed by: ORTHOPAEDIC SURGERY

## 2023-03-23 RX ORDER — OXYCODONE HYDROCHLORIDE 5 MG/1
5-10 TABLET ORAL EVERY 4 HOURS PRN
Qty: 30 TABLET | Refills: 0 | Status: SHIPPED | OUTPATIENT
Start: 2023-03-23 | End: 2023-07-10

## 2023-03-23 RX ORDER — ASPIRIN 81 MG/1
81 TABLET ORAL 2 TIMES DAILY
Qty: 42 TABLET | Refills: 0
Start: 2023-03-23 | End: 2024-02-02

## 2023-03-23 RX ORDER — METHOCARBAMOL 500 MG/1
500 TABLET, FILM COATED ORAL 4 TIMES DAILY PRN
Qty: 40 TABLET | Refills: 1 | Status: SHIPPED | OUTPATIENT
Start: 2023-03-23 | End: 2023-07-10

## 2023-03-23 RX ADMIN — ACETAMINOPHEN 975 MG: 325 TABLET, FILM COATED ORAL at 02:13

## 2023-03-23 RX ADMIN — ASPIRIN 81 MG: 81 TABLET, COATED ORAL at 08:48

## 2023-03-23 RX ADMIN — SENNOSIDES AND DOCUSATE SODIUM 1 TABLET: 50; 8.6 TABLET ORAL at 08:48

## 2023-03-23 RX ADMIN — CEFAZOLIN 1 G: 1 INJECTION, POWDER, FOR SOLUTION INTRAMUSCULAR; INTRAVENOUS at 04:08

## 2023-03-23 RX ADMIN — CHLORTHALIDONE 25 MG: 25 TABLET ORAL at 08:51

## 2023-03-23 RX ADMIN — LOSARTAN POTASSIUM 50 MG: 50 TABLET, FILM COATED ORAL at 08:51

## 2023-03-23 RX ADMIN — FAMOTIDINE 20 MG: 20 TABLET, FILM COATED ORAL at 08:50

## 2023-03-23 RX ADMIN — ACETAMINOPHEN 975 MG: 325 TABLET, FILM COATED ORAL at 10:21

## 2023-03-23 ASSESSMENT — ACTIVITIES OF DAILY LIVING (ADL)
ADLS_ACUITY_SCORE: 20

## 2023-03-23 NOTE — CONSULTS
[facility] Medicine Progress Note  Date of Service: 03/23/2023    Assessment & Plan   Tri Walden is a 72 year old female who presented on 3/22/2023 for scheduled Procedure(s):  left REVERSE Total Shoulder Arthroplasty by Escobar Magana MD and is being followed by the hospital medicine service for co-management of acute and/or chronic perioperative medical problems.      S/p Procedure(s):  left REVERSE Total Shoulder Arthroplasty   1 Day Post-Op    - pain control, wound cares, physical therapy, occupational therapy and DVT prophylaxis per orthopedic surgery service    Hypertension  Hyperlipidemia  Continue PTA medications: Chlorthalidone, losartan, and simvastatin    Active Problems:    * No active hospital problems. *      DVT Prophylaxis: as per orthopedic surgery service  Code Status: Full Code    Lines: None  Hill catheter: None    Discussion: Medically, the patient appears stable and ready for discharge home.    Disposition: Anticipate discharge home today.    Attestation:  I have reviewed today's vital signs, notes, medications, labs and imaging.    MARCELLUS SANCHEZ, BHARAT CNP       Interval History   Postop day 1 status post left total shoulder arthroplasty.    There were no acute events overnight    Physical Exam   Temp:  [97.6  F (36.4  C)-98.6  F (37  C)] 98  F (36.7  C)  Pulse:  [71-96] 78  Resp:  [11-42] 16  BP: (100-171)/(42-84) 106/52  SpO2:  [92 %-99 %] 98 %    Weights:   Vitals:    03/22/23 0938 03/22/23 1500   Weight: 56.2 kg (124 lb) 58.2 kg (128 lb 4.9 oz)    Body mass index is 22.73 kg/m .    General: In no apparent distress.  Sitting in chair eating breakfast.  Pleasant and appropriate.  CV: RRR, 2+ radial pulses bilaterally, no edema noted  Respiratory: CTA bilaterally  GI: Soft, nontender  Skin: Warm and dry  Musculoskeletal: Arm immobilized in sling with movement and gross sensation only.  No other deficits noted, oriented x3.    Data   Recent  "Labs   Lab 03/23/23  0459 03/22/23  0939   WBC  --  6.5   HGB 10.5* 13.4   MCV  --  93   PLT  --  271   POTASSIUM  --  3.9   CR  --  0.67   *  --        Recent Labs   Lab 03/23/23  0459   *        Unresulted Labs Ordered in the Past 30 Days of this Admission     No orders found for last 31 day(s).           Imaging  Recent Results (from the past 24 hour(s))   POC US Guidance Needle Placement    Narrative    Normal anatomy    XR Shoulder Left Port G/E 2 Views    Narrative    Examination:  XR SHOULDER LEFT PORT G/E 2 VIEWS    Date:  3/22/2023 2:17 PM     Clinical Information: Status post surgery    Comparison: 1/24/2023.      Impression    Impression:    1.  Completed left reverse total shoulder arthroplasty. Normal joint  alignment. No evidence of periprosthetic fracture or other immediate  complication. Postoperative air in the joint space and in the  surrounding soft tissues.    DAMION STEPHENS MD         SYSTEM ID:  [id number]        I reviewed all new labs and imaging results over the last 24 hours. I personally reviewed the Left shoulder x-ray image(s) showing Total shoulder arthroplasty.    Medications     bupivacaine liposome (EXPAREL) LA inj was given in the infiltration site to produce post-op analgesia. Duration of action is up to 72 hours. Other \"mariah\" meds should not be given for 96 hours except for lidocaine 4% patch. This is for INFORMATION ONLY.       lactated ringers Stopped (03/23/23 0218)       acetaminophen  975 mg Oral Q8H     aspirin  81 mg Oral BID     chlorthalidone  25 mg Oral Daily     famotidine  20 mg Oral BID    Or     famotidine  20 mg Intravenous BID     losartan  50 mg Oral Daily     polyethylene glycol  17 g Oral Daily     senna-docusate  1 tablet Oral BID     simvastatin  10 mg Oral At Bedtime     sodium chloride (PF)  3 mL Intracatheter Q8H       BHARAT BUENO CNP       "

## 2023-03-23 NOTE — DISCHARGE SUMMARY
[facility] Orthopedics Discharge Summary                                  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 *      Allergies:  Nkda [no known drug allergies]    Following the procedure noted above the patient was transferred to the post-op floor and started on:    Therapy:  physical therapy  Anticoagulation Plan: Aspirin 81 mg BID  for 42 days  Pain Management: oxycodone and tylenol  Weight bearing status: NWB     The patient was followed and co-managed by the hospitalist service during the inpatient treatment course  Complications:  None  Consultations:  None     Pertinent Labs   Lab Results: personally reviewed.     Recent Labs   Lab Test 03/23/23  0459 03/22/23  0939 03/14/23  0839 12/06/22  0843 11/15/22  0907 08/24/15  0737 06/28/15  0225   HGB 10.5* 13.4 13.0  --   --   --  12.0   HCT  --  40.9 40.1  --   --   --  37.5   MCV  --  93 96  --   --   --  92   PLT  --  271 240  --   --   --  202   NA  --   --  136 142 142   < > 140    < > = values in this interval not displayed.          Discharge Information:  Condition at discharge: Stable  Discharge destination:  Discharged to home     Medications at discharge:  Current Discharge Medication List      START taking these medications    Details   acetaminophen (TYLENOL) 325 MG tablet Take 2 tablets (650 mg) by mouth every 4 hours as needed for other (mild pain)  Qty: 100 tablet,  Refills: 0    Associated Diagnoses: Status post reverse total arthroplasty of left shoulder      !! aspirin 81 MG EC tablet Take 1 tablet (81 mg) by mouth 2 times daily  Qty: 42 tablet, Refills: 0    Comments: For 6 weeks post surgery, then resume once daily dosing  Associated Diagnoses: Status post reverse total arthroplasty of left shoulder      methocarbamol (ROBAXIN) 500 MG tablet Take 1 tablet (500 mg) by mouth 4 times daily as needed for muscle spasms  Qty: 40 tablet, Refills: 1    Associated Diagnoses: Status post reverse total arthroplasty of left shoulder      oxyCODONE (ROXICODONE) 5 MG tablet Take 1-2 tablets (5-10 mg) by mouth every 4 hours as needed for moderate to severe pain  Qty: 30 tablet, Refills: 0    Associated Diagnoses: Status post reverse total arthroplasty of left shoulder       !! - Potential duplicate medications found. Please discuss with provider.      CONTINUE these medications which have NOT CHANGED    Details   !! ASPIRIN PO Take 81 mg by mouth daily      Calcium Carbonate-Vit D-Min (CALTRATE 600+D PLUS MINERALS) 600-800 MG-UNIT TABS Take 1 tablet by mouth 2 times daily    Associated Diagnoses: Osteopenia      chlorthalidone (HYGROTON) 25 MG tablet Take 1 tablet (25 mg) by mouth daily  Qty: 90 tablet, Refills: 4    Associated Diagnoses: Essential hypertension      clobetasol (TEMOVATE) 0.05 % external solution Apply once to twice daily as needed.  Qty: 50 mL, Refills: 4    Associated Diagnoses: Frontal fibrosing alopecia      CRANBERRY PO Take by mouth daily       Ginger, Zingiber officinalis, (GINGER PO) Take by mouth daily       losartan (COZAAR) 50 MG tablet Take 1 tablet (50 mg) by mouth daily  Qty: 90 tablet, Refills: 4    Associated Diagnoses: Essential hypertension      simvastatin (ZOCOR) 10 MG tablet TAKE ONE TABLET BY MOUTH EVERY NIGHT AT BEDTIME  Qty: 90 tablet, Refills: 4    Associated Diagnoses: Pure hypercholesterolemia      VITAMIN D, CHOLECALCIFEROL, PO Take by mouth  daily       !! - Potential duplicate medications found. Please discuss with provider.                     Follow-Up Care:  Patient should be seen in the office in 14 days by the Orthopedic Surgeon/Physician Assistant.  Call [phone] for appointment or questions.  MD Nehemias Nash PA-C

## 2023-03-23 NOTE — PLAN OF CARE
Occupational Therapy Discharge Summary    Reason for therapy discharge:    Discharged to home with outpatient therapy. (physical therapy)    Progress towards therapy goal(s). See goals on Care Plan [facility] electronic health record for goal details.  Goals met    Therapy recommendation(s):    Continued therapy is recommended.  Rationale/Recommendations:  outpatient physical therapy once appropriate.

## 2023-03-23 NOTE — PROGRESS NOTES
[facility] DISCHARGE NOTE    Patient discharged to home at 14:00 PM via wheel chair. Accompanied by daughter and staff. Discharge instructions reviewed with patient, opportunity offered to ask questions. Prescriptions filled and sent with patient upon discharge. All belongings sent with patient.    Sammi Maddox RN

## 2023-03-23 NOTE — PROGRESS NOTES
"[facility] Orthopaedics Progress Note      Post-operative Day: 1 Day Post-Op    Procedure(s):  left REVERSE Total Shoulder Arthroplasty  Subjective:    Pt reports that her shoulder is still numb but the sensation and motion has mostly returned to the left hand, she has a little tingling in the thumb. She has seen OT and feels ready to go home.     Chest pain, SOB:  No  Nausea, vomiting:  No  Lightheadedness, dizziness:  No      Objective:  Blood pressure 131/71, pulse 87, temperature 98  F (36.7  C), temperature source Oral, resp. rate 18, height 1.6 m (5' 3\"), weight 58.2 kg (128 lb 4.9 oz), SpO2 98 %.    Patient Vitals for the past 24 hrs:   BP Temp Temp src Pulse Resp SpO2 Height Weight   03/23/23 0850 131/71 -- -- 87 18 98 % -- --   03/23/23 0403 106/52 98  F (36.7  C) Oral 78 16 98 % -- --   03/22/23 2252 114/53 98.6  F (37  C) Oral 82 16 96 % -- --   03/22/23 2026 121/63 98.1  F (36.7  C) Oral 85 16 95 % -- --   03/22/23 1630 136/67 -- -- 89 -- -- -- --   03/22/23 1530 128/58 -- -- 89 -- -- -- --   03/22/23 1500 126/61 -- -- 90 16 97 % 1.6 m (5' 3\") 58.2 kg (128 lb 4.9 oz)   03/22/23 1430 120/60 98.1  F (36.7  C) Temporal 93 16 94 % -- --   03/22/23 1415 121/42 -- -- 93 15 94 % -- --   03/22/23 1400 106/67 -- -- 96 11 94 % -- --   03/22/23 1346 100/59 97.6  F (36.4  C) Temporal 82 12 92 % -- --   03/22/23 1345 100/59 -- -- 92 (!) 42 93 % -- --   03/22/23 1130 128/65 -- -- 78 17 96 % -- --   03/22/23 1125 126/67 -- -- 73 16 97 % -- --   03/22/23 1115 134/71 -- -- 82 12 97 % -- --   03/22/23 1110 127/70 -- -- 84 18 97 % -- --   03/22/23 1105 126/64 -- -- 80 16 96 % -- --   03/22/23 1100 131/73 -- -- 91 13 96 % -- --   03/22/23 1055 114/70 -- -- 85 11 96 % -- --   03/22/23 1050 118/62 -- -- 71 13 96 % -- --   03/22/23 1045 109/59 -- -- 78 13 97 % -- --   03/22/23 1040 (!) 145/77 -- -- 87 17 99 % -- --   03/22/23 1035 (!) 171/84 -- -- 89 11 99 % -- --       Wt Readings from Last 4 Encounters:   03/22/23 58.2 kg " (128 lb 4.9 oz)   03/14/23 56.2 kg (124 lb)   01/10/23 56.7 kg (125 lb)   11/15/22 57.4 kg (126 lb 9.6 oz)       Gen: A&O x 3. NAD. Appears comfortable, up to the recliner fully dressed.   Wound status: Covered, Aquacel dressing is c/d/i.  Circulation, motion and sensation: Finger and hand ROM intact and equal bilaterally; distal upper extremity sensation is intact and equal bilaterally. Fingers are warm and well perfused.    Swelling: Mild    Pertinent Labs   Lab Results: personally reviewed.     Recent Labs   Lab Test 03/23/23  0459 03/22/23  0939 03/14/23  0839 12/06/22  0843 11/15/22  0907 08/24/15  0737 06/28/15  0225   HGB 10.5* 13.4 13.0  --   --   --  12.0   HCT  --  40.9 40.1  --   --   --  37.5   MCV  --  93 96  --   --   --  92   PLT  --  271 240  --   --   --  202   NA  --   --  136 142 142   < > 140    < > = values in this interval not displayed.       Plan:   Continue current cares and rehabilitation.  Anticoagulation protocol: Aspirin 81 mg BID  x 42  days  Pain medications: oxycodone, tylenol and Robaxin  Weight bearing status:  NWB  Disposition:  Plan for discharge to home with outpatient therapy when medically stable and pain is controlled, cleared by therapy. Later today.             Report completed by:  Nehemias Skelton PA-C  Date: 3/23/2023  Time: 9:47 AM

## 2023-03-23 NOTE — PROGRESS NOTES
Occupational Therapy     03/23/23 0730   Appointment Info   Signing Clinician's Name / Credentials (OT) Yee Grossman OTR/L   Quick Adds   Quick Adds Certification   Living Environment   People in Home spouse  (daughter will stay with patient as well)   Current Living Arrangements house   Home Accessibility stairs to enter home;stairs within home   Number of Stairs, Main Entrance 4   Stair Railings, Main Entrance railings on both sides of stairs   Number of Stairs, Within Home, Primary six   Stair Railings, Within Home, Primary railings safe and in good condition   Living Environment Comments Patient independent with ADLs at baseline.   Self-Care   Equipment Currently Used at Home none   Fall history within last six months no   General Information   Onset of Illness/Injury or Date of Surgery 03/22/23   Referring Physician Escobar Magana MD   Patient/Family Therapy Goal Statement (OT) Get home today   Existing Precautions/Restrictions shoulder  (sling)   Left Upper Extremity (Weight-bearing Status) non weight-bearing (NWB)   General Observations and Info Internal/external rotation to 20degrees for 4 weeks, extension/flexion up to 90 degrees as tolerated   Cognitive Status Examination   Orientation Status orientation to person, place and time   Range of Motion Comprehensive   Comment, General Range of Motion L in sling, LUE still numb, R WNL-patient is right handed   Strength Comprehensive (MMT)   Comment, General Manual Muscle Testing (MMT) Assessment R WNL-left not tested due to numbness   Bed Mobility   Bed Mobility supine-sit   Supine-Sit Sutter (Bed Mobility) supervision   Transfers   Transfers sit-stand transfer   Transfer Comments Educated patient on UE restrictions, patient uanble to baldomero/doff sling on own while seated EOB. Discussed importance of  outpatient physical therapy for improvement of shoulder mobility.   Sit-Stand Transfer   Sit-Stand Sutter (Transfers) supervision   Sit/Stand  Transfer Comments Patient up to BR ambulating in room independently, able to manage clothing and rita-cares.   Clinical Impression   Criteria for Skilled Therapeutic Interventions Met (OT) Yes, treatment indicated   OT Diagnosis Decreased ADL [place]   Influenced by the following impairments s/p left total shoulder   OT Problem List-Impairments impacting ADL problems related to;post-surgical precautions   Assessment of Occupational Performance 1-3 Performance Deficits   Identified Performance Deficits dressing   Planned Therapy Interventions (OT) ADL retraining;progressive activity/exercise;home program guidelines   Clinical Decision Making Complexity (OT) low complexity   Risk & Benefits of therapy have been explained evaluation/treatment results reviewed;care plan/treatment goals reviewed   OT Total Evaluation Time   OT Eval, Low Complexity Minutes (14762) 20   Therapy Certification   Medical Diagnosis Left shoulder rotator cuff tear with shoulder degenerative arthritis   Start of Care Date 03/23/23   Certification date from 03/23/23   Certification date to 03/30/23   OT Goals   Therapy Frequency (OT) One time eval and treatment   OT Predicted Duration/Target Date for Goal Attainment 03/24/23   OT Goals Upper Body Dressing;OT Goal 1   OT: Upper Body Dressing Independent   OT: Goal 1 patient will verbalize home exercise program independently.   Self-Care/Home Management   Self-Care/Home Mgmt/ADL, Compensatory, Meal Prep Minutes (13505) 10   Treatment Detail/Skilled Intervention OT demonstrated UE dressing technique for pull over and open front shirt. Patient demonstrated pull over shirt independently after demonstration. OT provided verbal cuing for correct technique to baldomero bra. Patient able to complete LE dresing (pants/underwear) with SBA. OT demonstrated correct technique for donning/doffing sling, discussed removing for exercises and skin assessment. Patietn able to complete after demonstration  independently. Additionally, discussed using shower chair if patient is concerned about balance in shower   Therapeutic Procedures/Exercise   Therapeutic Procedure: strength, endurance, ROM, flexibillity minutes (90567) 10   Symptoms Noted During/After Treatment none   Treatment Detail/Skilled Intervention OT demosntrated all UE exercises to patient. Patient was previously going to outpatient physical therapy and is familar with exercises. Patient LUE still number, but patient able to complete PROM using R hand for elbow/wrist/hand on L. Provided handout on exercises and answered questions.   OT Discharge Planning   OT Plan DC OT   OT Discharge Recommendation (DC Rec) home with assist   OT Rationale for DC Rec Patient has support from family at home, patient safe to discharge home with assist from family as needed. Patient aware of correct technique for ADLs and appropriate exercises, patient agreeable to outpatient physical therapy once appropriate.   OT Brief overview of current status SBA for ADLs.   Total Session Time   Timed Code Treatment Minutes 20   Total Session Time (sum of timed and untimed services) 40      [facility]  OUTPATIENT OCCUPATIONAL THERAPY  EVALUATION  PLAN OF TREATMENT FOR OUTPATIENT REHABILITATION  (COMPLETE FOR INITIAL CLAIMS ONLY)  Patient's Last Name, First Name, M.I.  YOB: 1950  Tri Walden  FAHAD                          Provider's Name  [facility] Medical Record No.  [id number]                             Onset Date:  03/22/23   Start of Care Date:  03/23/23   Type:     ___PT   _X_OT   ___SLP Medical Diagnosis:  Left shoulder rotator cuff tear with shoulder degenerative arthritis                    OT Diagnosis:  Decreased ADL [place] Visits from SOC:  1     See note for plan of treatment, functional goals and certification details    I CERTIFY THE NEED FOR THESE SERVICES FURNISHED UNDER        THIS PLAN  OF TREATMENT AND WHILE UNDER MY CARE     (Physician co-signature of this document indicates review and certification of the therapy plan).                         Escobar Magana MD

## 2023-03-27 ENCOUNTER — HOSPITAL ENCOUNTER (OUTPATIENT)
Dept: PHYSICAL THERAPY | Facility: CLINIC | Age: 73
Setting detail: THERAPIES SERIES
Discharge: HOME | End: 2023-03-27
Attending: ORTHOPAEDIC SURGERY
Payer: COMMERCIAL

## 2023-03-27 DIAGNOSIS — Z96.612 S/P REVERSE TOTAL SHOULDER ARTHROPLASTY, LEFT: ICD-10-CM

## 2023-03-27 PROCEDURE — 97110 THERAPEUTIC EXERCISES: CPT | Mod: GP | Performed by: PHYSICAL THERAPIST

## 2023-03-27 PROCEDURE — 97161 PT EVAL LOW COMPLEX 20 MIN: CPT | Mod: GP | Performed by: PHYSICAL THERAPIST

## 2023-03-27 NOTE — PROGRESS NOTES
03/27/23 0900   General Information   Type of Visit Initial OP Ortho PT Evaluation   Start of Care Date 03/27/23   Referring Physician Comfort   Patient/Family Goals Statement reach top shelf   Orders Evaluate and Treat   Date of Order 03/22/23   Certification Required? Yes   Medical Diagnosis rTSA Left   Surgical/Medical history reviewed Yes   Precautions/Limitations no known precautions/limitations   Body Part(s)   Body Part(s) Shoulder   Presentation and Etiology   Pertinent history of current problem (include personal factors and/or comorbidities that impact the POC) hx of shoulder pain since at least last spring.  Did two stents of PT and it did not improve.  Had a reverse TSA a few days ago.  a bit of trouble with her pain control and light headedness but seems to be managing.  doing hand ex post op so far.  in sling   Impairments A. Pain;B. Decreased WB tolerance;C. Swelling;E. Decreased flexibility   Functional Limitations perform activities of daily living;perform desired leisure / sports activities   Symptom Location Marcelle L shoulder   How/Where did it occur From Degenerative Joint Disease   Onset date of current episode/exacerbation 03/22/23  (surgery)   Chronicity New   Pain rating (0-10 point scale) Best (/10);Worst (/10)   Best (/10) 4   Worst (/10) 8   Pain quality A. Sharp;B. Dull;C. Aching;F. Stabbing   Frequency of pain/symptoms A. Constant   Pain/symptoms are: Worse during the night   Pain/symptoms exacerbated by A. Sitting;B. Walking;E. Rest;G. Certain positions   Pain/symptoms eased by D. Nothing   Progression of symptoms since onset: Unchanged   Fall Risk Screen   Fall screen completed by PT   Have you fallen 2 or more times in the past year? No   Have you fallen and had an injury in the past year? No   Is patient a fall risk? No   Abuse Screen (yes response referral indicated)   Feels Unsafe at Home or Work/School no   Feels Threatened by Someone no   Does Anyone Try to Keep You From Having  Contact with Others or Doing Things Outside Your Home? no   Shoulder Objective Findings   Side (if bilateral, select both right and left) Left   Cervical Screen (ROM, quadrant) WNL   Scapulothoracic Rhythm UT sub and protraction at rest   Palpation incisional tender   Integumentary  incision covered.  Very light red staining   Posture rounded shoulders   Right Shoulder Flexion PROM supine 80 deg   Right Shoulder ER PROM 5 with light tension   Shoulder ROM Comment elbow started    Planned Therapy Interventions   Planned Therapy Interventions strengthening;ROM;stretching;neuromuscular re-education;manual therapy   Clinical Impression   Criteria for Skilled Therapeutic Interventions Met yes, treatment indicated   PT Diagnosis L shoulder OA, SP rTSA   Influenced by the following impairments pain, stiffness, weakness   Functional limitations due to impairments self cares, home maint.   Clinical Presentation Stable/Uncomplicated   Clinical Presentation Rationale PMH, post op, CA, HBP   Clinical Decision Making (Complexity) Low complexity   Therapy Frequency 1 time/week   Predicted Duration of Therapy Intervention (days/wks) 12 weeks   Risk & Benefits of therapy have been explained Yes   Patient, Family & other staff in agreement with plan of care Yes   Education Assessment   Preferred Learning Style Listening   Barriers to Learning No barriers   Ortho Goal 1   Goal Identifier 1   Goal Description pt will be able to feed self   Target Date 05/08/23   Ortho Goal 2   Goal Identifier 2   Goal Description pt will be able to reach to bottom shelf   Target Date 05/27/23   Ortho Goal 3   Goal Identifier 3   Goal Description pt will be able to drive without limitation   Target Date 06/19/23   Total Evaluation Time   PT Eval, Low Complexity Minutes (26645) 15   Therapy Certification   Certification date from 03/27/23   Certification date to 06/19/23   Medical Diagnosis L rTSA 3/22

## 2023-03-27 NOTE — PROGRESS NOTES
[facility]    OUTPATIENT PHYSICAL THERAPY ORTHOPEDIC EVALUATION  PLAN OF TREATMENT FOR OUTPATIENT REHABILITATION  (COMPLETE FOR INITIAL CLAIMS ONLY)  Patient's Last Name, First Name, M.I.  YOB: 1950  Tri Walden    Provider s Name:  [facility]   Medical Record No.  [id number]   Start of Care Date:  03/27/23   Onset Date:  03/22/23 (surgery)   Type:     _X__PT   ___OT   ___SLP Medical Diagnosis:  L rTSA 3/22     PT Diagnosis:  L shoulder OA, SP rTSA   Visits from SOC:  1      _________________________________________________________________________________  Plan of Treatment/Functional Goals:  strengthening, ROM, stretching, neuromuscular re-education, manual therapy           Goals  Goal Identifier: 1  Goal Description: pt will be able to feed self  Target Date: 05/08/23    Goal Identifier: 2  Goal Description: pt will be able to reach to bottom shelf  Target Date: 05/27/23    Goal Identifier: 3  Goal Description: pt will be able to drive without limitation  Target Date: 06/19/23                                                           Therapy Frequency:  1 time/week  Predicted Duration of Therapy Intervention:  12 weeks    New Ramirez, PT                 I CERTIFY THE NEED FOR THESE SERVICES FURNISHED UNDER        THIS PLAN OF TREATMENT AND WHILE UNDER MY CARE .             Physician Signature               Date    X_____________________________________________________                             Certification Date From:  03/27/23   Certification Date To:  06/19/23    Referring Provider:  Comfort    Initial Assessment        See Epic Evaluation Start of Care Date: 03/27/23

## 2023-03-30 ENCOUNTER — HOSPITAL ENCOUNTER (OUTPATIENT)
Dept: PHYSICAL THERAPY | Facility: CLINIC | Age: 73
Setting detail: THERAPIES SERIES
Discharge: HOME | End: 2023-03-30
Attending: ORTHOPAEDIC SURGERY
Payer: COMMERCIAL

## 2023-03-30 PROCEDURE — 97110 THERAPEUTIC EXERCISES: CPT | Mod: GP | Performed by: PHYSICAL THERAPIST

## 2023-04-03 ENCOUNTER — HOSPITAL ENCOUNTER (OUTPATIENT)
Dept: PHYSICAL THERAPY | Facility: CLINIC | Age: 73
Setting detail: THERAPIES SERIES
Discharge: HOME | End: 2023-04-03
Attending: ORTHOPAEDIC SURGERY
Payer: COMMERCIAL

## 2023-04-03 PROCEDURE — 97110 THERAPEUTIC EXERCISES: CPT | Mod: GP | Performed by: PHYSICAL THERAPIST

## 2023-04-05 ENCOUNTER — TRANSFERRED RECORDS (OUTPATIENT)
Dept: HEALTH INFORMATION MANAGEMENT | Facility: CLINIC | Age: 73
End: 2023-04-05
Payer: COMMERCIAL

## 2023-04-06 ENCOUNTER — HOSPITAL ENCOUNTER (OUTPATIENT)
Dept: PHYSICAL THERAPY | Facility: CLINIC | Age: 73
Setting detail: THERAPIES SERIES
Discharge: HOME | End: 2023-04-06
Attending: ORTHOPAEDIC SURGERY
Payer: COMMERCIAL

## 2023-04-06 PROCEDURE — 97110 THERAPEUTIC EXERCISES: CPT | Mod: GP | Performed by: PHYSICAL THERAPIST

## 2023-04-10 ENCOUNTER — HOSPITAL ENCOUNTER (OUTPATIENT)
Dept: PHYSICAL THERAPY | Facility: CLINIC | Age: 73
Setting detail: THERAPIES SERIES
Discharge: HOME | End: 2023-04-10
Attending: ORTHOPAEDIC SURGERY
Payer: COMMERCIAL

## 2023-04-10 PROCEDURE — 97110 THERAPEUTIC EXERCISES: CPT | Mod: GP | Performed by: PHYSICAL THERAPIST

## 2023-04-13 ENCOUNTER — HOSPITAL ENCOUNTER (OUTPATIENT)
Dept: PHYSICAL THERAPY | Facility: CLINIC | Age: 73
Setting detail: THERAPIES SERIES
Discharge: HOME | End: 2023-04-13
Attending: ORTHOPAEDIC SURGERY
Payer: COMMERCIAL

## 2023-04-13 PROCEDURE — 97110 THERAPEUTIC EXERCISES: CPT | Mod: GP | Performed by: PHYSICAL THERAPIST

## 2023-04-17 ENCOUNTER — HOSPITAL ENCOUNTER (OUTPATIENT)
Dept: PHYSICAL THERAPY | Facility: CLINIC | Age: 73
Setting detail: THERAPIES SERIES
Discharge: HOME | End: 2023-04-17
Attending: ORTHOPAEDIC SURGERY
Payer: COMMERCIAL

## 2023-04-17 PROCEDURE — 97110 THERAPEUTIC EXERCISES: CPT | Mod: GP | Performed by: PHYSICAL THERAPIST

## 2023-04-20 ENCOUNTER — HOSPITAL ENCOUNTER (OUTPATIENT)
Dept: PHYSICAL THERAPY | Facility: CLINIC | Age: 73
Setting detail: THERAPIES SERIES
Discharge: HOME | End: 2023-04-20
Attending: ORTHOPAEDIC SURGERY
Payer: COMMERCIAL

## 2023-04-25 ENCOUNTER — HOSPITAL ENCOUNTER (OUTPATIENT)
Dept: PHYSICAL THERAPY | Facility: CLINIC | Age: 73
Setting detail: THERAPIES SERIES
Discharge: HOME | End: 2023-04-25
Attending: ORTHOPAEDIC SURGERY
Payer: COMMERCIAL

## 2023-04-25 PROCEDURE — 97110 THERAPEUTIC EXERCISES: CPT | Mod: GP | Performed by: PHYSICAL THERAPIST

## 2023-04-26 NOTE — PROGRESS NOTES
[facility]    OUTPATIENT PHYSICAL THERAPY  PLAN OF TREATMENT FOR OUTPATIENT REHABILITATION AND PROGRESS NOTE           Patient's Last Name, First Name, Tri Cheema Date of Birth  1950   Provider's Name  [facility] Medical Record No.  [id number]    Onset Date  3/22/23 Start of Care Date  3/27/23   Type:     _X_PT   ___OT   ___SLP Medical Diagnosis  L rTSA   PT Diagnosis  L RCT Plan of Treatment  Frequency/Duration: 2x/wk x 3 week, then 1x/wk x 4 week  Certification date from 4/26/23 to 6/14     Goals:  Goal Identifier 1   Goal Description pt will be able to feed self   Target Date 05/08/23   Date Met      Progress (detail required for progress note): L arm still in sling     Goal Identifier 2   Goal Description pt will be able to reach to bottom shelf   Target Date 05/27/23   Date Met      Progress (detail required for progress note):       Goal Identifier 3   Goal Description pt will be able to drive without limitation   Target Date 06/19/23   Date Met      Progress (detail required for progress note): driving with R             Beginning/End Dates of Progress Note Reporting Period:  3/27/23  to 4/26/23    Progress Toward Goals:   Progress this reporting period: progressing as expected.  Improving PROM.  Shoulder is getting less reactive to gentle ROM.  Pain level generally 1-4.    Client Self (Subjective) Report for Progress Note Reporting Period: a bit sore but improving    Objective Measurements:   Objective Measure: PROM ER0 25  Details: sup flex 130   scapt 125  Objective Measure: AAROM door slide to 115                I CERTIFY THE NEED FOR THESE SERVICES FURNISHED UNDER        THIS PLAN OF TREATMENT AND WHILE UNDER MY CARE .             Physician Signature               Date    X_____________________________________________________                       Referring Provider: Dr Izzy Ramirez, PT

## 2023-04-27 ENCOUNTER — HOSPITAL ENCOUNTER (OUTPATIENT)
Dept: PHYSICAL THERAPY | Facility: CLINIC | Age: 73
Setting detail: THERAPIES SERIES
Discharge: HOME | End: 2023-04-27
Attending: ORTHOPAEDIC SURGERY
Payer: COMMERCIAL

## 2023-04-27 PROCEDURE — 97110 THERAPEUTIC EXERCISES: CPT | Mod: GP | Performed by: PHYSICAL THERAPIST

## 2023-04-28 ENCOUNTER — TELEPHONE (OUTPATIENT)
Dept: DERMATOLOGY | Facility: CLINIC | Age: 73
End: 2023-04-28
Payer: COMMERCIAL

## 2023-04-28 NOTE — CONFIDENTIAL NOTE
Please advise if this is ok to wait for October.    Thank you,    Samia JETERRN BSN  [facility]- [phone]      A/P:  1. FFA- controlled  Continue clobetasol solution 3-4 times weekly.  2. History of Melanoma on right upper arm   MELANOMA DISCUSSED WITH PATIENT:  I discussed the specifics of tumor, prognosis, metachronous melanoma, self exam, and genetics with the patient. I explained the need for monthly skin exams including and taught the patient how to do this. Patient was asked about new or changing moles . I discussed with patient signs and symptoms that could arise in the setting of recurrent locoregional or metastatic disease. In addition, the need to undergo every 3-4 month dermatologic full skin survey and evaluation given that patients with a diagnosis of melanoma are at risk of recurrence (local and distant) and of subsequent de rosalee melanoma.

## 2023-04-28 NOTE — CONFIDENTIAL NOTE
Called patient- she can come at that time.    Thank you,    Samia JETERRN BSN  [facility]- [phone]

## 2023-04-28 NOTE — TELEPHONE ENCOUNTER
[facility] Call Center    Phone Message    May a detailed message be left on voicemail: no     Reason for Call: Other: Patient called to reschedule her appointment in June due to clincian not being available that day. However there is nothing on the schedule that would be in the four month time frame that she needs to be within due to Patient having history of skin cancer. Please call Patient if she needs a sooner appointment otherwise she will just have a skin check in October.     Action Taken: Other: [facility]    Travel Screening: Not Applicable

## 2023-05-01 ENCOUNTER — HOSPITAL ENCOUNTER (OUTPATIENT)
Dept: PHYSICAL THERAPY | Facility: CLINIC | Age: 73
Setting detail: THERAPIES SERIES
Discharge: HOME | End: 2023-05-01
Attending: ORTHOPAEDIC SURGERY
Payer: COMMERCIAL

## 2023-05-01 PROCEDURE — 97110 THERAPEUTIC EXERCISES: CPT | Mod: GP | Performed by: PHYSICAL THERAPIST

## 2023-05-04 ENCOUNTER — HOSPITAL ENCOUNTER (OUTPATIENT)
Dept: PHYSICAL THERAPY | Facility: CLINIC | Age: 73
Setting detail: THERAPIES SERIES
Discharge: HOME | End: 2023-05-04
Attending: ORTHOPAEDIC SURGERY
Payer: COMMERCIAL

## 2023-05-04 PROCEDURE — 97110 THERAPEUTIC EXERCISES: CPT | Mod: GP | Performed by: PHYSICAL THERAPIST

## 2023-05-04 PROCEDURE — 97140 MANUAL THERAPY 1/> REGIONS: CPT | Mod: GP | Performed by: PHYSICAL THERAPIST

## 2023-05-10 ENCOUNTER — HOSPITAL ENCOUNTER (OUTPATIENT)
Dept: PHYSICAL THERAPY | Facility: CLINIC | Age: 73
Setting detail: THERAPIES SERIES
Discharge: HOME | End: 2023-05-10
Attending: ORTHOPAEDIC SURGERY
Payer: COMMERCIAL

## 2023-05-10 PROCEDURE — 97110 THERAPEUTIC EXERCISES: CPT | Mod: GP | Performed by: PHYSICAL THERAPIST

## 2023-05-22 ENCOUNTER — THERAPY VISIT (OUTPATIENT)
Dept: PHYSICAL THERAPY | Facility: CLINIC | Age: 73
End: 2023-05-22
Attending: ORTHOPAEDIC SURGERY
Payer: COMMERCIAL

## 2023-05-22 DIAGNOSIS — Z96.612 STATUS POST SHOULDER REPLACEMENT, LEFT: ICD-10-CM

## 2023-05-22 PROCEDURE — 97110 THERAPEUTIC EXERCISES: CPT | Mod: GP | Performed by: PHYSICAL THERAPIST

## 2023-05-31 ENCOUNTER — THERAPY VISIT (OUTPATIENT)
Dept: PHYSICAL THERAPY | Facility: CLINIC | Age: 73
End: 2023-05-31
Attending: ORTHOPAEDIC SURGERY
Payer: COMMERCIAL

## 2023-05-31 DIAGNOSIS — Z96.612 STATUS POST SHOULDER REPLACEMENT, LEFT: Primary | ICD-10-CM

## 2023-05-31 PROCEDURE — 97110 THERAPEUTIC EXERCISES: CPT | Mod: GP | Performed by: PHYSICAL THERAPIST

## 2023-05-31 NOTE — PROGRESS NOTES
05/31/23 0500   Appointment Info   Signing clinician's name / credentials Raj James PT [facility]   Visits Used 15   Medical Diagnosis TSA reverse   Progress Note/Certification   Progress Note Due Date 06/14/23   PT Goal 1   Goal Identifier 1   Goal Description pt will be able to feed self   Target Date 05/08/23   Date Met 05/02/23   PT Goal 2   Goal Identifier 2   Goal Description pt will be able to reach to bottom shelf   Target Date 05/27/23   Date Met 05/31/23   PT Goal 3   Goal Identifier 3   Goal Description pt will be able to drive without limitation   Target Date 06/18/23   Goal Progress much improved.   Subjective Report   Subjective Report no complants.  sleeping good.   Objective Measure 1   Objective Measure AROM ER -20, flexion 115 with UT sub, able to reach past the top of her head, abd 100 with UT sub, IR L2 without tension.   Details sup flex 140  scapt 135 abd 125   Therapeutic Procedure/Exercise   Therapeutic Procedures: strength, endurance, ROM, flexibillity minutes (67630) 25   Ther Proc 1 UBE 4 min f/b level 2.  manual PROM, AAROM, light delt strength added tricep ext 1# x 10, bicep curl x 15 with 2#, 2# ball overhead x 10,  RTB IR GH at 0 x 15   Skilled Intervention ROM, strength   Plan   Plan for next session DC next visit planned   Total Session Time   Timed Code Treatment Minutes 25   Total Treatment Time (sum of timed and untimed services) 25

## 2023-06-06 ENCOUNTER — OFFICE VISIT (OUTPATIENT)
Dept: DERMATOLOGY | Facility: CLINIC | Age: 73
End: 2023-06-06
Payer: COMMERCIAL

## 2023-06-06 DIAGNOSIS — Z85.828 HISTORY OF BASAL CELL CANCER: ICD-10-CM

## 2023-06-06 DIAGNOSIS — D18.01 CHERRY ANGIOMA: ICD-10-CM

## 2023-06-06 DIAGNOSIS — L66.12 FRONTAL FIBROSING ALOPECIA: Primary | ICD-10-CM

## 2023-06-06 DIAGNOSIS — L57.0 ACTINIC KERATOSIS: ICD-10-CM

## 2023-06-06 DIAGNOSIS — Z85.820 HISTORY OF MELANOMA: ICD-10-CM

## 2023-06-06 DIAGNOSIS — L82.1 SEBORRHEIC KERATOSIS: ICD-10-CM

## 2023-06-06 DIAGNOSIS — D22.9 MULTIPLE BENIGN NEVI: ICD-10-CM

## 2023-06-06 DIAGNOSIS — L81.4 LENTIGO: ICD-10-CM

## 2023-06-06 PROCEDURE — 17003 DESTRUCT PREMALG LES 2-14: CPT | Performed by: PHYSICIAN ASSISTANT

## 2023-06-06 PROCEDURE — 99213 OFFICE O/P EST LOW 20 MIN: CPT | Mod: 25 | Performed by: PHYSICIAN ASSISTANT

## 2023-06-06 PROCEDURE — 17000 DESTRUCT PREMALG LESION: CPT | Performed by: PHYSICIAN ASSISTANT

## 2023-06-06 ASSESSMENT — PAIN SCALES - GENERAL: PAINLEVEL: NO PAIN (0)

## 2023-06-06 NOTE — NURSING NOTE
Chief Complaint   Patient presents with     Skin Check     No Concerns       There were no vitals filed for this visit.  Wt Readings from Last 1 Encounters:   03/22/23 58.2 kg (128 lb 4.9 oz)       Cecilia Santana LPN .................6/6/2023

## 2023-06-06 NOTE — LETTER
6/6/2023         RE: Tri Walden  [address]  [facility] [address]        Dear Colleague,    Thank you for referring your patient, Tri Walden, to the [facility]. Please see a copy of my visit note below.    Tri Walden is an extremely pleasant 72 year old year old female patient here today for skin check. She denies any new or changing skin lesions. She notes hair loss is stable no increased loss. She is using topical steroids as needed. She notes bump on left popliteal fossa.  Patient has no other skin complaints today.  Remainder of the HPI, Meds, PMH, Allergies, FH, and SH was reviewed in chart.    Pertinent Hx:      History of melanoma on right upper arm 0.2 mm 2/2022, History of BCC on left upper arm.   Past Medical History:   Diagnosis Date     Basal cell carcinoma      Hypertension     Unsure if the date     Malignant melanoma (H)      Nonrheumatic aortic valve stenosis 3/14/2023       Past Surgical History:   Procedure Laterality Date     ABLATE VEIN VARICOSE RADIO FREQUENCY WITHOUT PHLEBECTOMY MULTIPLE STAB  12/06/2012    Procedure: ABLATE VEIN VARICOSE RADIO FREQUENCY WITHOUT PHLEBECTOMY MULTIPLE STAB;  Bilateral Ablation of Varicose Veins;  Surgeon: Fredis Marks MD;  Location: [facility] OR     REVERSE ARTHROPLASTY SHOULDER Left 3/22/2023    Procedure: left REVERSE Total Shoulder Arthroplasty;  Surgeon: Escobar Magana MD;  Location: [facility] OR        Family History   Problem Relation Age of Onset     Arthritis Mother      Diabetes Mother         type 2     Heart Disease Father      Coronary Artery Disease Father      Cancer Paternal Grandmother         breast cancer     Heart Disease Paternal Grandfather      Cancer Sister         right kindney       Social History     Socioeconomic History     Marital status:      Spouse name: Not on file     Number of children: Not on file     Years of education: Not on file     Highest education level: Not on  file   Occupational History     Occupation:      Employer: [employer]   Tobacco Use     Smoking status: Never     Smokeless tobacco: Never   Vaping Use     Vaping status: Never Used   Substance and Sexual Activity     Alcohol use: No     Comment: very rare     Drug use: Never     Sexual activity: Not Currently     Partners: Male     Birth control/protection: None     Comment: tubal ligation   Other Topics Concern     Parent/sibling w/ CABG, MI or angioplasty before 65F 55M? Yes     Comment: Father at age 39   Social History Narrative     Not on file     Social Determinants of Health     Financial Resource Strain: Not on file   Food Insecurity: Not on file   Transportation Needs: Not on file   Physical Activity: Not on file   Stress: Not on file   Social Connections: Not on file   Intimate Partner Violence: Not on file   Housing Stability: Not on file       Outpatient Encounter Medications as of 6/6/2023   Medication Sig Dispense Refill     acetaminophen (TYLENOL) 325 MG tablet Take 2 tablets (650 mg) by mouth every 4 hours as needed for other (mild pain) 100 tablet 0     aspirin 81 MG EC tablet Take 1 tablet (81 mg) by mouth 2 times daily 42 tablet 0     ASPIRIN PO Take 81 mg by mouth daily       Calcium Carbonate-Vit D-Min (CALTRATE 600+D PLUS MINERALS) 600-800 MG-UNIT TABS Take 1 tablet by mouth 2 times daily       chlorthalidone (HYGROTON) 25 MG tablet Take 1 tablet (25 mg) by mouth daily 90 tablet 4     clobetasol (TEMOVATE) 0.05 % external solution Apply once to twice daily as needed. 50 mL 4     CRANBERRY PO Take by mouth daily        Ginger, Zingiber officinalis, (GINGER PO) Take by mouth daily        losartan (COZAAR) 50 MG tablet Take 1 tablet (50 mg) by mouth daily 90 tablet 4     methocarbamol (ROBAXIN) 500 MG tablet Take 1 tablet (500 mg) by mouth 4 times daily as needed for muscle spasms 40 tablet 1     oxyCODONE (ROXICODONE) 5 MG tablet Take 1-2 tablets (5-10 mg) by mouth  every 4 hours as needed for moderate to severe pain 30 tablet 0     simvastatin (ZOCOR) 10 MG tablet TAKE ONE TABLET BY MOUTH EVERY NIGHT AT BEDTIME 90 tablet 4     VITAMIN D, CHOLECALCIFEROL, PO Take by mouth daily       No facility-administered encounter medications on file as of 6/6/2023.             O:   NAD, WDWN, Alert & Oriented, Mood & Affect wnl, Vitals stable   Here today alone   There were no vitals taken for this visit.   General appearance normal   Vitals stable   Alert, oriented and in no acute distress     Well healed scar on right and left arm   Stuck on papules and brown macules on trunk and ext   Red papules on trunk  Brown papules and macules with regular pigment network and borders on torso and extremities   The remainder of skin exam is normal       Eyes: Conjunctivae/lids:Normal     ENT: Lips: normal    MSK:Normal    Cardiovascular: peripheral edema none    Pulm: Breathing Normal    Lymph Nodes: No Head and Neck and axillary Lymphadenopathy     Neuro/Psych: Orientation:Alert and Orientedx3 ; Mood/Affect:normal   A/P:  1. FFA- controlled  Continue clobetasol solution 3-4 times weekly.  2. Actinic keratosis on left upper arm and left forehead x 2  LN2:  Treated with LN2 for 5s for 1-2 cycles. Warned risks of blistering, pain, pigment change, scarring, and incomplete resolution.  Advised patient to return if lesions do not completely resolve.  Wound care sheet given.  3.  History of Melanoma on right upper arm   MELANOMA DISCUSSED WITH PATIENT:  I discussed the specifics of tumor, prognosis, metachronous melanoma, self exam, and genetics with the patient. I explained the need for monthly skin exams including and taught the patient how to do this. Patient was asked about new or changing moles . I discussed with patient signs and symptoms that could arise in the setting of recurrent locoregional or metastatic disease. In addition, the need to undergo every 3-4 month dermatologic full skin survey and  evaluation given that patients with a diagnosis of melanoma are at risk of recurrence (local and distant) and of subsequent de rosalee melanoma.     4. Seborrheic keratosis, lentigo, angioma, benign nevi, history of bcc, hemangioma on left popliteal fossa, history of BCC  It was a pleasure speaking to Trise FAHAD Walden today.  BENIGN LESIONS DISCUSSED WITH PATIENT:  I discussed the specifics of tumor, prognosis, and genetics of benign lesions.  I explained that treatment of these lesions would be purely cosmetic and not medically neccessary.  I discussed with patient different removal options including excision, cautery and /or laser.       Nature and genetics of benign skin lesions dicussed with patient.  Signs and Symptoms of skin cancer discussed with patient.  ABCDEs of melanoma reviewed with patient.  Patient encouraged to perform monthly skin exams.  UV precautions reviewed with patient.  Risks of non-melanoma skin cancer discussed with patient   Return to clinic in one year or sooner if needed.       Again, thank you for allowing me to participate in the care of your patient.        Sincerely,        Jackelyn Erickson PA-C

## 2023-06-06 NOTE — PROGRESS NOTES
Tri Walden is an extremely pleasant 72 year old year old female patient here today for skin check. She denies any new or changing skin lesions. She notes hair loss is stable no increased loss. She is using topical steroids as needed. She notes bump on left popliteal fossa.  Patient has no other skin complaints today.  Remainder of the HPI, Meds, PMH, Allergies, FH, and SH was reviewed in chart.    Pertinent Hx:      History of melanoma on right upper arm 0.2 mm 2/2022, History of BCC on left upper arm.   Past Medical History:   Diagnosis Date     Basal cell carcinoma      Hypertension     Unsure if the date     Malignant melanoma (H)      Nonrheumatic aortic valve stenosis 3/14/2023       Past Surgical History:   Procedure Laterality Date     ABLATE VEIN VARICOSE RADIO FREQUENCY WITHOUT PHLEBECTOMY MULTIPLE STAB  12/06/2012    Procedure: ABLATE VEIN VARICOSE RADIO FREQUENCY WITHOUT PHLEBECTOMY MULTIPLE STAB;  Bilateral Ablation of Varicose Veins;  Surgeon: Fredis Marks MD;  Location: [facility]     REVERSE ARTHROPLASTY SHOULDER Left 3/22/2023    Procedure: left REVERSE Total Shoulder Arthroplasty;  Surgeon: Escobar Magana MD;  Location: [facility] OR        Family History   Problem Relation Age of Onset     Arthritis Mother      Diabetes Mother         type 2     Heart Disease Father      Coronary Artery Disease Father      Cancer Paternal Grandmother         breast cancer     Heart Disease Paternal Grandfather      Cancer Sister         right kindney       Social History     Socioeconomic History     Marital status:      Spouse name: Not on file     Number of children: Not on file     Years of education: Not on file     Highest education level: Not on file   Occupational History     Occupation:      Employer: [employer]   Tobacco Use     Smoking status: Never     Smokeless tobacco: Never   Vaping Use     Vaping status: Never Used   Substance and Sexual Activity     Alcohol  use: No     Comment: very rare     Drug use: Never     Sexual activity: Not Currently     Partners: Male     Birth control/protection: None     Comment: tubal ligation   Other Topics Concern     Parent/sibling w/ CABG, MI or angioplasty before 65F 55M? Yes     Comment: Father at age 39   Social History Narrative     Not on file     Social Determinants of Health     Financial Resource Strain: Not on file   Food Insecurity: Not on file   Transportation Needs: Not on file   Physical Activity: Not on file   Stress: Not on file   Social Connections: Not on file   Intimate Partner Violence: Not on file   Housing Stability: Not on file       Outpatient Encounter Medications as of 6/6/2023   Medication Sig Dispense Refill     acetaminophen (TYLENOL) 325 MG tablet Take 2 tablets (650 mg) by mouth every 4 hours as needed for other (mild pain) 100 tablet 0     aspirin 81 MG EC tablet Take 1 tablet (81 mg) by mouth 2 times daily 42 tablet 0     ASPIRIN PO Take 81 mg by mouth daily       Calcium Carbonate-Vit D-Min (CALTRATE 600+D PLUS MINERALS) 600-800 MG-UNIT TABS Take 1 tablet by mouth 2 times daily       chlorthalidone (HYGROTON) 25 MG tablet Take 1 tablet (25 mg) by mouth daily 90 tablet 4     clobetasol (TEMOVATE) 0.05 % external solution Apply once to twice daily as needed. 50 mL 4     CRANBERRY PO Take by mouth daily        Ginger, Zingiber officinalis, (GINGER PO) Take by mouth daily        losartan (COZAAR) 50 MG tablet Take 1 tablet (50 mg) by mouth daily 90 tablet 4     methocarbamol (ROBAXIN) 500 MG tablet Take 1 tablet (500 mg) by mouth 4 times daily as needed for muscle spasms 40 tablet 1     oxyCODONE (ROXICODONE) 5 MG tablet Take 1-2 tablets (5-10 mg) by mouth every 4 hours as needed for moderate to severe pain 30 tablet 0     simvastatin (ZOCOR) 10 MG tablet TAKE ONE TABLET BY MOUTH EVERY NIGHT AT BEDTIME 90 tablet 4     VITAMIN D, CHOLECALCIFEROL, PO Take by mouth daily       No facility-administered encounter  medications on file as of 6/6/2023.             O:   NAD, WDWN, Alert & Oriented, Mood & Affect wnl, Vitals stable   Here today alone   There were no vitals taken for this visit.   General appearance normal   Vitals stable   Alert, oriented and in no acute distress     Well healed scar on right and left arm   Stuck on papules and brown macules on trunk and ext   Red papules on trunk  Brown papules and macules with regular pigment network and borders on torso and extremities   The remainder of skin exam is normal       Eyes: Conjunctivae/lids:Normal     ENT: Lips: normal    MSK:Normal    Cardiovascular: peripheral edema none    Pulm: Breathing Normal    Lymph Nodes: No Head and Neck and axillary Lymphadenopathy     Neuro/Psych: Orientation:Alert and Orientedx3 ; Mood/Affect:normal   A/P:  1. FFA- controlled  Continue clobetasol solution 3-4 times weekly.  2. Actinic keratosis on left upper arm and left forehead x 2  LN2:  Treated with LN2 for 5s for 1-2 cycles. Warned risks of blistering, pain, pigment change, scarring, and incomplete resolution.  Advised patient to return if lesions do not completely resolve.  Wound care sheet given.  3.  History of Melanoma on right upper arm   MELANOMA DISCUSSED WITH PATIENT:  I discussed the specifics of tumor, prognosis, metachronous melanoma, self exam, and genetics with the patient. I explained the need for monthly skin exams including and taught the patient how to do this. Patient was asked about new or changing moles . I discussed with patient signs and symptoms that could arise in the setting of recurrent locoregional or metastatic disease. In addition, the need to undergo every 3-4 month dermatologic full skin survey and evaluation given that patients with a diagnosis of melanoma are at risk of recurrence (local and distant) and of subsequent de rosalee melanoma.     4. Seborrheic keratosis, lentigo, angioma, benign nevi, history of bcc, hemangioma on left popliteal fossa,  history of BCC  It was a pleasure speaking to Tri Walden today.  BENIGN LESIONS DISCUSSED WITH PATIENT:  I discussed the specifics of tumor, prognosis, and genetics of benign lesions.  I explained that treatment of these lesions would be purely cosmetic and not medically neccessary.  I discussed with patient different removal options including excision, cautery and /or laser.       Nature and genetics of benign skin lesions dicussed with patient.  Signs and Symptoms of skin cancer discussed with patient.  ABCDEs of melanoma reviewed with patient.  Patient encouraged to perform monthly skin exams.  UV precautions reviewed with patient.  Risks of non-melanoma skin cancer discussed with patient   Return to clinic in one year or sooner if needed.

## 2023-06-07 ENCOUNTER — TRANSFERRED RECORDS (OUTPATIENT)
Dept: HEALTH INFORMATION MANAGEMENT | Facility: CLINIC | Age: 73
End: 2023-06-07
Payer: COMMERCIAL

## 2023-06-08 ENCOUNTER — THERAPY VISIT (OUTPATIENT)
Dept: PHYSICAL THERAPY | Facility: CLINIC | Age: 73
End: 2023-06-08
Attending: ORTHOPAEDIC SURGERY
Payer: COMMERCIAL

## 2023-06-08 DIAGNOSIS — Z96.612 STATUS POST SHOULDER REPLACEMENT, LEFT: Primary | ICD-10-CM

## 2023-06-08 PROCEDURE — 97110 THERAPEUTIC EXERCISES: CPT | Mod: GP | Performed by: PHYSICAL THERAPIST

## 2023-06-08 NOTE — PROGRESS NOTES
06/08/23 0500   Appointment Info   Signing clinician's name / credentials Raj Ramirez PT [facility]   Visits Used 16   Medical Diagnosis TSA reverse   Progress Note/Certification   Progress Note Due Date 06/14/23   PT Goal 1   Goal Identifier 1   Goal Description pt will be able to feed self   Rationale to maximize safety and independence with performance of ADLs and functional tasks   Target Date 05/08/23   Date Met 05/02/23   PT Goal 2   Goal Identifier 2   Goal Description pt will be able to reach to bottom shelf   Target Date 05/27/23   Date Met 05/31/23   Rationale to maximize safety and independence with performance of ADLs and functional tasks   PT Goal 3   Goal Identifier 3   Goal Description pt will be able to drive without limitation   Goal Progress much improved.   Target Date 06/18/23   Date Met 06/08/23   Rationale to maximize safety and independence with performance of ADLs and functional tasks   Objective Measure 1   Objective Measure AROM ER -20, flexion 115 with UT sub, able to reach past the top of her head, abd 100 with UT sub, IR L2 without tension.   Details sup flex 145  scapt 140 abd 140, IR T11   Therapeutic Procedure/Exercise   Therapeutic Procedures: strength, endurance, ROM, flexibillity minutes (32160) 15   Ther Proc 1 ube 3 min f/b.  man PROM flex to abd.  reviewed HEP including daily ROM and strengthening.  to continue for at least 3 months until Dr Magana recheck.  able to progress bands when able to do 20 reps or so.   Skilled Intervention final HEP   Patient Response/Progress ind with HEP   Plan   Plan for next session DC with HEP   Total Session Time   Timed Code Treatment Minutes 15   Total Treatment Time (sum of timed and untimed services) 15       DISCHARGE  Reason for Discharge: Patient has met all goals.    Equipment Issued:     Discharge Plan: Patient to continue home program.    Referring Provider:  Escobar Magana

## 2023-07-03 ENCOUNTER — HOSPITAL ENCOUNTER (OUTPATIENT)
Dept: CARDIOLOGY | Facility: CLINIC | Age: 73
Discharge: HOME | End: 2023-07-03
Attending: INTERNAL MEDICINE | Admitting: INTERNAL MEDICINE
Payer: COMMERCIAL

## 2023-07-03 DIAGNOSIS — I35.0 AORTIC VALVE STENOSIS, ETIOLOGY OF CARDIAC VALVE DISEASE UNSPECIFIED: ICD-10-CM

## 2023-07-03 LAB — LVEF ECHO: NORMAL

## 2023-07-03 PROCEDURE — 93306 TTE W/DOPPLER COMPLETE: CPT | Mod: 26 | Performed by: INTERNAL MEDICINE

## 2023-07-03 PROCEDURE — 93306 TTE W/DOPPLER COMPLETE: CPT

## 2023-07-03 NOTE — RESULT ENCOUNTER NOTE
EF 55-60%; severe AS; per reader there has been progression in AS since last echo on 1/2/23. Follow up with Dr You on 7/10/23

## 2023-07-05 NOTE — PROGRESS NOTES
CARDIOLOGY VISIT    REASON FOR VISIT: Aortic stenosis    SUBJECTIVE:  73-year-old female seen for aortic stenosis.  She has dyslipidemia, GERD, irritable bowel.     Echo July 2021 showed EF 60%, normal RV, aortic stenosis with mean 21 mmHg, V-max 2.1 m/s, area 0.9 cm , DI 0.27, SVI 37 ml/m2.     Echo March 2022 showed EF 55%, aortic stenosis with mean 26 mmHg, V-max 3.3 m/s, area 0.9 cm , DI 0.28, SVI 41 ml/m2.     Echo January 2023 showed EF 55%, aortic stenosis with mean 28 mmHg, V-max 3.8 m/s, area 0.8 cm , DI 0.24, SVI 37 mls/m2.    Echo July 2023 showed EF 55%, aortic stenosis with mean 31 mmHg, V-max 3.6 m/s, area 0.8 cm , DI 0.25, SVI 43 ml/m2.    She has been feeling the same in the past 6 months.  She does yard work including gardening, trimming, and other moderate activities.  She denies any chest pain, shortness of breath, edema, or increased fatigue.    MEDICATIONS:  Current Outpatient Medications   Medication     acetaminophen (TYLENOL) 325 MG tablet     aspirin 81 MG EC tablet     ASPIRIN PO     Calcium Carbonate-Vit D-Min (CALTRATE 600+D PLUS MINERALS) 600-800 MG-UNIT TABS     chlorthalidone (HYGROTON) 25 MG tablet     clobetasol (TEMOVATE) 0.05 % external solution     CRANBERRY PO     Erin, Zingiber officinalis, (ERIN PO)     losartan (COZAAR) 50 MG tablet     methocarbamol (ROBAXIN) 500 MG tablet     oxyCODONE (ROXICODONE) 5 MG tablet     simvastatin (ZOCOR) 10 MG tablet     VITAMIN D, CHOLECALCIFEROL, PO     No current facility-administered medications for this visit.       ALLERGIES:  Allergies   Allergen Reactions     Nkda [No Known Drug Allergy]        REVIEW OF SYSTEMS:  Constitutional:  No weight loss, fever, chills  HEENT:  Eyes:  No visual loss, blurred vision, double vision or yellow sclerae. No hearing loss, sneezing, congestion, runny nose or sore throat.  Skin:  No rash or itching.  Cardiovascular: per HPI  Respiratory: per HPI  GI:  No anorexia, nausea, vomiting or diarrhea. No  abdominal pain or blood.  :  No dysurea, hematuria  Neurologic:  No headache, paralysis, ataxia, numbness or tingling in the extremities. No change in bowel or bladder control.  Musculoskeletal:  No muscle pain  Hematologic:  No bleeding or bruising.  Lymphatics:  No enlarged nodes. No history of splenectomy.  Endocrine:  No reports of sweating, cold or heat intolerance. No polyuria or polydipsia.  Allergies:  No history of asthma, hives, eczema or rhinitis.    PHYSICAL EXAM:  /71 (BP Location: Right arm, Patient Position: Sitting, Cuff Size: Adult Regular)   Pulse 75   Temp 97.4  F (36.3  C) (Tympanic)   Resp 16   Wt 57.6 kg (127 lb)   SpO2 97%   BMI 22.50 kg/m      Constitutional: awake, alert, no distress  Eyes: PERRL, sclera nonicteric  ENT: trachea midline  Respiratory: Lungs clear  Cardiovascular: Regular rate and rhythm, 2/6 mid peaking systolic murmur at the upper sternal border  GI: nondistended, nontender, bowel sounds present  Lymph/Hematologic: no lymphadenopathy  Skin: dry, no rash  Musculoskeletal: good muscle tone, strength 5/5 in upper and lower extremities  Neurologic: no focal deficits  Neuropsychiatric: appropriate affact    DATA:  Lab: March 2023: Creatinine 0.7, hemoglobin 10.5  Recent Labs   Lab Test 11/15/22  0907 10/20/21  0901 08/29/16  0843 08/24/15  0737   CHOL 180 176   < > 173   HDL 69 91   < > 80   LDL 86 69   < > 81   TRIG 123 80   < > 58   CHOLHDLRATIO  --   --   --  2.2    < > = values in this interval not displayed.     ASSESSMENT:  73-year-old female seen for aortic stenosis.  She has late moderate to early severe asymptomatic aortic stenosis.  She has a good functional capacity for her age with no concerning symptoms and no worsening in the past 6 months.  Stress echo will be repeated in 6 months.  We again briefly talked about the need for eventual aortic valve replacement.    RECOMMENDATIONS:  1.  Aortic stenosis, moderately severe, asymptomatic  -Treadmill stress  echo in 6 months, okay to use modified Handy protocol    Follow-up in 6 months after stress test.    Raymundo You MD  Cardiology - [facility]  Pager:  [phone]  Text Page  July 10, 2023

## 2023-07-10 ENCOUNTER — OFFICE VISIT (OUTPATIENT)
Dept: CARDIOLOGY | Facility: CLINIC | Age: 73
End: 2023-07-10
Payer: COMMERCIAL

## 2023-07-10 VITALS
OXYGEN SATURATION: 97 % | DIASTOLIC BLOOD PRESSURE: 71 MMHG | BODY MASS INDEX: 22.5 KG/M2 | RESPIRATION RATE: 16 BRPM | WEIGHT: 127 LBS | SYSTOLIC BLOOD PRESSURE: 134 MMHG | HEART RATE: 75 BPM | TEMPERATURE: 97.4 F

## 2023-07-10 DIAGNOSIS — I35.0 AORTIC VALVE STENOSIS, ETIOLOGY OF CARDIAC VALVE DISEASE UNSPECIFIED: ICD-10-CM

## 2023-07-10 PROCEDURE — 99214 OFFICE O/P EST MOD 30 MIN: CPT | Performed by: INTERNAL MEDICINE

## 2023-07-10 NOTE — NURSING NOTE
Chief Complaint   Patient presents with     Coronary Artery Disease     6 month follow up CAD and review echo results       Vitals:    07/10/23 0845   BP: 134/71   BP Location: Right arm   Patient Position: Sitting   Cuff Size: Adult Regular   Pulse: 75   Resp: 16   Temp: 97.4  F (36.3  C)   TempSrc: Tympanic   SpO2: 97%   Weight: 57.6 kg (127 lb)     Wt Readings from Last 1 Encounters:   07/10/23 57.6 kg (127 lb)     Ly Andrea MA     PAST MEDICAL HISTORY:  No pertinent past medical history

## 2023-07-10 NOTE — PATIENT INSTRUCTIONS
Treadmill stress echocardiogram  Will schedule a treadmill echo stress test.  This is a test where we take some baseline pictures of your heart with an ultrasound (echocardiogram).  You will then exercise on the treadmill while hooked up to an EKG machine.  We monitored you heart rate and blood pressure.  We will have you exercise long enough to reach a certain target heart rate.  Immediately after exercise, additional pictures are taken of your heart with the ultrasound.    If there are any severe blockages in the heart, there may be changes on the EKG or the heart pictures may look abnormal.  If the test is abnormal, often a coronary angiogram is recommended at a later time.  This is the definitive test looking for blockages in the heart and potentially fixing them with stents.    Overall the test takes 30-45 minutes.  You will be on the treadmill for 5-12 minutes.  Wear some comfortable clothes and shoes for doing some light to moderate exercise.    Your test will be at [facility].

## 2023-07-10 NOTE — LETTER
7/10/2023    Nehemias Wallace MD  [address] [address]    RE: Tri Walden       Dear Colleague,     I had the pleasure of seeing Tri Walden in the [facility].  CARDIOLOGY VISIT    REASON FOR VISIT: Aortic stenosis    SUBJECTIVE:  73-year-old female seen for aortic stenosis.  She has dyslipidemia, GERD, irritable bowel.     Echo July 2021 showed EF 60%, normal RV, aortic stenosis with mean 21 mmHg, V-max 2.1 m/s, area 0.9 cm , DI 0.27, SVI 37 ml/m2.     Echo March 2022 showed EF 55%, aortic stenosis with mean 26 mmHg, V-max 3.3 m/s, area 0.9 cm , DI 0.28, SVI 41 ml/m2.     Echo January 2023 showed EF 55%, aortic stenosis with mean 28 mmHg, V-max 3.8 m/s, area 0.8 cm , DI 0.24, SVI 37 mls/m2.    Echo July 2023 showed EF 55%, aortic stenosis with mean 31 mmHg, V-max 3.6 m/s, area 0.8 cm , DI 0.25, SVI 43 ml/m2.    She has been feeling the same in the past 6 months.  She does yard work including gardening, trimming, and other moderate activities.  She denies any chest pain, shortness of breath, edema, or increased fatigue.    MEDICATIONS:  Current Outpatient Medications   Medication    acetaminophen (TYLENOL) 325 MG tablet    aspirin 81 MG EC tablet    ASPIRIN PO    Calcium Carbonate-Vit D-Min (CALTRATE 600+D PLUS MINERALS) 600-800 MG-UNIT TABS    chlorthalidone (HYGROTON) 25 MG tablet    clobetasol (TEMOVATE) 0.05 % external solution    CRANBERRY PO    Erin, Zingiber officinalis, (ERIN PO)    losartan (COZAAR) 50 MG tablet    methocarbamol (ROBAXIN) 500 MG tablet    oxyCODONE (ROXICODONE) 5 MG tablet    simvastatin (ZOCOR) 10 MG tablet    VITAMIN D, CHOLECALCIFEROL, PO     No current facility-administered medications for this visit.       ALLERGIES:  Allergies   Allergen Reactions    Nkda [No Known Drug Allergy]        REVIEW OF SYSTEMS:  Constitutional:  No weight loss, fever, chills  HEENT:  Eyes:  No visual loss, blurred vision, double vision or yellow sclerae. No  hearing loss, sneezing, congestion, runny nose or sore throat.  Skin:  No rash or itching.  Cardiovascular: per HPI  Respiratory: per HPI  GI:  No anorexia, nausea, vomiting or diarrhea. No abdominal pain or blood.  :  No dysurea, hematuria  Neurologic:  No headache, paralysis, ataxia, numbness or tingling in the extremities. No change in bowel or bladder control.  Musculoskeletal:  No muscle pain  Hematologic:  No bleeding or bruising.  Lymphatics:  No enlarged nodes. No history of splenectomy.  Endocrine:  No reports of sweating, cold or heat intolerance. No polyuria or polydipsia.  Allergies:  No history of asthma, hives, eczema or rhinitis.    PHYSICAL EXAM:  /71 (BP Location: Right arm, Patient Position: Sitting, Cuff Size: Adult Regular)   Pulse 75   Temp 97.4  F (36.3  C) (Tympanic)   Resp 16   Wt 57.6 kg (127 lb)   SpO2 97%   BMI 22.50 kg/m      Constitutional: awake, alert, no distress  Eyes: PERRL, sclera nonicteric  ENT: trachea midline  Respiratory: Lungs clear  Cardiovascular: Regular rate and rhythm, 2/6 mid peaking systolic murmur at the upper sternal border  GI: nondistended, nontender, bowel sounds present  Lymph/Hematologic: no lymphadenopathy  Skin: dry, no rash  Musculoskeletal: good muscle tone, strength 5/5 in upper and lower extremities  Neurologic: no focal deficits  Neuropsychiatric: appropriate affact    DATA:  Lab: March 2023: Creatinine 0.7, hemoglobin 10.5  Recent Labs   Lab Test 11/15/22  0907 10/20/21  0901 08/29/16  0843 08/24/15  0737   CHOL 180 176   < > 173   HDL 69 91   < > 80   LDL 86 69   < > 81   TRIG 123 80   < > 58   CHOLHDLRATIO  --   --   --  2.2    < > = values in this interval not displayed.     ASSESSMENT:  73-year-old female seen for aortic stenosis.  She has late moderate to early severe asymptomatic aortic stenosis.  She has a good functional capacity for her age with no concerning symptoms and no worsening in the past 6 months.  Stress echo will be  repeated in 6 months.  We again briefly talked about the need for eventual aortic valve replacement.    RECOMMENDATIONS:  1.  Aortic stenosis, moderately severe, asymptomatic  -Treadmill stress echo in 6 months, okay to use modified Handy protocol    Follow-up in 6 months after stress test.    Raymundo You MD  Cardiology - [facility]  Pager:  [phone]  Text Page  July 10, 2023          Thank you for allowing me to participate in the care of your patient.      Sincerely,     Raymundo You MD     [facility]  cc:   Raymundo You MD

## 2023-08-15 NOTE — PROGRESS NOTES
DISCHARGE  Reason for Discharge: No further expectation of progress.    Equipment Issued:     Discharge Plan: Patient to continue home program.    Referring Provider:  Nehemias Wallace     12/08/22 1200   Appointment Info   Signing clinician's name / credentials Kris Hoenk, PT   Visits Used 4   Progress Note/Certification   Progress Note Due Date 02/09/23   Subjective Report   Subjective Report will see DR Magana, son gets  in Feb,   Objective Measures   Objective Measures Objective Measure 1   Objective Measure 1   Objective Measure L active flexion approx 60* with up trap substitution   Details MMT ER L 2/5, trace push with isometric ER, unable to lift ER even to neutral in SL position, R 2/5 as well, unabel to do SL ER, can lift higher than L   Treatment Interventions (PT)   Interventions Therapeutic Procedure/Exercise   Therapeutic Procedure/Exercise   Therapeutic Procedures: strength, endurance, ROM, flexibillity minutes (57028) 17   Skilled Intervention ROM and light strength ex modify HEP   Patient Response/Progress no pain with new ex   Plan   Homework PTRX- printed handouts   Updates to plan of care DR Wallace said go back to see Dr Magana   Plan for next session recheck in 2wks or pt to cancel if no changes adn set up appt with Dr Magana   Medicare Claim Information   Medical Diagnosis Chronic left shoulder pain (M25.512, G89.29)   PT Diagnosis L shoulder pain   Start of Care Date 11/17/22   Onset date of current episode/exacerbation 09/17/22   Certification date from 11/17/22   Ortho Goal 1   Goal Identifier 1   Goal Description Patient will be able lift dishes in and out of microwave without pain or difficulty.   Target Date 02/09/23   Goal Progress cannot lift with both hands, R can reach   Ortho Goal 2   Goal Identifier 2   Goal Description Patient will be able to perform dressing without pain or difficulty.   Target Date 02/09/23   Goal Progress bra and coat slightly better   Ortho Goal  3   Goal Identifier 3   Goal Description Patient will be able to lift a tea kettle with minimal pain or difficulty.   Target Date 02/09/23   Goal Progress does with R   Ortho Goal 4   Goal Identifier 4   Goal Description Patient will be independent and consistent with HEP at least 5x a week to aid functional recovery.   Target Date 02/09/23   Session Number   Authorization status are/Medicare, cert required

## 2023-09-06 ENCOUNTER — TRANSFERRED RECORDS (OUTPATIENT)
Dept: HEALTH INFORMATION MANAGEMENT | Facility: CLINIC | Age: 73
End: 2023-09-06
Payer: COMMERCIAL

## 2023-10-03 ENCOUNTER — OFFICE VISIT (OUTPATIENT)
Dept: DERMATOLOGY | Facility: CLINIC | Age: 73
End: 2023-10-03
Payer: COMMERCIAL

## 2023-10-03 DIAGNOSIS — D18.01 CHERRY ANGIOMA: ICD-10-CM

## 2023-10-03 DIAGNOSIS — L82.1 SEBORRHEIC KERATOSIS: ICD-10-CM

## 2023-10-03 DIAGNOSIS — Z85.828 HISTORY OF BASAL CELL CANCER: ICD-10-CM

## 2023-10-03 DIAGNOSIS — Z85.820 HISTORY OF MELANOMA: ICD-10-CM

## 2023-10-03 DIAGNOSIS — L81.4 LENTIGO: ICD-10-CM

## 2023-10-03 DIAGNOSIS — L66.12 FRONTAL FIBROSING ALOPECIA: Primary | ICD-10-CM

## 2023-10-03 DIAGNOSIS — D22.9 MULTIPLE BENIGN NEVI: ICD-10-CM

## 2023-10-03 PROCEDURE — 99213 OFFICE O/P EST LOW 20 MIN: CPT | Performed by: PHYSICIAN ASSISTANT

## 2023-10-03 ASSESSMENT — PAIN SCALES - GENERAL: PAINLEVEL: NO PAIN (0)

## 2023-10-03 NOTE — LETTER
10/3/2023         RE: Tri Walden  [address]  [facility] [address]        Dear Colleague,    Thank you for referring your patient, Tri Walden, to the [facility]. Please see a copy of my visit note below.    Tri Walden is an extremely pleasant 73 year old year old female patient here today for skin check. She denies any new or changing skin lesions. She notes hair loss is stable no increased loss. She is using topical steroids as needed once weekly. Patient has no other skin complaints today.  Remainder of the HPI, Meds, PMH, Allergies, FH, and SH was reviewed in chart.    Pertinent Hx:      History of melanoma on right upper arm 0.2 mm 2/2022, History of BCC on left upper arm.   Past Medical History:   Diagnosis Date     Basal cell carcinoma      Hypertension     Unsure if the date     Malignant melanoma (H)      Nonrheumatic aortic valve stenosis 3/14/2023       Past Surgical History:   Procedure Laterality Date     ABLATE VEIN VARICOSE RADIO FREQUENCY WITHOUT PHLEBECTOMY MULTIPLE STAB  12/06/2012    Procedure: ABLATE VEIN VARICOSE RADIO FREQUENCY WITHOUT PHLEBECTOMY MULTIPLE STAB;  Bilateral Ablation of Varicose Veins;  Surgeon: Fredis Marks MD;  Location: [facility] OR     REVERSE ARTHROPLASTY SHOULDER Left 3/22/2023    Procedure: left REVERSE Total Shoulder Arthroplasty;  Surgeon: Escobar Magana MD;  Location: [facility] OR        Family History   Problem Relation Age of Onset     Arthritis Mother      Diabetes Mother         type 2     Heart Disease Father      Coronary Artery Disease Father      Cancer Paternal Grandmother         breast cancer     Heart Disease Paternal Grandfather      Cancer Sister         right kindney       Social History     Socioeconomic History     Marital status:      Spouse name: Not on file     Number of children: Not on file     Years of education: Not on file     Highest education level: Not on file   Occupational History      Occupation: [employer]     Employer: [employer]   Tobacco Use     Smoking status: Never     Smokeless tobacco: Never   Vaping Use     Vaping Use: Never used   Substance and Sexual Activity     Alcohol use: No     Comment: very rare     Drug use: Never     Sexual activity: Not Currently     Partners: Male     Birth control/protection: None     Comment: tubal ligation   Other Topics Concern     Parent/sibling w/ CABG, MI or angioplasty before 65F 55M? Yes     Comment: Father at age 39   Social History Narrative     Not on file     Social Determinants of Health     Financial Resource Strain: Not on file   Food Insecurity: Not on file   Transportation Needs: Not on file   Physical Activity: Not on file   Stress: Not on file   Social Connections: Not on file   Interpersonal Safety: Not on file   Housing Stability: Not on file       Outpatient Encounter Medications as of 10/3/2023   Medication Sig Dispense Refill     acetaminophen (TYLENOL) 325 MG tablet Take 2 tablets (650 mg) by mouth every 4 hours as needed for other (mild pain) (Patient not taking: Reported on 7/10/2023) 100 tablet 0     aspirin 81 MG EC tablet Take 1 tablet (81 mg) by mouth 2 times daily 42 tablet 0     ASPIRIN PO Take 81 mg by mouth daily       Calcium Carbonate-Vit D-Min (CALTRATE 600+D PLUS MINERALS) 600-800 MG-UNIT TABS Take 1 tablet by mouth 2 times daily       chlorthalidone (HYGROTON) 25 MG tablet Take 1 tablet (25 mg) by mouth daily 90 tablet 4     clobetasol (TEMOVATE) 0.05 % external solution Apply once to twice daily as needed. (Patient not taking: Reported on 7/10/2023) 50 mL 4     CRANBERRY PO Take by mouth daily        Ginger, Zingiber officinalis, (GINGER PO) Take by mouth daily        losartan (COZAAR) 50 MG tablet Take 1 tablet (50 mg) by mouth daily 90 tablet 4     simvastatin (ZOCOR) 10 MG tablet TAKE ONE TABLET BY MOUTH EVERY NIGHT AT BEDTIME 90 tablet 4     VITAMIN D, CHOLECALCIFEROL, PO Take by mouth daily        No facility-administered encounter medications on file as of 10/3/2023.             O:   NAD, WDWN, Alert & Oriented, Mood & Affect wnl, Vitals stable   Here today alone   There were no vitals taken for this visit.   General appearance normal   Vitals stable   Alert, oriented and in no acute distress     Well healed scar on right and left arm   Stuck on papules and brown macules on trunk and ext   Red papules on trunk  Brown papules and macules with regular pigment network and borders on torso and extremities   The remainder of skin exam is normal       Eyes: Conjunctivae/lids:Normal     ENT: Lips: normal    MSK:Normal    Cardiovascular: peripheral edema none    Pulm: Breathing Normal    Lymph Nodes: No Head and Neck and axillary Lymphadenopathy     Neuro/Psych: Orientation:Alert and Orientedx3 ; Mood/Affect:normal   A/P:  1. FFA- controlled  Continue clobetasol solution 1-2 times weekly.  3.  History of Melanoma on right upper arm   MELANOMA DISCUSSED WITH PATIENT:  I discussed the specifics of tumor, prognosis, metachronous melanoma, self exam, and genetics with the patient. I explained the need for monthly skin exams including and taught the patient how to do this. Patient was asked about new or changing moles . I discussed with patient signs and symptoms that could arise in the setting of recurrent locoregional or metastatic disease. In addition, the need to undergo every 3-4 month dermatologic full skin survey and evaluation given that patients with a diagnosis of melanoma are at risk of recurrence (local and distant) and of subsequent de rosalee melanoma.     4. Seborrheic keratosis, lentigo, angioma, benign nevi, history of bcc, hemangioma on left popliteal fossa, history of BCC  It was a pleasure speaking to Tri Walden today.  BENIGN LESIONS DISCUSSED WITH PATIENT:  I discussed the specifics of tumor, prognosis, and genetics of benign lesions.  I explained that treatment of these lesions would be  purely cosmetic and not medically neccessary.  I discussed with patient different removal options including excision, cautery and /or laser.       Nature and genetics of benign skin lesions dicussed with patient.  Signs and Symptoms of skin cancer discussed with patient.  ABCDEs of melanoma reviewed with patient.  Patient encouraged to perform monthly skin exams.  UV precautions reviewed with patient.  Risks of non-melanoma skin cancer discussed with patient   Return to clinic in 3-4 months.       Again, thank you for allowing me to participate in the care of your patient.        Sincerely,        Jackelyn Erickson PA-C

## 2023-10-03 NOTE — NURSING NOTE
Chief Complaint   Patient presents with    Skin Check     No Concerns       There were no vitals filed for this visit.  Wt Readings from Last 1 Encounters:   07/10/23 57.6 kg (127 lb)       Cecilia Santana LPN .................10/3/2023

## 2023-10-03 NOTE — PROGRESS NOTES
Tri Walden is an extremely pleasant 73 year old year old female patient here today for skin check. She denies any new or changing skin lesions. She notes hair loss is stable no increased loss. She is using topical steroids as needed once weekly. Patient has no other skin complaints today.  Remainder of the HPI, Meds, PMH, Allergies, FH, and SH was reviewed in chart.    Pertinent Hx:      History of melanoma on right upper arm 0.2 mm 2/2022, History of BCC on left upper arm.   Past Medical History:   Diagnosis Date    Basal cell carcinoma     Hypertension     Unsure if the date    Malignant melanoma (H)     Nonrheumatic aortic valve stenosis 3/14/2023       Past Surgical History:   Procedure Laterality Date    ABLATE VEIN VARICOSE RADIO FREQUENCY WITHOUT PHLEBECTOMY MULTIPLE STAB  12/06/2012    Procedure: ABLATE VEIN VARICOSE RADIO FREQUENCY WITHOUT PHLEBECTOMY MULTIPLE STAB;  Bilateral Ablation of Varicose Veins;  Surgeon: Fredis Marks MD;  Location: [facility]    REVERSE ARTHROPLASTY SHOULDER Left 3/22/2023    Procedure: left REVERSE Total Shoulder Arthroplasty;  Surgeon: Escobar Magana MD;  Location: [facility] OR        Family History   Problem Relation Age of Onset    Arthritis Mother     Diabetes Mother         type 2    Heart Disease Father     Coronary Artery Disease Father     Cancer Paternal Grandmother         breast cancer    Heart Disease Paternal Grandfather     Cancer Sister         right kindney       Social History     Socioeconomic History    Marital status:      Spouse name: Not on file    Number of children: Not on file    Years of education: Not on file    Highest education level: Not on file   Occupational History    Occupation:      Employer: [employer]   Tobacco Use    Smoking status: Never    Smokeless tobacco: Never   Vaping Use    Vaping Use: Never used   Substance and Sexual Activity    Alcohol use: No     Comment: very rare    Drug use: Never     Sexual activity: Not Currently     Partners: Male     Birth control/protection: None     Comment: tubal ligation   Other Topics Concern    Parent/sibling w/ CABG, MI or angioplasty before 65F 55M? Yes     Comment: Father at age 39   Social History Narrative    Not on file     Social Determinants of Health     Financial Resource Strain: Not on file   Food Insecurity: Not on file   Transportation Needs: Not on file   Physical Activity: Not on file   Stress: Not on file   Social Connections: Not on file   Interpersonal Safety: Not on file   Housing Stability: Not on file       Outpatient Encounter Medications as of 10/3/2023   Medication Sig Dispense Refill    acetaminophen (TYLENOL) 325 MG tablet Take 2 tablets (650 mg) by mouth every 4 hours as needed for other (mild pain) (Patient not taking: Reported on 7/10/2023) 100 tablet 0    aspirin 81 MG EC tablet Take 1 tablet (81 mg) by mouth 2 times daily 42 tablet 0    ASPIRIN PO Take 81 mg by mouth daily      Calcium Carbonate-Vit D-Min (CALTRATE 600+D PLUS MINERALS) 600-800 MG-UNIT TABS Take 1 tablet by mouth 2 times daily      chlorthalidone (HYGROTON) 25 MG tablet Take 1 tablet (25 mg) by mouth daily 90 tablet 4    clobetasol (TEMOVATE) 0.05 % external solution Apply once to twice daily as needed. (Patient not taking: Reported on 7/10/2023) 50 mL 4    CRANBERRY PO Take by mouth daily       Ginger, Zingiber officinalis, (GINGER PO) Take by mouth daily       losartan (COZAAR) 50 MG tablet Take 1 tablet (50 mg) by mouth daily 90 tablet 4    simvastatin (ZOCOR) 10 MG tablet TAKE ONE TABLET BY MOUTH EVERY NIGHT AT BEDTIME 90 tablet 4    VITAMIN D, CHOLECALCIFEROL, PO Take by mouth daily       No facility-administered encounter medications on file as of 10/3/2023.             O:   NAD, WDWN, Alert & Oriented, Mood & Affect wnl, Vitals stable   Here today alone   There were no vitals taken for this visit.   General appearance normal   Vitals stable   Alert, oriented and in  no acute distress     Well healed scar on right and left arm   Stuck on papules and brown macules on trunk and ext   Red papules on trunk  Brown papules and macules with regular pigment network and borders on torso and extremities   The remainder of skin exam is normal       Eyes: Conjunctivae/lids:Normal     ENT: Lips: normal    MSK:Normal    Cardiovascular: peripheral edema none    Pulm: Breathing Normal    Lymph Nodes: No Head and Neck and axillary Lymphadenopathy     Neuro/Psych: Orientation:Alert and Orientedx3 ; Mood/Affect:normal   A/P:  1. FFA- controlled  Continue clobetasol solution 1-2 times weekly.  3.  History of Melanoma on right upper arm   MELANOMA DISCUSSED WITH PATIENT:  I discussed the specifics of tumor, prognosis, metachronous melanoma, self exam, and genetics with the patient. I explained the need for monthly skin exams including and taught the patient how to do this. Patient was asked about new or changing moles . I discussed with patient signs and symptoms that could arise in the setting of recurrent locoregional or metastatic disease. In addition, the need to undergo every 3-4 month dermatologic full skin survey and evaluation given that patients with a diagnosis of melanoma are at risk of recurrence (local and distant) and of subsequent de rosalee melanoma.     4. Seborrheic keratosis, lentigo, angioma, benign nevi, history of bcc, hemangioma on left popliteal fossa, history of BCC  It was a pleasure speaking to Tri Walden today.  BENIGN LESIONS DISCUSSED WITH PATIENT:  I discussed the specifics of tumor, prognosis, and genetics of benign lesions.  I explained that treatment of these lesions would be purely cosmetic and not medically neccessary.  I discussed with patient different removal options including excision, cautery and /or laser.       Nature and genetics of benign skin lesions dicussed with patient.  Signs and Symptoms of skin cancer discussed with patient.  ABCDEs of melanoma  reviewed with patient.  Patient encouraged to perform monthly skin exams.  UV precautions reviewed with patient.  Risks of non-melanoma skin cancer discussed with patient   Return to clinic in 3-4 months.

## 2023-10-16 ENCOUNTER — PATIENT OUTREACH (OUTPATIENT)
Dept: CARE COORDINATION | Facility: CLINIC | Age: 73
End: 2023-10-16
Payer: COMMERCIAL

## 2023-10-30 ENCOUNTER — PATIENT OUTREACH (OUTPATIENT)
Dept: CARE COORDINATION | Facility: CLINIC | Age: 73
End: 2023-10-30
Payer: COMMERCIAL

## 2023-11-17 ENCOUNTER — OFFICE VISIT (OUTPATIENT)
Dept: FAMILY MEDICINE | Facility: CLINIC | Age: 73
End: 2023-11-17
Payer: COMMERCIAL

## 2023-11-17 VITALS
HEART RATE: 82 BPM | HEIGHT: 63 IN | TEMPERATURE: 98.3 F | OXYGEN SATURATION: 98 % | DIASTOLIC BLOOD PRESSURE: 76 MMHG | BODY MASS INDEX: 22.15 KG/M2 | SYSTOLIC BLOOD PRESSURE: 132 MMHG | WEIGHT: 125 LBS | RESPIRATION RATE: 16 BRPM

## 2023-11-17 DIAGNOSIS — Z12.11 SCREEN FOR COLON CANCER: ICD-10-CM

## 2023-11-17 DIAGNOSIS — I10 ESSENTIAL HYPERTENSION: ICD-10-CM

## 2023-11-17 DIAGNOSIS — Z85.820 HISTORY OF MELANOMA: ICD-10-CM

## 2023-11-17 DIAGNOSIS — R73.01 IMPAIRED FASTING GLUCOSE: ICD-10-CM

## 2023-11-17 DIAGNOSIS — Z23 NEED FOR COVID-19 VACCINE: ICD-10-CM

## 2023-11-17 DIAGNOSIS — Z23 NEED FOR INFLUENZA VACCINATION: ICD-10-CM

## 2023-11-17 DIAGNOSIS — Z00.00 MEDICARE ANNUAL WELLNESS VISIT, SUBSEQUENT: Primary | ICD-10-CM

## 2023-11-17 DIAGNOSIS — I10 BENIGN ESSENTIAL HYPERTENSION: ICD-10-CM

## 2023-11-17 DIAGNOSIS — E78.00 PURE HYPERCHOLESTEROLEMIA: ICD-10-CM

## 2023-11-17 DIAGNOSIS — Z13.220 SCREENING FOR LIPID DISORDERS: ICD-10-CM

## 2023-11-17 LAB
ALBUMIN SERPL BCG-MCNC: 4.7 G/DL (ref 3.5–5.2)
ALP SERPL-CCNC: 57 U/L (ref 40–150)
ALT SERPL W P-5'-P-CCNC: 12 U/L (ref 0–50)
ANION GAP SERPL CALCULATED.3IONS-SCNC: 9 MMOL/L (ref 7–15)
AST SERPL W P-5'-P-CCNC: 21 U/L (ref 0–45)
BILIRUB SERPL-MCNC: 0.4 MG/DL
BUN SERPL-MCNC: 18.9 MG/DL (ref 8–23)
CALCIUM SERPL-MCNC: 9.6 MG/DL (ref 8.8–10.2)
CHLORIDE SERPL-SCNC: 101 MMOL/L (ref 98–107)
CHOLEST SERPL-MCNC: 191 MG/DL
CREAT SERPL-MCNC: 0.68 MG/DL (ref 0.51–0.95)
DEPRECATED HCO3 PLAS-SCNC: 32 MMOL/L (ref 22–29)
EGFRCR SERPLBLD CKD-EPI 2021: >90 ML/MIN/1.73M2
GLUCOSE SERPL-MCNC: 102 MG/DL (ref 70–99)
HBA1C MFR BLD: 5.2 % (ref 0–5.6)
HDLC SERPL-MCNC: 76 MG/DL
HGB BLD-MCNC: 12.9 G/DL (ref 11.7–15.7)
LDLC SERPL CALC-MCNC: 96 MG/DL
NONHDLC SERPL-MCNC: 115 MG/DL
POTASSIUM SERPL-SCNC: 3.6 MMOL/L (ref 3.4–5.3)
PROT SERPL-MCNC: 7.5 G/DL (ref 6.4–8.3)
SODIUM SERPL-SCNC: 142 MMOL/L (ref 135–145)
TRIGL SERPL-MCNC: 97 MG/DL

## 2023-11-17 PROCEDURE — 83036 HEMOGLOBIN GLYCOSYLATED A1C: CPT | Performed by: FAMILY MEDICINE

## 2023-11-17 PROCEDURE — G0008 ADMIN INFLUENZA VIRUS VAC: HCPCS | Performed by: FAMILY MEDICINE

## 2023-11-17 PROCEDURE — 91320 SARSCV2 VAC 30MCG TRS-SUC IM: CPT | Performed by: FAMILY MEDICINE

## 2023-11-17 PROCEDURE — 90480 ADMN SARSCOV2 VAC 1/ONLY CMP: CPT | Performed by: FAMILY MEDICINE

## 2023-11-17 PROCEDURE — G0439 PPPS, SUBSEQ VISIT: HCPCS | Performed by: FAMILY MEDICINE

## 2023-11-17 PROCEDURE — 99214 OFFICE O/P EST MOD 30 MIN: CPT | Mod: 25 | Performed by: FAMILY MEDICINE

## 2023-11-17 PROCEDURE — 90662 IIV NO PRSV INCREASED AG IM: CPT | Performed by: FAMILY MEDICINE

## 2023-11-17 PROCEDURE — 85018 HEMOGLOBIN: CPT | Performed by: FAMILY MEDICINE

## 2023-11-17 PROCEDURE — 80061 LIPID PANEL: CPT | Performed by: FAMILY MEDICINE

## 2023-11-17 PROCEDURE — 80053 COMPREHEN METABOLIC PANEL: CPT | Performed by: FAMILY MEDICINE

## 2023-11-17 PROCEDURE — 36415 COLL VENOUS BLD VENIPUNCTURE: CPT | Performed by: FAMILY MEDICINE

## 2023-11-17 RX ORDER — SIMVASTATIN 10 MG
TABLET ORAL
Qty: 90 TABLET | Refills: 4 | Status: SHIPPED | OUTPATIENT
Start: 2023-11-17

## 2023-11-17 RX ORDER — LOSARTAN POTASSIUM 50 MG/1
50 TABLET ORAL DAILY
Qty: 90 TABLET | Refills: 4 | Status: SHIPPED | OUTPATIENT
Start: 2023-11-17

## 2023-11-17 RX ORDER — CHLORTHALIDONE 25 MG/1
25 TABLET ORAL DAILY
Qty: 90 TABLET | Refills: 4 | Status: SHIPPED | OUTPATIENT
Start: 2023-11-17

## 2023-11-17 ASSESSMENT — PAIN SCALES - GENERAL: PAINLEVEL: NO PAIN (0)

## 2023-11-17 ASSESSMENT — PATIENT HEALTH QUESTIONNAIRE - PHQ9: SUM OF ALL RESPONSES TO PHQ QUESTIONS 1-9: 0

## 2023-11-17 NOTE — PROGRESS NOTES
Assessment & Plan     Medicare annual wellness visit, subsequent    Screen for colon cancer  - Fecal colorectal cancer screen FIT - Future (S+30); Future    Screening for lipid disorders  Pure hypercholesterolemia:  Stable, refill  - Simvastatin (Zocar) 10 MG tablet; Take one tablet by mouth every night at bedtime   - Lipid panel reflex to direct LDL Fasting; Future  - Lipid panel reflex to direct LDL Fasting    Benign essential hypertension  Improved with addition of losartan, refill. Plan to recheck labs today.   - Chlorthalidone (Hygroton) 25 MG tablet; Take 1 tablet by mouth daily  - Losartan (Cozaar) 50 MG tablet; take 1 tablet by mouth daily   - Hemoglobin; Future  - Comprehensive metabolic panel (BMP + Alb, Alk Phos, ALT, AST, Total. Bili, TP); Future  - Hemoglobin  - Comprehensive metabolic panel (BMP + Alb, Alk Phos, ALT, AST, Total. Bili, TP)    Impaired fasting glucose  - Hemoglobin A1c; Future  - Hemoglobin; Future  - Hemoglobin A1c  - Hemoglobin    History of melanoma    Need for COVID-19 vaccine  - COVID-19 12+ (2023-24) (PFIZER)    Need for influenza vaccination  - INFLUENZA VACCINE 65+ (FLUZONE HD)       See Patient Instructions    I edited this note to reflect my history, physical, assessment and plan.    Nehemias Wallace MD  [facility] is a 73 year [facility], presenting for the following health issues:  Recheck Medication        11/17/2023     8:04 AM   Additional Questions   Roomed by Rika LANGSTON   Accompanied by self         11/17/2023     8:04 AM   Patient Reported Additional Medications   Patient reports taking the following new medications no new meds     Discuss RSV vaccine, will get at pharm.   Interested in getting covid and flu shot today.   Denied wellness visit today, will come back.   Pt is fasting today, would like blood work.     History of Present Illness       Hyperlipidemia:  She presents for follow up of hyperlipidemia.   She is taking  "medication to lower cholesterol. She is not having myalgia or other side effects to statin medications.    Hypertension: She presents for follow up of hypertension.  She does check blood pressure  regularly outside of the clinic. Outpatient blood pressures have not been over 140/90. She follows a low salt diet.     She eats 0-1 servings of fruits and vegetables daily.She consumes 0 sweetened beverage(s) daily.She exercises with enough effort to increase her heart rate 10 to 19 minutes per day.  She exercises with enough effort to increase her heart rate 3 or less days per week.   She is taking medications regularly.     Checks periodically at home 130s/70s  Doesn't get symptoms anymore of blood pressure going too high or low     Annual Wellness Visit    Patient has been advised of split billing requirements and indicates understanding: Yes     Are you in the first 12 months of your Medicare Part B coverage?  No    Physical Health:  In general, how would you rate your overall physical health? excellent  Outside of work, how many days during the week do you exercise?4-5 days/week  Outside of work, approximately how many minutes a day do you exercise?30-45 minutes  If you drink alcohol do you typically have >3 drinks per day or >7 drinks per week? No  Do you usually eat at least 4 servings of fruit and vegetables a day, include whole grains & fiber and avoid regularly eating high fat or \"junk\" foods? No  Do you have any problems taking medications regularly? No  Do you have any side effects from medications? none  Needs assistance for the following daily activities: no assistance needed  Which of the following safety concerns are present in your home?  none identified   Hearing impairment: No  In the past 6 months, have you been bothered by leaking of urine? no    Mental Health:  In general, how would you rate your overall mental or emotional health? excellent      Today's PHQ-9 Score:       11/17/2023     8:54 AM "   PHQ-9 SCORE   PHQ-9 Total Score 0       Do you feel safe in your environment? Yes  Initial screening wrong answer clicked.    Have you ever done Advance Care Planning? (For example, a Health Directive, POLST, or a discussion with a medical provider or your loved ones about your wishes)? Yes, patient states has an Advance Care Planning document and will bring a copy to the clinic.    Fall risk:  Fallen 2 or more times in the past year?: No  Any fall with injury in the past year?: No    Cognitive Screenin) Repeat 3 items (Leader, Season, Table)    2) Clock draw: NORMAL  3) 3 item recall: Recalls 2 objects   Results: ABNORMAL clock, 1-2 items recalled: PROBABLE COGNITIVE IMPAIRMENT, **INFORM PROVIDER**    Mini-CogTM Copyright TADEO Cabello. Licensed by the author for use in [facility] [employer]; reprinted with permission (angelito@[facility]). All rights reserved.      Do you have sleep apnea, excessive snoring or daytime drowsiness? : no    Social History     Tobacco Use    Smoking status: Never    Smokeless tobacco: Never   Substance Use Topics    Alcohol use: No     Comment: very rare              No data to display              Do you have a current opioid prescription? No  Do you use any other controlled substances or medications that are not prescribed by a provider? None    Current providers sharing in care for this patient include:   Patient Care Team:  Nehemias Wallace MD as PCP - General (Family Practice)  Nehemias Wallace MD as Assigned PCP  Raymundo You MD as Assigned Heart and Vascular Provider  Jackelyn Fonseca PA-C as Physician Assistant (Dermatology)    The following health maintenance items are reviewed in Epic and correct as of today:  Health Maintenance   Topic Date Due    RSV VACCINE (Pregnancy & 60+) (1 - 1-dose 60+ series) Never done    INFLUENZA VACCINE (1) 2023    COVID-19 Vaccine ( - -24 season) 2023    FALL RISK ASSESSMENT  11/15/2023    MEDICARE  "ANNUAL WELLNESS VISIT  11/15/2023    COLORECTAL CANCER SCREENING  12/09/2023    DTAP/TDAP/TD IMMUNIZATION (2 - Td or Tdap) 08/21/2024    ANNUAL REVIEW OF HM ORDERS  11/17/2024    MAMMO SCREENING  12/26/2024    DEXA  11/16/2026    LIPID  11/15/2027    ADVANCE CARE PLANNING  11/15/2027    HEPATITIS C SCREENING  Completed    PHQ-2 (once per calendar year)  Completed    Pneumococcal Vaccine: 65+ Years  Completed    ZOSTER IMMUNIZATION  Completed    IPV IMMUNIZATION  Aged Out    HPV IMMUNIZATION  Aged Out    MENINGITIS IMMUNIZATION  Aged Out    RSV MONOCLONAL ANTIBODY  Aged Out       Patient has been advised of split billing requirements and indicates understanding: Yes    Appropriate preventive services were discussed with this patient, including applicable screening as appropriate for fall prevention, nutrition, physical activity, Tobacco-use cessation, weight loss and cognition.  Checklist reviewing preventive services available has been given to the patient.        Review of Systems         Objective    /76   Pulse 82   Temp 98.3  F (36.8  C) (Tympanic)   Resp 16   Ht 1.6 m (5' 3\")   Wt 56.7 kg (125 lb)   SpO2 98%   BMI 22.14 kg/m    Body mass index is 22.14 kg/m .  Physical Exam   GENERAL: healthy, alert and no distress  EYES: Eyes grossly normal to inspection, PERRL and conjunctivae and sclerae normal  RESP: lungs clear to auscultation - no rales, rhonchi or wheezes  CV: regular rate and rhythm, normal S1 S2, no S3 or S4, grade III murmur noted upon auscultation, click or rub, no peripheral edema and peripheral pulses strong  MS: no gross musculoskeletal defects noted, no edema  SKIN: no suspicious lesions or rashes  PSYCH: mentation appears normal, affect normal/bright      Cassy Orr DNP Student           "

## 2023-11-17 NOTE — LETTER
November 20, 2023      Aaron Walden  [address]  [facility] [address]        Dear ,    We are writing to inform you of your test results.    Normal electrolyte, liver, and kidney tests.  Glucose was 102, slightly above normal and A1c is in normal, so no diabetes, but some early pre-diabetes signs with the higher blood sugar.  Hemoglobin (red blood count) normal.  Cholesterol is normal.    Resulted Orders   Lipid panel reflex to direct LDL Fasting   Result Value Ref Range    Cholesterol 191 <200 mg/dL    Triglycerides 97 <150 mg/dL    Direct Measure HDL 76 >=50 mg/dL    LDL Cholesterol Calculated 96 <=100 mg/dL    Non HDL Cholesterol 115 <130 mg/dL    Narrative    Cholesterol  Desirable:  <200 mg/dL    Triglycerides  Normal:  Less than 150 mg/dL  Borderline High:  150-199 mg/dL  High:  200-499 mg/dL  Very High:  Greater than or equal to 500 mg/dL    Direct Measure HDL  Female:  Greater than or equal to 50 mg/dL   Male:  Greater than or equal to 40 mg/dL    LDL Cholesterol  Desirable:  <100mg/dL  Above Desirable:  100-129 mg/dL   Borderline High:  130-159 mg/dL   High:  160-189 mg/dL   Very High:  >= 190 mg/dL    Non HDL Cholesterol  Desirable:  130 mg/dL  Above Desirable:  130-159 mg/dL  Borderline High:  160-189 mg/dL  High:  190-219 mg/dL  Very High:  Greater than or equal to 220 mg/dL   Hemoglobin A1c   Result Value Ref Range    Hemoglobin A1C 5.2 0.0 - 5.6 %      Comment:      Normal <5.7%   Prediabetes 5.7-6.4%    Diabetes 6.5% or higher     Note: Adopted from ADA consensus guidelines.   Hemoglobin   Result Value Ref Range    Hemoglobin 12.9 11.7 - 15.7 g/dL   Comprehensive metabolic panel (BMP + Alb, Alk Phos, ALT, AST, Total. Bili, TP)   Result Value Ref Range    Sodium 142 135 - 145 mmol/L      Comment:      Reference intervals for this test were updated on 09/26/2023 to more accurately reflect our healthy population. There may be differences in the flagging of prior results with similar  values performed with this method. Interpretation of those prior results can be made in the context of the updated reference intervals.     Potassium 3.6 3.4 - 5.3 mmol/L    Carbon Dioxide (CO2) 32 (H) 22 - 29 mmol/L    Anion Gap 9 7 - 15 mmol/L    Urea Nitrogen 18.9 8.0 - 23.0 mg/dL    Creatinine 0.68 0.51 - 0.95 mg/dL    GFR Estimate >90 >60 mL/min/1.73m2    Calcium 9.6 8.8 - 10.2 mg/dL    Chloride 101 98 - 107 mmol/L    Glucose 102 (H) 70 - 99 mg/dL    Alkaline Phosphatase 57 40 - 150 U/L      Comment:      Reference intervals for this test were updated on 11/14/2023 to more accurately reflect our healthy population. There may be differences in the flagging of prior results with similar values performed with this method. Interpretation of those prior results can be made in the context of the updated reference intervals.    AST 21 0 - 45 U/L      Comment:      Reference intervals for this test were updated on 6/12/2023 to more accurately reflect our healthy population. There may be differences in the flagging of prior results with similar values performed with this method. Interpretation of those prior results can be made in the context of the updated reference intervals.    ALT 12 0 - 50 U/L      Comment:      Reference intervals for this test were updated on 6/12/2023 to more accurately reflect our healthy population. There may be differences in the flagging of prior results with similar values performed with this method. Interpretation of those prior results can be made in the context of the updated reference intervals.      Protein Total 7.5 6.4 - 8.3 g/dL    Albumin 4.7 3.5 - 5.2 g/dL    Bilirubin Total 0.4 <=1.2 mg/dL       If you have any questions or concerns, please call the clinic at the number listed above.       Sincerely,      Nehemias Wallace MD

## 2023-11-17 NOTE — PROGRESS NOTES
{PROVIDER CHARTING PREFERENCE:577558}    Juan Walker is a 73 year old, presenting for the following health issues:  Recheck Medication      11/17/2023     8:04 AM   Additional Questions   Roomed by Rika LANGSTON   Accompanied by self         11/17/2023     8:04 AM   Patient Reported Additional Medications   Patient reports taking the following new medications no new meds       History of Present Illness       Hyperlipidemia:  She presents for follow up of hyperlipidemia.   She is taking medication to lower cholesterol. She is not having myalgia or other side effects to statin medications.    Hypertension: She presents for follow up of hypertension.  She does check blood pressure  regularly outside of the clinic. Outpatient blood pressures have not been over 140/90. She follows a low salt diet.     She eats 0-1 servings of fruits and vegetables daily.She consumes 0 sweetened beverage(s) daily.She exercises with enough effort to increase her heart rate 10 to 19 minutes per day.  She exercises with enough effort to increase her heart rate 3 or less days per week.   She is taking medications regularly.     Annual Wellness Visit  {Split Bill scripting  The purpose of this visit is to discuss your medical history and prevent health problems before you are sick. You may be responsible for a co-pay, coinsurance, or deductible if your visit today includes services such as checking on a sore throat, having an x-ray or lab test, or treating and evaluating a new or existing condition :042181}  Patient has been advised of split billing requirements and indicates understanding: {Yes and No:182126}     Are you in the first 12 months of your Medicare Part B coverage?  { :778593}    Physical Health:  In general, how would you rate your overall physical health? { :219094}  Outside of work, how many days during the week do you exercise?{ :334062}  Outside of work, approximately how many minutes a day do you exercise?{  ":764921}  If you drink alcohol do you typically have >3 drinks per day or >7 drinks per week? { :966080}  Do you usually eat at least 4 servings of fruit and vegetables a day, include whole grains & fiber and avoid regularly eating high fat or \"junk\" foods? { :614853}  Do you have any problems taking medications regularly? { :766830}  Do you have any side effects from medications? { :774624}  Needs assistance for the following daily activities: { :734354}  Which of the following safety concerns are present in your home?  { :886029}   Hearing impairment: { :416897}  In the past 6 months, have you been bothered by leaking of urine? { :740190}    Mental Health:  In general, how would you rate your overall mental or emotional health? { :154436}  {Rooming Staff Patient needs a PHQ as part of the AWV.  Use this link to complete and then refresh the note to pull results Link to PHQ2 Assessment :879903}  {USE TO PULL IN PHQ RESULTS FOR TODAY:409395}    Do you feel safe in your environment? { :893023}    Have you ever done Advance Care Planning? (For example, a Health Directive, POLST, or a discussion with a medical provider or your loved ones about your wishes)? { :931539}    Fall risk:  { :099848}  {If any of the above assessments are answered yes, consider ordering appropriate referrals (Optional):617946}  Cognitive Screening: { :241534}    {Do you have sleep apnea, excessive snoring or daytime drowsiness? (Optional):026482}    Social History     Tobacco Use    Smoking status: Never    Smokeless tobacco: Never   Substance Use Topics    Alcohol use: No     Comment: very rare     {Rooming staff  Click this link to complete the Prescreen if response below is not for today's visit  Alcohol Use Prescreen >3 drinks/day or > 7 drinks/week.  If the prescreen question answer is YES, complete the full AUDIT  :451273}         No data to display            {add AUDIT responses (Optional) (A score of 7 for adult men is an " indication of hazardous drinking; a score of 8 or more is an indication of an alcohol use disorder.  A score of 7 or more for adult women is an indication of hazardous drinking or an alchohol use disorder):040915}  Do you have a current opioid prescription? { :704220}  Do you use any other controlled substances or medications that are not prescribed by a provider? {Substance Use :020112}  {Provider  If there are gaps in the social history shown above, please follow the link and refresh the note Link to Social and Substance History :650366}  Current providers sharing in care for this patient include: {Rooming staff:  Please update Care Team in Rooming Activity, refresh this note and then delete this statement}  Patient Care Team:  Nehemias Wallace MD as PCP - General (Family Practice)  Nehemias Wallace MD as Assigned PCP  Raymundo You MD as Assigned Heart and Vascular Provider  Jackelyn Fonseca PA-C as Physician Assistant (Dermatology)    The following health maintenance items are reviewed in Epic and correct as of today:  Health Maintenance   Topic Date Due    RSV VACCINE (Pregnancy & 60+) (1 - 1-dose 60+ series) Never done    INFLUENZA VACCINE (1) 09/01/2023    COVID-19 Vaccine (5 - 2023-24 season) 09/01/2023    ANNUAL REVIEW OF HM ORDERS  11/15/2023    FALL RISK ASSESSMENT  11/15/2023    MEDICARE ANNUAL WELLNESS VISIT  11/15/2023    COLORECTAL CANCER SCREENING  12/09/2023    DTAP/TDAP/TD IMMUNIZATION (2 - Td or Tdap) 08/21/2024    MAMMO SCREENING  12/26/2024    DEXA  11/16/2026    LIPID  11/15/2027    ADVANCE CARE PLANNING  11/15/2027    HEPATITIS C SCREENING  Completed    PHQ-2 (once per calendar year)  Completed    Pneumococcal Vaccine: 65+ Years  Completed    ZOSTER IMMUNIZATION  Completed    IPV IMMUNIZATION  Aged Out    HPV IMMUNIZATION  Aged Out    MENINGITIS IMMUNIZATION  Aged Out    RSV MONOCLONAL ANTIBODY  Aged Out       Patient has been advised of split billing requirements  "and indicates understanding: {YES / NO:557349::\"Yes\"}    Appropriate preventive services were discussed with this patient, including applicable screening as appropriate for fall prevention, nutrition, physical activity, Tobacco-use cessation, weight loss and cognition.  Checklist reviewing preventive services available has been given to the patient.  {additonal problems for provider to add (Optional):408766}      Review of Systems   {ROS COMP (Optional):234522}      Objective    There were no vitals taken for this visit.  There is no height or weight on file to calculate BMI.  Physical Exam   {Exam List (Optional):685403}    {Diagnostic Test Results (Optional):746061}    {AMBULATORY ATTESTATION (Optional):355414}              "

## 2023-12-18 ENCOUNTER — LAB (OUTPATIENT)
Dept: LAB | Facility: CLINIC | Age: 73
End: 2023-12-18
Payer: COMMERCIAL

## 2023-12-18 DIAGNOSIS — Z12.11 SPECIAL SCREENING FOR MALIGNANT NEOPLASMS, COLON: Primary | ICD-10-CM

## 2023-12-18 PROCEDURE — 82274 ASSAY TEST FOR BLOOD FECAL: CPT | Performed by: FAMILY MEDICINE

## 2023-12-22 LAB — HEMOCCULT STL QL IA: NEGATIVE

## 2024-01-30 ENCOUNTER — HOSPITAL ENCOUNTER (OUTPATIENT)
Dept: CARDIOLOGY | Facility: CLINIC | Age: 74
Discharge: HOME | End: 2024-01-30
Attending: INTERNAL MEDICINE | Admitting: INTERNAL MEDICINE
Payer: COMMERCIAL

## 2024-01-30 DIAGNOSIS — I35.0 AORTIC VALVE STENOSIS, ETIOLOGY OF CARDIAC VALVE DISEASE UNSPECIFIED: ICD-10-CM

## 2024-01-30 PROCEDURE — 93321 DOPPLER ECHO F-UP/LMTD STD: CPT | Mod: 26 | Performed by: INTERNAL MEDICINE

## 2024-01-30 PROCEDURE — 93325 DOPPLER ECHO COLOR FLOW MAPG: CPT | Mod: 26 | Performed by: INTERNAL MEDICINE

## 2024-01-30 PROCEDURE — 93350 STRESS TTE ONLY: CPT | Mod: 26 | Performed by: INTERNAL MEDICINE

## 2024-01-30 PROCEDURE — 93325 DOPPLER ECHO COLOR FLOW MAPG: CPT | Mod: TC

## 2024-01-30 PROCEDURE — 93016 CV STRESS TEST SUPVJ ONLY: CPT | Performed by: INTERNAL MEDICINE

## 2024-01-30 PROCEDURE — 93018 CV STRESS TEST I&R ONLY: CPT | Performed by: INTERNAL MEDICINE

## 2024-01-30 NOTE — PROGRESS NOTES
CARDIOLOGY VISIT    REASON FOR VISIT: Aortic stenosis    SUBJECTIVE:  73-year-old female seen for aortic stenosis.  She has dyslipidemia, GERD, irritable bowel.     Echo July 2021 showed EF 60%, normal RV, aortic stenosis with mean 21 mmHg, V-max 2.1 m/s, area 0.9 cm , DI 0.27, SVI 37 ml/m2.     Echo March 2022 showed EF 55%, aortic stenosis with mean 26 mmHg, V-max 3.3 m/s, area 0.9 cm , DI 0.28, SVI 41 ml/m2.     Echo January 2023 showed EF 55%, aortic stenosis with mean 28 mmHg, V-max 3.8 m/s, area 0.8 cm , DI 0.24, SVI 37 mls/m2.     Echo July 2023 showed EF 55%, aortic stenosis with mean 31 mmHg, V-max 3.6 m/s, area 0.8 cm , DI 0.25, SVI 43 ml/m2.    Stress echo January 2024 showed EF 60%, anteroseptal hypokinesis with stress, aortic stenosis with mean 32 mmHg, V-max 3.6 m/s, area 0.7 cm , DI 0.25.    She has been doing well recently.  She denies any shortness of breath, chest pain, or other new symptoms.  She walks on her treadmill every other day at for 30 minutes, 2.6 mph, up to 2% incline.  She denies decreased exercise capacity or any exertional symptoms.    MEDICATIONS:  Current Outpatient Medications   Medication    acetaminophen (TYLENOL) 325 MG tablet    aspirin 81 MG EC tablet    ASPIRIN PO    Calcium Carbonate-Vit D-Min (CALTRATE 600+D PLUS MINERALS) 600-800 MG-UNIT TABS    chlorthalidone (HYGROTON) 25 MG tablet    clobetasol (TEMOVATE) 0.05 % external solution    CRANBERRY PO    Erin, Zingiber officinalis, (ERIN PO)    losartan (COZAAR) 50 MG tablet    simvastatin (ZOCOR) 10 MG tablet    VITAMIN D, CHOLECALCIFEROL, PO     No current facility-administered medications for this visit.       ALLERGIES:  Allergies   Allergen Reactions    Nkda [No Known Drug Allergy]        REVIEW OF SYSTEMS:  Constitutional:  No weight loss, fever, chills  HEENT:  Eyes:  No visual loss, blurred vision, double vision or yellow sclerae. No hearing loss, sneezing, congestion, runny nose or sore throat.  Skin:  No rash  "or itching.  Cardiovascular: per HPI  Respiratory: per HPI  GI:  No anorexia, nausea, vomiting or diarrhea. No abdominal pain or blood.  :  No dysurea, hematuria  Neurologic:  No headache, paralysis, ataxia, numbness or tingling in the extremities. No change in bowel or bladder control.  Musculoskeletal:  No muscle pain  Hematologic:  No bleeding or bruising.  Lymphatics:  No enlarged nodes. No history of splenectomy.  Endocrine:  No reports of sweating, cold or heat intolerance. No polyuria or polydipsia.  Allergies:  No history of asthma, hives, eczema or rhinitis.    PHYSICAL EXAM:  /78 (BP Location: Left arm)   Pulse 82   Resp 14   Ht 1.6 m (5' 3\")   Wt 59 kg (130 lb)   SpO2 98%   BMI 23.03 kg/m    Constitutional: awake, alert, no distress  Eyes: PERRL, sclera nonicteric  ENT: trachea midline  Respiratory: Lungs clear  Cardiovascular: Regular rate and rhythm, 2/6 mid-to-late peaking systolic ejection murmur  GI: nondistended, nontender, bowel sounds present  Lymph/Hematologic: no lymphadenopathy  Skin: dry, no rash  Musculoskeletal: good muscle tone, strength 5/5 in upper and lower extremities  Neurologic: no focal deficits  Neuropsychiatric: appropriate affact    DATA:  Lab: November 2023: Potassium 3.6, creatinine 0.7  Recent Labs   Lab Test 11/17/23  0916 11/15/22  0907   CHOL 191 180   HDL 76 69   LDL 96 86   TRIG 97 123     ASSESSMENT:  73-year-old female seen for aortic stenosis.  She has no concerning symptoms.  Aortic stenosis is in the moderate to very early severe range.  She did have an apical wall motion on her echo.  We talked about the possibility of doing an angiogram now.  Since she is completely asymptomatic, she had no EKG changes, and no drop in blood pressure on her stress test, she prefers medical management for now.  She of course will need an angiogram when she needs her valve replaced which will probably be within the next year.    RECOMMENDATIONS:  1.  Moderate  to severe " aortic stenosis  -Echo in 6 months  -Continue aspirin and medical therapy    Follow-up in 6 months after echo.    Raymundo You MD  Cardiology - [facility]  Pager:  [phone]  Text Page  February 2, 2024

## 2024-01-31 ENCOUNTER — HOSPITAL ENCOUNTER (OUTPATIENT)
Dept: MAMMOGRAPHY | Facility: CLINIC | Age: 74
Discharge: HOME | End: 2024-01-31
Attending: FAMILY MEDICINE | Admitting: FAMILY MEDICINE
Payer: COMMERCIAL

## 2024-01-31 DIAGNOSIS — Z12.31 VISIT FOR SCREENING MAMMOGRAM: ICD-10-CM

## 2024-01-31 PROCEDURE — 77063 BREAST TOMOSYNTHESIS BI: CPT

## 2024-02-02 ENCOUNTER — OFFICE VISIT (OUTPATIENT)
Dept: CARDIOLOGY | Facility: CLINIC | Age: 74
End: 2024-02-02
Attending: INTERNAL MEDICINE
Payer: COMMERCIAL

## 2024-02-02 VITALS
RESPIRATION RATE: 14 BRPM | HEART RATE: 82 BPM | BODY MASS INDEX: 23.04 KG/M2 | OXYGEN SATURATION: 98 % | DIASTOLIC BLOOD PRESSURE: 78 MMHG | WEIGHT: 130 LBS | HEIGHT: 63 IN | SYSTOLIC BLOOD PRESSURE: 137 MMHG

## 2024-02-02 DIAGNOSIS — I35.0 AORTIC VALVE STENOSIS, ETIOLOGY OF CARDIAC VALVE DISEASE UNSPECIFIED: ICD-10-CM

## 2024-02-02 PROCEDURE — 99214 OFFICE O/P EST MOD 30 MIN: CPT | Performed by: INTERNAL MEDICINE

## 2024-02-02 ASSESSMENT — PAIN SCALES - GENERAL: PAINLEVEL: NO PAIN (0)

## 2024-02-02 NOTE — LETTER
2/2/2024    Nehemias Wallace MD  [address] [address]    RE: Tri Walden       Dear Colleague,     I had the pleasure of seeing Tri Walden in the [facility].  CARDIOLOGY VISIT    REASON FOR VISIT: Aortic stenosis    SUBJECTIVE:  73-year-old female seen for aortic stenosis.  She has dyslipidemia, GERD, irritable bowel.     Echo July 2021 showed EF 60%, normal RV, aortic stenosis with mean 21 mmHg, V-max 2.1 m/s, area 0.9 cm , DI 0.27, SVI 37 ml/m2.     Echo March 2022 showed EF 55%, aortic stenosis with mean 26 mmHg, V-max 3.3 m/s, area 0.9 cm , DI 0.28, SVI 41 ml/m2.     Echo January 2023 showed EF 55%, aortic stenosis with mean 28 mmHg, V-max 3.8 m/s, area 0.8 cm , DI 0.24, SVI 37 mls/m2.     Echo July 2023 showed EF 55%, aortic stenosis with mean 31 mmHg, V-max 3.6 m/s, area 0.8 cm , DI 0.25, SVI 43 ml/m2.    Stress echo January 2024 showed EF 60%, anteroseptal hypokinesis with stress, aortic stenosis with mean 32 mmHg, V-max 3.6 m/s, area 0.7 cm , DI 0.25.    She has been doing well recently.  She denies any shortness of breath, chest pain, or other new symptoms.  She walks on her treadmill every other day at for 30 minutes, 2.6 mph, up to 2% incline.  She denies decreased exercise capacity or any exertional symptoms.    MEDICATIONS:  Current Outpatient Medications   Medication    acetaminophen (TYLENOL) 325 MG tablet    aspirin 81 MG EC tablet    ASPIRIN PO    Calcium Carbonate-Vit D-Min (CALTRATE 600+D PLUS MINERALS) 600-800 MG-UNIT TABS    chlorthalidone (HYGROTON) 25 MG tablet    clobetasol (TEMOVATE) 0.05 % external solution    CRANBERRY PO    Erin, Zingiber officinalis, (ERIN PO)    losartan (COZAAR) 50 MG tablet    simvastatin (ZOCOR) 10 MG tablet    VITAMIN D, CHOLECALCIFEROL, PO     No current facility-administered medications for this visit.       ALLERGIES:  Allergies   Allergen Reactions    Nkda [No Known Drug Allergy]        REVIEW OF  "SYSTEMS:  Constitutional:  No weight loss, fever, chills  HEENT:  Eyes:  No visual loss, blurred vision, double vision or yellow sclerae. No hearing loss, sneezing, congestion, runny nose or sore throat.  Skin:  No rash or itching.  Cardiovascular: per HPI  Respiratory: per HPI  GI:  No anorexia, nausea, vomiting or diarrhea. No abdominal pain or blood.  :  No dysurea, hematuria  Neurologic:  No headache, paralysis, ataxia, numbness or tingling in the extremities. No change in bowel or bladder control.  Musculoskeletal:  No muscle pain  Hematologic:  No bleeding or bruising.  Lymphatics:  No enlarged nodes. No history of splenectomy.  Endocrine:  No reports of sweating, cold or heat intolerance. No polyuria or polydipsia.  Allergies:  No history of asthma, hives, eczema or rhinitis.    PHYSICAL EXAM:  /78 (BP Location: Left arm)   Pulse 82   Resp 14   Ht 1.6 m (5' 3\")   Wt 59 kg (130 lb)   SpO2 98%   BMI 23.03 kg/m    Constitutional: awake, alert, no distress  Eyes: PERRL, sclera nonicteric  ENT: trachea midline  Respiratory: Lungs clear  Cardiovascular: Regular rate and rhythm, 2/6 mid-to-late peaking systolic ejection murmur  GI: nondistended, nontender, bowel sounds present  Lymph/Hematologic: no lymphadenopathy  Skin: dry, no rash  Musculoskeletal: good muscle tone, strength 5/5 in upper and lower extremities  Neurologic: no focal deficits  Neuropsychiatric: appropriate affact    DATA:  Lab: November 2023: Potassium 3.6, creatinine 0.7  Recent Labs   Lab Test 11/17/23  0916 11/15/22  0907   CHOL 191 180   HDL 76 69   LDL 96 86   TRIG 97 123     ASSESSMENT:  73-year-old female seen for aortic stenosis.  She has no concerning symptoms.  Aortic stenosis is in the moderate to very early severe range.  She did have an apical wall motion on her echo.  We talked about the possibility of doing an angiogram now.  Since she is completely asymptomatic, she had no EKG changes, and no drop in blood pressure on " her stress test, she prefers medical management for now.  She of course will need an angiogram when she needs her valve replaced which will probably be within the next year.    RECOMMENDATIONS:  1.  Moderate  to severe aortic stenosis  -Echo in 6 months  -Continue aspirin and medical therapy    Follow-up in 6 months after echo.    Raymundo You MD  Cardiology - [facility]  Pager:  [phone]  Text Page  February 2, 2024        Thank you for allowing me to participate in the care of your patient.      Sincerely,     Raymundo You MD     [facility]  cc:   Raymundo You MD

## 2024-02-06 ENCOUNTER — OFFICE VISIT (OUTPATIENT)
Dept: DERMATOLOGY | Facility: CLINIC | Age: 74
End: 2024-02-06
Payer: COMMERCIAL

## 2024-02-06 DIAGNOSIS — D18.01 CHERRY ANGIOMA: ICD-10-CM

## 2024-02-06 DIAGNOSIS — Z85.820 HISTORY OF MELANOMA: ICD-10-CM

## 2024-02-06 DIAGNOSIS — L66.12 FRONTAL FIBROSING ALOPECIA: Primary | ICD-10-CM

## 2024-02-06 DIAGNOSIS — L82.1 SEBORRHEIC KERATOSIS: ICD-10-CM

## 2024-02-06 DIAGNOSIS — Z85.828 HISTORY OF BASAL CELL CANCER: ICD-10-CM

## 2024-02-06 DIAGNOSIS — D22.9 MULTIPLE BENIGN NEVI: ICD-10-CM

## 2024-02-06 DIAGNOSIS — D48.5 NEOPLASM OF UNCERTAIN BEHAVIOR OF SKIN: ICD-10-CM

## 2024-02-06 DIAGNOSIS — L81.4 LENTIGO: ICD-10-CM

## 2024-02-06 PROCEDURE — 88341 IMHCHEM/IMCYTCHM EA ADD ANTB: CPT | Performed by: DERMATOLOGY

## 2024-02-06 PROCEDURE — 88305 TISSUE EXAM BY PATHOLOGIST: CPT | Performed by: DERMATOLOGY

## 2024-02-06 PROCEDURE — 99213 OFFICE O/P EST LOW 20 MIN: CPT | Mod: 25 | Performed by: PHYSICIAN ASSISTANT

## 2024-02-06 PROCEDURE — 88342 IMHCHEM/IMCYTCHM 1ST ANTB: CPT | Performed by: DERMATOLOGY

## 2024-02-06 PROCEDURE — 11102 TANGNTL BX SKIN SINGLE LES: CPT | Performed by: PHYSICIAN ASSISTANT

## 2024-02-06 ASSESSMENT — PAIN SCALES - GENERAL: PAINLEVEL: NO PAIN (0)

## 2024-02-06 NOTE — PROGRESS NOTES
Tri Walden is an extremely pleasant 73 year old year old female patient here today for skin check. She denies any painful or bleeding skin lesions. No changing nevi. Patient has no other skin complaints today.  Remainder of the HPI, Meds, PMH, Allergies, FH, and SH was reviewed in chart.    Pertinent Hx:      History of melanoma on right upper arm 0.2 mm 2/2022, History of BCC on left upper arm.    Past Medical History:   Diagnosis Date    Basal cell carcinoma     Hypertension     Unsure if the date    Malignant melanoma (H)     Nonrheumatic aortic valve stenosis 3/14/2023       Past Surgical History:   Procedure Laterality Date    ABLATE VEIN VARICOSE RADIO FREQUENCY WITHOUT PHLEBECTOMY MULTIPLE STAB  12/06/2012    Procedure: ABLATE VEIN VARICOSE RADIO FREQUENCY WITHOUT PHLEBECTOMY MULTIPLE STAB;  Bilateral Ablation of Varicose Veins;  Surgeon: Fredis Marks MD;  Location: [facility]    REVERSE ARTHROPLASTY SHOULDER Left 3/22/2023    Procedure: left REVERSE Total Shoulder Arthroplasty;  Surgeon: Escobar Magana MD;  Location: [facility]        Family History   Problem Relation Age of Onset    Arthritis Mother     Diabetes Mother         type 2    Heart Disease Father     Coronary Artery Disease Father     Cancer Paternal Grandmother         breast cancer    Heart Disease Paternal Grandfather     Cancer Sister         right kindney       Social History     Socioeconomic History    Marital status:      Spouse name: Not on file    Number of children: Not on file    Years of education: Not on file    Highest education level: Not on file   Occupational History    Occupation:      Employer: [employer]   Tobacco Use    Smoking status: Never    Smokeless tobacco: Never   Vaping Use    Vaping Use: Never used   Substance and Sexual Activity    Alcohol use: No     Comment: very rare    Drug use: Never    Sexual activity: Not Currently     Partners: Male     Birth control/protection:  None     Comment: tubal ligation   Other Topics Concern    Parent/sibling w/ CABG, MI or angioplasty before 65F 55M? Yes     Comment: Father at age 39   Social History Narrative    Not on file     Social Determinants of Health     Financial Resource Strain: Low Risk  (11/17/2023)    Financial Resource Strain     Within the past 12 months, have you or your family members you live with been unable to get utilities (heat, electricity) when it was really needed?: No   Food Insecurity: Low Risk  (11/17/2023)    Food Insecurity     Within the past 12 months, did you worry that your food would run out before you got money to buy more?: No     Within the past 12 months, did the food you bought just not last and you didn t have money to get more?: No   Transportation Needs: Low Risk  (11/17/2023)    Transportation Needs     Within the past 12 months, has lack of transportation kept you from medical appointments, getting your medicines, non-medical meetings or appointments, work, or from getting things that you need?: No   Physical Activity: Not on file   Stress: Not on file   Social Connections: Not on file   Interpersonal Safety: Low Risk  (11/17/2023)    Interpersonal Safety     Do you feel physically and emotionally safe where you currently live?: Yes     Within the past 12 months, have you been hit, slapped, kicked or otherwise physically hurt by someone?: No     Within the past 12 months, have you been humiliated or emotionally abused in other ways by your partner or ex-partner?: No   Recent Concern: Interpersonal Safety - High Risk (11/17/2023)    Interpersonal Safety     Do you feel physically and emotionally safe where you currently live?: No     Within the past 12 months, have you been hit, slapped, kicked or otherwise physically hurt by someone?: No     Within the past 12 months, have you been humiliated or emotionally abused in other ways by your partner or ex-partner?: No   Housing Stability: Low Risk   (11/17/2023)    Housing Stability     Do you have housing? : Yes     Are you worried about losing your housing?: No       Outpatient Encounter Medications as of 2/6/2024   Medication Sig Dispense Refill    acetaminophen (TYLENOL) 325 MG tablet Take 2 tablets (650 mg) by mouth every 4 hours as needed for other (mild pain) 100 tablet 0    ASPIRIN PO Take 81 mg by mouth daily      Calcium Carbonate-Vit D-Min (CALTRATE 600+D PLUS MINERALS) 600-800 MG-UNIT TABS Take 1 tablet by mouth 2 times daily      chlorthalidone (HYGROTON) 25 MG tablet Take 1 tablet (25 mg) by mouth daily 90 tablet 4    clobetasol (TEMOVATE) 0.05 % external solution Apply once to twice daily as needed. 50 mL 4    CRANBERRY PO Take by mouth daily       Ginger, Zingiber officinalis, (GINGER PO) Take by mouth daily       losartan (COZAAR) 50 MG tablet Take 1 tablet (50 mg) by mouth daily 90 tablet 4    simvastatin (ZOCOR) 10 MG tablet TAKE ONE TABLET BY MOUTH EVERY NIGHT AT BEDTIME 90 tablet 4    VITAMIN D, CHOLECALCIFEROL, PO Take by mouth daily       No facility-administered encounter medications on file as of 2/6/2024.             O:   NAD, WDWN, Alert & Oriented, Mood & Affect wnl, Vitals stable   Here today alone   There were no vitals taken for this visit.   General appearance normal   Vitals stable   Alert, oriented and in no acute distress     Well healed scar on right and left arm   Stuck on papules and brown macules on trunk and ext   Red papules on trunk  Brown papules and macules with regular pigment network and borders on torso and extremities   0.6 cm brown irregular macule on right upper arm   The remainder of skin exam is normal       Eyes: Conjunctivae/lids:Normal     ENT: Lips: normal    MSK:Normal    Cardiovascular: peripheral edema none    Pulm: Breathing Normal    Neuro/Psych: Orientation:Alert and Orientedx3 ; Mood/Affect:normal   A/P:  1. FFA- controlled  Continue clobetasol solution 1-2 times weekly.  2. R/O lentigo vs MIS on  right upper arm posterior   TANGENTIAL BIOPSY SENT OUT:  After consent, anesthesia with LEC and prep, tangential excision performed and specimen sent out for permanent section histology.  No complications and routine wound care. Patient told to call our office in 1-2 weeks for result.      3.  History of Melanoma on right upper arm   MELANOMA DISCUSSED WITH PATIENT:  I discussed the specifics of tumor, prognosis, metachronous melanoma, self exam, and genetics with the patient. I explained the need for monthly skin exams including and taught the patient how to do this. Patient was asked about new or changing moles . I discussed with patient signs and symptoms that could arise in the setting of recurrent locoregional or metastatic disease. In addition, the need to undergo every 6 month dermatologic full skin survey and evaluation given that patients with a diagnosis of melanoma are at risk of recurrence (local and distant) and of subsequent de rosalee melanoma.     4. Seborrheic keratosis, lentigo, angioma, benign nevi, history of bcc, hemangioma on left popliteal fossa, history of BCC  It was a pleasure speaking to Tri Walden today.  BENIGN LESIONS DISCUSSED WITH PATIENT:  I discussed the specifics of tumor, prognosis, and genetics of benign lesions.  I explained that treatment of these lesions would be purely cosmetic and not medically neccessary.  I discussed with patient different removal options including excision, cautery and /or laser.      Nature and genetics of benign skin lesions dicussed with patient.  Signs and Symptoms of skin cancer discussed with patient.  ABCDEs of melanoma reviewed with patient.  Patient encouraged to perform monthly skin exams.  UV precautions reviewed with patient.  Risks of non-melanoma skin cancer discussed with patient   Return to clinic pending biopsy results.

## 2024-02-06 NOTE — PATIENT INSTRUCTIONS
Wound Care Instructions     FOR SUPERFICIAL WOUNDS     [facility] [phone]  [facility] [phone]      AFTER 24 HOURS YOU SHOULD REMOVE THE BANDAGE AND BEGIN DAILY DRESSING CHANGES AS FOLLOWS:     1) Remove Dressing.     2) Clean and dry the area with tap water using a Q-tip or sterile gauze pad.     3) Apply Vaseline, Aquaphor, Polysporin ointment or Bacitracin ointment over entire wound.  Do NOT use Neosporin ointment.     4) Cover the wound with a band-aid, or a sterile non-stick gauze pad and micropore paper tape      REPEAT THESE INSTRUCTIONS AT LEAST ONCE A DAY UNTIL THE WOUND HAS COMPLETELY HEALED.    It is an old wives tale that a wound heals better when it is exposed to air and allowed to dry out. The wound will heal faster with a better cosmetic result if it is kept moist with ointment and covered with a bandage.    **Do not let the wound dry out.**      Supplies Needed:      *Cotton tipped applicators (Q-tips)    *Polysporin Ointment or Bacitracin Ointment (NOT NEOSPORIN)    *Band-aids or non-stick gauze pads and micropore paper tape.      PATIENT INFORMATION:    During the healing process you will notice a number of changes. All wounds develop a small halo of redness surrounding the wound.  This means healing is occurring. Severe itching with extensive redness usually indicates sensitivity to the ointment or bandage tape used to dress the wound.  You should call our office if this develops.      Swelling  and/or discoloration around your surgical site is common, particularly when performed around the eye.    All wounds normally drain.  The larger the wound the more drainage there will be.  After 7-10 days, you will notice the wound beginning to shrink and new skin will begin to grow.  The wound is healed when you can see skin has formed over the entire area.  A healed wound has a healthy, shiny look to the surface and is red to dark pink in color to normalize.   Wounds may take approximately 4-6 weeks to heal.  Larger wounds may take 6-8 weeks.  After the wound is healed you may discontinue dressing changes.    You may experience a sensation of tightness as your wound heals. This is normal and will gradually subside.    Your healed wound may be sensitive to temperature changes. This sensitivity improves with time, but if you re having a lot of discomfort, try to avoid temperature extremes.    Patients frequently experience itching after their wound appears to have healed because of the continue healing under the skin.  Plain Vaseline will help relieve the itching.    POSSIBLE COMPLICATIONS    BLEEDING:    Leave the bandage in place.  Use tightly rolled up gauze or a cloth to apply direct pressure over the bandage for 30  minutes.  Reapply pressure for an additional 30 minutes if necessary  Use additional gauze and tape to maintain pressure once the bleeding has stopped.

## 2024-02-06 NOTE — LETTER
2/6/2024         RE: Tri Walden  [address]  [facility] [place] [address]        Dear Colleague,    Thank you for referring your patient, Tri Walden, to the [facility]. Please see a copy of my visit note below.    Tri Walden is an extremely pleasant 73 year old year old female patient here today for skin check. She denies any painful or bleeding skin lesions. No changing nevi. Patient has no other skin complaints today.  Remainder of the HPI, Meds, PMH, Allergies, FH, and SH was reviewed in chart.    Pertinent Hx:      History of melanoma on right upper arm 0.2 mm 2/2022, History of BCC on left upper arm.    Past Medical History:   Diagnosis Date     Basal cell carcinoma      Hypertension     Unsure if the date     Malignant melanoma (H)      Nonrheumatic aortic valve stenosis 3/14/2023       Past Surgical History:   Procedure Laterality Date     ABLATE VEIN VARICOSE RADIO FREQUENCY WITHOUT PHLEBECTOMY MULTIPLE STAB  12/06/2012    Procedure: ABLATE VEIN VARICOSE RADIO FREQUENCY WITHOUT PHLEBECTOMY MULTIPLE STAB;  Bilateral Ablation of Varicose Veins;  Surgeon: Fredis Marks MD;  Location: [facility]     REVERSE ARTHROPLASTY SHOULDER Left 3/22/2023    Procedure: left REVERSE Total Shoulder Arthroplasty;  Surgeon: Escobar Magana MD;  Location: [facility]        Family History   Problem Relation Age of Onset     Arthritis Mother      Diabetes Mother         type 2     Heart Disease Father      Coronary Artery Disease Father      Cancer Paternal Grandmother         breast cancer     Heart Disease Paternal Grandfather      Cancer Sister         right kindney       Social History     Socioeconomic History     Marital status:      Spouse name: Not on file     Number of children: Not on file     Years of education: Not on file     Highest education level: Not on file   Occupational History     Occupation:      Employer: [employer]   Tobacco Use      Smoking status: Never     Smokeless tobacco: Never   Vaping Use     Vaping Use: Never used   Substance and Sexual Activity     Alcohol use: No     Comment: very rare     Drug use: Never     Sexual activity: Not Currently     Partners: Male     Birth control/protection: None     Comment: tubal ligation   Other Topics Concern     Parent/sibling w/ CABG, MI or angioplasty before 65F 55M? Yes     Comment: Father at age 39   Social History Narrative     Not on file     Social Determinants of Health     Financial Resource Strain: Low Risk  (11/17/2023)    Financial Resource Strain      Within the past 12 months, have you or your family members you live with been unable to get utilities (heat, electricity) when it was really needed?: No   Food Insecurity: Low Risk  (11/17/2023)    Food Insecurity      Within the past 12 months, did you worry that your food would run out before you got money to buy more?: No      Within the past 12 months, did the food you bought just not last and you didn t have money to get more?: No   Transportation Needs: Low Risk  (11/17/2023)    Transportation Needs      Within the past 12 months, has lack of transportation kept you from medical appointments, getting your medicines, non-medical meetings or appointments, work, or from getting things that you need?: No   Physical Activity: Not on file   Stress: Not on file   Social Connections: Not on file   Interpersonal Safety: Low Risk  (11/17/2023)    Interpersonal Safety      Do you feel physically and emotionally safe where you currently live?: Yes      Within the past 12 months, have you been hit, slapped, kicked or otherwise physically hurt by someone?: No      Within the past 12 months, have you been humiliated or emotionally abused in other ways by your partner or ex-partner?: No   Recent Concern: Interpersonal Safety - High Risk (11/17/2023)    Interpersonal Safety      Do you feel physically and emotionally safe where you currently  live?: No      Within the past 12 months, have you been hit, slapped, kicked or otherwise physically hurt by someone?: No      Within the past 12 months, have you been humiliated or emotionally abused in other ways by your partner or ex-partner?: No   Housing Stability: Low Risk  (11/17/2023)    Housing Stability      Do you have housing? : Yes      Are you worried about losing your housing?: No       Outpatient Encounter Medications as of 2/6/2024   Medication Sig Dispense Refill     acetaminophen (TYLENOL) 325 MG tablet Take 2 tablets (650 mg) by mouth every 4 hours as needed for other (mild pain) 100 tablet 0     ASPIRIN PO Take 81 mg by mouth daily       Calcium Carbonate-Vit D-Min (CALTRATE 600+D PLUS MINERALS) 600-800 MG-UNIT TABS Take 1 tablet by mouth 2 times daily       chlorthalidone (HYGROTON) 25 MG tablet Take 1 tablet (25 mg) by mouth daily 90 tablet 4     clobetasol (TEMOVATE) 0.05 % external solution Apply once to twice daily as needed. 50 mL 4     CRANBERRY PO Take by mouth daily        Ginger, Zingiber officinalis, (GINGER PO) Take by mouth daily        losartan (COZAAR) 50 MG tablet Take 1 tablet (50 mg) by mouth daily 90 tablet 4     simvastatin (ZOCOR) 10 MG tablet TAKE ONE TABLET BY MOUTH EVERY NIGHT AT BEDTIME 90 tablet 4     VITAMIN D, CHOLECALCIFEROL, PO Take by mouth daily       No facility-administered encounter medications on file as of 2/6/2024.             O:   NAD, WDWN, Alert & Oriented, Mood & Affect wnl, Vitals stable   Here today alone   There were no vitals taken for this visit.   General appearance normal   Vitals stable   Alert, oriented and in no acute distress     Well healed scar on right and left arm   Stuck on papules and brown macules on trunk and ext   Red papules on trunk  Brown papules and macules with regular pigment network and borders on torso and extremities   0.6 cm brown irregular macule on right upper arm   The remainder of skin exam is normal       Eyes:  Conjunctivae/lids:Normal     ENT: Lips: normal    MSK:Normal    Cardiovascular: peripheral edema none    Pulm: Breathing Normal    Neuro/Psych: Orientation:Alert and Orientedx3 ; Mood/Affect:normal   A/P:  1. FFA- controlled  Continue clobetasol solution 1-2 times weekly.  2. R/O lentigo vs MIS on right upper arm posterior   TANGENTIAL BIOPSY SENT OUT:  After consent, anesthesia with LEC and prep, tangential excision performed and specimen sent out for permanent section histology.  No complications and routine wound care. Patient told to call our office in 1-2 weeks for result.      3.  History of Melanoma on right upper arm   MELANOMA DISCUSSED WITH PATIENT:  I discussed the specifics of tumor, prognosis, metachronous melanoma, self exam, and genetics with the patient. I explained the need for monthly skin exams including and taught the patient how to do this. Patient was asked about new or changing moles . I discussed with patient signs and symptoms that could arise in the setting of recurrent locoregional or metastatic disease. In addition, the need to undergo every 6 month dermatologic full skin survey and evaluation given that patients with a diagnosis of melanoma are at risk of recurrence (local and distant) and of subsequent de rosalee melanoma.     4. Seborrheic keratosis, lentigo, angioma, benign nevi, history of bcc, hemangioma on left popliteal fossa, history of BCC  It was a pleasure speaking to Tri Walden today.  BENIGN LESIONS DISCUSSED WITH PATIENT:  I discussed the specifics of tumor, prognosis, and genetics of benign lesions.  I explained that treatment of these lesions would be purely cosmetic and not medically neccessary.  I discussed with patient different removal options including excision, cautery and /or laser.      Nature and genetics of benign skin lesions dicussed with patient.  Signs and Symptoms of skin cancer discussed with patient.  ABCDEs of melanoma reviewed with patient.  Patient  encouraged to perform monthly skin exams.  UV precautions reviewed with patient.  Risks of non-melanoma skin cancer discussed with patient   Return to clinic pending biopsy results.       Again, thank you for allowing me to participate in the care of your patient.        Sincerely,        Jackelyn Erickson PA-C

## 2024-02-06 NOTE — NURSING NOTE
Chief Complaint   Patient presents with    Skin Check     No Concerns        There were no vitals filed for this visit.  Wt Readings from Last 1 Encounters:   02/02/24 59 kg (130 lb)       Cecilia Santana LPN .................2/6/2024

## 2024-02-13 DIAGNOSIS — D48.5 NEOPLASM OF UNCERTAIN BEHAVIOR OF SKIN: Primary | ICD-10-CM

## 2024-02-14 ENCOUNTER — OFFICE VISIT (OUTPATIENT)
Dept: DERMATOLOGY | Facility: CLINIC | Age: 74
End: 2024-02-14
Payer: COMMERCIAL

## 2024-02-14 DIAGNOSIS — D03.61 MELANOMA IN SITU OF RIGHT UPPER ARM (H): Primary | ICD-10-CM

## 2024-02-14 DIAGNOSIS — Z85.820 HISTORY OF MELANOMA: ICD-10-CM

## 2024-02-14 PROCEDURE — 88342 IMHCHEM/IMCYTCHM 1ST ANTB: CPT | Mod: 59 | Performed by: DERMATOLOGY

## 2024-02-14 PROCEDURE — 17313 MOHS 1 STAGE T/A/L: CPT | Performed by: DERMATOLOGY

## 2024-02-14 PROCEDURE — 13121 CMPLX RPR S/A/L 2.6-7.5 CM: CPT | Performed by: DERMATOLOGY

## 2024-02-14 NOTE — PATIENT INSTRUCTIONS
Sutured Wound Care     [facility]: [phone]    [facility]: [phone]    Right Upper Arm    No strenuous activity for 48 hours. Resume moderate activity in 48 hours. No heavy exercising until you are seen for follow up in one week.     Take Tylenol as needed for discomfort.                         Do not drink alcoholic beverages for 48 hours.     Keep the pressure bandage in place for 24 hours. If the bandage becomes blood tinged or loose, reinforce it with gauze and tape.        (Refer to the reverse side of this page for management of bleeding).    Remove pressure bandage in 24 hours (White)    Leave the flat bandage in place until your follow up appointment. (Light Blue)    Keep the bandage dry. Wash around it carefully.    If the tape becomes soiled or starts to come off, reinforce it with additional paper tape.    Do not smoke for 3 weeks; smoking is detrimental to wound healing.    It is normal to have swelling and bruising around the surgical site. The bruising will fade in approximately 10-14 days. Elevate the area to reduce swelling.    Numbness, itchiness and sensitivity to temperature changes can occur after surgery and may take up to 18 months to normalize.    POSSIBLE COMPLICATIONS    BLEEDING:    Leave the bandage in place.  Use tightly rolled up gauze or a cloth to apply direct pressure over the bandage for 20   minutes.  Reapply pressure for an additional 20 minutes if necessary  Call the office or go to the nearest emergency room if pressure fails to stop the bleeding.  Use additional gauze and tape to maintain pressure once the bleeding has stopped.    PAIN:    Post operative pain should slowly get better, never worse.  A severe increase in pain may indicate a problem. Call the office if this occurs.    In case of emergency phone:Dr Wilson [phone]

## 2024-02-14 NOTE — PROGRESS NOTES
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  Patient has no other skin complaints today.  Remainder of the HPI, Meds, PMH, Allergies, FH, and SH was reviewed in chart.      Past Medical History:   Diagnosis Date    Basal cell carcinoma     Hypertension     Unsure if the date    Malignant melanoma (H)     Nonrheumatic aortic valve stenosis 3/14/2023       Past Surgical History:   Procedure Laterality Date    ABLATE VEIN VARICOSE RADIO FREQUENCY WITHOUT PHLEBECTOMY MULTIPLE STAB  12/06/2012    Procedure: ABLATE VEIN VARICOSE RADIO FREQUENCY WITHOUT PHLEBECTOMY MULTIPLE STAB;  Bilateral Ablation of Varicose Veins;  Surgeon: Fredis Marks MD;  Location: [facility]    REVERSE ARTHROPLASTY SHOULDER Left 3/22/2023    Procedure: left REVERSE Total Shoulder Arthroplasty;  Surgeon: Escobar Magana MD;  Location: [facility]        Family History   Problem Relation Age of Onset    Arthritis Mother     Diabetes Mother         type 2    Heart Disease Father     Coronary Artery Disease Father     Cancer Paternal Grandmother         breast cancer    Heart Disease Paternal Grandfather     Cancer Sister         right kindney       Social History     Socioeconomic History    Marital status:      Spouse name: Not on file    Number of children: Not on file    Years of education: Not on file    Highest education level: Not on file   Occupational History    Occupation: [employer]     Employer: [employer]   Tobacco Use    Smoking status: Never    Smokeless tobacco: Never   Vaping Use    Vaping Use: Never used   Substance and Sexual Activity    Alcohol use: No     Comment: very rare    Drug use: Never    Sexual activity: Not Currently     Partners: Male     Birth control/protection: None     Comment: tubal ligation   Other Topics Concern    Parent/sibling w/ CABG, MI or angioplasty before 65F 55M? Yes      Comment: Father at age 39   Social History Narrative    Not on file     Social Determinants of Health     Financial Resource Strain: Low Risk  (11/17/2023)    Financial Resource Strain     Within the past 12 months, have you or your family members you live with been unable to get utilities (heat, electricity) when it was really needed?: No   Food Insecurity: Low Risk  (11/17/2023)    Food Insecurity     Within the past 12 months, did you worry that your food would run out before you got money to buy more?: No     Within the past 12 months, did the food you bought just not last and you didn t have money to get more?: No   Transportation Needs: Low Risk  (11/17/2023)    Transportation Needs     Within the past 12 months, has lack of transportation kept you from medical appointments, getting your medicines, non-medical meetings or appointments, work, or from getting things that you need?: No   Physical Activity: Not on file   Stress: Not on file   Social Connections: Not on file   Interpersonal Safety: Low Risk  (11/17/2023)    Interpersonal Safety     Do you feel physically and emotionally safe where you currently live?: Yes     Within the past 12 months, have you been hit, slapped, kicked or otherwise physically hurt by someone?: No     Within the past 12 months, have you been humiliated or emotionally abused in other ways by your partner or ex-partner?: No   Recent Concern: Interpersonal Safety - High Risk (11/17/2023)    Interpersonal Safety     Do you feel physically and emotionally safe where you currently live?: No     Within the past 12 months, have you been hit, slapped, kicked or otherwise physically hurt by someone?: No     Within the past 12 months, have you been humiliated or emotionally abused in other ways by your partner or ex-partner?: No   Housing Stability: Low Risk  (11/17/2023)    Housing Stability     Do you have housing? : Yes     Are you worried about losing your housing?: No       Outpatient  Encounter Medications as of 2/14/2024   Medication Sig Dispense Refill    acetaminophen (TYLENOL) 325 MG tablet Take 2 tablets (650 mg) by mouth every 4 hours as needed for other (mild pain) 100 tablet 0    ASPIRIN PO Take 81 mg by mouth daily      Calcium Carbonate-Vit D-Min (CALTRATE 600+D PLUS MINERALS) 600-800 MG-UNIT TABS Take 1 tablet by mouth 2 times daily      chlorthalidone (HYGROTON) 25 MG tablet Take 1 tablet (25 mg) by mouth daily 90 tablet 4    clobetasol (TEMOVATE) 0.05 % external solution Apply once to twice daily as needed. 50 mL 4    CRANBERRY PO Take by mouth daily       Ginger, Zingiber officinalis, (GINGER PO) Take by mouth daily       losartan (COZAAR) 50 MG tablet Take 1 tablet (50 mg) by mouth daily 90 tablet 4    simvastatin (ZOCOR) 10 MG tablet TAKE ONE TABLET BY MOUTH EVERY NIGHT AT BEDTIME 90 tablet 4    VITAMIN D, CHOLECALCIFEROL, PO Take by mouth daily       No facility-administered encounter medications on file as of 2/14/2024.             O:   NAD, WDWN, Alert & Oriented, Mood & Affect wnl, Vitals stable   General appearance normal   Vitals stable   Alert, oriented and in no acute distress      Following lymph nodes palpated: axilla no lad  R arm 6mm red scaly papule       Eyes: Conjunctivae/lids:Normal     ENT: Lips, buccal mucosa, tongue: normal    MSK:Normal    Cardiovascular: peripheral edema none    Pulm: Breathing Normal    Neuro/Psych: Orientation:Alert and Orientedx3 ; Mood/Affect:normal       A/P:  R arm melanoma in situ and hx of melanoma  MELANOMA DISCUSSED WITH PATIENT:  I discussed the specifics of tumor, prognosis, metachronous melanoma, self exam, and genetics with the patient. I explained the need for monthly skin exams including and taught the patient how to do this. Patient was asked about new or changing moles . I discussed with patient signs and symptoms that could arise in the setting of recurrent locoregional or metastatic disease. In addition, the need to undergo  every 4 month dermatologic full skin survey and evaluation given that patients with a diagnosis of melanoma are at risk of recurrence (local and distant) and of subsequent de rosalee melanoma.  . I reviewed treatment options, including a discussion of wide excision (the gold standard) versus Mohs surgery with MART-1 immunostains.     Note: MART-1 (Melanoma Antigen Recognized by T-cells) antibody immunostaining was used during Mohs surgery as per standard protocol, in addition to routine processing of all specimens with hematoxylin and eosin. The peripheral margins/edges were processed with the MART-1 stain (2 specimens total). The center was examined with hematoxylin & eosin and MART-1 immunostains. The patient was informed of the procedure and its risk/benefits during the consent for the procedure.    One or more of the reagents used in immunohistochemical testing in this case may not have been cleared or approved by the U.S. Food and Drug Administration (FDA). The FDA has determined that such clearance or approval is not necessary. These tests are used for clinical purposes. They should not be regarded as investigational or for research. These reagents  performance characteristics have been determined by [facility]. This laboratory is certified under the Clinical Laboratory Improvement Amendments of 1988 (CLIA-88) as qualified to perform high complexity clinical laboratory testing.      MOHS:   Aggressive histology    The rationale for Mohs surgery was discussed with the patient and consent was obtained.  The risks and benefits as well as alternatives to therapy were discussed, in detail.  Specifically, the risks of infection, scarring, bleeding, prolonged wound healing, incomplete removal, allergy to anesthesia, nerve injury and recurrence were addressed.  Indication for Mohs was Aggressive histology. Prior to the procedure, the treatment site was clearly identified and, if available, confirmed with  previous photos and confirmed by the patient   All components of the Universal Protocol/PAUSE rule were completed.  The Mohs surgeon operated in two distinct and integrated capacities as the surgeon and pathologist.      The area was prepped with Betasept.  A rim of normal appearing skin was marked circumferentially around the lesion.  The area was infiltrated with local anesthesia.  The tumor was first debulked to remove all clinically apparent tumor.  An incision following the standard Mohs approach was done and the specimen was oriented,mapped and placed in 3 block(s).  Each specimen was then chromacoded and processed in the Mohs laboratory using standard Mohs technique and submitted for frozen section histology.  Frozen section analysis showed no residual tumor but CLEAR MARGINS.      The tumor was excised using standard Mohs technique in 1 stages(s).  MART 1 stains were performed on 2 specimens. CLEAR MARGINS OBTAINED and Final defect size was 1.6 cm.   Mart 1 negative     We discussed the options for wound management in full with the patient including risks/benefits/ possible outcomes.      REPAIR COMPLEX: Because of the tightness of the surrounding skin and Because of the size and full thickness nature of the defect, Because of the tightness of the surrounding skin, To maintain form and function, and In order to avoid distortion, a complex closure was planned. After LE anesthesia and prep, Burow's triangles were excised in the relaxed skin tension lines. The wound edges were widely undermined greater than width of the defect on both sides by dissection in the subcutaneous plane until adequate tissue mobility was obtained. Hemostasis was obtained. The wound edges were closed in a layered fashion using Vicryl and Fast Absorbing Plain Gut sutures. Postoperative length was 4.3 cm.   EBL minimal; complications none; wound care routine.  The patient was discharged in good condition and will return in one week for  wound evaluation.  It was a pleasure speaking to Tri Walden today.  Previous clinic notes and pertinent laboratory tests were reviewed prior to Tri Walden's visit.  Signs and Symptoms of skin cancer discussed with patient.  Patient encouraged to perform monthly skin exams.  UV precautions reviewed with patient.  Return to clinic 4 months

## 2024-02-14 NOTE — LETTER
2/14/2024         RE: Tri Walden  [address]  [facility] [address]        Dear Colleague,    Thank you for referring your patient, Tri Walden, to [facility]. Please see a copy of my visit note below.    Surgical Office Location :   [facility]  [address] [place] [address]      Tri Walden is an extremely pleasant 73 year old year old female patient here today for evaluation and managment of melanoma in situ on right arm.  Patient has no other skin complaints today.  Remainder of the HPI, Meds, PMH, Allergies, FH, and SH was reviewed in chart.      Past Medical History:   Diagnosis Date     Basal cell carcinoma      Hypertension     Unsure if the date     Malignant melanoma (H)      Nonrheumatic aortic valve stenosis 3/14/2023       Past Surgical History:   Procedure Laterality Date     ABLATE VEIN VARICOSE RADIO FREQUENCY WITHOUT PHLEBECTOMY MULTIPLE STAB  12/06/2012    Procedure: ABLATE VEIN VARICOSE RADIO FREQUENCY WITHOUT PHLEBECTOMY MULTIPLE STAB;  Bilateral Ablation of Varicose Veins;  Surgeon: Fredis Marks MD;  Location: [facility]     REVERSE ARTHROPLASTY SHOULDER Left 3/22/2023    Procedure: left REVERSE Total Shoulder Arthroplasty;  Surgeon: Escobar Magana MD;  Location: [facility]        Family History   Problem Relation Age of Onset     Arthritis Mother      Diabetes Mother         type 2     Heart Disease Father      Coronary Artery Disease Father      Cancer Paternal Grandmother         breast cancer     Heart Disease Paternal Grandfather      Cancer Sister         right kindney       Social History     Socioeconomic History     Marital status:      Spouse name: Not on file     Number of children: Not on file     Years of education: Not on file     Highest education level: Not on file   Occupational History     Occupation:      Employer: [employer]   Tobacco Use     Smoking status:  Never     Smokeless tobacco: Never   Vaping Use     Vaping Use: Never used   Substance and Sexual Activity     Alcohol use: No     Comment: very rare     Drug use: Never     Sexual activity: Not Currently     Partners: Male     Birth control/protection: None     Comment: tubal ligation   Other Topics Concern     Parent/sibling w/ CABG, MI or angioplasty before 65F 55M? Yes     Comment: Father at age 39   Social History Narrative     Not on file     Social Determinants of Health     Financial Resource Strain: Low Risk  (11/17/2023)    Financial Resource Strain      Within the past 12 months, have you or your family members you live with been unable to get utilities (heat, electricity) when it was really needed?: No   Food Insecurity: Low Risk  (11/17/2023)    Food Insecurity      Within the past 12 months, did you worry that your food would run out before you got money to buy more?: No      Within the past 12 months, did the food you bought just not last and you didn t have money to get more?: No   Transportation Needs: Low Risk  (11/17/2023)    Transportation Needs      Within the past 12 months, has lack of transportation kept you from medical appointments, getting your medicines, non-medical meetings or appointments, work, or from getting things that you need?: No   Physical Activity: Not on file   Stress: Not on file   Social Connections: Not on file   Interpersonal Safety: Low Risk  (11/17/2023)    Interpersonal Safety      Do you feel physically and emotionally safe where you currently live?: Yes      Within the past 12 months, have you been hit, slapped, kicked or otherwise physically hurt by someone?: No      Within the past 12 months, have you been humiliated or emotionally abused in other ways by your partner or ex-partner?: No   Recent Concern: Interpersonal Safety - High Risk (11/17/2023)    Interpersonal Safety      Do you feel physically and emotionally safe where you currently live?: No      Within the  past 12 months, have you been hit, slapped, kicked or otherwise physically hurt by someone?: No      Within the past 12 months, have you been humiliated or emotionally abused in other ways by your partner or ex-partner?: No   Housing Stability: Low Risk  (11/17/2023)    Housing Stability      Do you have housing? : Yes      Are you worried about losing your housing?: No       Outpatient Encounter Medications as of 2/14/2024   Medication Sig Dispense Refill     acetaminophen (TYLENOL) 325 MG tablet Take 2 tablets (650 mg) by mouth every 4 hours as needed for other (mild pain) 100 tablet 0     ASPIRIN PO Take 81 mg by mouth daily       Calcium Carbonate-Vit D-Min (CALTRATE 600+D PLUS MINERALS) 600-800 MG-UNIT TABS Take 1 tablet by mouth 2 times daily       chlorthalidone (HYGROTON) 25 MG tablet Take 1 tablet (25 mg) by mouth daily 90 tablet 4     clobetasol (TEMOVATE) 0.05 % external solution Apply once to twice daily as needed. 50 mL 4     CRANBERRY PO Take by mouth daily        Ginger, Zingiber officinalis, (GINGER PO) Take by mouth daily        losartan (COZAAR) 50 MG tablet Take 1 tablet (50 mg) by mouth daily 90 tablet 4     simvastatin (ZOCOR) 10 MG tablet TAKE ONE TABLET BY MOUTH EVERY NIGHT AT BEDTIME 90 tablet 4     VITAMIN D, CHOLECALCIFEROL, PO Take by mouth daily       No facility-administered encounter medications on file as of 2/14/2024.             O:   NAD, WDWN, Alert & Oriented, Mood & Affect wnl, Vitals stable   General appearance normal   Vitals stable   Alert, oriented and in no acute distress      Following lymph nodes palpated: axilla no lad  R arm 6mm red scaly papule       Eyes: Conjunctivae/lids:Normal     ENT: Lips, buccal mucosa, tongue: normal    MSK:Normal    Cardiovascular: peripheral edema none    Pulm: Breathing Normal    Neuro/Psych: Orientation:Alert and Orientedx3 ; Mood/Affect:normal       A/P:  R arm melanoma in situ and hx of melanoma  MELANOMA DISCUSSED WITH PATIENT:  I  discussed the specifics of tumor, prognosis, metachronous melanoma, self exam, and genetics with the patient. I explained the need for monthly skin exams including and taught the patient how to do this. Patient was asked about new or changing moles . I discussed with patient signs and symptoms that could arise in the setting of recurrent locoregional or metastatic disease. In addition, the need to undergo every 4 month dermatologic full skin survey and evaluation given that patients with a diagnosis of melanoma are at risk of recurrence (local and distant) and of subsequent de rosalee melanoma.  . I reviewed treatment options, including a discussion of wide excision (the gold standard) versus Mohs surgery with MART-1 immunostains.     Note: MART-1 (Melanoma Antigen Recognized by T-cells) antibody immunostaining was used during Mohs surgery as per standard protocol, in addition to routine processing of all specimens with hematoxylin and eosin. The peripheral margins/edges were processed with the MART-1 stain (2 specimens total). The center was examined with hematoxylin & eosin and MART-1 immunostains. The patient was informed of the procedure and its risk/benefits during the consent for the procedure.    One or more of the reagents used in immunohistochemical testing in this case may not have been cleared or approved by the U.S. Food and Drug Administration (FDA). The FDA has determined that such clearance or approval is not necessary. These tests are used for clinical purposes. They should not be regarded as investigational or for research. These reagents  performance characteristics have been determined by [facility]. This laboratory is certified under the Clinical Laboratory Improvement Amendments of 1988 (CLIA-88) as qualified to perform high complexity clinical laboratory testing.      MOHS:   Aggressive histology    The rationale for Mohs surgery was discussed with the patient and consent was  obtained.  The risks and benefits as well as alternatives to therapy were discussed, in detail.  Specifically, the risks of infection, scarring, bleeding, prolonged wound healing, incomplete removal, allergy to anesthesia, nerve injury and recurrence were addressed.  Indication for Mohs was Aggressive histology. Prior to the procedure, the treatment site was clearly identified and, if available, confirmed with previous photos and confirmed by the patient   All components of the Universal Protocol/PAUSE rule were completed.  The Mohs surgeon operated in two distinct and integrated capacities as the surgeon and pathologist.      The area was prepped with Betasept.  A rim of normal appearing skin was marked circumferentially around the lesion.  The area was infiltrated with local anesthesia.  The tumor was first debulked to remove all clinically apparent tumor.  An incision following the standard Mohs approach was done and the specimen was oriented,mapped and placed in 3 block(s).  Each specimen was then chromacoded and processed in the Mohs laboratory using standard Mohs technique and submitted for frozen section histology.  Frozen section analysis showed no residual tumor but CLEAR MARGINS.      The tumor was excised using standard Mohs technique in 1 stages(s).  MART 1 stains were performed on 2 specimens. CLEAR MARGINS OBTAINED and Final defect size was 1.6 cm.   Mart 1 negative     We discussed the options for wound management in full with the patient including risks/benefits/ possible outcomes.      REPAIR COMPLEX: Because of the tightness of the surrounding skin and Because of the size and full thickness nature of the defect, Because of the tightness of the surrounding skin, To maintain form and function, and In order to avoid distortion, a complex closure was planned. After LE anesthesia and prep, Burow's triangles were excised in the relaxed skin tension lines. The wound edges were widely undermined greater  than width of the defect on both sides by dissection in the subcutaneous plane until adequate tissue mobility was obtained. Hemostasis was obtained. The wound edges were closed in a layered fashion using Vicryl and Fast Absorbing Plain Gut sutures. Postoperative length was 4.3 cm.   EBL minimal; complications none; wound care routine.  The patient was discharged in good condition and will return in one week for wound evaluation.  It was a pleasure speaking to Tri Walden today.  Previous clinic notes and pertinent laboratory tests were reviewed prior to Tri Walden's visit.  Signs and Symptoms of skin cancer discussed with patient.  Patient encouraged to perform monthly skin exams.  UV precautions reviewed with patient.  Return to clinic 4 months        Again, thank you for allowing me to participate in the care of your patient.        Sincerely,        Armand Wilson MD

## 2024-02-21 ENCOUNTER — ALLIED HEALTH/NURSE VISIT (OUTPATIENT)
Dept: DERMATOLOGY | Facility: CLINIC | Age: 74
End: 2024-02-21
Payer: COMMERCIAL

## 2024-02-21 DIAGNOSIS — Z48.01 ENCOUNTER FOR CHANGE OR REMOVAL OF SURGICAL WOUND DRESSING: Primary | ICD-10-CM

## 2024-02-21 PROCEDURE — 99207 PR NO CHARGE NURSE ONLY: CPT

## 2024-02-21 NOTE — PATIENT INSTRUCTIONS
Wound care instructions for  ONE WEEK AFTER SURGERY    Leave the bandage on for 1 week after today's bandage change.  In 1 week you may resume your regular skin care when you remove the dressing. This includes washing with mild soap and water, applying moisturizer, make-up and sunscreen.  If the wound is open or bleeding in any part of the incision/graft site, you should cover the area with a bandage daily as directed below:    Clean and dry area with plain water using a Q-tip or sterile gauze pad.  Apply vaseline, aquaphor, polysporin, or bacitracin ointment to the wound  Cover the wound with a band-aid or a sterile non-stick gauze pad and micropore paper tape.      Repeat the instructions above until wound is completely healed    If you notice that the scar is red and firm (after you remove the dressing) this is normal and will fade over time. It may take up to 6-12 months for this to occur.    Massaging the area helps the scar fade and soften quicker. Begin to massage the area one month after the dressings have been removed. Apply pressure directly and firmly over the scar with the fingertips and move in circular motions. You may massage up to 10 times daily, each time for 30 seconds.    It is normal for there to be a tender, pimple-like bump along the scar about 6-8 weeks after surgery. This sometimes happens as the scar matures and the stitches beneath begin to dissolve. Do not pick or squeeze. It will resolve in time. If an area of the wound does open and there appears to be drainage, you may apply one of the ointments listed in the above directions a few times every day until the wound is completely healed.    Numbness around the surgical site is normal. It can take up to 12-18 months for the feeling to return to normal. Itchiness, tingling, and occasional sharp pains might be present during this portion of the healing. All of these feelings will subside once the nerves have completely healed.      IN CASE OF  EMERGENCY: Dr Wilson [phone]     If you were seen in [place] call: [phone]    If you were seen in [place] call: [phone]

## 2024-02-21 NOTE — PROGRESS NOTES
Tri Walden comes into clinic today at the request of Dr. Wilson Ordering Provider for Wound Check Action taken: See Below.    This service provided today was under the supervising provider of the day Dr. Wilson, who was available if needed.    Pt returned to clinic for post surgery 1 week follow up bandage change. Pt has no complaints, denies pain. Bandage removed from right upper arm, area cleansed with normal saline. Site is healing and wound edges approximating well. Reapplied new steri strips and paper tape.    Advised to watch for signs/sx of infection; spreading redness, drainage, odor, fever. Call or report promptly to clinic. Pt given written instructions and informed to rtc as needed. Patient verbalized understanding.     Bernice Remy on 2/21/2024 at 1:03 PM

## 2024-04-12 NOTE — ADDENDUM NOTE
Addendum  created 03/22/23 1501 by Hyun Barkley APRN CRNA    Result filed       HISTORY OF PRESENT ILLNESS:   Auar Howell is here today for a 6 month breast exam following bilateral simple mastectomy with left axillary node dissection on 3/13/2014. She has been diagnosed with Parkinson's.     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 ER/IA positive and Her-2 negative. Ki67 was 41%. She did receive cytotoxic chemotherapy.       She has finally gotten over her DVT and pulmonary embolism. She is now on chronic anticoagulation with Eliquis.  Her exercise tolerance and her shortness of breath are significantly improved over 6 months ago. She looks much better than the last year or so    She is really doing much better and has minimal complaints at this time. We are seeing her  who did well with his gallbladder surgery but is still not eating as much as he did before.    She had a radical hysterectomy in [place] on 8/7/2020 by Dr. Sanz.  Her pathology on uterus and ovaries were both benign.    She recently had a knee surgery for an injury getting out of bed.    Most recently she had problem with shortness of breath again and had a cardiac catheterization last week by Dr. Reynolds that demonstrates mild to moderate pulmonary hypertension.    She seems to be having more problems with fluid retention and shortness of breath and hopefully her cardiologist can get this worked out for her.  She has been going to multiple basketball games with her  and may just be exhausted.    Her  fell off a ladder in May and broke multiple ribs and had a concussion.  He still having some dizzy spells and his ribs are still sore.  It does look like he is going to survive.  He did end up requiring a craniotomy for a chronic subdural hematoma.    She has a new unrelated problem with recent back surgery.  She did reasonably well initially but came back a few weeks postop with significant staph infection in her back wound.  This is now pretty much resolved.    Her most

## 2024-07-26 NOTE — PROGRESS NOTES
CARDIOLOGY VISIT    REASON FOR VISIT: Aortic stenosis    SUBJECTIVE:  74-year-old female seen for aortic stenosis.  She has dyslipidemia, GERD, irritable bowel.     Echo July 2021 showed EF 60%, normal RV, aortic stenosis with mean 21 mmHg, V-max 2.1 m/s, area 0.9 cm , DI 0.27, SVI 37 ml/m2.     Echo March 2022 showed EF 55%, aortic stenosis with mean 26 mmHg, V-max 3.3 m/s, area 0.9 cm , DI 0.28, SVI 41 ml/m2.     Echo January 2023 showed EF 55%, aortic stenosis with mean 28 mmHg, V-max 3.8 m/s, area 0.8 cm , DI 0.24, SVI 37 mls/m2.     Echo July 2023 showed EF 55%, aortic stenosis with mean 31 mmHg, V-max 3.6 m/s, area 0.8 cm , DI 0.25, SVI 43 ml/m2.     Stress echo January 2024 showed EF 60%, anteroseptal hypokinesis with stress, aortic stenosis with mean 32 mmHg, V-max 3.6 m/s, area 0.7 cm , DI 0.25.    Echo July 2024 showed EF 50 to 55%, aortic stenosis with mean 33 mmHg, V-max 3.6 m/s, area 0.7 cm , DI 0.22, SVI 41 mL/m .    She has been feeling the same in the past 6 months.  She does a lot of yard work and denies any fatigue, chest pain, lightheadedness, or dyspnea.  She watches her 2 grandsons and keeps active with this.    MEDICATIONS:  Current Outpatient Medications   Medication Sig Dispense Refill    acetaminophen (TYLENOL) 325 MG tablet Take 2 tablets (650 mg) by mouth every 4 hours as needed for other (mild pain) 100 tablet 0    ASPIRIN PO Take 81 mg by mouth daily      Calcium Carbonate-Vit D-Min (CALTRATE 600+D PLUS MINERALS) 600-800 MG-UNIT TABS Take 1 tablet by mouth 2 times daily      chlorthalidone (HYGROTON) 25 MG tablet Take 1 tablet (25 mg) by mouth daily 90 tablet 4    clobetasol (TEMOVATE) 0.05 % external solution Apply once to twice daily as needed. 50 mL 4    CRANBERRY PO Take by mouth daily       Ginger, Zingiber officinalis, (GINGER PO) Take by mouth daily       losartan (COZAAR) 50 MG tablet Take 1 tablet (50 mg) by mouth daily 90 tablet 4    simvastatin (ZOCOR) 10 MG tablet TAKE ONE  TABLET BY MOUTH EVERY NIGHT AT BEDTIME 90 tablet 4    VITAMIN D, CHOLECALCIFEROL, PO Take by mouth daily       No current facility-administered medications for this visit.       ALLERGIES:  Allergies   Allergen Reactions    Nkda [No Known Drug Allergy]        REVIEW OF SYSTEMS:  Constitutional:  No weight loss, fever, chills  HEENT:  Eyes:  No visual loss, blurred vision, double vision or yellow sclerae. No hearing loss, sneezing, congestion, runny nose or sore throat.  Skin:  No rash or itching.  Cardiovascular: per HPI  Respiratory: per HPI  GI:  No anorexia, nausea, vomiting or diarrhea. No abdominal pain or blood.  :  No dysurea, hematuria  Neurologic:  No headache, paralysis, ataxia, numbness or tingling in the extremities. No change in bowel or bladder control.  Musculoskeletal:  No muscle pain  Hematologic:  No bleeding or bruising.  Lymphatics:  No enlarged nodes. No history of splenectomy.  Endocrine:  No reports of sweating, cold or heat intolerance. No polyuria or polydipsia.  Allergies:  No history of asthma, hives, eczema or rhinitis.    PHYSICAL EXAM:  /77 (BP Location: Right arm, Patient Position: Sitting)   Pulse 80   Resp 12   Wt 58.1 kg (128 lb)   SpO2 98%   BMI 22.67 kg/m      Constitutional: awake, alert, no distress  Eyes: PERRL, sclera nonicteric  ENT: trachea midline  Respiratory: Lungs clear  Cardiovascular: Regular rate and rhythm, 3/6 mid peaking systolic murmur, S2 is audible, no edema  GI: nondistended, nontender, bowel sounds present  Lymph/Hematologic: no lymphadenopathy  Skin: dry, no rash  Musculoskeletal: good muscle tone, strength 5/5 in upper and lower extremities  Neurologic: no focal deficits  Neuropsychiatric: appropriate affact    DATA:  Lab: November 2023: Creatinine 0.7  Recent Labs   Lab Test 11/17/23  0916 11/15/22  0907   CHOL 191 180   HDL 76 69   LDL 96 86   TRIG 97 123     ASSESSMENT:  74-year-old female seen for aortic stenosis.  She has severe  asymptomatic aortic stenosis.  She has no concerning symptoms.  Progression of aortic stenosis has been a little slower than average.  Suspect she will need a valve replacement within 1 year, probably 2 with the most.  Stress echo will be done on follow-up.  She was educated about any symptoms to watch out for.    RECOMMENDATIONS:  1.  Asymptomatic severe aortic stenosis  -Treadmill stress echo on follow-up    Follow-up in 6 months.    Raymundo You MD  Cardiology - [facility]  Pager:  [phone]  Text Page  August 1, 2024

## 2024-07-29 ENCOUNTER — HOSPITAL ENCOUNTER (OUTPATIENT)
Dept: CARDIOLOGY | Facility: CLINIC | Age: 74
Discharge: HOME | End: 2024-07-29
Attending: INTERNAL MEDICINE | Admitting: INTERNAL MEDICINE
Payer: COMMERCIAL

## 2024-07-29 DIAGNOSIS — I35.0 AORTIC VALVE STENOSIS, ETIOLOGY OF CARDIAC VALVE DISEASE UNSPECIFIED: ICD-10-CM

## 2024-07-29 LAB — LVEF ECHO: NORMAL

## 2024-07-29 PROCEDURE — 93306 TTE W/DOPPLER COMPLETE: CPT | Mod: 26 | Performed by: INTERNAL MEDICINE

## 2024-07-29 PROCEDURE — 93306 TTE W/DOPPLER COMPLETE: CPT

## 2024-07-29 NOTE — RESULT ENCOUNTER NOTE
EF 50-55%; WMAs as described; severe AS with mean gradient of 33 mmHt and NAYELI of 0.73 cm2; mild MR, TR; results similar to previous per reader. Follow up with Dr You on 8/1/24.

## 2024-08-01 ENCOUNTER — OFFICE VISIT (OUTPATIENT)
Dept: CARDIOLOGY | Facility: CLINIC | Age: 74
End: 2024-08-01
Payer: COMMERCIAL

## 2024-08-01 VITALS
RESPIRATION RATE: 12 BRPM | BODY MASS INDEX: 22.67 KG/M2 | HEART RATE: 80 BPM | WEIGHT: 128 LBS | SYSTOLIC BLOOD PRESSURE: 126 MMHG | OXYGEN SATURATION: 98 % | DIASTOLIC BLOOD PRESSURE: 77 MMHG

## 2024-08-01 DIAGNOSIS — I35.0 AORTIC VALVE STENOSIS, ETIOLOGY OF CARDIAC VALVE DISEASE UNSPECIFIED: ICD-10-CM

## 2024-08-01 PROCEDURE — 99214 OFFICE O/P EST MOD 30 MIN: CPT | Performed by: INTERNAL MEDICINE

## 2024-08-01 NOTE — LETTER
8/1/2024    Nehemias Wallace MD  [address] [facility] [address]    RE: Tri Walden       Dear Colleague,     I had the pleasure of seeing Tri Walden in the [facility].  CARDIOLOGY VISIT    REASON FOR VISIT: Aortic stenosis    SUBJECTIVE:  74-year-old female seen for aortic stenosis.  She has dyslipidemia, GERD, irritable bowel.     Echo July 2021 showed EF 60%, normal RV, aortic stenosis with mean 21 mmHg, V-max 2.1 m/s, area 0.9 cm , DI 0.27, SVI 37 ml/m2.     Echo March 2022 showed EF 55%, aortic stenosis with mean 26 mmHg, V-max 3.3 m/s, area 0.9 cm , DI 0.28, SVI 41 ml/m2.     Echo January 2023 showed EF 55%, aortic stenosis with mean 28 mmHg, V-max 3.8 m/s, area 0.8 cm , DI 0.24, SVI 37 mls/m2.     Echo July 2023 showed EF 55%, aortic stenosis with mean 31 mmHg, V-max 3.6 m/s, area 0.8 cm , DI 0.25, SVI 43 ml/m2.     Stress echo January 2024 showed EF 60%, anteroseptal hypokinesis with stress, aortic stenosis with mean 32 mmHg, V-max 3.6 m/s, area 0.7 cm , DI 0.25.    Echo July 2024 showed EF 50 to 55%, aortic stenosis with mean 33 mmHg, V-max 3.6 m/s, area 0.7 cm , DI 0.22, SVI 41 mL/m .    She has been feeling the same in the past 6 months.  She does a lot of yard work and denies any fatigue, chest pain, lightheadedness, or dyspnea.  She watches her 2 grandsons and keeps active with this.    MEDICATIONS:  Current Outpatient Medications   Medication Sig Dispense Refill    acetaminophen (TYLENOL) 325 MG tablet Take 2 tablets (650 mg) by mouth every 4 hours as needed for other (mild pain) 100 tablet 0    ASPIRIN PO Take 81 mg by mouth daily      Calcium Carbonate-Vit D-Min (CALTRATE 600+D PLUS MINERALS) 600-800 MG-UNIT TABS Take 1 tablet by mouth 2 times daily      chlorthalidone (HYGROTON) 25 MG tablet Take 1 tablet (25 mg) by mouth daily 90 tablet 4    clobetasol (TEMOVATE) 0.05 % external solution Apply once to twice daily as needed. 50 mL 4    CRANBERRY PO Take by mouth  daily       Erin, Zingiber officinalis, (ERIN PO) Take by mouth daily       losartan (COZAAR) 50 MG tablet Take 1 tablet (50 mg) by mouth daily 90 tablet 4    simvastatin (ZOCOR) 10 MG tablet TAKE ONE TABLET BY MOUTH EVERY NIGHT AT BEDTIME 90 tablet 4    VITAMIN D, CHOLECALCIFEROL, PO Take by mouth daily       No current facility-administered medications for this visit.       ALLERGIES:  Allergies   Allergen Reactions    Nkda [No Known Drug Allergy]        REVIEW OF SYSTEMS:  Constitutional:  No weight loss, fever, chills  HEENT:  Eyes:  No visual loss, blurred vision, double vision or yellow sclerae. No hearing loss, sneezing, congestion, runny nose or sore throat.  Skin:  No rash or itching.  Cardiovascular: per HPI  Respiratory: per HPI  GI:  No anorexia, nausea, vomiting or diarrhea. No abdominal pain or blood.  :  No dysurea, hematuria  Neurologic:  No headache, paralysis, ataxia, numbness or tingling in the extremities. No change in bowel or bladder control.  Musculoskeletal:  No muscle pain  Hematologic:  No bleeding or bruising.  Lymphatics:  No enlarged nodes. No history of splenectomy.  Endocrine:  No reports of sweating, cold or heat intolerance. No polyuria or polydipsia.  Allergies:  No history of asthma, hives, eczema or rhinitis.    PHYSICAL EXAM:  /77 (BP Location: Right arm, Patient Position: Sitting)   Pulse 80   Resp 12   Wt 58.1 kg (128 lb)   SpO2 98%   BMI 22.67 kg/m      Constitutional: awake, alert, no distress  Eyes: PERRL, sclera nonicteric  ENT: trachea midline  Respiratory: Lungs clear  Cardiovascular: Regular rate and rhythm, 3/6 mid peaking systolic murmur, S2 is audible, no edema  GI: nondistended, nontender, bowel sounds present  Lymph/Hematologic: no lymphadenopathy  Skin: dry, no rash  Musculoskeletal: good muscle tone, strength 5/5 in upper and lower extremities  Neurologic: no focal deficits  Neuropsychiatric: appropriate affact    DATA:  Lab: November 2023:  Creatinine 0.7  Recent Labs   Lab Test 11/17/23  0916 11/15/22  0907   CHOL 191 180   HDL 76 69   LDL 96 86   TRIG 97 123     ASSESSMENT:  74-year-old female seen for aortic stenosis.  She has severe asymptomatic aortic stenosis.  She has no concerning symptoms.  Progression of aortic stenosis has been a little slower than average.  Suspect she will need a valve replacement within 1 year, probably 2 with the most.  Stress echo will be done on follow-up.  She was educated about any symptoms to watch out for.    RECOMMENDATIONS:  1.  Asymptomatic severe aortic stenosis  -Treadmill stress echo on follow-up    Follow-up in 6 months.    Raymundo You MD  Cardiology - [facility]  Pager:  [phone]  Text Page  August 1, 2024        Thank you for allowing me to participate in the care of your patient.      Sincerely,     Raymundo You MD     [facility]  cc:   Raymundo You MD

## 2024-08-06 ENCOUNTER — OFFICE VISIT (OUTPATIENT)
Dept: DERMATOLOGY | Facility: CLINIC | Age: 74
End: 2024-08-06
Payer: COMMERCIAL

## 2024-08-06 DIAGNOSIS — Z86.006 HISTORY OF MELANOMA IN SITU: ICD-10-CM

## 2024-08-06 DIAGNOSIS — L81.4 LENTIGO: ICD-10-CM

## 2024-08-06 DIAGNOSIS — D22.9 MULTIPLE BENIGN NEVI: ICD-10-CM

## 2024-08-06 DIAGNOSIS — D18.01 CHERRY ANGIOMA: ICD-10-CM

## 2024-08-06 DIAGNOSIS — Z85.820 HISTORY OF MELANOMA: ICD-10-CM

## 2024-08-06 DIAGNOSIS — L57.0 ACTINIC KERATOSIS: ICD-10-CM

## 2024-08-06 DIAGNOSIS — L82.1 SEBORRHEIC KERATOSIS: Primary | ICD-10-CM

## 2024-08-06 DIAGNOSIS — L66.12 FRONTAL FIBROSING ALOPECIA: ICD-10-CM

## 2024-08-06 DIAGNOSIS — Z85.828 HISTORY OF BASAL CELL CANCER: ICD-10-CM

## 2024-08-06 PROCEDURE — 99213 OFFICE O/P EST LOW 20 MIN: CPT | Mod: 25 | Performed by: PHYSICIAN ASSISTANT

## 2024-08-06 PROCEDURE — 17000 DESTRUCT PREMALG LESION: CPT | Performed by: PHYSICIAN ASSISTANT

## 2024-08-06 ASSESSMENT — PAIN SCALES - GENERAL: PAINLEVEL: NO PAIN (0)

## 2024-08-06 NOTE — NURSING NOTE
Chief Complaint   Patient presents with    Skin Check     Spot on right sideburn        There were no vitals filed for this visit.  Wt Readings from Last 1 Encounters:   08/01/24 58.1 kg (128 lb)       Cecilia Santana LPN .................8/6/2024

## 2024-08-06 NOTE — LETTER
8/6/2024      Tri Walden  [address]  [place] [facility] [address]      Dear Colleague,    Thank you for referring your patient, Tri Walden, to the [facility]. Please see a copy of my visit note below.    Tri Walden is a pleasant 74 year old year old female patient here today for skin check. She denies any painful or bleeding skin lesions. No changing nevi. Patient has no other skin complaints today.  Remainder of the HPI, Meds, PMH, Allergies, FH, and SH was reviewed in chart.    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   Past Medical History:   Diagnosis Date     Basal cell carcinoma      Hypertension     Unsure if the date     Malignant melanoma (H)      Nonrheumatic aortic valve stenosis 3/14/2023       Past Surgical History:   Procedure Laterality Date     ABLATE VEIN VARICOSE RADIO FREQUENCY WITHOUT PHLEBECTOMY MULTIPLE STAB  12/06/2012    Procedure: ABLATE VEIN VARICOSE RADIO FREQUENCY WITHOUT PHLEBECTOMY MULTIPLE STAB;  Bilateral Ablation of Varicose Veins;  Surgeon: Fredis Marks MD;  Location: [facility] OR     REVERSE ARTHROPLASTY SHOULDER Left 3/22/2023    Procedure: left REVERSE Total Shoulder Arthroplasty;  Surgeon: Escobar Magana MD;  Location: [facility] OR        Family History   Problem Relation Age of Onset     Arthritis Mother      Diabetes Mother         type 2     Heart Disease Father      Coronary Artery Disease Father      Cancer Paternal Grandmother         breast cancer     Heart Disease Paternal Grandfather      Cancer Sister         right kindney       Social History     Socioeconomic History     Marital status:      Spouse name: Not on file     Number of children: Not on file     Years of education: Not on file     Highest education level: Not on file   Occupational History     Occupation:      Employer: [employer]    Tobacco Use     Smoking status: Never     Smokeless tobacco: Never   Vaping Use     Vaping status: Never Used   Substance and Sexual Activity     Alcohol use: No     Comment: very rare     Drug use: Never     Sexual activity: Not Currently     Partners: Male     Birth control/protection: None     Comment: tubal ligation   Other Topics Concern     Parent/sibling w/ CABG, MI or angioplasty before 65F 55M? Yes     Comment: Father at age 39   Social History Narrative     Not on file     Social Determinants of Health     Financial Resource Strain: Low Risk  (11/17/2023)    Financial Resource Strain      Within the past 12 months, have you or your family members you live with been unable to get utilities (heat, electricity) when it was really needed?: No   Food Insecurity: Low Risk  (11/17/2023)    Food Insecurity      Within the past 12 months, did you worry that your food would run out before you got money to buy more?: No      Within the past 12 months, did the food you bought just not last and you didn t have money to get more?: No   Transportation Needs: Low Risk  (11/17/2023)    Transportation Needs      Within the past 12 months, has lack of transportation kept you from medical appointments, getting your medicines, non-medical meetings or appointments, work, or from getting things that you need?: No   Physical Activity: Not on file   Stress: Not on file   Social Connections: Not on file   Interpersonal Safety: Low Risk  (11/17/2023)    Interpersonal Safety      Do you feel physically and emotionally safe where you currently live?: Yes      Within the past 12 months, have you been hit, slapped, kicked or otherwise physically hurt by someone?: No      Within the past 12 months, have you been humiliated or emotionally abused in other ways by your partner or ex-partner?: No   Recent Concern: Interpersonal Safety - High Risk (11/17/2023)    Interpersonal Safety      Do you feel physically and emotionally safe where  you currently live?: No      Within the past 12 months, have you been hit, slapped, kicked or otherwise physically hurt by someone?: No      Within the past 12 months, have you been humiliated or emotionally abused in other ways by your partner or ex-partner?: No   Housing Stability: Low Risk  (11/17/2023)    Housing Stability      Do you have housing? : Yes      Are you worried about losing your housing?: No       Outpatient Encounter Medications as of 8/6/2024   Medication Sig Dispense Refill     acetaminophen (TYLENOL) 325 MG tablet Take 2 tablets (650 mg) by mouth every 4 hours as needed for other (mild pain) 100 tablet 0     ASPIRIN PO Take 81 mg by mouth daily       Calcium Carbonate-Vit D-Min (CALTRATE 600+D PLUS MINERALS) 600-800 MG-UNIT TABS Take 1 tablet by mouth 2 times daily       chlorthalidone (HYGROTON) 25 MG tablet Take 1 tablet (25 mg) by mouth daily 90 tablet 4     clobetasol (TEMOVATE) 0.05 % external solution Apply once to twice daily as needed. 50 mL 4     CRANBERRY PO Take by mouth daily        Ginger, Zingiber officinalis, (GINGER PO) Take by mouth daily        losartan (COZAAR) 50 MG tablet Take 1 tablet (50 mg) by mouth daily 90 tablet 4     simvastatin (ZOCOR) 10 MG tablet TAKE ONE TABLET BY MOUTH EVERY NIGHT AT BEDTIME 90 tablet 4     VITAMIN D, CHOLECALCIFEROL, PO Take by mouth daily       No facility-administered encounter medications on file as of 8/6/2024.             O:   NAD, WDWN, Alert & Oriented, Mood & Affect wnl, Vitals stable   Here today alone   There were no vitals taken for this visit.   General appearance normal   Vitals stable   Alert, oriented and in no acute distress     Well healed scar on right and left arm   Stuck on papules and brown macules on trunk and ext   Red papules on trunk  Brown papules and macules with regular pigment network and borders on torso and extremities   Gritty papule on right nasal sidewall   The remainder of skin exam is normal       Eyes:  Conjunctivae/lids:Normal     ENT: Lips: normal    MSK:Normal    Cardiovascular: peripheral edema none    Pulm: Breathing Normal    Neuro/Psych: Orientation:Alert and Orientedx3 ; Mood/Affect:normal   A/P:  1. FFA- controlled  Continue clobetasol solution 1-2 times weekly.  2. Actinic keratosis on right NSW x 1  LN2:  Treated with LN2 for 5s for 1-2 cycles. Warned risks of blistering, pain, pigment change, scarring, and incomplete resolution.  Advised patient to return if lesions do not completely resolve.  Wound care sheet given.  3.  History of Melanoma on right upper arm 2/2022, history of MIS on 2/2024  MELANOMA DISCUSSED WITH PATIENT:  I discussed the specifics of tumor, prognosis, metachronous melanoma, self exam, and genetics with the patient. I explained the need for monthly skin exams including and taught the patient how to do this. Patient was asked about new or changing moles . I discussed with patient signs and symptoms that could arise in the setting of recurrent locoregional or metastatic disease. In addition, the need to undergo every 6 month dermatologic full skin survey and evaluation given that patients with a diagnosis of melanoma are at risk of recurrence (local and distant) and of subsequent de rosalee melanoma.     4. Seborrheic keratosis, lentigo, angioma, benign nevi, history of bcc, hemangioma on left popliteal fossa, history of BCC  It was a pleasure speaking to Tri Walden today.  BENIGN LESIONS DISCUSSED WITH PATIENT:  I discussed the specifics of tumor, prognosis, and genetics of benign lesions.  I explained that treatment of these lesions would be purely cosmetic and not medically neccessary.  I discussed with patient different removal options including excision, cautery and /or laser.      Nature and genetics of benign skin lesions dicussed with patient.  Signs and Symptoms of skin cancer discussed with patient.  ABCDEs of melanoma reviewed with patient.  Patient encouraged to perform  monthly skin exams.  UV precautions reviewed with patient.  Risks of non-melanoma skin cancer discussed with patient   Return to clinic in 6 months.       Again, thank you for allowing me to participate in the care of your patient.        Sincerely,        Jackelyn Erickson PA-C

## 2024-08-06 NOTE — PROGRESS NOTES
Tri Walden is a pleasant 74 year old year old female patient here today for skin check. She denies any painful or bleeding skin lesions. No changing nevi. Patient has no other skin complaints today.  Remainder of the HPI, Meds, PMH, Allergies, FH, and SH was reviewed in chart.    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   Past Medical History:   Diagnosis Date    Basal cell carcinoma     Hypertension     Unsure if the date    Malignant melanoma (H)     Nonrheumatic aortic valve stenosis 3/14/2023       Past Surgical History:   Procedure Laterality Date    ABLATE VEIN VARICOSE RADIO FREQUENCY WITHOUT PHLEBECTOMY MULTIPLE STAB  12/06/2012    Procedure: ABLATE VEIN VARICOSE RADIO FREQUENCY WITHOUT PHLEBECTOMY MULTIPLE STAB;  Bilateral Ablation of Varicose Veins;  Surgeon: Fredis Marks MD;  Location: [facility]    REVERSE ARTHROPLASTY SHOULDER Left 3/22/2023    Procedure: left REVERSE Total Shoulder Arthroplasty;  Surgeon: Escobar Magana MD;  Location: [facility]        Family History   Problem Relation Age of Onset    Arthritis Mother     Diabetes Mother         type 2    Heart Disease Father     Coronary Artery Disease Father     Cancer Paternal Grandmother         breast cancer    Heart Disease Paternal Grandfather     Cancer Sister         right kindney       Social History     Socioeconomic History    Marital status:      Spouse name: Not on file    Number of children: Not on file    Years of education: Not on file    Highest education level: Not on file   Occupational History    Occupation:      Employer: [employer]   Tobacco Use    Smoking status: Never    Smokeless tobacco: Never   Vaping Use    Vaping status: Never Used   Substance and Sexual Activity    Alcohol use: No     Comment: very rare    Drug use: Never    Sexual activity: Not Currently     Partners: Male      Birth control/protection: None     Comment: tubal ligation   Other Topics Concern    Parent/sibling w/ CABG, MI or angioplasty before 65F 55M? Yes     Comment: Father at age 39   Social History Narrative    Not on file     Social Determinants of Health     Financial Resource Strain: Low Risk  (11/17/2023)    Financial Resource Strain     Within the past 12 months, have you or your family members you live with been unable to get utilities (heat, electricity) when it was really needed?: No   Food Insecurity: Low Risk  (11/17/2023)    Food Insecurity     Within the past 12 months, did you worry that your food would run out before you got money to buy more?: No     Within the past 12 months, did the food you bought just not last and you didn t have money to get more?: No   Transportation Needs: Low Risk  (11/17/2023)    Transportation Needs     Within the past 12 months, has lack of transportation kept you from medical appointments, getting your medicines, non-medical meetings or appointments, work, or from getting things that you need?: No   Physical Activity: Not on file   Stress: Not on file   Social Connections: Not on file   Interpersonal Safety: Low Risk  (11/17/2023)    Interpersonal Safety     Do you feel physically and emotionally safe where you currently live?: Yes     Within the past 12 months, have you been hit, slapped, kicked or otherwise physically hurt by someone?: No     Within the past 12 months, have you been humiliated or emotionally abused in other ways by your partner or ex-partner?: No   Recent Concern: Interpersonal Safety - High Risk (11/17/2023)    Interpersonal Safety     Do you feel physically and emotionally safe where you currently live?: No     Within the past 12 months, have you been hit, slapped, kicked or otherwise physically hurt by someone?: No     Within the past 12 months, have you been humiliated or emotionally abused in other ways by your partner or ex-partner?: No   Housing  Stability: Low Risk  (11/17/2023)    Housing Stability     Do you have housing? : Yes     Are you worried about losing your housing?: No       Outpatient Encounter Medications as of 8/6/2024   Medication Sig Dispense Refill    acetaminophen (TYLENOL) 325 MG tablet Take 2 tablets (650 mg) by mouth every 4 hours as needed for other (mild pain) 100 tablet 0    ASPIRIN PO Take 81 mg by mouth daily      Calcium Carbonate-Vit D-Min (CALTRATE 600+D PLUS MINERALS) 600-800 MG-UNIT TABS Take 1 tablet by mouth 2 times daily      chlorthalidone (HYGROTON) 25 MG tablet Take 1 tablet (25 mg) by mouth daily 90 tablet 4    clobetasol (TEMOVATE) 0.05 % external solution Apply once to twice daily as needed. 50 mL 4    CRANBERRY PO Take by mouth daily       Ginger, Zingiber officinalis, (GINGER PO) Take by mouth daily       losartan (COZAAR) 50 MG tablet Take 1 tablet (50 mg) by mouth daily 90 tablet 4    simvastatin (ZOCOR) 10 MG tablet TAKE ONE TABLET BY MOUTH EVERY NIGHT AT BEDTIME 90 tablet 4    VITAMIN D, CHOLECALCIFEROL, PO Take by mouth daily       No facility-administered encounter medications on file as of 8/6/2024.             O:   NAD, WDWN, Alert & Oriented, Mood & Affect wnl, Vitals stable   Here today alone   There were no vitals taken for this visit.   General appearance normal   Vitals stable   Alert, oriented and in no acute distress     Well healed scar on right and left arm   Stuck on papules and brown macules on trunk and ext   Red papules on trunk  Brown papules and macules with regular pigment network and borders on torso and extremities   Gritty papule on right nasal sidewall   The remainder of skin exam is normal       Eyes: Conjunctivae/lids:Normal     ENT: Lips: normal    MSK:Normal    Cardiovascular: peripheral edema none    Pulm: Breathing Normal    Neuro/Psych: Orientation:Alert and Orientedx3 ; Mood/Affect:normal   A/P:  1. FFA- controlled  Continue clobetasol solution 1-2 times weekly.  2. Actinic  keratosis on right NSW x 1  LN2:  Treated with LN2 for 5s for 1-2 cycles. Warned risks of blistering, pain, pigment change, scarring, and incomplete resolution.  Advised patient to return if lesions do not completely resolve.  Wound care sheet given.  3.  History of Melanoma on right upper arm 2/2022, history of MIS on 2/2024  MELANOMA DISCUSSED WITH PATIENT:  I discussed the specifics of tumor, prognosis, metachronous melanoma, self exam, and genetics with the patient. I explained the need for monthly skin exams including and taught the patient how to do this. Patient was asked about new or changing moles . I discussed with patient signs and symptoms that could arise in the setting of recurrent locoregional or metastatic disease. In addition, the need to undergo every 6 month dermatologic full skin survey and evaluation given that patients with a diagnosis of melanoma are at risk of recurrence (local and distant) and of subsequent de rosalee melanoma.     4. Seborrheic keratosis, lentigo, angioma, benign nevi, history of bcc, hemangioma on left popliteal fossa, history of BCC  It was a pleasure speaking to Tri Walden today.  BENIGN LESIONS DISCUSSED WITH PATIENT:  I discussed the specifics of tumor, prognosis, and genetics of benign lesions.  I explained that treatment of these lesions would be purely cosmetic and not medically neccessary.  I discussed with patient different removal options including excision, cautery and /or laser.      Nature and genetics of benign skin lesions dicussed with patient.  Signs and Symptoms of skin cancer discussed with patient.  ABCDEs of melanoma reviewed with patient.  Patient encouraged to perform monthly skin exams.  UV precautions reviewed with patient.  Risks of non-melanoma skin cancer discussed with patient   Return to clinic in 6 months.

## 2024-10-18 ENCOUNTER — PATIENT OUTREACH (OUTPATIENT)
Dept: CARE COORDINATION | Facility: CLINIC | Age: 74
End: 2024-10-18
Payer: COMMERCIAL

## 2024-11-01 ENCOUNTER — PATIENT OUTREACH (OUTPATIENT)
Dept: CARE COORDINATION | Facility: CLINIC | Age: 74
End: 2024-11-01
Payer: COMMERCIAL

## 2024-11-19 ENCOUNTER — OFFICE VISIT (OUTPATIENT)
Dept: FAMILY MEDICINE | Facility: CLINIC | Age: 74
End: 2024-11-19
Payer: COMMERCIAL

## 2024-11-19 VITALS
HEIGHT: 63 IN | DIASTOLIC BLOOD PRESSURE: 70 MMHG | RESPIRATION RATE: 16 BRPM | WEIGHT: 127.2 LBS | HEART RATE: 75 BPM | OXYGEN SATURATION: 99 % | BODY MASS INDEX: 22.54 KG/M2 | SYSTOLIC BLOOD PRESSURE: 110 MMHG | TEMPERATURE: 97.2 F

## 2024-11-19 DIAGNOSIS — Z23 NEED FOR TDAP VACCINATION: ICD-10-CM

## 2024-11-19 DIAGNOSIS — Z23 NEED FOR COVID-19 VACCINE: ICD-10-CM

## 2024-11-19 DIAGNOSIS — Z12.11 SCREEN FOR COLON CANCER: ICD-10-CM

## 2024-11-19 DIAGNOSIS — Z23 NEED FOR IMMUNIZATION AGAINST INFLUENZA: ICD-10-CM

## 2024-11-19 DIAGNOSIS — E78.00 PURE HYPERCHOLESTEROLEMIA: ICD-10-CM

## 2024-11-19 DIAGNOSIS — Z00.00 ENCOUNTER FOR MEDICARE ANNUAL WELLNESS EXAM: Primary | ICD-10-CM

## 2024-11-19 DIAGNOSIS — Z29.11 NEED FOR VACCINATION AGAINST RESPIRATORY SYNCYTIAL VIRUS: ICD-10-CM

## 2024-11-19 DIAGNOSIS — I10 BENIGN ESSENTIAL HYPERTENSION: ICD-10-CM

## 2024-11-19 DIAGNOSIS — I10 ESSENTIAL HYPERTENSION: ICD-10-CM

## 2024-11-19 LAB
ANION GAP SERPL CALCULATED.3IONS-SCNC: 11 MMOL/L (ref 7–15)
BUN SERPL-MCNC: 16.9 MG/DL (ref 8–23)
CALCIUM SERPL-MCNC: 10 MG/DL (ref 8.8–10.4)
CHLORIDE SERPL-SCNC: 97 MMOL/L (ref 98–107)
CHOLEST SERPL-MCNC: 197 MG/DL
CREAT SERPL-MCNC: 0.73 MG/DL (ref 0.51–0.95)
EGFRCR SERPLBLD CKD-EPI 2021: 85 ML/MIN/1.73M2
FASTING STATUS PATIENT QL REPORTED: YES
FASTING STATUS PATIENT QL REPORTED: YES
GLUCOSE SERPL-MCNC: 103 MG/DL (ref 70–99)
HCO3 SERPL-SCNC: 30 MMOL/L (ref 22–29)
HDLC SERPL-MCNC: 74 MG/DL
LDLC SERPL CALC-MCNC: 99 MG/DL
NONHDLC SERPL-MCNC: 123 MG/DL
POTASSIUM SERPL-SCNC: 3.5 MMOL/L (ref 3.4–5.3)
SODIUM SERPL-SCNC: 138 MMOL/L (ref 135–145)
TRIGL SERPL-MCNC: 119 MG/DL

## 2024-11-19 PROCEDURE — 80061 LIPID PANEL: CPT | Performed by: FAMILY MEDICINE

## 2024-11-19 PROCEDURE — 36415 COLL VENOUS BLD VENIPUNCTURE: CPT | Performed by: FAMILY MEDICINE

## 2024-11-19 PROCEDURE — 80048 BASIC METABOLIC PNL TOTAL CA: CPT | Performed by: FAMILY MEDICINE

## 2024-11-19 RX ORDER — CHLORTHALIDONE 25 MG/1
25 TABLET ORAL DAILY
Qty: 90 TABLET | Refills: 4 | Status: SHIPPED | OUTPATIENT
Start: 2024-11-19

## 2024-11-19 RX ORDER — SIMVASTATIN 10 MG
TABLET ORAL
Qty: 90 TABLET | Refills: 4 | Status: SHIPPED | OUTPATIENT
Start: 2024-11-19

## 2024-11-19 RX ORDER — LOSARTAN POTASSIUM 50 MG/1
50 TABLET ORAL DAILY
Qty: 90 TABLET | Refills: 4 | Status: SHIPPED | OUTPATIENT
Start: 2024-11-19

## 2024-11-19 ASSESSMENT — PAIN SCALES - GENERAL: PAINLEVEL_OUTOF10: NO PAIN (0)

## 2024-11-19 NOTE — PROGRESS NOTES
Assessment & Plan     Encounter for Medicare annual wellness exam    Need for Tdap vaccination  Will get at pharamcy    Need for vaccination against respiratory syncytial virus  Will get at pharmacy    Benign essential hypertension  Stable refill and recheck labs  - BASIC METABOLIC PANEL; Future    Pure hypercholesterolemia  Stable, refill  - Lipid panel reflex to direct LDL Fasting; Future  - simvastatin (ZOCOR) 10 MG tablet; TAKE ONE TABLET BY MOUTH EVERY NIGHT AT BEDTIME    Screen for colon cancer  - Fecal colorectal cancer screen FIT - Future (S+30); Future    Essential hypertension  Stable, refill   - chlorthalidone (HYGROTON) 25 MG tablet; Take 1 tablet (25 mg) by mouth daily.  - losartan (COZAAR) 50 MG tablet; Take 1 tablet (50 mg) by mouth daily.    Need for immunization against influenza  - INFLUENZA HIGH DOSE, TRIVALENT, PF (FLUZONE)    Need for COVID-19 vaccine  - COVID-19 12+ (PFIZER)            See Patient Instructions    Juan Walker is a 74 year old, presenting for the following health issues:  Recheck Medication (fasting)        11/19/2024     8:06 AM   Additional Questions   Roomed by Rola Kaiser   Accompanied by self         11/19/2024     8:06 AM   Patient Reported Additional Medications   Patient reports taking the following new medications none     History of Present Illness       Reason for visit:  Med refill check high cholesterol   She is taking medications regularly.         Hyperlipidemia Follow-Up    Are you regularly taking any medication or supplement to lower your cholesterol?   Yes- Simvastatin  Are you having muscle aches or other side effects that you think could be caused by your cholesterol lowering medication?  No    Hypertension Follow-up    Do you check your blood pressure regularly outside of the clinic? Yes- sometimes  Are you following a low salt diet? Yes  Are your blood pressures ever more than 140 on the top number (systolic) OR more   than 90 on the bottom  number (diastolic), for example 140/90? No    Annual Wellness Visit     Patient has been advised of split billing requirements and indicates understanding: Yes         Health Care Directive  Patient does not have a Health Care Directive: Patient states has Advance Directive and will bring in a copy to clinic.  In general, how would you rate your overall physical health? excellent  Do you have a special diet?  Low salt         No data to display              Do you see a dentist two times every year?  Yes  Have you been more tired than usual lately?  No  If you drink alcohol do you typically have >3 drinks per day or >7 drinks per week? No  Do you have a current opioid prescription? No  Do you use any other controlled substances or medications that are not prescribed by a provider? None  Social History     Tobacco Use    Smoking status: Never    Smokeless tobacco: Never   Vaping Use    Vaping status: Never Used   Substance Use Topics    Alcohol use: No     Comment: very rare    Drug use: Never       Needs assistance for the following daily activities: no assistance needed  Which of the following safety concerns are present in your home?  none identified   Do you (or your family members) have any concerns about your safety while driving?  No  Do you have any of the following hearing concerns?: No hearing concerns  In the past 6 months, have you been bothered by leaking of urine? No        11/17/2023   Social Factors   Worry food won't last until get money to buy more No   Food not last or not have enough money for food? No   Do you have housing? (Housing is defined as stable permanent housing and does not include staying ouside in a car, in a tent, in an abandoned building, in an overnight shelter, or couch-surfing.) Yes   Are you worried about losing your housing? No   Lack of transportation? No   Unable to get utilities (heat,electricity)? No             11/14/2024   Fall Risk   Fallen 2 or more times in the past  year? No    Trouble with walking or balance? No        Patient-reported          Today's PHQ-2 Score:       11/19/2024     8:06 AM   PHQ-2 ( 1999 [employer])   Q1: Little interest or pleasure in doing things 0    Q2: Feeling down, depressed or hopeless 0    PHQ-2 Score 0    Q1: Little interest or pleasure in doing things Not at all   Q2: Feeling down, depressed or hopeless Not at all   PHQ-2 Score 0       Patient-reported           1/31/2024   LAST FHS-7 RESULTS   1st degree relative breast or ovarian cancer No   Any relative bilateral breast cancer No   Any male have breast cancer No   Any ONE woman have BOTH breast AND ovarian cancer No   Any woman with breast cancer before 50yrs No   2 or more relatives with breast AND/OR ovarian cancer No   2 or more relatives with breast AND/OR bowel cancer No           Mammogram Screening - After age 74- determine frequency with patient based on health status, life expectancy and patient goals    ASCVD Risk   The 10-year ASCVD risk score (Praveen SCHWARTZ, et al., 2019) is: 16%    Values used to calculate the score:      Age: 74 years      Sex: Female      Is Non- : No      Diabetic: No      Tobacco smoker: No      Systolic Blood Pressure: 110 mmHg      Is BP treated: Yes      HDL Cholesterol: 76 mg/dL      Total Cholesterol: 191 mg/dL            Reviewed and updated as needed this visit by Provider           Sexual Activity          BP Readings from Last 3 Encounters:   11/19/24 110/70   08/01/24 126/77   02/02/24 137/78    Wt Readings from Last 3 Encounters:   11/19/24 57.7 kg (127 lb 3.2 oz)   08/01/24 58.1 kg (128 lb)   02/02/24 59 kg (130 lb)                    Current providers sharing in care for this patient include:  Patient Care Team:  Nehemias Wallace MD as PCP - General (Family Practice)  Nehemias Wallace MD as Assigned PCP  Raymundo You MD as Assigned Heart and Vascular Provider  Jackelyn Fonseca PA-C as  "Physician Assistant (Dermatology)  Jackelyn Fonseca PA-C as Assigned Surgical Provider    The following health maintenance items are reviewed in Epic and correct as of today:  Health Maintenance   Topic Date Due    RSV VACCINE (1 - 1-dose 75+ series) Never done    DTAP/TDAP/TD IMMUNIZATION (2 - Td or Tdap) 08/21/2024    INFLUENZA VACCINE (1) 09/01/2024    COVID-19 Vaccine (6 - 2024-25 season) 09/01/2024    BMP  11/17/2024    LIPID  11/17/2024    ANNUAL REVIEW OF HM ORDERS  11/17/2024    MEDICARE ANNUAL WELLNESS VISIT  11/17/2024    COLORECTAL CANCER SCREENING  12/18/2024    FALL RISK ASSESSMENT  11/19/2025    DEXA  11/16/2026    GLUCOSE  11/17/2026    ADVANCE CARE PLANNING  11/17/2028    HEPATITIS C SCREENING  Completed    PHQ-2 (once per calendar year)  Completed    Pneumococcal Vaccine: 65+ Years  Completed    ZOSTER IMMUNIZATION  Completed    HPV IMMUNIZATION  Aged Out    MENINGITIS IMMUNIZATION  Aged Out    RSV MONOCLONAL ANTIBODY  Aged Out    MAMMO SCREENING  Discontinued       Appropriate preventive services were discussed with this patient, including applicable screening as appropriate for fall prevention, nutrition, physical activity, Tobacco-use cessation, weight loss and cognition.  Checklist reviewing preventive services available has been given to the patient.        11/19/2024   Mini Cog   Clock Draw Score 2 Normal   3 Item Recall 3 objects recalled   Mini Cog Total Score 5                 Review of Systems  Constitutional, HEENT, cardiovascular, pulmonary, gi and gu systems are negative, except as otherwise noted.      Objective    /70 (BP Location: Right arm, Patient Position: Sitting, Cuff Size: Adult Regular)   Pulse 75   Temp 97.2  F (36.2  C) (Tympanic)   Resp 16   Ht 1.59 m (5' 2.6\")   Wt 57.7 kg (127 lb 3.2 oz)   SpO2 99%   BMI 22.82 kg/m    Body mass index is 22.82 kg/m .  Physical Exam   GENERAL: alert and no distress  HENT: ear canals and TM's normal, nose and mouth " without ulcers or lesions  NECK: no adenopathy, no asymmetry, masses, or scars  RESP: lungs clear to auscultation - no rales, rhonchi or wheezes  CV: regular rate and rhythm, normal S1 S2, no S3 or S4, no murmur, click or rub, no peripheral edema  MS: no gross musculoskeletal defects noted, no edema            Signed Electronically by: Nehemias Wallace MD

## 2024-11-19 NOTE — PATIENT INSTRUCTIONS
Patient Education   Preventive Care Advice   This is general advice given by our system to help you stay healthy. However, your care team may have specific advice just for you. Please talk to your care team about your preventive care needs.  Nutrition  Eat 5 or more servings of fruits and vegetables each day.  Try wheat bread, brown rice and whole grain pasta (instead of white bread, rice, and pasta).  Get enough calcium and vitamin D. Check the label on foods and aim for 100% of the RDA (recommended daily allowance).  Lifestyle  Exercise at least 150 minutes each week  (30 minutes a day, 5 days a week).  Do muscle strengthening activities 2 days a week. These help control your weight and prevent disease.  No smoking.  Wear sunscreen to prevent skin cancer.  Have a dental exam and cleaning every 6 months.  Yearly exams  See your health care team every year to talk about:  Any changes in your health.  Any medicines your care team has prescribed.  Preventive care, family planning, and ways to prevent chronic diseases.  Shots (vaccines)   HPV shots (up to age 26), if you've never had them before.  Hepatitis B shots (up to age 59), if you've never had them before.  COVID-19 shot: Get this shot when it's due.  Flu shot: Get a flu shot every year.  Tetanus shot: Get a tetanus shot every 10 years.  Pneumococcal, hepatitis A, and RSV shots: Ask your care team if you need these based on your risk.  Shingles shot (for age 50 and up)  General health tests  Diabetes screening:  Starting at age 35, Get screened for diabetes at least every 3 years.  If you are younger than age 35, ask your care team if you should be screened for diabetes.  Cholesterol test: At age 39, start having a cholesterol test every 5 years, or more often if advised.  Bone density scan (DEXA): At age 50, ask your care team if you should have this scan for osteoporosis (brittle bones).  Hepatitis C: Get tested at least once in your life.  STIs (sexually  transmitted infections)  Before age 24: Ask your care team if you should be screened for STIs.  After age 24: Get screened for STIs if you're at risk. You are at risk for STIs (including HIV) if:  You are sexually active with more than one person.  You don't use condoms every time.  You or a partner was diagnosed with a sexually transmitted infection.  If you are at risk for HIV, ask about PrEP medicine to prevent HIV.  Get tested for HIV at least once in your life, whether you are at risk for HIV or not.  Cancer screening tests  Cervical cancer screening: If you have a cervix, begin getting regular cervical cancer screening tests starting at age 21.  Breast cancer scan (mammogram): If you've ever had breasts, begin having regular mammograms starting at age 40. This is a scan to check for breast cancer.  Colon cancer screening: It is important to start screening for colon cancer at age 45.  Have a colonoscopy test every 10 years (or more often if you're at risk) Or, ask your provider about stool tests like a FIT test every year or Cologuard test every 3 years.  To learn more about your testing options, visit:   .  For help making a decision, visit:   https://bit.ly/ge17205.  Prostate cancer screening test: If you have a prostate, ask your care team if a prostate cancer screening test (PSA) at age 55 is right for you.  Lung cancer screening: If you are a current or former smoker ages 50 to 80, ask your care team if ongoing lung cancer screenings are right for you.  For informational purposes only. Not to replace the advice of your health care provider. Copyright   2023 [employer]. All rights reserved. Clinically reviewed by the [facility] Transitions Program. Knomo [id number] - REV 01/24.

## 2024-11-20 ENCOUNTER — PATIENT OUTREACH (OUTPATIENT)
Dept: CARE COORDINATION | Facility: CLINIC | Age: 74
End: 2024-11-20
Payer: COMMERCIAL

## 2024-12-27 PROCEDURE — 82274 ASSAY TEST FOR BLOOD FECAL: CPT | Performed by: FAMILY MEDICINE

## 2025-01-02 ENCOUNTER — LAB (OUTPATIENT)
Dept: LAB | Facility: CLINIC | Age: 75
End: 2025-01-02
Payer: COMMERCIAL

## 2025-01-02 DIAGNOSIS — Z12.11 SPECIAL SCREENING FOR MALIGNANT NEOPLASMS, COLON: Primary | ICD-10-CM

## 2025-01-03 LAB — HEMOCCULT STL QL IA: NEGATIVE

## 2025-01-30 ENCOUNTER — OFFICE VISIT (OUTPATIENT)
Dept: DERMATOLOGY | Facility: CLINIC | Age: 75
End: 2025-01-30
Payer: COMMERCIAL

## 2025-01-30 DIAGNOSIS — L81.4 LENTIGO: ICD-10-CM

## 2025-01-30 DIAGNOSIS — D18.01 CHERRY ANGIOMA: ICD-10-CM

## 2025-01-30 DIAGNOSIS — L66.12 FRONTAL FIBROSING ALOPECIA: ICD-10-CM

## 2025-01-30 DIAGNOSIS — Z85.828 HISTORY OF BASAL CELL CANCER: ICD-10-CM

## 2025-01-30 DIAGNOSIS — D22.9 NEVUS: ICD-10-CM

## 2025-01-30 DIAGNOSIS — L57.0 AK (ACTINIC KERATOSIS): ICD-10-CM

## 2025-01-30 DIAGNOSIS — Z86.006 HISTORY OF MELANOMA IN SITU: ICD-10-CM

## 2025-01-30 DIAGNOSIS — Z85.820 HISTORY OF MELANOMA: ICD-10-CM

## 2025-01-30 DIAGNOSIS — L82.1 SEBORRHEIC KERATOSIS: Primary | ICD-10-CM

## 2025-01-30 ASSESSMENT — PAIN SCALES - GENERAL: PAINLEVEL_OUTOF10: NO PAIN (0)

## 2025-01-30 NOTE — LETTER
1/30/2025      Tri Walden  [address]  [facility] [address]      Dear Colleague,    Thank you for referring your patient, Tri Walden, to the [facility]. Please see a copy of my visit note below.    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   ROS of the following were done and are negative: Constitutional, Eyes, Ears, Nose,   Mouth, Throat, Cardiovascular, Respiratory, GI, Genitourinary, Musculoskeletal,   Psychiatric, Endocrine, Allergic/Immunologic.    PHYSICAL EXAM:   There were no vitals taken for this visit.  Skin exam performed as follows: Type 2 skin. Mood appropriate  Alert and Oriented X 3. Well developed, well nourished in no distress.  General appearance: Normal  Head including face: Normal  Eyes: conjunctiva and lids: Normal  Mouth: Lips, teeth, gums: Normal  Neck: Normal  Chest-breast/axillae: Normal  Back: Normal  Extremities: digits/nails (clubbing): Normal  Eccrine and Apocrine glands: Normal  Right upper extremity: Normal  Left upper extremity: Normal  Right lower extremity: Normal  Left lower extremity: Normal  Skin: Scalp and body hair: See below    Pt deferred exam of breasts, groin, buttocks: No    Other physical findings:  1. Multiple pigmented macules on extremities and trunk  2. Multiple pigmented macules on face, trunk and extremities  3. Multiple vascular papules on  trunk, arms and legs  4. Multiple scattered keratotic plaques  5. Pink gritty papules on the right brow x 1, right nasal side wall x 1, left nasal side wall x 1       Except as noted above, no other signs of skin cancer or melanoma.     ASSESSMENT/PLAN:   Benign Full skin cancer screening today. . Patient with history of melanoma and BCC  Advised on monthly self exams and 1 year  Patient Education: Appropriate brochures given.    Multiple benign appearing melanocytic nevi on arms, legs and trunk. Discussed ABCDEs of melanoma and sunscreen.   Multiple lentigos on arms, legs and trunk. Advised benign, no treatment needed.  Multiple scattered angiomas. Advised benign, no treatment needed.   Seborrheic keratosis on arms, legs and trunk. Advised benign, no treatment needed.  Actinic keratosis on the right brow x 1, right nasal side wall x 1, left nasal side wall x 1. As precancerous, cryosurgery performed. Advised on blistering and post-op care. 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 YES  2.) Patient received a complete physical skin examination: YES  3.) Patient was counseled to perform a monthly self skin examination: YES  Scribed By: Tiny Mcqueen MS, PAGiselaC'      Again, thank you for allowing me to participate in the care of your patient.        Sincerely,        Tiny Mcqueen PA-C    Electronically signed

## 2025-01-30 NOTE — PROGRESS NOTES
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   ROS of the following were done and are negative: Constitutional, Eyes, Ears, Nose,   Mouth, Throat, Cardiovascular, Respiratory, GI, Genitourinary, Musculoskeletal,   Psychiatric, Endocrine, Allergic/Immunologic.    PHYSICAL EXAM:   There were no vitals taken for this visit.  Skin exam performed as follows: Type 2 skin. Mood appropriate  Alert and Oriented X 3. Well developed, well nourished in no distress.  General appearance: Normal  Head including face: Normal  Eyes: conjunctiva and lids: Normal  Mouth: Lips, teeth, gums: Normal  Neck: Normal  Chest-breast/axillae: Normal  Back: Normal  Extremities: digits/nails (clubbing): Normal  Eccrine and Apocrine glands: Normal  Right upper extremity: Normal  Left upper extremity: Normal  Right lower extremity: Normal  Left lower extremity: Normal  Skin: Scalp and body hair: See below    Pt deferred exam of breasts, groin, buttocks: No    Other physical findings:  1. Multiple pigmented macules on extremities and trunk  2. Multiple pigmented macules on face, trunk and extremities  3. Multiple vascular papules on trunk, arms and legs  4. Multiple scattered keratotic plaques  5. Pink gritty papules on the right brow x 1, right nasal side wall x 1, left nasal side wall x 1       Except as noted above, no other signs of skin cancer or melanoma.      ASSESSMENT/PLAN:   Benign Full skin cancer screening today. . Patient with history of melanoma and BCC  Advised on monthly self exams and 1 year  Patient Education: Appropriate brochures given.    Multiple benign appearing melanocytic nevi on arms, legs and trunk. Discussed ABCDEs of melanoma and sunscreen.   Multiple lentigos on arms, legs and trunk. Advised benign, no treatment needed.  Multiple scattered angiomas. Advised benign, no treatment needed.   Seborrheic keratosis on arms, legs and trunk. Advised benign, no treatment needed.  Actinic keratosis on the right brow x 1, right nasal side wall x 1, left nasal side wall x 1. As precancerous, cryosurgery performed. Advised on blistering and post-op care. 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 YES  2.) Patient received a complete physical skin examination: YES  3.) Patient was counseled to perform a monthly self skin examination: YES  Scribed By: Tiny Mcqueen MS, PAENRIQUE'

## 2025-01-30 NOTE — NURSING NOTE
Tri Walden's chief complaint for this visit includes:  Chief Complaint   Patient presents with    Skin Check     Patient is here for her 4 month skin check and presents no concerns.      PCP: Nehemias Wallace    Referring Provider:  Referred Self, MD  No address on file    There were no vitals taken for this visit.  No Pain (0)        Allergies   Allergen Reactions    Nkda [No Known Drug Allergy]          Do you need any medication refills at today's visit? Taina Villa MA

## 2025-01-30 NOTE — PATIENT INSTRUCTIONS
- Information was given to patient in exam room and available via [employer].     WOUND CARE INSTRUCTIONS   FOR CRYOSURGERY   This area treated with liquid nitrogen should form a blister (areas treated may or may not blister-skin may just turn dark and slough off). You do not need to bandage the area unless a blister forms and breaks (which may be a few days). When the blister breaks, begin daily dressing changes as follows:  1) Clean and dry the area with tap water using clean Q-tip or sterile gauze pad.   2) Apply Polysporin ointment or Bacitracin ointment over entire wound. Do NOT use Neosporin ointment.   3) Cover the wound with a band-aid or sterile non-stick gauze pad and micropore paper tape.   REPEAT THESE INSTRUCTIONS AT LEAST ONCE A DAY UNTIL THE WOUND HAS COMPLETELY HEALED.   It is an old wives tale that a wound heals better when it is exposed to air and allowed to dry out. The wound will heal faster with a better cosmetic result if it is kept moist with ointment and covered with a bandage.   Do not let the wound dry out.   IMPORTANT INFORMATION ON REVERSE SIDE   Supplies Needed:   *Cotton tipped applicators (Q-tips)   *Polysporin ointment or Bacitracin ointment (NOT NEOSPORIN)   *Band-aids, or non stick gauze pads and micropore paper tape   PATIENT INFORMATION   During the healing process you will notice a number of changes. All wounds develop a small halo of redness surrounding the wound. This means healing is occurring. Severe itching with extensive redness usually indicates sensitivity to the ointment or bandage tape used to dress the wound. You should call our office if this develops.   Swelling and/or discoloration around your surgical site is common, particularly when performed around the eye.   All wounds normally drain. The larger the wound the more drainage there will be. After 7-10 days, you will notice the wound beginning to shrink and new skin will begin to grow. The wound is healed when you can  see skin has formed over the entire area. A healed wound has a healthy, shiny look to the surface and is red to dark pink in color to normalize. Wounds may take approximately 4-6 weeks to heal. Larger wounds may take 6-8 weeks. After the wound is healed you may discontinue dressing changes.   You may experience a sensation of tightness as your wound heals. This is normal and will gradually subside.   Your healed wound may be sensitive to temperature changes. This sensitivity improves with time, but if you re having a lot of discomfort, try to avoid temperature extremes.   Patients frequently experience itching after their wound appears to have healed because of the continue healing under the skin. Plain Vaseline will help relieve the itching.        Proper skin care from [facility]:    -Eliminate harsh soaps as they strip the natural oils from the skin, often resulting in dry itchy skin ( i.e. Dial, Zest, Katie Spring)  -Use mild soaps such as Cetaphil or Dove Sensitive Skin in the shower. You do not need to use soap on arms, legs, and trunk every time you shower unless visibly soiled.   -Avoid hot or cold showers.  -After showering, lightly dry off and apply moisturizing within 2-3 minutes. This will help trap moisture in the skin.   -Aggressive use of a moisturizer at least 1-2 times a day to the entire body (including -Vanicream, Cetaphil, Aquaphor or Cerave) and moisturize hands after every washing.  -We recommend using moisturizers that come in a tub that needs to be scooped out, not a pump. This has more of an oil base. It will hold moisture in your skin much better than a water base moisturizer. The above recommended are non-pore clogging.      Wear a sunscreen with at least SPF 30 on your face, ears, neck and V of the chest daily. Wear sunscreen on other areas of the body if those areas are exposed to the sun throughout the day. Sunscreens can contain physical and/or chemical blockers. Physical  blockers are less likely to clog pores, these include zinc oxide and titanium dioxide. Reapply every two hour and after swimming.     Sunscreen examples: https://www.ewg.org/sunscreen/    UV radiation  UVA radiation remains constant throughout the day and throughout the year. It is a longer wavelength than UVB and therefore penetrates deeper into the skin leading to immediate and delayed tanning, photoaging, and skin cancer. 70-80% of UVA and UVB radiation occurs between the hours of 10am-2pm.  UVB radiation  UVB radiation causes the most harmful effects and is more significant during the summer months. However, snow and ice can reflect UVB radiation leading to skin damage during the winter months as well. UVB radiation is responsible for tanning, burning, inflammation, delayed erythema (pinkness), pigmentation (brown spots), and skin cancer.     I recommend self monthly full body exams and yearly full body exams with a dermatology provider. If you develop a new or changing lesion please follow up for examination. Most skin cancers are pink and scaly or pink and pearly. However, we do see blue/brown/black skin cancers.  Consider the ABCDEs of melanoma when giving yourself your monthly full body exam ( don't forget the groin, buttocks, feet, toes, etc). A-asymmetry, B-borders, C-color, D-diameter, E-elevation or evolving. If you see any of these changes please follow up in clinic. If you cannot see your back I recommend purchasing a hand held mirror to use with a larger wall mirror.       Checking for Skin Cancer  You can find cancer early by checking your skin each month. There are 3 kinds of skin cancer. They are melanoma, basal cell carcinoma, and squamous cell carcinoma. Doing monthly skin checks is the best way to find new marks or skin changes. Follow the instructions below for checking your skin.   The ABCDEs of checking moles for melanoma   Check your moles or growths for signs of melanoma using ABCDE:    Asymmetry: the sides of the mole or growth don t match  Border: the edges are ragged, notched, or blurred  Color: the color within the mole or growth varies  Diameter: the mole or growth is larger than 6 mm (size of a pencil eraser)  Evolving: the size, shape, or color of the mole or growth is changing (evolving is not shown in the images below)    Checking for other types of skin cancer  Basal cell carcinoma or squamous cell carcinoma have symptoms such as:     A spot or mole that looks different from all other marks on your skin  Changes in how an area feels, such as itching, tenderness, or pain  Changes in the skin's surface, such as oozing, bleeding, or scaliness  A sore that does not heal  New swelling or redness beyond the border of a mole    Who s at risk?  Anyone can get skin cancer. But you are at greater risk if you have:   Fair skin, light-colored hair, or light-colored eyes  Many moles or abnormal moles on your skin  A history of sunburns from sunlight or tanning beds  A family history of skin cancer  A history of exposure to radiation or chemicals  A weakened immune system  If you have had skin cancer in the past, you are at risk for recurring skin cancer.   How to check your skin  Do your monthly skin checkups in front of a full-length mirror. Check all parts of your body, including your:   Head (ears, face, neck, and scalp)  Torso (front, back, and sides)  Arms (tops, undersides, upper, and lower armpits)  Hands (palms, backs, and fingers, including under the nails)  Buttocks and genitals  Legs (front, back, and sides)  Feet (tops, soles, toes, including under the nails, and between toes)  If you have a lot of moles, take digital photos of them each month. Make sure to take photos both up close and from a distance. These can help you see if any moles change over time.   Most skin changes are not cancer. But if you see any changes in your skin, call your doctor right away. Only he or she can diagnose  a problem. If you have skin cancer, seeing your doctor can be the first step toward getting the treatment that could save your life.   [employer]adoÂ° last reviewed this educational content on 4/1/2019 2000-2020 [employer]. [address] [place] [address], [place] [address]. All rights reserved. This information is not intended as a substitute for professional medical care. Always follow your healthcare professional's instructions.       When should I call my doctor?  If you are worsening or not improving, please, contact us or seek urgent care as noted below.     Who should I call with questions (adults)?    [facility] [phone]  For urgent needs outside of business hours call the [facility] at [phone] and ask for the dermatology resident on call to be paged  If this is a medical emergency and you are unable to reach an ER, Call [phone]      If you need a prescription refill, please contact your pharmacy. Refills are approved or denied by our Physicians during normal business hours, Monday through Friday.  Per office policy, refills will not be granted if you have not been seen within the past year (or sooner depending on the condition).

## 2025-02-03 ENCOUNTER — HOSPITAL ENCOUNTER (OUTPATIENT)
Dept: MAMMOGRAPHY | Facility: CLINIC | Age: 75
Discharge: HOME | End: 2025-02-03
Attending: FAMILY MEDICINE | Admitting: FAMILY MEDICINE
Payer: COMMERCIAL

## 2025-02-03 DIAGNOSIS — Z12.31 VISIT FOR SCREENING MAMMOGRAM: ICD-10-CM

## 2025-02-03 PROCEDURE — 77063 BREAST TOMOSYNTHESIS BI: CPT

## 2025-02-04 ENCOUNTER — HOSPITAL ENCOUNTER (OUTPATIENT)
Dept: CARDIOLOGY | Facility: CLINIC | Age: 75
Discharge: HOME | End: 2025-02-04
Attending: INTERNAL MEDICINE
Payer: COMMERCIAL

## 2025-02-04 DIAGNOSIS — I35.0 AORTIC VALVE STENOSIS, ETIOLOGY OF CARDIAC VALVE DISEASE UNSPECIFIED: ICD-10-CM

## 2025-02-04 PROCEDURE — 93016 CV STRESS TEST SUPVJ ONLY: CPT | Performed by: INTERNAL MEDICINE

## 2025-02-04 PROCEDURE — 93325 DOPPLER ECHO COLOR FLOW MAPG: CPT | Mod: TC

## 2025-02-04 PROCEDURE — 255N000002 HC RX 255 OP 636: Performed by: INTERNAL MEDICINE

## 2025-02-04 RX ADMIN — HUMAN ALBUMIN MICROSPHERES AND PERFLUTREN 2 ML: 10; .22 INJECTION, SOLUTION INTRAVENOUS at 09:27

## 2025-02-20 ENCOUNTER — TELEPHONE (OUTPATIENT)
Dept: CARDIOLOGY | Facility: CLINIC | Age: 75
End: 2025-02-20
Payer: COMMERCIAL

## 2025-02-20 NOTE — TELEPHONE ENCOUNTER
Coronary angiogram/PCI/Right Heart Cath prep instructions.     Patient is scheduled for a Coronary Angiogram at [facility] - [address], [place] [address] - Main Entrance of the Hospital on 2/24/25.  Check in time is at 7:30 AM and procedure to follow.    Patient instructed to remain NPO for solid foods 8 hours prior to arrival and may have clear liquids up to 2 hours prior to arrival.    Patient does not require extra fluids prior to procedure.    Patient is not diabetic.    Patient is not on anticoagulation.    Patient is taking chlorthalidone and has been instructed to hold it the morning of procedure.     Patient is taking ASA 81mg daily and will take 4 tabs (324mg) the morning of the procedure.    Pt is not on a SGLT2 inhibitor.    Pt is not on a GLP-1 Agonist    Patient advised to take their other daily medications the morning of the procedure with small sips of water.     Patient advised to shower the night before and morning of their procedure with regular soap.    Verified patient does not have a contrast allergy.    Verified patient has someone available to drive them home from the hospital and can stay with them for 24 hours after the procedure.     Patient advised they will have bedrest post procedure.  Length of time is 2-6 hours and dependent on access site used for procedure.  This bedrest is to allow proper clotting of the access site to prevent bleeding.    Patient advised to notify care team with any new COVID like symptoms prior to procedure. Day of procedure phone number: Kimberly at [phone]    Patient is aware of visitor policy.    Patient expresses understanding of above instructions and denies further questions at this time.      Jenifer Cortez RN  [facility]

## 2025-02-24 ENCOUNTER — APPOINTMENT (OUTPATIENT)
Dept: CT IMAGING | Facility: CLINIC | Age: 75
End: 2025-02-24
Attending: SURGERY
Payer: COMMERCIAL

## 2025-02-24 ENCOUNTER — HOSPITAL ENCOUNTER (OUTPATIENT)
Facility: CLINIC | Age: 75
Discharge: HOME | End: 2025-02-24
Attending: INTERNAL MEDICINE | Admitting: INTERNAL MEDICINE
Payer: COMMERCIAL

## 2025-02-24 ENCOUNTER — APPOINTMENT (OUTPATIENT)
Dept: ULTRASOUND IMAGING | Facility: CLINIC | Age: 75
End: 2025-02-24
Attending: SURGERY
Payer: COMMERCIAL

## 2025-02-24 VITALS
HEART RATE: 77 BPM | RESPIRATION RATE: 16 BRPM | WEIGHT: 129 LBS | DIASTOLIC BLOOD PRESSURE: 57 MMHG | BODY MASS INDEX: 22.86 KG/M2 | OXYGEN SATURATION: 97 % | SYSTOLIC BLOOD PRESSURE: 118 MMHG | TEMPERATURE: 97.7 F | HEIGHT: 63 IN

## 2025-02-24 DIAGNOSIS — R94.39 ABNORMAL CARDIOVASCULAR STRESS TEST: ICD-10-CM

## 2025-02-24 DIAGNOSIS — I25.10 CHRONIC TOTAL OCCLUSION OF NATIVE CORONARY ARTERY: Primary | ICD-10-CM

## 2025-02-24 DIAGNOSIS — I25.82 CHRONIC TOTAL OCCLUSION OF NATIVE CORONARY ARTERY: Primary | ICD-10-CM

## 2025-02-24 DIAGNOSIS — I35.0 NONRHEUMATIC AORTIC VALVE STENOSIS: ICD-10-CM

## 2025-02-24 DIAGNOSIS — I35.0 AORTIC VALVE STENOSIS, ETIOLOGY OF CARDIAC VALVE DISEASE UNSPECIFIED: ICD-10-CM

## 2025-02-24 PROBLEM — Z98.890 STATUS POST CORONARY ANGIOGRAM: Status: ACTIVE | Noted: 2025-02-24

## 2025-02-24 LAB
ABO + RH BLD: NORMAL
ALBUMIN SERPL BCG-MCNC: 4.5 G/DL (ref 3.5–5.2)
ALBUMIN UR-MCNC: NEGATIVE MG/DL
ALP SERPL-CCNC: 60 U/L (ref 40–150)
ALT SERPL W P-5'-P-CCNC: 16 U/L (ref 0–50)
ANION GAP SERPL CALCULATED.3IONS-SCNC: 14 MMOL/L (ref 7–15)
APPEARANCE UR: CLEAR
APTT PPP: 32 SECONDS (ref 22–38)
AST SERPL W P-5'-P-CCNC: 29 U/L (ref 0–45)
ATRIAL RATE - MUSE: 85 BPM
BILIRUB DIRECT SERPL-MCNC: 0.23 MG/DL (ref 0–0.3)
BILIRUB SERPL-MCNC: 0.7 MG/DL
BILIRUB UR QL STRIP: NEGATIVE
BLD GP AB SCN SERPL QL: NEGATIVE
BUN SERPL-MCNC: 14 MG/DL (ref 8–23)
CALCIUM SERPL-MCNC: 9.7 MG/DL (ref 8.8–10.4)
CHLORIDE SERPL-SCNC: 92 MMOL/L (ref 98–107)
COLOR UR AUTO: ABNORMAL
CREAT SERPL-MCNC: 0.64 MG/DL (ref 0.51–0.95)
DIASTOLIC BLOOD PRESSURE - MUSE: NORMAL MMHG
EGFRCR SERPLBLD CKD-EPI 2021: >90 ML/MIN/1.73M2
ERYTHROCYTE [DISTWIDTH] IN BLOOD BY AUTOMATED COUNT: 13.1 % (ref 10–15)
EST. AVERAGE GLUCOSE BLD GHB EST-MCNC: 108 MG/DL
GLUCOSE SERPL-MCNC: 126 MG/DL (ref 70–99)
GLUCOSE UR STRIP-MCNC: NEGATIVE MG/DL
HBA1C MFR BLD: 5.4 %
HCO3 SERPL-SCNC: 27 MMOL/L (ref 22–29)
HCT VFR BLD AUTO: 38.4 % (ref 35–47)
HGB BLD-MCNC: 13.3 G/DL (ref 11.7–15.7)
HGB UR QL STRIP: ABNORMAL
INR PPP: 0.98 (ref 0.85–1.15)
INTERPRETATION ECG - MUSE: NORMAL
KETONES UR STRIP-MCNC: NEGATIVE MG/DL
LEUKOCYTE ESTERASE UR QL STRIP: NEGATIVE
MCH RBC QN AUTO: 31.1 PG (ref 26.5–33)
MCHC RBC AUTO-ENTMCNC: 34.6 G/DL (ref 31.5–36.5)
MCV RBC AUTO: 90 FL (ref 78–100)
MUCOUS THREADS #/AREA URNS LPF: PRESENT /LPF
NITRATE UR QL: NEGATIVE
P AXIS - MUSE: 67 DEGREES
PH UR STRIP: 6.5 [PH] (ref 5–7)
PLATELET # BLD AUTO: 249 10E3/UL (ref 150–450)
POTASSIUM SERPL-SCNC: 3.2 MMOL/L (ref 3.4–5.3)
POTASSIUM SERPL-SCNC: 3.4 MMOL/L (ref 3.4–5.3)
POTASSIUM SERPL-SCNC: 3.4 MMOL/L (ref 3.4–5.3)
PR INTERVAL - MUSE: 136 MS
PROT SERPL-MCNC: 7.7 G/DL (ref 6.4–8.3)
QRS DURATION - MUSE: 102 MS
QT - MUSE: 354 MS
QTC - MUSE: 421 MS
R AXIS - MUSE: 67 DEGREES
RBC # BLD AUTO: 4.28 10E6/UL (ref 3.8–5.2)
RBC URINE: 0 /HPF
SODIUM SERPL-SCNC: 133 MMOL/L (ref 135–145)
SP GR UR STRIP: 1.02 (ref 1–1.03)
SPECIMEN EXP DATE BLD: NORMAL
SYSTOLIC BLOOD PRESSURE - MUSE: NORMAL MMHG
T AXIS - MUSE: 84 DEGREES
UROBILINOGEN UR STRIP-MCNC: NORMAL MG/DL
VENTRICULAR RATE- MUSE: 85 BPM
WBC # BLD AUTO: 5.9 10E3/UL (ref 4–11)
WBC URINE: 2 /HPF

## 2025-02-24 PROCEDURE — 85730 THROMBOPLASTIN TIME PARTIAL: CPT | Performed by: INTERNAL MEDICINE

## 2025-02-24 PROCEDURE — 83036 HEMOGLOBIN GLYCOSYLATED A1C: CPT | Performed by: SURGERY

## 2025-02-24 PROCEDURE — 86850 RBC ANTIBODY SCREEN: CPT | Performed by: INTERNAL MEDICINE

## 2025-02-24 PROCEDURE — C1769 GUIDE WIRE: HCPCS | Performed by: INTERNAL MEDICINE

## 2025-02-24 PROCEDURE — 258N000003 HC RX IP 258 OP 636: Performed by: INTERNAL MEDICINE

## 2025-02-24 PROCEDURE — 71250 CT THORAX DX C-: CPT

## 2025-02-24 PROCEDURE — 93970 EXTREMITY STUDY: CPT

## 2025-02-24 PROCEDURE — 250N000011 HC RX IP 250 OP 636: Performed by: INTERNAL MEDICINE

## 2025-02-24 PROCEDURE — 85027 COMPLETE CBC AUTOMATED: CPT | Performed by: INTERNAL MEDICINE

## 2025-02-24 PROCEDURE — 84132 ASSAY OF SERUM POTASSIUM: CPT | Performed by: STUDENT IN AN ORGANIZED HEALTH CARE EDUCATION/TRAINING PROGRAM

## 2025-02-24 PROCEDURE — 99153 MOD SED SAME PHYS/QHP EA: CPT | Performed by: INTERNAL MEDICINE

## 2025-02-24 PROCEDURE — 86900 BLOOD TYPING SEROLOGIC ABO: CPT | Performed by: INTERNAL MEDICINE

## 2025-02-24 PROCEDURE — C1887 CATHETER, GUIDING: HCPCS | Performed by: INTERNAL MEDICINE

## 2025-02-24 PROCEDURE — 93454 CORONARY ARTERY ANGIO S&I: CPT | Mod: 26 | Performed by: INTERNAL MEDICINE

## 2025-02-24 PROCEDURE — 93454 CORONARY ARTERY ANGIO S&I: CPT | Performed by: INTERNAL MEDICINE

## 2025-02-24 PROCEDURE — 84132 ASSAY OF SERUM POTASSIUM: CPT | Performed by: INTERNAL MEDICINE

## 2025-02-24 PROCEDURE — 93010 ELECTROCARDIOGRAM REPORT: CPT | Performed by: INTERNAL MEDICINE

## 2025-02-24 PROCEDURE — 250N000013 HC RX MED GY IP 250 OP 250 PS 637: Performed by: INTERNAL MEDICINE

## 2025-02-24 PROCEDURE — 999N000071 HC STATISTIC HEART CATH LAB OR EP LAB

## 2025-02-24 PROCEDURE — 36415 COLL VENOUS BLD VENIPUNCTURE: CPT | Performed by: INTERNAL MEDICINE

## 2025-02-24 PROCEDURE — 85610 PROTHROMBIN TIME: CPT | Performed by: INTERNAL MEDICINE

## 2025-02-24 PROCEDURE — 93005 ELECTROCARDIOGRAM TRACING: CPT

## 2025-02-24 PROCEDURE — 999N000184 HC STATISTIC TELEMETRY

## 2025-02-24 PROCEDURE — 81003 URINALYSIS AUTO W/O SCOPE: CPT | Performed by: SURGERY

## 2025-02-24 PROCEDURE — 999N000054 HC STATISTIC EKG NON-CHARGEABLE

## 2025-02-24 PROCEDURE — 99152 MOD SED SAME PHYS/QHP 5/>YRS: CPT | Performed by: INTERNAL MEDICINE

## 2025-02-24 PROCEDURE — 250N000009 HC RX 250: Performed by: INTERNAL MEDICINE

## 2025-02-24 PROCEDURE — C1894 INTRO/SHEATH, NON-LASER: HCPCS | Performed by: INTERNAL MEDICINE

## 2025-02-24 PROCEDURE — 80048 BASIC METABOLIC PNL TOTAL CA: CPT | Performed by: INTERNAL MEDICINE

## 2025-02-24 PROCEDURE — 272N000001 HC OR GENERAL SUPPLY STERILE: Performed by: INTERNAL MEDICINE

## 2025-02-24 PROCEDURE — 82310 ASSAY OF CALCIUM: CPT | Performed by: INTERNAL MEDICINE

## 2025-02-24 PROCEDURE — 93880 EXTRACRANIAL BILAT STUDY: CPT

## 2025-02-24 PROCEDURE — 82040 ASSAY OF SERUM ALBUMIN: CPT | Performed by: SURGERY

## 2025-02-24 RX ORDER — HEPARIN SODIUM 1000 [USP'U]/ML
INJECTION, SOLUTION INTRAVENOUS; SUBCUTANEOUS
Status: DISCONTINUED | OUTPATIENT
Start: 2025-02-24 | End: 2025-02-24 | Stop reason: HOSPADM

## 2025-02-24 RX ORDER — FENTANYL CITRATE 50 UG/ML
INJECTION, SOLUTION INTRAMUSCULAR; INTRAVENOUS
Status: DISCONTINUED | OUTPATIENT
Start: 2025-02-24 | End: 2025-02-24 | Stop reason: HOSPADM

## 2025-02-24 RX ORDER — NALOXONE HYDROCHLORIDE 0.4 MG/ML
0.2 INJECTION, SOLUTION INTRAMUSCULAR; INTRAVENOUS; SUBCUTANEOUS
Status: DISCONTINUED | OUTPATIENT
Start: 2025-02-24 | End: 2025-02-24 | Stop reason: HOSPADM

## 2025-02-24 RX ORDER — POTASSIUM CHLORIDE 1500 MG/1
40 TABLET, EXTENDED RELEASE ORAL ONCE
Status: COMPLETED | OUTPATIENT
Start: 2025-02-24 | End: 2025-02-24

## 2025-02-24 RX ORDER — ACETAMINOPHEN 325 MG/1
650 TABLET ORAL EVERY 4 HOURS PRN
Status: DISCONTINUED | OUTPATIENT
Start: 2025-02-24 | End: 2025-02-24 | Stop reason: HOSPADM

## 2025-02-24 RX ORDER — LIDOCAINE 40 MG/G
CREAM TOPICAL
Status: DISCONTINUED | OUTPATIENT
Start: 2025-02-24 | End: 2025-02-24 | Stop reason: HOSPADM

## 2025-02-24 RX ORDER — SODIUM CHLORIDE 9 MG/ML
INJECTION, SOLUTION INTRAVENOUS CONTINUOUS
Status: DISCONTINUED | OUTPATIENT
Start: 2025-02-24 | End: 2025-02-24 | Stop reason: HOSPADM

## 2025-02-24 RX ORDER — NALOXONE HYDROCHLORIDE 0.4 MG/ML
0.4 INJECTION, SOLUTION INTRAMUSCULAR; INTRAVENOUS; SUBCUTANEOUS
Status: DISCONTINUED | OUTPATIENT
Start: 2025-02-24 | End: 2025-02-24 | Stop reason: HOSPADM

## 2025-02-24 RX ORDER — ATROPINE SULFATE 0.1 MG/ML
0.5 INJECTION INTRAVENOUS
Status: DISCONTINUED | OUTPATIENT
Start: 2025-02-24 | End: 2025-02-24 | Stop reason: HOSPADM

## 2025-02-24 RX ORDER — FLUMAZENIL 0.1 MG/ML
0.2 INJECTION, SOLUTION INTRAVENOUS
Status: DISCONTINUED | OUTPATIENT
Start: 2025-02-24 | End: 2025-02-24 | Stop reason: HOSPADM

## 2025-02-24 RX ORDER — IOPAMIDOL 755 MG/ML
INJECTION, SOLUTION INTRAVASCULAR
Status: DISCONTINUED | OUTPATIENT
Start: 2025-02-24 | End: 2025-02-24 | Stop reason: HOSPADM

## 2025-02-24 RX ORDER — FENTANYL CITRATE 50 UG/ML
25 INJECTION, SOLUTION INTRAMUSCULAR; INTRAVENOUS
Status: DISCONTINUED | OUTPATIENT
Start: 2025-02-24 | End: 2025-02-24 | Stop reason: HOSPADM

## 2025-02-24 RX ORDER — VERAPAMIL HYDROCHLORIDE 2.5 MG/ML
INJECTION, SOLUTION INTRAVENOUS
Status: DISCONTINUED | OUTPATIENT
Start: 2025-02-24 | End: 2025-02-24 | Stop reason: HOSPADM

## 2025-02-24 RX ADMIN — POTASSIUM CHLORIDE 40 MEQ: 1500 TABLET, EXTENDED RELEASE ORAL at 08:40

## 2025-02-24 RX ADMIN — SODIUM CHLORIDE: 0.9 INJECTION, SOLUTION INTRAVENOUS at 07:48

## 2025-02-24 ASSESSMENT — ACTIVITIES OF DAILY LIVING (ADL)
ADLS_ACUITY_SCORE: 47

## 2025-02-24 NOTE — DISCHARGE INSTRUCTIONS
Cardiac Angiogram Discharge Instructions - Radial    After you go home:    Have an adult stay with you until tomorrow.  Drink extra fluids for 2 days.  You may resume your normal diet.  No smoking       For 24 hours - due to the sedation you received:  Relax and take it easy.  Do NOT make any important or legal decisions.  Do NOT drive or operate machines at home or at work.  Do NOT drink alcohol.    Care of Wrist Puncture Site:    For the first 24 hrs - check the puncture site every 1-2 hours while awake.  It is normal to have soreness at the puncture site and mild tingling in your hand for up to 3 days.  Remove the bandaid after 24 hours. If there is minor oozing, apply another bandaid and remove it after 12 hours.  You may shower tomorrow.  Do NOT take a bath, or use a hot tub or pool for at least 3 days. Do NOT scrub the site. Do not use lotion or powder near the puncture site.           Activity:        For 2 days:   do not use your hand or arm to support your weight (such as rising from a chair)   do not bend your wrist (such as lifting a garage door).  do not lift more than 5 pounds or exercise your arm (such as tennis, golf or bowling).  Do NOT do any heavy activity such as exercise, lifting, or straining.     Bleeding:    If you start bleeding from the site in your wrist, sit down and press firmly on/above the site for 10 minutes.   Once bleeding stops, keep arm still for 2 hours.   Call [facility] as soon as you can.       Call 911 right away if you have heavy bleeding or bleeding that does not stop.      Medicines:    If you are taking an antiplatelet medication such as Plavix, Brilinta or Effient, do not stop taking it until you talk to your cardiologist.      If you are on Metformin (Glucophage), do not restart it until you have blood tests (within 2 to 3 days after discharge).  After you have your blood drawn, you may restart the Metformin.   Take your medications, including blood thinners, unless your  provider tells you not to.    If you take Coumadin (Warfarin), have your INR checked by your provider in  3-5 days. Call your clinic to schedule this.  If you have stopped any medicines, check with your provider about when to restart them.    Follow Up Appointments:    Follow up with [facility] Nurse Practitioner at [facility] of patient preference in 7-10 days.    Call the clinic if:    You have a large or growing hard lump around the site.  The site is red, swollen, hot or tender.  Blood or fluid is draining from the site.  You have chills or a fever greater than 101 F (38 C).  Your arm feels numb, cool or changes color.  You have hives, a rash or unusual itching.  Any questions or concerns.          [facility] [place]:    [phone] [facility] (7 days a week)    Or you may contact your provider via My Chart

## 2025-02-24 NOTE — PROGRESS NOTES
Pt to US and CT for ordered imaging at this time. R) radial site covered with dressing, CDI, CMS intact. Pt denies any pain.     Shelby Frye RN on 2/24/2025 at 1:50 PM

## 2025-02-24 NOTE — PROGRESS NOTES
Care Suites Discharge Nursing Note    Patient Information  Name: Tri Walden  Age: 74 year old    Discharge Education:  Discharge instructions reviewed: Yes  Additional education/resources provided: none  Patient/patient representative verbalizes understanding: Yes  Patient discharging on new medications: N/A  Medication education completed: Yes    Discharge Plans:   Discharge location: home  Discharge ride contacted: Yes  Approximate discharge time: 1455    Discharge Criteria:  Discharge criteria met and vital signs stable: Yes  Right wrist CDI no bleeding no hematoma noted     Patient Belongs:  Patient belongings returned to patient: Yes    Gerson Smith RN

## 2025-02-24 NOTE — PRE-PROCEDURE
GENERAL PRE-PROCEDURE:     Written consent obtained?: Yes    Risks and benefits: Risks, benefits and alternatives were discussed    Consent given by:  Patient  Patient states understanding of procedure being performed: Yes    Patient's understanding of procedure matches consent: Yes    Procedure consent matches procedure scheduled: Yes    Expected level of sedation:  Moderate  Appropriately NPO:  Yes  Mallampati  :  Grade 1- soft palate, uvula, tonsillar pillars, and posterior pharyngeal wall visible  Lungs:  Lungs clear with good breath sounds bilaterally  Heart:  Normal heart sounds and rate  History & Physical reviewed:  History and physical reviewed and no updates needed  Statement of review:  I have reviewed the lab findings, diagnostic data, medications, and the plan for sedation    Addendum in addition to above: systolic murmur present.  Merissa Rodgers MD on 2/24/2025 at 9:15 AM

## 2025-02-24 NOTE — RESULT ENCOUNTER NOTE
mLAD. Follow up with CV surgery 2/25/25 and Dr Rodgers on 3/13/25 to discuss valve. Follow up with Kathryn Goodson NP on 3/19/25.

## 2025-02-24 NOTE — PROGRESS NOTES
Care Suites Admission Nursing Note    Patient Information  Name: Tri Walden  Age: 74 year old  Reason for admission: R&L heart cath  Care Suites arrival time: 0715    Visitor Information  Name: Son-Carl, Sister- Bianca Barnett     Patient Admission/Assessment   Pre-procedure assessment complete: Yes  If abnormal assessment/labs, provider notified: Potassium reported to fellow- replaced per his order.  NPO: Yes  Medications held per instructions/orders: N/A  Consent: deferred  If applicable, pregnancy test status: deferred  Patient oriented to room: Yes  Education/questions answered: Yes  Plan/other: proceed with orders as planned    Discharge Planning  Discharge name/phone number: Family here   Overnight post sedation caregiver: Daughter will be overnight with pt tonight  Discharge location: home    Bekah Puentes RN

## 2025-02-24 NOTE — PROGRESS NOTES
Care Suites Post Procedure Note    Patient Information  Name: Tri Walden  Age: 74 year old    Post Procedure  Time patient returned to Care Suites: 0959  Concerns/abnormal assessment: none  If abnormal assessment, provider notified: N/A  Plan/Other: TR band to Rt radial site.  Follow orders.  MD to see and talk with family.      Bekah Puentes RN

## 2025-02-25 ENCOUNTER — DOCUMENTATION ONLY (OUTPATIENT)
Dept: CARDIOLOGY | Facility: CLINIC | Age: 75
End: 2025-02-25

## 2025-02-25 ENCOUNTER — OFFICE VISIT (OUTPATIENT)
Dept: CARDIOLOGY | Facility: CLINIC | Age: 75
End: 2025-02-25
Attending: INTERNAL MEDICINE
Payer: COMMERCIAL

## 2025-02-25 ENCOUNTER — TELEPHONE (OUTPATIENT)
Dept: CARDIOLOGY | Facility: CLINIC | Age: 75
End: 2025-02-25

## 2025-02-25 VITALS
DIASTOLIC BLOOD PRESSURE: 81 MMHG | OXYGEN SATURATION: 99 % | SYSTOLIC BLOOD PRESSURE: 141 MMHG | HEART RATE: 90 BPM | WEIGHT: 131.8 LBS | BODY MASS INDEX: 23.35 KG/M2 | HEIGHT: 63 IN

## 2025-02-25 DIAGNOSIS — I25.10 CHRONIC TOTAL OCCLUSION OF NATIVE CORONARY ARTERY: Primary | ICD-10-CM

## 2025-02-25 DIAGNOSIS — I35.0 AORTIC VALVE STENOSIS, ETIOLOGY OF CARDIAC VALVE DISEASE UNSPECIFIED: ICD-10-CM

## 2025-02-25 DIAGNOSIS — I25.82 CHRONIC TOTAL OCCLUSION OF NATIVE CORONARY ARTERY: Primary | ICD-10-CM

## 2025-02-25 PROCEDURE — 3079F DIAST BP 80-89 MM HG: CPT | Performed by: STUDENT IN AN ORGANIZED HEALTH CARE EDUCATION/TRAINING PROGRAM

## 2025-02-25 PROCEDURE — 3077F SYST BP >= 140 MM HG: CPT | Performed by: STUDENT IN AN ORGANIZED HEALTH CARE EDUCATION/TRAINING PROGRAM

## 2025-02-25 PROCEDURE — 99204 OFFICE O/P NEW MOD 45 MIN: CPT | Performed by: STUDENT IN AN ORGANIZED HEALTH CARE EDUCATION/TRAINING PROGRAM

## 2025-02-25 NOTE — LETTER
2/25/2025      Tri Walden  [address]  [facility] [address]        Cardiac Surgery Consultation      Tri Walden MRN# [id number]   YOB: 1950 Age: 74 year old      Reason for consult: Severe single vessel CAD; moderate-severe aortic stenosis     Primary Cardiologist: Dr. You       Assessment and Plan:   Mr. Tri Walden. is a 74-year-old female with severe single vessel coronary artery disease ( of LAD) and moderate-severe aortic stenosis.     I recommend coronary artery bypass grafting with concomitant aortic valve replacement.     We described the technical details, as well as the expected postoperative course and recovery to the patient. We also discussed the risks and benefits of the procedure. The risks include but are not limited to bleeding, infection, stroke, heart or graft failure with myocardial infarction, dysrhythmia, respiratory failure, kidney or liver injury, and death.     After carefully considering the above, the patient understands these risks and wishes to undergo the operation.     Plan    -recommend CABG + AVR;          History of Present Illness:   This patient is a 74 year old female with a PMHx significant for dyslipidemia, GERD, and irritable bowel syndrome, who presents to clinic today to discuss management of her coronary artery disease and aortic stenosis.     She is a patient of Dr. You' and has had her aortic stenosis followed closely over the past few years. She is largely asymptomatic and is fairly active. She recently underwent a stress test, which was (+) for myocardial ischemia and was referred for a coronary angiogram.     Angiography was notable for a chronically occluded left anterior descending coronary artery.     Has a supportive family; sister is a RN and nephew is an emergency medicine physician.                 Past Medical History:     Past Medical History:   Diagnosis Date     Basal cell carcinoma      Hypertension      Unsure if the date     Malignant melanoma (H)      Nonrheumatic aortic valve stenosis 3/14/2023             Past Surgical History:     Past Surgical History:   Procedure Laterality Date     ABLATE VEIN VARICOSE RADIO FREQUENCY WITHOUT PHLEBECTOMY MULTIPLE STAB  12/06/2012    Procedure: ABLATE VEIN VARICOSE RADIO FREQUENCY WITHOUT PHLEBECTOMY MULTIPLE STAB;  Bilateral Ablation of Varicose Veins;  Surgeon: Fredis Marks MD;  Location: [facility]      CORONARY ANGIOGRAM N/A 2/24/2025    Procedure: Coronary Angiogram;  Surgeon: Merissa Rodgers MD;  Location: [facility] CARDIAC CATH LAB     REVERSE ARTHROPLASTY SHOULDER Left 3/22/2023    Procedure: left REVERSE Total Shoulder Arthroplasty;  Surgeon: Escobar Magana MD;  Location: [facility] OR               Social History:     Social History     Tobacco Use     Smoking status: Never     Smokeless tobacco: Never   Substance Use Topics     Alcohol use: Yes     Comment: very rare             Family History:     Family History   Problem Relation Age of Onset     Arthritis Mother      Diabetes Mother         type 2     Heart Disease Father      Coronary Artery Disease Father      Cancer Paternal Grandmother         breast cancer     Heart Disease Paternal Grandfather      Cancer Sister         right kindney             Medications:     Current Outpatient Medications   Medication Sig Dispense Refill     acetaminophen (TYLENOL) 325 MG tablet Take 2 tablets (650 mg) by mouth every 4 hours as needed for other (mild pain) 100 tablet 0     ASPIRIN PO Take 81 mg by mouth daily       Calcium Carbonate-Vit D-Min (CALTRATE 600+D PLUS MINERALS) 600-800 MG-UNIT TABS Take 1 tablet by mouth 2 times daily       chlorthalidone (HYGROTON) 25 MG tablet Take 1 tablet (25 mg) by mouth daily. 90 tablet 4     clobetasol (TEMOVATE) 0.05 % external solution Apply once to twice daily as needed. 50 mL 4     CRANBERRY PO Take by mouth daily        Ginger, Zingiber officinalis, (GINGER PO) Take by  mouth daily        losartan (COZAAR) 50 MG tablet Take 1 tablet (50 mg) by mouth daily. 90 tablet 4     simvastatin (ZOCOR) 10 MG tablet TAKE ONE TABLET BY MOUTH EVERY NIGHT AT BEDTIME 90 tablet 4     VITAMIN D, CHOLECALCIFEROL, PO Take by mouth daily       No current facility-administered medications for this visit.             Review of Systems:     The 10 point Review of Systems is negative other than noted in the HPI            Physical Exam:   Vitals were reviewed  Initial vitals were reviewed  All vitals stable    Gen: resting comfortably in NAD  CV: RRR on tele  Pulm: normal work of breathing on room air  Abd: soft, non-tender, non-distended  Ext: warm and well perfused         Data:     Lab Results   Component Value Date    WBC 5.9 02/24/2025    HGB 13.3 02/24/2025    HCT 38.4 02/24/2025     02/24/2025     (L) 02/24/2025    POTASSIUM 3.4 02/24/2025    POTASSIUM 3.4 02/24/2025    CHLORIDE 92 (L) 02/24/2025    CO2 27 02/24/2025    BUN 14.0 02/24/2025    CR 0.64 02/24/2025     (H) 02/24/2025    TROPI  06/28/2015     <0.015  The 99th percentile for upper reference range is 0.045 ug/L.  Troponin values in   the range of 0.045 - 0.120 ug/L may be associated with risks of adverse   clinical events.      AST 29 02/24/2025    ALT 16 02/24/2025    ALKPHOS 60 02/24/2025    BILITOTAL 0.7 02/24/2025    INR 0.98 02/24/2025     All cardiac studies reviewed by me.  All imaging studies reviewed by me.    CARDIAC CATHETERIZATION (Feb '25):     Mid LAD lesion is 100% stenosed.     Chronic total occlusion of the midLAD.    Mid to apical LAD fills by right to left collaterals.     STRESS TRANSTHORACIC ECHOCARDIOGRAM (Feb '25):  1. Exercise stress echocardiogram is positive for myocardial ischemia.  2. Exercise capacity was 8 minutes 1 second on manual protocol equivalent to 6  METS achieving 99% of maximum predicted heart rate.  3. Left ventricular ejection fraction decreased from 55% at rest to 45%  with  stress.  4. Left ventricular end systolic volume increased with stress.  5. Resting regional wall motion abnormalities worsened with stress. New  regional wall motion abnormalities with stress.  6. Stress ECG is nondiagnostic. No chest pain with exercise.      TRANSTHORACIC ECHOCARDIOGRAPHY (July '24):    Left ventricular systolic function is mildly reduced.The visual ejection  fraction is 50-55%.Apical wall hypokinesia  The right ventricular systolic function is normal.  Severe valvular aortic stenosis-peak aortic valve velocity 3.6 m/s, mean  gradients 33 mm hg, aortic valve area 0.73 cmÂ , dimensionless index 0.22.         AMY Medellin MD  Cardiothoracic Surgery  Cell: [phone]; Pager [phone]          Sincerely,        Robert Medellin MD    Electronically signed

## 2025-02-25 NOTE — PROGRESS NOTES
Met with patient and family in consultation with Dr. Medellin   Pre-op labs and imaging reviewed and discussed with patient/family.  Pre-op education done.   Planned CABG, AVR in near future.  Contact information reviewed and questions addressed.    [de-identified] : cough [FreeTextEntry6] : \par Pt with few d h/o runny nose, eye d/c. No fever\par  Last shayy had acute onset croupy cough, diff breathing. New fever last shayy. Today has had some gagging/V a/w cough\par    + h/o intermittent eye d/c when ill

## 2025-02-27 ENCOUNTER — DOCUMENTATION ONLY (OUTPATIENT)
Dept: CARDIOLOGY | Facility: CLINIC | Age: 75
End: 2025-02-27
Payer: COMMERCIAL

## 2025-02-27 ENCOUNTER — PREP FOR PROCEDURE (OUTPATIENT)
Dept: CARDIOLOGY | Facility: CLINIC | Age: 75
End: 2025-02-27
Payer: COMMERCIAL

## 2025-02-27 ENCOUNTER — TELEPHONE (OUTPATIENT)
Dept: CARDIOLOGY | Facility: CLINIC | Age: 75
End: 2025-02-27
Payer: COMMERCIAL

## 2025-02-27 DIAGNOSIS — I35.0 AORTIC STENOSIS: ICD-10-CM

## 2025-02-27 DIAGNOSIS — I25.10 CAD (CORONARY ARTERY DISEASE): Primary | ICD-10-CM

## 2025-02-27 NOTE — PROGRESS NOTES
Cardiac Surgery Consultation      Tri Walden MRN# [id number]   YOB: 1950 Age: 74 year old      Reason for consult: Severe single vessel CAD; moderate-severe aortic stenosis     Primary Cardiologist: Dr. You       Assessment and Plan:   Mr. Tri Walden. is a 74-year-old female with severe single vessel coronary artery disease ( of LAD) and moderate-severe aortic stenosis.     I recommend coronary artery bypass grafting with concomitant aortic valve replacement.     We described the technical details, as well as the expected postoperative course and recovery to the patient. We also discussed the risks and benefits of the procedure. The risks include but are not limited to bleeding, infection, stroke, heart or graft failure with myocardial infarction, dysrhythmia, respiratory failure, kidney or liver injury, and death.     After carefully considering the above, the patient understands these risks and wishes to undergo the operation.     Plan    -recommend CABG + AVR;          History of Present Illness:   This patient is a 74 year old female with a PMHx significant for dyslipidemia, GERD, and irritable bowel syndrome, who presents to clinic today to discuss management of her coronary artery disease and aortic stenosis.     She is a patient of Dr. You' and has had her aortic stenosis followed closely over the past few years. She is largely asymptomatic and is fairly active. She recently underwent a stress test, which was (+) for myocardial ischemia and was referred for a coronary angiogram.     Angiography was notable for a chronically occluded left anterior descending coronary artery.     Has a supportive family; sister is a RN and nephew is an emergency medicine physician.                 Past Medical History:     Past Medical History:   Diagnosis Date    Basal cell carcinoma     Hypertension     Unsure if the date    Malignant melanoma (H)     Nonrheumatic aortic valve stenosis  3/14/2023             Past Surgical History:     Past Surgical History:   Procedure Laterality Date    ABLATE VEIN VARICOSE RADIO FREQUENCY WITHOUT PHLEBECTOMY MULTIPLE STAB  12/06/2012    Procedure: ABLATE VEIN VARICOSE RADIO FREQUENCY WITHOUT PHLEBECTOMY MULTIPLE STAB;  Bilateral Ablation of Varicose Veins;  Surgeon: Fredis Marks MD;  Location: [facility] OR    CV CORONARY ANGIOGRAM N/A 2/24/2025    Procedure: Coronary Angiogram;  Surgeon: Merissa Rodgers MD;  Location:  [facility]    REVERSE ARTHROPLASTY SHOULDER Left 3/22/2023    Procedure: left REVERSE Total Shoulder Arthroplasty;  Surgeon: Escobar Magana MD;  Location: [facility] OR               Social History:     Social History     Tobacco Use    Smoking status: Never    Smokeless tobacco: Never   Substance Use Topics    Alcohol use: Yes     Comment: very rare             Family History:     Family History   Problem Relation Age of Onset    Arthritis Mother     Diabetes Mother         type 2    Heart Disease Father     Coronary Artery Disease Father     Cancer Paternal Grandmother         breast cancer    Heart Disease Paternal Grandfather     Cancer Sister         right kindney             Medications:     Current Outpatient Medications   Medication Sig Dispense Refill    acetaminophen (TYLENOL) 325 MG tablet Take 2 tablets (650 mg) by mouth every 4 hours as needed for other (mild pain) 100 tablet 0    ASPIRIN PO Take 81 mg by mouth daily      Calcium Carbonate-Vit D-Min (CALTRATE 600+D PLUS MINERALS) 600-800 MG-UNIT TABS Take 1 tablet by mouth 2 times daily      chlorthalidone (HYGROTON) 25 MG tablet Take 1 tablet (25 mg) by mouth daily. 90 tablet 4    clobetasol (TEMOVATE) 0.05 % external solution Apply once to twice daily as needed. 50 mL 4    CRANBERRY PO Take by mouth daily       Ginger, Zingiber officinalis, (GINGER PO) Take by mouth daily       losartan (COZAAR) 50 MG tablet Take 1 tablet (50 mg) by mouth daily. 90 tablet 4     simvastatin (ZOCOR) 10 MG tablet TAKE ONE TABLET BY MOUTH EVERY NIGHT AT BEDTIME 90 tablet 4    VITAMIN D, CHOLECALCIFEROL, PO Take by mouth daily       No current facility-administered medications for this visit.             Review of Systems:     The 10 point Review of Systems is negative other than noted in the HPI            Physical Exam:   Vitals were reviewed  Initial vitals were reviewed  All vitals stable    Gen: resting comfortably in NAD  CV: RRR on tele  Pulm: normal work of breathing on room air  Abd: soft, non-tender, non-distended  Ext: warm and well perfused         Data:     Lab Results   Component Value Date    WBC 5.9 02/24/2025    HGB 13.3 02/24/2025    HCT 38.4 02/24/2025     02/24/2025     (L) 02/24/2025    POTASSIUM 3.4 02/24/2025    POTASSIUM 3.4 02/24/2025    CHLORIDE 92 (L) 02/24/2025    CO2 27 02/24/2025    BUN 14.0 02/24/2025    CR 0.64 02/24/2025     (H) 02/24/2025    TROPI  06/28/2015     <0.015  The 99th percentile for upper reference range is 0.045 ug/L.  Troponin values in   the range of 0.045 - 0.120 ug/L may be associated with risks of adverse   clinical events.      AST 29 02/24/2025    ALT 16 02/24/2025    ALKPHOS 60 02/24/2025    BILITOTAL 0.7 02/24/2025    INR 0.98 02/24/2025     All cardiac studies reviewed by me.  All imaging studies reviewed by me.    CARDIAC CATHETERIZATION (Feb '25):    Mid LAD lesion is 100% stenosed.     Chronic total occlusion of the midLAD.    Mid to apical LAD fills by right to left collaterals.     STRESS TRANSTHORACIC ECHOCARDIOGRAM (Feb '25):  1. Exercise stress echocardiogram is positive for myocardial ischemia.  2. Exercise capacity was 8 minutes 1 second on manual protocol equivalent to 6  METS achieving 99% of maximum predicted heart rate.  3. Left ventricular ejection fraction decreased from 55% at rest to 45% with  stress.  4. Left ventricular end systolic volume increased with stress.  5. Resting regional wall motion  abnormalities worsened with stress. New  regional wall motion abnormalities with stress.  6. Stress ECG is nondiagnostic. No chest pain with exercise.      TRANSTHORACIC ECHOCARDIOGRAPHY (July '24):    Left ventricular systolic function is mildly reduced.The visual ejection  fraction is 50-55%.Apical wall hypokinesia  The right ventricular systolic function is normal.  Severe valvular aortic stenosis-peak aortic valve velocity 3.6 m/s, mean  gradients 33 mm hg, aortic valve area 0.73 cmÂ , dimensionless index 0.22.         LUIS. Robert Medellin MD  Cardiothoracic Surgery  Cell: [phone]; Pager [phone]

## 2025-03-04 RX ORDER — ASPIRIN 81 MG/1
81 TABLET, CHEWABLE ORAL
OUTPATIENT
Start: 2025-03-04

## 2025-03-04 RX ORDER — LIDOCAINE 40 MG/G
CREAM TOPICAL
OUTPATIENT
Start: 2025-03-04

## 2025-03-04 RX ORDER — CHLORHEXIDINE GLUCONATE ORAL RINSE 1.2 MG/ML
10 SOLUTION DENTAL ONCE
OUTPATIENT
Start: 2025-03-04 | End: 2025-03-04

## 2025-03-04 RX ORDER — FAMOTIDINE 20 MG/1
20 TABLET, FILM COATED ORAL
OUTPATIENT
Start: 2025-03-04

## 2025-03-04 RX ORDER — ASPIRIN 81 MG/1
162 TABLET, CHEWABLE ORAL
OUTPATIENT
Start: 2025-03-04

## 2025-03-04 RX ORDER — ACETAMINOPHEN 325 MG/1
975 TABLET ORAL ONCE
OUTPATIENT
Start: 2025-03-04 | End: 2025-03-04

## 2025-03-10 RX ORDER — MULTIVIT WITH MINERALS/LUTEIN
400 TABLET ORAL DAILY
COMMUNITY
End: 2025-04-01

## 2025-03-10 RX ORDER — ASPIRIN 81 MG/1
81 TABLET ORAL DAILY
Status: ON HOLD | COMMUNITY
End: 2025-03-22

## 2025-03-10 RX ORDER — GLUCOSAMINE HCL 500 MG
1 TABLET ORAL DAILY
COMMUNITY

## 2025-03-10 NOTE — PROGRESS NOTES
PTA medications updated by Medication Scribe prior to surgery via phone call with patient (last doses completed by Nurse)     Medication history sources: Patient, Surescripts, and H&P  In the past week, patient estimated taking medication this percent of the time: Greater than 90%      Significant changes made to the medication list:  None      Additional medication history information:   None    Medication reconciliation completed by provider prior to medication history? No    Time spent in this activity: 35 MINUTES    The information provided in this note is only as accurate as the sources available at the time of update(s)      Prior to Admission medications    Medication Sig Last Dose Taking? Auth Provider Long Term End Date   acetaminophen (TYLENOL) 325 MG tablet Take 2 tablets (650 mg) by mouth every 4 hours as needed for other (mild pain)  Yes Escobar Magana MD     aspirin 81 MG EC tablet Take 81 mg by mouth daily. Morning Yes Reported, Patient No    Calcium Carbonate-Vit D-Min (CALTRATE 600+D PLUS MINERALS) 600-800 MG-UNIT TABS Take 1 tablet by mouth 2 times daily Evening Yes Cecilia Orr APRN CNP     chlorthalidone (HYGROTON) 25 MG tablet Take 1 tablet (25 mg) by mouth daily. Morning Yes Nehemias Wallace MD Yes    clobetasol (TEMOVATE) 0.05 % external solution Apply once to twice daily as needed.  Yes Jackelyn Fonseca PA-C     Cranberry 400 MG TABS Take 1 tablet by mouth daily. Morning Yes Reported, Patient     Ginger 500 MG CAPS Take 1 capsule by mouth daily. Morning Yes Reported, Patient     losartan (COZAAR) 50 MG tablet Take 1 tablet (50 mg) by mouth daily. Morning Yes Nehemias Wallace MD Yes    simvastatin (ZOCOR) 10 MG tablet TAKE ONE TABLET BY MOUTH EVERY NIGHT AT BEDTIME Bedtime Yes Nehemias Wallace MD Yes    Vitamin D3 (CHOLECALCIFEROL) 25 mcg (1000 units) tablet Take 1 tablet by mouth daily. Morning Yes Reported, Patient     vitamin E (TOCOPHEROL) 400 units (180  mg) capsule Take 400 Units by mouth daily. Morning Yes Reported, Patient         Medication history completed by: Thea Frye

## 2025-03-14 ENCOUNTER — APPOINTMENT (OUTPATIENT)
Dept: GENERAL RADIOLOGY | Facility: CLINIC | Age: 75
DRG: 219 | End: 2025-03-14
Attending: PHYSICIAN ASSISTANT
Payer: COMMERCIAL

## 2025-03-14 ENCOUNTER — HOSPITAL ENCOUNTER (INPATIENT)
Facility: CLINIC | Age: 75
DRG: 219 | End: 2025-03-14
Attending: STUDENT IN AN ORGANIZED HEALTH CARE EDUCATION/TRAINING PROGRAM | Admitting: STUDENT IN AN ORGANIZED HEALTH CARE EDUCATION/TRAINING PROGRAM
Payer: COMMERCIAL

## 2025-03-14 DIAGNOSIS — Z95.2 S/P AVR: Primary | ICD-10-CM

## 2025-03-14 DIAGNOSIS — Z96.612 STATUS POST REVERSE TOTAL ARTHROPLASTY OF LEFT SHOULDER: ICD-10-CM

## 2025-03-14 DIAGNOSIS — Z95.1 S/P CABG (CORONARY ARTERY BYPASS GRAFT): ICD-10-CM

## 2025-03-14 LAB
ABO + RH BLD: NORMAL
ALBUMIN SERPL BCG-MCNC: 3.2 G/DL (ref 3.5–5.2)
ALLEN'S TEST: ABNORMAL
ALP SERPL-CCNC: 42 U/L (ref 40–150)
ALT SERPL W P-5'-P-CCNC: 10 U/L (ref 0–50)
ANION GAP SERPL CALCULATED.3IONS-SCNC: 10 MMOL/L (ref 7–15)
ANION GAP SERPL CALCULATED.3IONS-SCNC: 11 MMOL/L (ref 7–15)
APTT PPP: 35 SECONDS (ref 22–38)
APTT PPP: 38 SECONDS (ref 22–38)
AST SERPL W P-5'-P-CCNC: 34 U/L (ref 0–45)
BASE EXCESS BLDA CALC-SCNC: -0.2 MMOL/L (ref -3–3)
BASE EXCESS BLDA CALC-SCNC: -2.2 MMOL/L (ref -3–3)
BASE EXCESS BLDA CALC-SCNC: 1.3 MMOL/L (ref -3–3)
BASE EXCESS BLDA CALC-SCNC: 1.4 MMOL/L (ref -3–3)
BASE EXCESS BLDA CALC-SCNC: 2 MMOL/L (ref -3–3)
BASE EXCESS BLDA CALC-SCNC: 2.5 MMOL/L (ref -3–3)
BASE EXCESS BLDA CALC-SCNC: 2.6 MMOL/L (ref -3–3)
BASE EXCESS BLDV CALC-SCNC: 3.3 MMOL/L (ref -3–3)
BILIRUB SERPL-MCNC: 0.6 MG/DL
BLD GP AB SCN SERPL QL: NEGATIVE
BLD PROD TYP BPU: NORMAL
BLD PROD TYP BPU: NORMAL
BLOOD COMPONENT TYPE: NORMAL
BLOOD COMPONENT TYPE: NORMAL
BUN SERPL-MCNC: 9.1 MG/DL (ref 8–23)
BUN SERPL-MCNC: 9.1 MG/DL (ref 8–23)
CA-I BLD-MCNC: 3.9 MG/DL (ref 4.4–5.2)
CA-I BLD-MCNC: 3.9 MG/DL (ref 4.4–5.2)
CA-I BLD-MCNC: 4.1 MG/DL (ref 4.4–5.2)
CA-I BLD-MCNC: 4.1 MG/DL (ref 4.4–5.2)
CA-I BLD-MCNC: 4.5 MG/DL (ref 4.4–5.2)
CA-I BLD-MCNC: 4.6 MG/DL (ref 4.4–5.2)
CA-I BLD-MCNC: 4.7 MG/DL (ref 4.4–5.2)
CA-I BLD-MCNC: 5 MG/DL (ref 4.4–5.2)
CALCIUM SERPL-MCNC: 8.3 MG/DL (ref 8.8–10.4)
CALCIUM SERPL-MCNC: 9.1 MG/DL (ref 8.8–10.4)
CHLORIDE SERPL-SCNC: 102 MMOL/L (ref 98–107)
CHLORIDE SERPL-SCNC: 103 MMOL/L (ref 98–107)
CLOT INIT KAOL IND TO POST HEP NEUT TRTO: 1 {RATIO}
CLOT INIT KAOL IND TO POST HEP NEUT TRTO: 1 {RATIO}
CLOT INIT KAOLIN IND BLD US: 134 SEC (ref 113–166)
CLOT INIT KAOLIN IND BLD US: 149 SEC (ref 113–166)
CLOT INIT KAOLIN IND P HEP NEUT BLD US: 135 SEC (ref 103–153)
CLOT INIT KAOLIN IND P HEP NEUT BLD US: 142 SEC (ref 103–153)
CLOT STIFF PLT CONT BLD CALC: 24.5 HPA (ref 11.9–29.8)
CLOT STIFF PLT CONT BLD CALC: 27.4 HPA (ref 11.9–29.8)
CLOT STIFF TF IND P HEP NEUT BLD US: 27.9 HPA (ref 13–33.2)
CLOT STIFF TF IND P HEP NEUT BLD US: 31.8 HPA (ref 13–33.2)
CLOT STIFF TF IND+IIB-IIIA INH P HEP NEU: 3.4 HPA (ref 1–3.7)
CLOT STIFF TF IND+IIB-IIIA INH P HEP NEU: 4.4 HPA (ref 1–3.7)
CODING SYSTEM: NORMAL
CODING SYSTEM: NORMAL
CREAT SERPL-MCNC: 0.48 MG/DL (ref 0.51–0.95)
CREAT SERPL-MCNC: 0.5 MG/DL (ref 0.51–0.95)
CROSSMATCH: NORMAL
CROSSMATCH: NORMAL
EGFRCR SERPLBLD CKD-EPI 2021: >90 ML/MIN/1.73M2
EGFRCR SERPLBLD CKD-EPI 2021: >90 ML/MIN/1.73M2
ERYTHROCYTE [DISTWIDTH] IN BLOOD BY AUTOMATED COUNT: 13 % (ref 10–15)
ERYTHROCYTE [DISTWIDTH] IN BLOOD BY AUTOMATED COUNT: 13.1 % (ref 10–15)
FIBRINOGEN PPP-MCNC: 288 MG/DL (ref 170–510)
GLUCOSE BLD-MCNC: 106 MG/DL (ref 70–99)
GLUCOSE BLD-MCNC: 107 MG/DL (ref 70–99)
GLUCOSE BLD-MCNC: 113 MG/DL (ref 70–99)
GLUCOSE BLD-MCNC: 129 MG/DL (ref 70–99)
GLUCOSE BLD-MCNC: 130 MG/DL (ref 70–99)
GLUCOSE BLD-MCNC: 98 MG/DL (ref 70–99)
GLUCOSE BLD-MCNC: 98 MG/DL (ref 70–99)
GLUCOSE BLDC GLUCOMTR-MCNC: 123 MG/DL (ref 70–99)
GLUCOSE SERPL-MCNC: 113 MG/DL (ref 70–99)
GLUCOSE SERPL-MCNC: 125 MG/DL (ref 70–99)
GLUCOSE SERPL-MCNC: 136 MG/DL (ref 70–99)
HCO3 BLD-SCNC: 22 MMOL/L (ref 21–28)
HCO3 BLDA-SCNC: 24 MMOL/L (ref 21–28)
HCO3 BLDA-SCNC: 26 MMOL/L (ref 21–28)
HCO3 BLDA-SCNC: 27 MMOL/L (ref 21–28)
HCO3 BLDA-SCNC: 27 MMOL/L (ref 21–28)
HCO3 BLDV-SCNC: 28 MMOL/L (ref 21–28)
HCO3 SERPL-SCNC: 21 MMOL/L (ref 22–29)
HCO3 SERPL-SCNC: 23 MMOL/L (ref 22–29)
HCT VFR BLD AUTO: 27.7 % (ref 35–47)
HCT VFR BLD AUTO: 29.9 % (ref 35–47)
HGB BLD-MCNC: 10 G/DL (ref 11.7–15.7)
HGB BLD-MCNC: 10.7 G/DL (ref 11.7–15.7)
HGB BLD-MCNC: 11.2 G/DL (ref 11.7–15.7)
HGB BLD-MCNC: 11.3 G/DL (ref 11.7–15.7)
HGB BLD-MCNC: 13.3 G/DL (ref 11.7–15.7)
HGB BLD-MCNC: 9 G/DL (ref 11.7–15.7)
HGB BLD-MCNC: 9 G/DL (ref 11.7–15.7)
HGB BLD-MCNC: 9.1 G/DL (ref 11.7–15.7)
HGB BLD-MCNC: 9.5 G/DL (ref 11.7–15.7)
HGB BLD-MCNC: 9.8 G/DL (ref 11.7–15.7)
INR PPP: 1.35 (ref 0.85–1.15)
INR PPP: 1.53 (ref 0.85–1.15)
ISSUE DATE AND TIME: NORMAL
ISSUE DATE AND TIME: NORMAL
LACTATE BLD-SCNC: 0.7 MMOL/L (ref 0.7–2)
LACTATE BLD-SCNC: 0.8 MMOL/L (ref 0.7–2)
LACTATE BLD-SCNC: 0.8 MMOL/L (ref 0.7–2)
LACTATE BLD-SCNC: 0.9 MMOL/L (ref 0.7–2)
LACTATE BLD-SCNC: 0.9 MMOL/L (ref 0.7–2)
LACTATE BLD-SCNC: 1 MMOL/L (ref 0.7–2)
LACTATE BLD-SCNC: 1.2 MMOL/L (ref 0.7–2)
LACTATE SERPL-SCNC: 1 MMOL/L (ref 0.7–2)
MAGNESIUM SERPL-MCNC: 2.1 MG/DL (ref 1.7–2.3)
MCH RBC QN AUTO: 31.1 PG (ref 26.5–33)
MCH RBC QN AUTO: 31.5 PG (ref 26.5–33)
MCHC RBC AUTO-ENTMCNC: 35.4 G/DL (ref 31.5–36.5)
MCHC RBC AUTO-ENTMCNC: 35.8 G/DL (ref 31.5–36.5)
MCV RBC AUTO: 88 FL (ref 78–100)
MCV RBC AUTO: 88 FL (ref 78–100)
O2/TOTAL GAS SETTING VFR VENT: 70 %
O2/TOTAL GAS SETTING VFR VENT: 80 %
O2/TOTAL GAS SETTING VFR VENT: 80 %
OXYHGB MFR BLDA: 100 % (ref 92–100)
OXYHGB MFR BLDA: 99 % (ref 92–100)
OXYHGB MFR BLDV: 84 % (ref 70–75)
PCO2 BLD: 33 MM HG (ref 35–45)
PCO2 BLDA: 34 MM HG (ref 35–45)
PCO2 BLDA: 37 MM HG (ref 35–45)
PCO2 BLDA: 37 MM HG (ref 35–45)
PCO2 BLDA: 40 MM HG (ref 35–45)
PCO2 BLDA: 42 MM HG (ref 35–45)
PCO2 BLDA: 42 MM HG (ref 35–45)
PCO2 BLDV: 41 MM HG (ref 40–50)
PH BLD: 7.43 [PH] (ref 7.35–7.45)
PH BLDA: 7.4 [PH] (ref 7.35–7.45)
PH BLDA: 7.41 [PH] (ref 7.35–7.45)
PH BLDA: 7.43 [PH] (ref 7.35–7.45)
PH BLDA: 7.45 [PH] (ref 7.35–7.45)
PH BLDA: 7.46 [PH] (ref 7.35–7.45)
PH BLDA: 7.46 [PH] (ref 7.35–7.45)
PH BLDV: 7.44 [PH] (ref 7.32–7.43)
PHOSPHATE SERPL-MCNC: 2.8 MG/DL (ref 2.5–4.5)
PLATELET # BLD AUTO: 184 10E3/UL (ref 150–450)
PLATELET # BLD AUTO: 207 10E3/UL (ref 150–450)
PO2 BLD: 206 MM HG (ref 80–105)
PO2 BLDA: 302 MM HG (ref 80–105)
PO2 BLDA: 331 MM HG (ref 80–105)
PO2 BLDA: 355 MM HG (ref 80–105)
PO2 BLDA: 361 MM HG (ref 80–105)
PO2 BLDA: 410 MM HG (ref 80–105)
PO2 BLDA: 435 MM HG (ref 80–105)
PO2 BLDV: 48 MM HG (ref 25–47)
POTASSIUM BLD-SCNC: 3.2 MMOL/L (ref 3.4–5.3)
POTASSIUM BLD-SCNC: 3.2 MMOL/L (ref 3.4–5.3)
POTASSIUM BLD-SCNC: 3.4 MMOL/L (ref 3.4–5.3)
POTASSIUM BLD-SCNC: 3.8 MMOL/L (ref 3.4–5.3)
POTASSIUM BLD-SCNC: 3.8 MMOL/L (ref 3.4–5.3)
POTASSIUM BLD-SCNC: 4.3 MMOL/L (ref 3.4–5.3)
POTASSIUM BLD-SCNC: 4.4 MMOL/L (ref 3.4–5.3)
POTASSIUM SERPL-SCNC: 3.5 MMOL/L (ref 3.4–5.3)
POTASSIUM SERPL-SCNC: 3.7 MMOL/L (ref 3.4–5.3)
POTASSIUM SERPL-SCNC: 4 MMOL/L (ref 3.4–5.3)
PROT SERPL-MCNC: 5.3 G/DL (ref 6.4–8.3)
RBC # BLD AUTO: 3.15 10E6/UL (ref 3.8–5.2)
RBC # BLD AUTO: 3.4 10E6/UL (ref 3.8–5.2)
SAO2 % BLDA: >100 % (ref 95–96)
SAO2 % BLDV: 85 % (ref 70–75)
SODIUM BLD-SCNC: 132 MMOL/L (ref 135–145)
SODIUM BLD-SCNC: 132 MMOL/L (ref 135–145)
SODIUM BLD-SCNC: 133 MMOL/L (ref 135–145)
SODIUM BLD-SCNC: 134 MMOL/L (ref 135–145)
SODIUM SERPL-SCNC: 135 MMOL/L (ref 135–145)
SODIUM SERPL-SCNC: 135 MMOL/L (ref 135–145)
SPECIMEN EXP DATE BLD: NORMAL
UNIT ABO/RH: NORMAL
UNIT ABO/RH: NORMAL
UNIT NUMBER: NORMAL
UNIT NUMBER: NORMAL
UNIT STATUS: NORMAL
UNIT STATUS: NORMAL
UNIT TYPE ISBT: 6200
UNIT TYPE ISBT: 6200
WBC # BLD AUTO: 10.1 10E3/UL (ref 4–11)
WBC # BLD AUTO: 14.3 10E3/UL (ref 4–11)

## 2025-03-14 PROCEDURE — 250N000012 HC RX MED GY IP 250 OP 636 PS 637: Performed by: STUDENT IN AN ORGANIZED HEALTH CARE EDUCATION/TRAINING PROGRAM

## 2025-03-14 PROCEDURE — 80048 BASIC METABOLIC PNL TOTAL CA: CPT | Performed by: ANESTHESIOLOGY

## 2025-03-14 PROCEDURE — 85610 PROTHROMBIN TIME: CPT | Performed by: PHYSICIAN ASSISTANT

## 2025-03-14 PROCEDURE — 82947 ASSAY GLUCOSE BLOOD QUANT: CPT | Performed by: ANESTHESIOLOGY

## 2025-03-14 PROCEDURE — 250N000013 HC RX MED GY IP 250 OP 250 PS 637: Performed by: STUDENT IN AN ORGANIZED HEALTH CARE EDUCATION/TRAINING PROGRAM

## 2025-03-14 PROCEDURE — 5A09359 ASSISTANCE WITH RESPIRATORY VENTILATION, LESS THAN 24 CONSECUTIVE HOURS, CONTINUOUS NEGATIVE AIRWAY PRESSURE: ICD-10-PCS | Performed by: STUDENT IN AN ORGANIZED HEALTH CARE EDUCATION/TRAINING PROGRAM

## 2025-03-14 PROCEDURE — 84132 ASSAY OF SERUM POTASSIUM: CPT

## 2025-03-14 PROCEDURE — 370N000017 HC ANESTHESIA TECHNICAL FEE, PER MIN: Performed by: STUDENT IN AN ORGANIZED HEALTH CARE EDUCATION/TRAINING PROGRAM

## 2025-03-14 PROCEDURE — 250N000011 HC RX IP 250 OP 636: Performed by: ANESTHESIOLOGY

## 2025-03-14 PROCEDURE — 258N000003 HC RX IP 258 OP 636: Performed by: STUDENT IN AN ORGANIZED HEALTH CARE EDUCATION/TRAINING PROGRAM

## 2025-03-14 PROCEDURE — 410N000005 HC PER-PERFUSION, SH ONLY, 1ST 30 MIN: Performed by: STUDENT IN AN ORGANIZED HEALTH CARE EDUCATION/TRAINING PROGRAM

## 2025-03-14 PROCEDURE — 86923 COMPATIBILITY TEST ELECTRIC: CPT | Performed by: STUDENT IN AN ORGANIZED HEALTH CARE EDUCATION/TRAINING PROGRAM

## 2025-03-14 PROCEDURE — 36415 COLL VENOUS BLD VENIPUNCTURE: CPT | Performed by: STUDENT IN AN ORGANIZED HEALTH CARE EDUCATION/TRAINING PROGRAM

## 2025-03-14 PROCEDURE — 82330 ASSAY OF CALCIUM: CPT

## 2025-03-14 PROCEDURE — 86850 RBC ANTIBODY SCREEN: CPT | Performed by: STUDENT IN AN ORGANIZED HEALTH CARE EDUCATION/TRAINING PROGRAM

## 2025-03-14 PROCEDURE — 5A1221Z PERFORMANCE OF CARDIAC OUTPUT, CONTINUOUS: ICD-10-PCS | Performed by: STUDENT IN AN ORGANIZED HEALTH CARE EDUCATION/TRAINING PROGRAM

## 2025-03-14 PROCEDURE — 83605 ASSAY OF LACTIC ACID: CPT | Performed by: PHYSICIAN ASSISTANT

## 2025-03-14 PROCEDURE — 85018 HEMOGLOBIN: CPT | Performed by: ANESTHESIOLOGY

## 2025-03-14 PROCEDURE — 85610 PROTHROMBIN TIME: CPT | Performed by: STUDENT IN AN ORGANIZED HEALTH CARE EDUCATION/TRAINING PROGRAM

## 2025-03-14 PROCEDURE — 200N000001 HC R&B ICU

## 2025-03-14 PROCEDURE — 76984 DX INTRAOP THORACIC AORTA US: CPT | Mod: 26 | Performed by: STUDENT IN AN ORGANIZED HEALTH CARE EDUCATION/TRAINING PROGRAM

## 2025-03-14 PROCEDURE — 250N000009 HC RX 250: Performed by: STUDENT IN AN ORGANIZED HEALTH CARE EDUCATION/TRAINING PROGRAM

## 2025-03-14 PROCEDURE — 258N000003 HC RX IP 258 OP 636: Performed by: ANESTHESIOLOGY

## 2025-03-14 PROCEDURE — 33533 CABG ARTERIAL SINGLE: CPT | Mod: 51 | Performed by: STUDENT IN AN ORGANIZED HEALTH CARE EDUCATION/TRAINING PROGRAM

## 2025-03-14 PROCEDURE — 82310 ASSAY OF CALCIUM: CPT | Performed by: STUDENT IN AN ORGANIZED HEALTH CARE EDUCATION/TRAINING PROGRAM

## 2025-03-14 PROCEDURE — 84295 ASSAY OF SERUM SODIUM: CPT

## 2025-03-14 PROCEDURE — 85730 THROMBOPLASTIN TIME PARTIAL: CPT | Performed by: STUDENT IN AN ORGANIZED HEALTH CARE EDUCATION/TRAINING PROGRAM

## 2025-03-14 PROCEDURE — 33405 REPLACEMENT AORTIC VALVE OPN: CPT | Performed by: STUDENT IN AN ORGANIZED HEALTH CARE EDUCATION/TRAINING PROGRAM

## 2025-03-14 PROCEDURE — 85396 CLOTTING ASSAY WHOLE BLOOD: CPT

## 2025-03-14 PROCEDURE — 84132 ASSAY OF SERUM POTASSIUM: CPT | Performed by: PHYSICIAN ASSISTANT

## 2025-03-14 PROCEDURE — 85730 THROMBOPLASTIN TIME PARTIAL: CPT | Performed by: PHYSICIAN ASSISTANT

## 2025-03-14 PROCEDURE — 250N000011 HC RX IP 250 OP 636: Performed by: STUDENT IN AN ORGANIZED HEALTH CARE EDUCATION/TRAINING PROGRAM

## 2025-03-14 PROCEDURE — B24BZZZ ULTRASONOGRAPHY OF HEART WITH AORTA: ICD-10-PCS | Performed by: STUDENT IN AN ORGANIZED HEALTH CARE EDUCATION/TRAINING PROGRAM

## 2025-03-14 PROCEDURE — 99291 CRITICAL CARE FIRST HOUR: CPT | Mod: GC | Performed by: ANESTHESIOLOGY

## 2025-03-14 PROCEDURE — 272N000001 HC OR GENERAL SUPPLY STERILE: Performed by: STUDENT IN AN ORGANIZED HEALTH CARE EDUCATION/TRAINING PROGRAM

## 2025-03-14 PROCEDURE — C1898 LEAD, PMKR, OTHER THAN TRANS: HCPCS | Performed by: STUDENT IN AN ORGANIZED HEALTH CARE EDUCATION/TRAINING PROGRAM

## 2025-03-14 PROCEDURE — 258N000003 HC RX IP 258 OP 636: Performed by: PHYSICIAN ASSISTANT

## 2025-03-14 PROCEDURE — 02100Z9 BYPASS CORONARY ARTERY, ONE ARTERY FROM LEFT INTERNAL MAMMARY, OPEN APPROACH: ICD-10-PCS | Performed by: STUDENT IN AN ORGANIZED HEALTH CARE EDUCATION/TRAINING PROGRAM

## 2025-03-14 PROCEDURE — 84132 ASSAY OF SERUM POTASSIUM: CPT | Performed by: ANESTHESIOLOGY

## 2025-03-14 PROCEDURE — 85014 HEMATOCRIT: CPT | Performed by: STUDENT IN AN ORGANIZED HEALTH CARE EDUCATION/TRAINING PROGRAM

## 2025-03-14 PROCEDURE — 82947 ASSAY GLUCOSE BLOOD QUANT: CPT | Performed by: STUDENT IN AN ORGANIZED HEALTH CARE EDUCATION/TRAINING PROGRAM

## 2025-03-14 PROCEDURE — 360N000079 HC SURGERY LEVEL 6, PER MIN: Performed by: STUDENT IN AN ORGANIZED HEALTH CARE EDUCATION/TRAINING PROGRAM

## 2025-03-14 PROCEDURE — C1889 IMPLANT/INSERT DEVICE, NOC: HCPCS | Performed by: STUDENT IN AN ORGANIZED HEALTH CARE EDUCATION/TRAINING PROGRAM

## 2025-03-14 PROCEDURE — 02RF08Z REPLACEMENT OF AORTIC VALVE WITH ZOOPLASTIC TISSUE, OPEN APPROACH: ICD-10-PCS | Performed by: STUDENT IN AN ORGANIZED HEALTH CARE EDUCATION/TRAINING PROGRAM

## 2025-03-14 PROCEDURE — 999N000065 XR CHEST PORT 1 VIEW

## 2025-03-14 PROCEDURE — 84100 ASSAY OF PHOSPHORUS: CPT | Performed by: PHYSICIAN ASSISTANT

## 2025-03-14 PROCEDURE — P9016 RBC LEUKOCYTES REDUCED: HCPCS | Performed by: STUDENT IN AN ORGANIZED HEALTH CARE EDUCATION/TRAINING PROGRAM

## 2025-03-14 PROCEDURE — 250N000011 HC RX IP 250 OP 636: Performed by: PHYSICIAN ASSISTANT

## 2025-03-14 PROCEDURE — 83735 ASSAY OF MAGNESIUM: CPT | Performed by: PHYSICIAN ASSISTANT

## 2025-03-14 PROCEDURE — 410N000006: Performed by: STUDENT IN AN ORGANIZED HEALTH CARE EDUCATION/TRAINING PROGRAM

## 2025-03-14 PROCEDURE — 85014 HEMATOCRIT: CPT | Performed by: PHYSICIAN ASSISTANT

## 2025-03-14 PROCEDURE — 94002 VENT MGMT INPAT INIT DAY: CPT

## 2025-03-14 PROCEDURE — 82805 BLOOD GASES W/O2 SATURATION: CPT | Performed by: PHYSICIAN ASSISTANT

## 2025-03-14 PROCEDURE — 82330 ASSAY OF CALCIUM: CPT | Performed by: PHYSICIAN ASSISTANT

## 2025-03-14 PROCEDURE — 93005 ELECTROCARDIOGRAM TRACING: CPT

## 2025-03-14 PROCEDURE — 84295 ASSAY OF SERUM SODIUM: CPT | Performed by: PHYSICIAN ASSISTANT

## 2025-03-14 PROCEDURE — 999N000141 HC STATISTIC PRE-PROCEDURE NURSING ASSESSMENT: Performed by: STUDENT IN AN ORGANIZED HEALTH CARE EDUCATION/TRAINING PROGRAM

## 2025-03-14 PROCEDURE — 84295 ASSAY OF SERUM SODIUM: CPT | Performed by: STUDENT IN AN ORGANIZED HEALTH CARE EDUCATION/TRAINING PROGRAM

## 2025-03-14 PROCEDURE — 85384 FIBRINOGEN ACTIVITY: CPT | Performed by: STUDENT IN AN ORGANIZED HEALTH CARE EDUCATION/TRAINING PROGRAM

## 2025-03-14 PROCEDURE — 250N000024 HC ISOFLURANE, PER MIN: Performed by: STUDENT IN AN ORGANIZED HEALTH CARE EDUCATION/TRAINING PROGRAM

## 2025-03-14 PROCEDURE — 88305 TISSUE EXAM BY PATHOLOGIST: CPT | Mod: TC | Performed by: STUDENT IN AN ORGANIZED HEALTH CARE EDUCATION/TRAINING PROGRAM

## 2025-03-14 PROCEDURE — 86900 BLOOD TYPING SEROLOGIC ABO: CPT | Performed by: STUDENT IN AN ORGANIZED HEALTH CARE EDUCATION/TRAINING PROGRAM

## 2025-03-14 PROCEDURE — 250N000013 HC RX MED GY IP 250 OP 250 PS 637: Performed by: PHYSICIAN ASSISTANT

## 2025-03-14 PROCEDURE — 999N000157 HC STATISTIC RCP TIME EA 10 MIN

## 2025-03-14 DEVICE — INSPIRIS RESILIA AORTIC VALVE
Type: IMPLANTABLE DEVICE | Site: AORTA | Status: FUNCTIONAL
Brand: INSPIRIS RESILIA AORTIC VALVE

## 2025-03-14 RX ORDER — HYDROMORPHONE HCL IN WATER/PF 6 MG/30 ML
0.2 PATIENT CONTROLLED ANALGESIA SYRINGE INTRAVENOUS
Status: DISCONTINUED | OUTPATIENT
Start: 2025-03-14 | End: 2025-03-21

## 2025-03-14 RX ORDER — VANCOMYCIN HYDROCHLORIDE 1 G/200ML
1000 INJECTION, SOLUTION INTRAVENOUS EVERY 12 HOURS
Status: COMPLETED | OUTPATIENT
Start: 2025-03-14 | End: 2025-03-15

## 2025-03-14 RX ORDER — CALCIUM GLUCONATE 20 MG/ML
2 INJECTION, SOLUTION INTRAVENOUS EVERY 6 HOURS PRN
Status: DISCONTINUED | OUTPATIENT
Start: 2025-03-14 | End: 2025-03-19

## 2025-03-14 RX ORDER — NALOXONE HYDROCHLORIDE 0.4 MG/ML
0.2 INJECTION, SOLUTION INTRAMUSCULAR; INTRAVENOUS; SUBCUTANEOUS
Status: DISCONTINUED | OUTPATIENT
Start: 2025-03-14 | End: 2025-03-22 | Stop reason: HOSPADM

## 2025-03-14 RX ORDER — ASPIRIN 81 MG/1
81 TABLET, CHEWABLE ORAL
Status: DISCONTINUED | OUTPATIENT
Start: 2025-03-14 | End: 2025-03-14 | Stop reason: HOSPADM

## 2025-03-14 RX ORDER — LIDOCAINE 40 MG/G
CREAM TOPICAL
Status: DISCONTINUED | OUTPATIENT
Start: 2025-03-14 | End: 2025-03-14 | Stop reason: HOSPADM

## 2025-03-14 RX ORDER — HYDROXYZINE HYDROCHLORIDE 10 MG/1
10 TABLET, FILM COATED ORAL EVERY 6 HOURS PRN
Status: DISCONTINUED | OUTPATIENT
Start: 2025-03-14 | End: 2025-03-22 | Stop reason: HOSPADM

## 2025-03-14 RX ORDER — CALCIUM GLUCONATE 20 MG/ML
1 INJECTION, SOLUTION INTRAVENOUS EVERY 6 HOURS PRN
Status: DISCONTINUED | OUTPATIENT
Start: 2025-03-14 | End: 2025-03-19

## 2025-03-14 RX ORDER — NICOTINE POLACRILEX 4 MG
15-30 LOZENGE BUCCAL
Status: DISCONTINUED | OUTPATIENT
Start: 2025-03-14 | End: 2025-03-22 | Stop reason: HOSPADM

## 2025-03-14 RX ORDER — ASPIRIN 81 MG/1
162 TABLET, CHEWABLE ORAL DAILY
Status: DISCONTINUED | OUTPATIENT
Start: 2025-03-14 | End: 2025-03-18

## 2025-03-14 RX ORDER — CEFAZOLIN SODIUM 1 G/3ML
1 INJECTION, POWDER, FOR SOLUTION INTRAMUSCULAR; INTRAVENOUS EVERY 8 HOURS
Status: COMPLETED | OUTPATIENT
Start: 2025-03-14 | End: 2025-03-15

## 2025-03-14 RX ORDER — LIDOCAINE 4 G/G
1-2 PATCH TOPICAL EVERY 24 HOURS
Status: DISCONTINUED | OUTPATIENT
Start: 2025-03-14 | End: 2025-03-22 | Stop reason: HOSPADM

## 2025-03-14 RX ORDER — NOREPINEPHRINE BITARTRATE 0.02 MG/ML
.01-.15 INJECTION, SOLUTION INTRAVENOUS CONTINUOUS PRN
Status: DISCONTINUED | OUTPATIENT
Start: 2025-03-14 | End: 2025-03-15

## 2025-03-14 RX ORDER — NALOXONE HYDROCHLORIDE 0.4 MG/ML
0.4 INJECTION, SOLUTION INTRAMUSCULAR; INTRAVENOUS; SUBCUTANEOUS
Status: DISCONTINUED | OUTPATIENT
Start: 2025-03-14 | End: 2025-03-22 | Stop reason: HOSPADM

## 2025-03-14 RX ORDER — DEXTROSE MONOHYDRATE 25 G/50ML
25-50 INJECTION, SOLUTION INTRAVENOUS
Status: DISCONTINUED | OUTPATIENT
Start: 2025-03-14 | End: 2025-03-22 | Stop reason: HOSPADM

## 2025-03-14 RX ORDER — MAGNESIUM HYDROXIDE/ALUMINUM HYDROXICE/SIMETHICONE 120; 1200; 1200 MG/30ML; MG/30ML; MG/30ML
30 SUSPENSION ORAL EVERY 4 HOURS PRN
Status: DISCONTINUED | OUTPATIENT
Start: 2025-03-14 | End: 2025-03-22 | Stop reason: HOSPADM

## 2025-03-14 RX ORDER — FAMOTIDINE 20 MG/1
20 TABLET, FILM COATED ORAL
Status: DISCONTINUED | OUTPATIENT
Start: 2025-03-14 | End: 2025-03-14 | Stop reason: HOSPADM

## 2025-03-14 RX ORDER — AMOXICILLIN 250 MG
1 CAPSULE ORAL 2 TIMES DAILY
Status: DISCONTINUED | OUTPATIENT
Start: 2025-03-14 | End: 2025-03-21

## 2025-03-14 RX ORDER — ONDANSETRON 2 MG/ML
4 INJECTION INTRAMUSCULAR; INTRAVENOUS EVERY 6 HOURS PRN
Status: DISCONTINUED | OUTPATIENT
Start: 2025-03-14 | End: 2025-03-22 | Stop reason: HOSPADM

## 2025-03-14 RX ORDER — DEXMEDETOMIDINE HYDROCHLORIDE 4 UG/ML
.2-.7 INJECTION, SOLUTION INTRAVENOUS CONTINUOUS
Status: DISCONTINUED | OUTPATIENT
Start: 2025-03-14 | End: 2025-03-15

## 2025-03-14 RX ORDER — VANCOMYCIN HYDROCHLORIDE 1 G/200ML
1000 INJECTION, SOLUTION INTRAVENOUS
Status: COMPLETED | OUTPATIENT
Start: 2025-03-14 | End: 2025-03-14

## 2025-03-14 RX ORDER — PAPAVERINE HYDROCHLORIDE 30 MG/ML
INJECTION INTRAMUSCULAR; INTRAVENOUS PRN
Status: DISCONTINUED | OUTPATIENT
Start: 2025-03-14 | End: 2025-03-14 | Stop reason: HOSPADM

## 2025-03-14 RX ORDER — CHLORHEXIDINE GLUCONATE ORAL RINSE 1.2 MG/ML
10 SOLUTION DENTAL ONCE
Status: COMPLETED | OUTPATIENT
Start: 2025-03-14 | End: 2025-03-14

## 2025-03-14 RX ORDER — ACETAMINOPHEN 325 MG/1
975 TABLET ORAL ONCE
Status: COMPLETED | OUTPATIENT
Start: 2025-03-14 | End: 2025-03-14

## 2025-03-14 RX ORDER — PANTOPRAZOLE SODIUM 40 MG/1
40 TABLET, DELAYED RELEASE ORAL DAILY
Status: DISCONTINUED | OUTPATIENT
Start: 2025-03-14 | End: 2025-03-22 | Stop reason: HOSPADM

## 2025-03-14 RX ORDER — PHENYLEPHRINE HCL IN 0.9% NACL 50MG/250ML
.1-6 PLASTIC BAG, INJECTION (ML) INTRAVENOUS CONTINUOUS
Status: DISCONTINUED | OUTPATIENT
Start: 2025-03-14 | End: 2025-03-18

## 2025-03-14 RX ORDER — EPINEPHRINE IN 0.9 % SOD CHLOR 5 MG/250ML
.01-.1 PLASTIC BAG, INJECTION (ML) INTRAVENOUS CONTINUOUS PRN
Status: DISCONTINUED | OUTPATIENT
Start: 2025-03-14 | End: 2025-03-15

## 2025-03-14 RX ORDER — HEPARIN SODIUM 5000 [USP'U]/.5ML
5000 INJECTION, SOLUTION INTRAVENOUS; SUBCUTANEOUS EVERY 8 HOURS
Status: DISCONTINUED | OUTPATIENT
Start: 2025-03-15 | End: 2025-03-20

## 2025-03-14 RX ORDER — CEFAZOLIN SODIUM/WATER 2 G/20 ML
2 SYRINGE (ML) INTRAVENOUS
Status: DISCONTINUED | OUTPATIENT
Start: 2025-03-14 | End: 2025-03-14 | Stop reason: HOSPADM

## 2025-03-14 RX ORDER — POLYETHYLENE GLYCOL 3350 17 G/17G
17 POWDER, FOR SOLUTION ORAL DAILY
Status: DISCONTINUED | OUTPATIENT
Start: 2025-03-15 | End: 2025-03-19

## 2025-03-14 RX ORDER — OXYCODONE HYDROCHLORIDE 5 MG/1
5 TABLET ORAL EVERY 4 HOURS PRN
Status: DISCONTINUED | OUTPATIENT
Start: 2025-03-14 | End: 2025-03-22 | Stop reason: HOSPADM

## 2025-03-14 RX ORDER — METHOCARBAMOL 500 MG/1
250 TABLET ORAL EVERY 6 HOURS PRN
Status: DISCONTINUED | OUTPATIENT
Start: 2025-03-14 | End: 2025-03-15

## 2025-03-14 RX ORDER — HYDROMORPHONE HCL IN WATER/PF 6 MG/30 ML
0.4 PATIENT CONTROLLED ANALGESIA SYRINGE INTRAVENOUS
Status: DISCONTINUED | OUTPATIENT
Start: 2025-03-14 | End: 2025-03-21

## 2025-03-14 RX ORDER — HYDRALAZINE HYDROCHLORIDE 20 MG/ML
10 INJECTION INTRAMUSCULAR; INTRAVENOUS EVERY 30 MIN PRN
Status: DISCONTINUED | OUTPATIENT
Start: 2025-03-14 | End: 2025-03-22 | Stop reason: HOSPADM

## 2025-03-14 RX ORDER — OXYCODONE HYDROCHLORIDE 5 MG/1
10 TABLET ORAL EVERY 4 HOURS PRN
Status: DISCONTINUED | OUTPATIENT
Start: 2025-03-14 | End: 2025-03-22 | Stop reason: HOSPADM

## 2025-03-14 RX ORDER — FENTANYL CITRATE 0.05 MG/ML
50 INJECTION, SOLUTION INTRAMUSCULAR; INTRAVENOUS
Status: DISCONTINUED | OUTPATIENT
Start: 2025-03-14 | End: 2025-03-14 | Stop reason: HOSPADM

## 2025-03-14 RX ORDER — CALCIUM CARBONATE 500 MG/1
500 TABLET, CHEWABLE ORAL 4 TIMES DAILY PRN
Status: DISCONTINUED | OUTPATIENT
Start: 2025-03-14 | End: 2025-03-22 | Stop reason: HOSPADM

## 2025-03-14 RX ORDER — NITROGLYCERIN 20 MG/100ML
10-200 INJECTION INTRAVENOUS CONTINUOUS PRN
Status: DISCONTINUED | OUTPATIENT
Start: 2025-03-14 | End: 2025-03-15

## 2025-03-14 RX ORDER — SODIUM CHLORIDE, SODIUM LACTATE, POTASSIUM CHLORIDE, CALCIUM CHLORIDE 600; 310; 30; 20 MG/100ML; MG/100ML; MG/100ML; MG/100ML
INJECTION, SOLUTION INTRAVENOUS CONTINUOUS
Status: DISCONTINUED | OUTPATIENT
Start: 2025-03-14 | End: 2025-03-14 | Stop reason: HOSPADM

## 2025-03-14 RX ORDER — BISACODYL 10 MG
10 SUPPOSITORY, RECTAL RECTAL DAILY PRN
Status: DISCONTINUED | OUTPATIENT
Start: 2025-03-17 | End: 2025-03-22 | Stop reason: HOSPADM

## 2025-03-14 RX ORDER — HEPARIN SOD,PORCINE/0.9 % NACL 30K/1000ML
INTRAVENOUS SOLUTION INTRAVENOUS
Status: DISCONTINUED | OUTPATIENT
Start: 2025-03-14 | End: 2025-03-14 | Stop reason: HOSPADM

## 2025-03-14 RX ORDER — SODIUM CHLORIDE, SODIUM LACTATE, POTASSIUM CHLORIDE, CALCIUM CHLORIDE 600; 310; 30; 20 MG/100ML; MG/100ML; MG/100ML; MG/100ML
INJECTION, SOLUTION INTRAVENOUS CONTINUOUS
Status: DISCONTINUED | OUTPATIENT
Start: 2025-03-14 | End: 2025-03-18

## 2025-03-14 RX ORDER — POTASSIUM CHLORIDE 29.8 MG/ML
20 INJECTION INTRAVENOUS ONCE
Status: COMPLETED | OUTPATIENT
Start: 2025-03-14 | End: 2025-03-14

## 2025-03-14 RX ORDER — ASPIRIN 81 MG/1
162 TABLET, CHEWABLE ORAL
Status: DISCONTINUED | OUTPATIENT
Start: 2025-03-14 | End: 2025-03-14 | Stop reason: HOSPADM

## 2025-03-14 RX ORDER — PROCHLORPERAZINE MALEATE 5 MG/1
5 TABLET ORAL EVERY 6 HOURS PRN
Status: DISCONTINUED | OUTPATIENT
Start: 2025-03-14 | End: 2025-03-22 | Stop reason: HOSPADM

## 2025-03-14 RX ORDER — ONDANSETRON 4 MG/1
4 TABLET, ORALLY DISINTEGRATING ORAL EVERY 6 HOURS PRN
Status: DISCONTINUED | OUTPATIENT
Start: 2025-03-14 | End: 2025-03-22 | Stop reason: HOSPADM

## 2025-03-14 RX ORDER — CEFAZOLIN SODIUM/WATER 2 G/20 ML
2 SYRINGE (ML) INTRAVENOUS SEE ADMIN INSTRUCTIONS
Status: DISCONTINUED | OUTPATIENT
Start: 2025-03-14 | End: 2025-03-14 | Stop reason: HOSPADM

## 2025-03-14 RX ORDER — ACETAMINOPHEN 325 MG/1
975 TABLET ORAL EVERY 8 HOURS
Status: DISCONTINUED | OUTPATIENT
Start: 2025-03-14 | End: 2025-03-22 | Stop reason: HOSPADM

## 2025-03-14 RX ORDER — PROPOFOL 10 MG/ML
5-75 INJECTION, EMULSION INTRAVENOUS CONTINUOUS
Status: DISCONTINUED | OUTPATIENT
Start: 2025-03-14 | End: 2025-03-15

## 2025-03-14 RX ORDER — LIDOCAINE 40 MG/G
CREAM TOPICAL
Status: DISCONTINUED | OUTPATIENT
Start: 2025-03-14 | End: 2025-03-22 | Stop reason: HOSPADM

## 2025-03-14 RX ADMIN — VANCOMYCIN HYDROCHLORIDE 1000 MG: 1 INJECTION, SOLUTION INTRAVENOUS at 22:48

## 2025-03-14 RX ADMIN — LIDOCAINE 1 PATCH: 4 PATCH TOPICAL at 20:45

## 2025-03-14 RX ADMIN — HYDROMORPHONE HYDROCHLORIDE 0.2 MG: 0.2 INJECTION, SOLUTION INTRAMUSCULAR; INTRAVENOUS; SUBCUTANEOUS at 22:48

## 2025-03-14 RX ADMIN — ASPIRIN 162 MG: 81 TABLET, CHEWABLE ORAL at 21:38

## 2025-03-14 RX ADMIN — SODIUM CHLORIDE, POTASSIUM CHLORIDE, SODIUM LACTATE AND CALCIUM CHLORIDE 500 ML: 600; 310; 30; 20 INJECTION, SOLUTION INTRAVENOUS at 20:03

## 2025-03-14 RX ADMIN — CEFAZOLIN 1 G: 1 INJECTION, POWDER, FOR SOLUTION INTRAMUSCULAR; INTRAVENOUS at 19:10

## 2025-03-14 RX ADMIN — SENNOSIDES AND DOCUSATE SODIUM 1 TABLET: 50; 8.6 TABLET ORAL at 21:37

## 2025-03-14 RX ADMIN — ACETAMINOPHEN 975 MG: 325 TABLET, FILM COATED ORAL at 19:11

## 2025-03-14 RX ADMIN — SODIUM CHLORIDE, SODIUM LACTATE, POTASSIUM CHLORIDE, AND CALCIUM CHLORIDE 250 ML: .6; .31; .03; .02 INJECTION, SOLUTION INTRAVENOUS at 19:04

## 2025-03-14 RX ADMIN — OXYCODONE HYDROCHLORIDE 5 MG: 5 TABLET ORAL at 21:37

## 2025-03-14 RX ADMIN — VANCOMYCIN HYDROCHLORIDE 1000 MG: 1 INJECTION, SOLUTION INTRAVENOUS at 11:06

## 2025-03-14 RX ADMIN — CHLORHEXIDINE GLUCONATE 10 ML: 1.2 SOLUTION ORAL at 10:06

## 2025-03-14 RX ADMIN — ACETAMINOPHEN 975 MG: 325 TABLET, FILM COATED ORAL at 10:05

## 2025-03-14 RX ADMIN — MIDAZOLAM 1 MG: 1 INJECTION INTRAMUSCULAR; INTRAVENOUS at 10:17

## 2025-03-14 RX ADMIN — POTASSIUM CHLORIDE 20 MEQ: 29.8 INJECTION, SOLUTION INTRAVENOUS at 20:05

## 2025-03-14 RX ADMIN — SODIUM CHLORIDE, POTASSIUM CHLORIDE, SODIUM LACTATE AND CALCIUM CHLORIDE: 600; 310; 30; 20 INJECTION, SOLUTION INTRAVENOUS at 09:30

## 2025-03-14 RX ADMIN — ONDANSETRON 4 MG: 2 INJECTION, SOLUTION INTRAMUSCULAR; INTRAVENOUS at 19:06

## 2025-03-14 RX ADMIN — SODIUM CHLORIDE, SODIUM LACTATE, POTASSIUM CHLORIDE, AND CALCIUM CHLORIDE: .6; .31; .03; .02 INJECTION, SOLUTION INTRAVENOUS at 18:42

## 2025-03-14 RX ADMIN — FENTANYL CITRATE 50 MCG: 50 INJECTION, SOLUTION INTRAMUSCULAR; INTRAVENOUS at 10:18

## 2025-03-14 RX ADMIN — Medication 40 MG: at 19:11

## 2025-03-14 ASSESSMENT — ACTIVITIES OF DAILY LIVING (ADL)
ADLS_ACUITY_SCORE: 23
ADLS_ACUITY_SCORE: 39
ADLS_ACUITY_SCORE: 23
ADLS_ACUITY_SCORE: 23
ADLS_ACUITY_SCORE: 47
ADLS_ACUITY_SCORE: 23
ADLS_ACUITY_SCORE: 47
ADLS_ACUITY_SCORE: 23
ADLS_ACUITY_SCORE: 39
ADLS_ACUITY_SCORE: 33
ADLS_ACUITY_SCORE: 23
ADLS_ACUITY_SCORE: 47
ADLS_ACUITY_SCORE: 23
ADLS_ACUITY_SCORE: 47
ADLS_ACUITY_SCORE: 47

## 2025-03-14 NOTE — BRIEF OP NOTE
[facility]    Brief Operative Note    Pre-operative diagnosis: CAD (coronary artery disease) [I25.10]  Aortic stenosis [I35.0]  Post-operative diagnosis Same as pre-operative diagnosis    Procedure: CORONARY ARTERY BYPASS GRAFT x 1 (LEFT INTERNAL MAMMARY ARTERY - LEFT ANTERIOR DESCENDING ARTERY), N/A - Heart  AORTIC VALVE REPLACEMENT WITH TISSUE HEART VALVE INSPIRIS  RESILIA  AORTIC VALVE SIZE: 23 MM, AND ON CARDIOPULMONARY PUMP OXYGENATOR  (INTRAOPERATIVE TRANSESOPHAGEAL ECHOCARDIOGRAM BY ANESTHESIOLOGIST), N/A - Chest    Surgeon: Surgeons and Role:     * Tevin Mdeellin MD - Primary     * Belia Schuler PA-C - Assisting  Anesthesia: General   Estimated Blood Loss: 350cc    Drains: Bilateral basilar pleural chest tubes, meds x2  A wires, V wires  Specimens:   ID Type Source Tests Collected by Time Destination   1 : AORTIC VALVE AND LEAFLETS Tissue Heart Valve, Aortic SURGICAL PATHOLOGY EXAM Tevin Medellin MD 3/14/2025  2:35 PM    A : STAT LABS S/P CARDIAC BYPASS Blood Line, arterial CBC WITH PLATELETS, BASIC METABOLIC PANEL, FIBRINOGEN ACTIVITY, PARTIAL THROMBOPLASTIN TIME, INR Cyndi Bass APRN CRNA 3/14/2025  3:57 PM      Findings:   None.  Complications: None.  Implants:   Implant Name Type Inv. Item Serial No.  Lot No. LRB No. Used Action   VALVE AORTIC INSPIRIS RESILLA [id number] [id number] - [id number] Valve VALVE AORTIC INSPIRIS RESILLA [id number] [id number]  N/A 1 Implanted

## 2025-03-14 NOTE — OP NOTE
DATE OF SERVICE:  3/14/25     PREOPERATIVE DIAGNOSES:  1.  Severe single vessel coronary artery disease.  2.  Moderate-severe aortic stenosis  3.  Dyslipidemia     POSTOPERATIVE DIAGNOSES:  1.  Severe single vessel coronary artery disease.  2.  Moderate-severe aortic stenosis  3.  Dyslipidemia     PROCEDURE PERFORMED:  1.  Coronary artery bypass grafting x 1   -in-situ left internal mammary artery to the left anterior descending coronary artery.  2.  Aortic Valve Replacement with a 23 mm Montano Inspiris Resilia Bioprosthetic Valve  3.  Epiaortic ultrasonography    SURGEON:  AMY Medellin MD    FIRST ASSISTANT: Belia Schuler PA-C     ANESTHESIA:  General endotracheal anesthesia.     ESTIMATED BLOOD LOSS:  500 mL     SPECIMEN:  None.    CPB/XC:  99 / 82 minutes     INDICATIONS FOR PROCEDURE:  Tri Walden is a 74-year-old female who had an abnormal stress test. Coronary angiography demonstrated a chronic total occlusion of her left anterior descending coronary artery. Echocardiography demonstrated preserved left ventricular function and moderate-severe aortic stenosis. Coronary artery bypass grafting with concomitant aortic valve replacement was recommended. The patient understood the risks and benefits of the procedure and wished to undergo the operation.     OPERATIVE FINDINGS:      The left internal mammary artery was 2 mm in diameter and had excellent flow.    The ascending aorta was free of calcified plaque.     The left anterior descending coronary artery was 2 mm in diameter and free of disease at the site of anastomosis.      The aorta was normal in size, diameter, and thickness. The aortic valve was trileaflet and heavily calcified. The aortic annulus was sized to a 23 mm valve sizer. Both coronary arteries were visualized following valve implantation and flow was demonstrated from both ostia with a dose of retrograde cardioplegia. After reperfusion and defibrillation, sinus rhythm was restored.   Intra-operative transesophageal echocardiography demonstrated a well seated valve with no evidence of paravalvular regurgitation after weaning from cardiopulmonary bypass. Mean gradient across the aortic valve prosthesis was 10 mm Hg.  Left ventricular function was normal preoperatively and unchanged after bypass on low-dose inotropic support.     OPERATIVE DESCRIPTION IN DETAIL:  After obtaining informed consent, the patient was brought to the operating room and placed in the supine position on the operating room table. Appropriate lines and devices for monitoring were placed by the anesthesia team.  The patient was prepped and draped, and a timeout was performed to confirm the correct patient identity, as well as the procedure to be performed. A median sternotomy was performed and the left internal mammary artery was harvested in a skeletonized fashion.    The patient was given full dose heparin to achieve an activated clotting time over 480 seconds.      Epiaortic ultrasonography was used to identify a suitable spot for aortic cannulation and cross-clamping. Following this, the distal ascending aorta was cannulated using epiaortic ultrasound guidance and the right atrium was cannulated next. Following this, both retrograde and antegrade cardioplegia cannulae were placed. Cardiopulmonary bypass was commenced. Cardioplegia was created. A left ventricular vent was placed via the right superior pulmonary vein. The heart was arrested with 1 liter of cold blood cardioplegia antegrade, followed by 200 ml retrograde. Subsequent maintenance doses of 300 mL of cold retrograde blood cardioplegia were given approximately every 20 minutes. Electrochemical silence was observed. Topical cold saline slush was applied for additional protection.     The following grafts were constructed:  -In-situ left internal mammary artery to the mid left anterior descending coronary artery in an end-to-side fashion    An oblique aortotomy was  performed. The aortic valve was inspected and noted to be heavily calcified. The leaflets were excised and the calcification along the aortic annulus thoroughly debrided using a combination of rongeur forceps and sharp dissection with metzenbaum scissors. Following satisfactory annular decalcification, the aortic annulus was sized to a 23 mm valve sizer. We then extensively irrigated the aortic root and LVOT with cold saline to remove all debris. Valve sutures were then placed circumferentially along the aortic annulus and the sutures were passed through the sewing ring of the bioprosthetic aortic valve.     The valve was then seated and held in place using snares while the sutures were individually secured with the Cor-Knot device. On final inspection, the valve was noted to be well seated along the annulus. The aortotomy was next closed in two layers with the first layer closed using a running horizontal mattress technique and the second layer with a simple running technique. Once the aortotomy was closed, we extensively de-aired the heart and the crossclamp was released after a retrograde dose of terminal warm blood cardioplegia was delivered.    All anastomoses were inspected and determined to be hemostatic. Atrial and ventricular pacing leads were placed and brought out through separate stab incisions. Ventilation was resumed. The patient weaned from cardiopulmonary bypass, was given protamine and decannulated. All cannulation sites were oversewn. Chest tubes were placed and brought out through separate stab incisions. The sternal edges were reapposed with multiple stainless steel wires. The skin and soft tissues were closed in multiple layers of running absorbable suture.    All sponge, instrument, and needle counts were correct at the end of the case.    AMY Medellin MD  Cardiothoracic Surgery  Pager [phone]

## 2025-03-14 NOTE — H&P
ICU H&P  Date of Service: 03/14/25    Assessment and Plan:  Aaron Walden is a 74 year old female with a history of IBS, chronically occluded LAD on recent angiogram after positive stress test, and moderate to severe aortic stenosis who is admitted s/p CABG x1 and bioprosthetic AVR with Dr. Medellin on 3/14/25. The patient was admitted to the ICU post-operatively.    Neuro:  Post-operative pain management  Mechanical ventilation  - Propofol to transition to Precedex for extubation  - Multimodal pain control    CV:  LAD CAD s/p CABG x1  Moderate to severe aortic stenosis s/p bioprosthetic AVR  Dyslipidemia  RBB  Bradycardia  - MAP goal > 65  - Chest tube management  - EKG and CXR on admission, repeat in AM  - Heparin prophylaxis, ASA ordered  - AV wires, paced at 80 bpm    Pulm:  Post-operative mechanical ventilation  - Wean to extubation, able to Fast Track    GI:  GERD  IBS  - Advance diet as tolerated once extubated    Renal:  No acute issues  - Monitor UOP, Cr, I/O    ID:  Marcelle-operative prophylaxis  - Cefazolin and vancomycin ordered    Heme:  Acute blood loss anemia  - CBC daily  - Transfuse hemoglobin <7    Endo:  Risk for stress hyperglycemia  - Insulin drip  - Goal glucose <180  - Transition to ISS once appropriate    PPx:  1. DVT: heparin   2. VAP: HOB 30 degrees, chlorhexidine rinse  3. Stress Ulcer: PPI  4. Restraints: Nonviolent soft two point restraints required and necessary for patient safety and continued cares and good effect as patient continues to pull at necessary lines, tubes despite education and distraction. Will readdress daily.   5. Wound care - per unit routine   6. Feeding - NPO  7. Family updated at bedside    Clinically Significant Risk Factors Present on Admission        # Hypokalemia: Lowest K = 3.2 mmol/L in last 2 days, will replace as needed  # Hyponatremia: Lowest Na = 132 mmol/L in last 2 days, will monitor as appropriate   # Hypocalcemia: Lowest iCa = 3.9 mg/dL in last 2 days, will  "monitor and replace as appropriate      # Coagulation Defect: INR = 1.53 (Ref range: 0.85 - 1.15) and/or PTT = 38 Seconds (Ref range: 22 - 38 Seconds), will monitor for bleeding  # Drug Induced Platelet Defect: home medication list includes an antiplatelet medication   # Hypertension: Noted on problem list      # Anemia: based on hgb <11               # Anemia: based on hgb <11           Dispo: ICU    HPI: Aaron Walden is a 74 year old female with a history of IBS, chronically occluded LAD on recent angiogram after positive stress test, and moderate to severe aortic stenosis who is admitted s/p CABG x1 and bioprosthetic AVR with Dr. Medellin on 3/14/25. The patient was admitted to the ICU post-operatively.    Intra-operatively, the patient received 2400mL crystalloid and 229 mL cell saver. EBL was 350mL and UOP 1000mL. She has both A and V wires with a rate of 80 and is currently being paced. She was shocked twice coming off pump. Currently, she is off all vasopressors. 4 chest tubes in place, two pleural and two mediastinal.    Unable to obtain ROS due to intubation.    BP (!) 169/85   Pulse 87   Temp 96.8  F (36  C) (Temporal)   Resp 17   Ht 1.6 m (5' 3\")   Wt 59.1 kg (130 lb 4.8 oz)   SpO2 99%   BMI 23.08 kg/m      Resp: 17      Intake/Output Summary (Last 24 hours) at 3/14/2025 1723  Last data filed at 3/14/2025 1650  Gross per 24 hour   Intake 2329 ml   Output 1075 ml   Net 1254 ml       Physical Exam:  Constitutional: healthy and no distress  Head: Normocephalic. No masses, lesions, tenderness or abnormalities  Neck: Neck supple. No adenopathy.   ENT: normal, no neck nodes or sinus tenderness  Cardiovascular: RRR, no murmur. No JVD.  Respiratory: Percussion normal. Good diaphragmatic excursion. Lungs clear bilaterally.  Gastrointestinal: Abdomen soft, non-tender. BS normal. No masses, organomegaly  : deferred  Musculoskeletal: extremities normal - no gross deformities noted and normal muscle tone  Skin: " sternotomy with clean, dry, intact dressing and chest tubes taped inferiorly  Neurologic: sedated  Psychiatric: unable to assess    Labs: reviewed. Significant for anemia and elevated INR. Normal lactate.    Imaging: reviewed. Significant for devices in the expected position.    Past Medical History  Past Medical History:   Diagnosis Date    Basal cell carcinoma     Hypertension     Unsure if the date    Malignant melanoma (H)     Nonrheumatic aortic valve stenosis 3/14/2023      Surgical History   Past Surgical History:   Procedure Laterality Date    ABLATE VEIN VARICOSE RADIO FREQUENCY WITHOUT PHLEBECTOMY MULTIPLE STAB  12/06/2012    Procedure: ABLATE VEIN VARICOSE RADIO FREQUENCY WITHOUT PHLEBECTOMY MULTIPLE STAB;  Bilateral Ablation of Varicose Veins;  Surgeon: Fredis Marks MD;  Location: [facility] OR     CORONARY ANGIOGRAM N/A 2/24/2025    Procedure: Coronary Angiogram;  Surgeon: Merissa Rodgers MD;  Location: [facility] CARDIAC CATH LAB    REVERSE ARTHROPLASTY SHOULDER Left 3/22/2023    Procedure: left REVERSE Total Shoulder Arthroplasty;  Surgeon: Escobar Magana MD;  Location: [facility] OR      Family History   I have reviewed this patient's family history and updated it with pertinent information if needed.  Family History   Problem Relation Age of Onset    Arthritis Mother     Diabetes Mother         type 2    Heart Disease Father     Coronary Artery Disease Father     Cancer Paternal Grandmother         breast cancer    Heart Disease Paternal Grandfather     Cancer Sister         right kindney       Social History   Social History     Tobacco Use    Smoking status: Never    Smokeless tobacco: Never   Vaping Use    Vaping status: Never Used   Substance Use Topics    Alcohol use: Yes     Comment: very rare    Drug use: Never       Discussed with Dr. Cody.    Maura Reis MD  Surgical Critical Care Fellow    Physician Attestation   I spent a total of 35 minutes with the patient, personally observing  as the resident/fellow performed physical examination, daily progress, evaluation, labs and imaging.     Key findings: 74 year old female with a history of IBS, chronically occluded LAD on recent angiogram after positive stress test, and moderate to severe aortic stenosis who is admitted s/p CABG x1 and bioprosthetic AVR with Dr. Medellin on 3/14/25. Fast track to extubation    Kahlil Cody MD  Critical care time excluding procedures 35 min  Date of Service (when I saw the patient): 3/14/2025

## 2025-03-15 ENCOUNTER — APPOINTMENT (OUTPATIENT)
Dept: PHYSICAL THERAPY | Facility: CLINIC | Age: 75
DRG: 219 | End: 2025-03-15
Attending: PHYSICIAN ASSISTANT
Payer: COMMERCIAL

## 2025-03-15 ENCOUNTER — APPOINTMENT (OUTPATIENT)
Dept: GENERAL RADIOLOGY | Facility: CLINIC | Age: 75
DRG: 219 | End: 2025-03-15
Attending: PHYSICIAN ASSISTANT
Payer: COMMERCIAL

## 2025-03-15 LAB
ALBUMIN SERPL BCG-MCNC: 3.6 G/DL (ref 3.5–5.2)
ALLEN'S TEST: ABNORMAL
ALP SERPL-CCNC: 38 U/L (ref 40–150)
ALT SERPL W P-5'-P-CCNC: 8 U/L (ref 0–50)
ANION GAP SERPL CALCULATED.3IONS-SCNC: 13 MMOL/L (ref 7–15)
AST SERPL W P-5'-P-CCNC: 37 U/L (ref 0–45)
BASE EXCESS BLDA CALC-SCNC: -1.7 MMOL/L (ref -3–3)
BILIRUB SERPL-MCNC: 0.6 MG/DL
BUN SERPL-MCNC: 12.3 MG/DL (ref 8–23)
CA-I BLD-MCNC: 4.5 MG/DL (ref 4.4–5.2)
CALCIUM SERPL-MCNC: 8.3 MG/DL (ref 8.8–10.4)
CHLORIDE SERPL-SCNC: 102 MMOL/L (ref 98–107)
CREAT SERPL-MCNC: 0.69 MG/DL (ref 0.51–0.95)
EGFRCR SERPLBLD CKD-EPI 2021: 90 ML/MIN/1.73M2
ERYTHROCYTE [DISTWIDTH] IN BLOOD BY AUTOMATED COUNT: 13.2 % (ref 10–15)
GLUCOSE BLDC GLUCOMTR-MCNC: 105 MG/DL (ref 70–99)
GLUCOSE BLDC GLUCOMTR-MCNC: 113 MG/DL (ref 70–99)
GLUCOSE BLDC GLUCOMTR-MCNC: 114 MG/DL (ref 70–99)
GLUCOSE BLDC GLUCOMTR-MCNC: 132 MG/DL (ref 70–99)
GLUCOSE BLDC GLUCOMTR-MCNC: 143 MG/DL (ref 70–99)
GLUCOSE SERPL-MCNC: 124 MG/DL (ref 70–99)
HCO3 BLD-SCNC: 21 MMOL/L (ref 21–28)
HCO3 SERPL-SCNC: 20 MMOL/L (ref 22–29)
HCT VFR BLD AUTO: 27.4 % (ref 35–47)
HGB BLD-MCNC: 9.6 G/DL (ref 11.7–15.7)
LACTATE SERPL-SCNC: 1.1 MMOL/L (ref 0.7–2)
MAGNESIUM SERPL-MCNC: 1.9 MG/DL (ref 1.7–2.3)
MCH RBC QN AUTO: 31.3 PG (ref 26.5–33)
MCHC RBC AUTO-ENTMCNC: 35 G/DL (ref 31.5–36.5)
MCV RBC AUTO: 89 FL (ref 78–100)
O2/TOTAL GAS SETTING VFR VENT: 30 %
OXYHGB MFR BLDA: 98 % (ref 92–100)
PCO2 BLD: 30 MM HG (ref 35–45)
PEEP: 5 CM H2O
PH BLD: 7.47 [PH] (ref 7.35–7.45)
PHOSPHATE SERPL-MCNC: 3.6 MG/DL (ref 2.5–4.5)
PLATELET # BLD AUTO: 219 10E3/UL (ref 150–450)
PO2 BLD: 142 MM HG (ref 80–105)
POTASSIUM SERPL-SCNC: 3.8 MMOL/L (ref 3.4–5.3)
PROT SERPL-MCNC: 5.6 G/DL (ref 6.4–8.3)
RBC # BLD AUTO: 3.07 10E6/UL (ref 3.8–5.2)
SAO2 % BLDA: 99.8 % (ref 95–96)
SODIUM SERPL-SCNC: 135 MMOL/L (ref 135–145)
WBC # BLD AUTO: 9.8 10E3/UL (ref 4–11)

## 2025-03-15 PROCEDURE — 97110 THERAPEUTIC EXERCISES: CPT | Mod: GP | Performed by: PHYSICAL THERAPIST

## 2025-03-15 PROCEDURE — 250N000013 HC RX MED GY IP 250 OP 250 PS 637: Performed by: PHYSICIAN ASSISTANT

## 2025-03-15 PROCEDURE — 84100 ASSAY OF PHOSPHORUS: CPT | Performed by: PHYSICIAN ASSISTANT

## 2025-03-15 PROCEDURE — 250N000011 HC RX IP 250 OP 636: Performed by: STUDENT IN AN ORGANIZED HEALTH CARE EDUCATION/TRAINING PROGRAM

## 2025-03-15 PROCEDURE — 82805 BLOOD GASES W/O2 SATURATION: CPT | Performed by: PHYSICIAN ASSISTANT

## 2025-03-15 PROCEDURE — 97161 PT EVAL LOW COMPLEX 20 MIN: CPT | Mod: GP | Performed by: PHYSICAL THERAPIST

## 2025-03-15 PROCEDURE — 83605 ASSAY OF LACTIC ACID: CPT | Performed by: STUDENT IN AN ORGANIZED HEALTH CARE EDUCATION/TRAINING PROGRAM

## 2025-03-15 PROCEDURE — 258N000003 HC RX IP 258 OP 636: Performed by: NURSE PRACTITIONER

## 2025-03-15 PROCEDURE — 83735 ASSAY OF MAGNESIUM: CPT | Performed by: PHYSICIAN ASSISTANT

## 2025-03-15 PROCEDURE — 250N000011 HC RX IP 250 OP 636: Performed by: PHYSICIAN ASSISTANT

## 2025-03-15 PROCEDURE — 82330 ASSAY OF CALCIUM: CPT | Performed by: PHYSICIAN ASSISTANT

## 2025-03-15 PROCEDURE — 93005 ELECTROCARDIOGRAM TRACING: CPT

## 2025-03-15 PROCEDURE — 250N000013 HC RX MED GY IP 250 OP 250 PS 637: Performed by: STUDENT IN AN ORGANIZED HEALTH CARE EDUCATION/TRAINING PROGRAM

## 2025-03-15 PROCEDURE — 84460 ALANINE AMINO (ALT) (SGPT): CPT | Performed by: PHYSICIAN ASSISTANT

## 2025-03-15 PROCEDURE — 999N000253 HC STATISTIC WEANING TRIALS

## 2025-03-15 PROCEDURE — 258N000003 HC RX IP 258 OP 636: Performed by: STUDENT IN AN ORGANIZED HEALTH CARE EDUCATION/TRAINING PROGRAM

## 2025-03-15 PROCEDURE — P9045 ALBUMIN (HUMAN), 5%, 250 ML: HCPCS | Performed by: STUDENT IN AN ORGANIZED HEALTH CARE EDUCATION/TRAINING PROGRAM

## 2025-03-15 PROCEDURE — 71045 X-RAY EXAM CHEST 1 VIEW: CPT

## 2025-03-15 PROCEDURE — 99291 CRITICAL CARE FIRST HOUR: CPT | Mod: 24 | Performed by: ANESTHESIOLOGY

## 2025-03-15 PROCEDURE — 85014 HEMATOCRIT: CPT | Performed by: PHYSICIAN ASSISTANT

## 2025-03-15 PROCEDURE — 999N000157 HC STATISTIC RCP TIME EA 10 MIN

## 2025-03-15 PROCEDURE — 97530 THERAPEUTIC ACTIVITIES: CPT | Mod: GP | Performed by: PHYSICAL THERAPIST

## 2025-03-15 PROCEDURE — 200N000001 HC R&B ICU

## 2025-03-15 PROCEDURE — P9047 ALBUMIN (HUMAN), 25%, 50ML: HCPCS | Performed by: SURGERY

## 2025-03-15 PROCEDURE — 250N000011 HC RX IP 250 OP 636: Performed by: SURGERY

## 2025-03-15 PROCEDURE — 999N000259 HC STATISTIC EXTUBATION

## 2025-03-15 PROCEDURE — 94003 VENT MGMT INPAT SUBQ DAY: CPT

## 2025-03-15 RX ORDER — ATORVASTATIN CALCIUM 40 MG/1
40 TABLET, FILM COATED ORAL EVERY EVENING
Status: DISCONTINUED | OUTPATIENT
Start: 2025-03-15 | End: 2025-03-22 | Stop reason: HOSPADM

## 2025-03-15 RX ORDER — MAGNESIUM SULFATE HEPTAHYDRATE 40 MG/ML
2 INJECTION, SOLUTION INTRAVENOUS ONCE
Status: COMPLETED | OUTPATIENT
Start: 2025-03-15 | End: 2025-03-15

## 2025-03-15 RX ORDER — SIMVASTATIN 20 MG
20 TABLET ORAL EVERY EVENING
Status: DISCONTINUED | OUTPATIENT
Start: 2025-03-15 | End: 2025-03-15

## 2025-03-15 RX ORDER — POTASSIUM CHLORIDE 20MEQ/15ML
20 LIQUID (ML) ORAL ONCE
Status: COMPLETED | OUTPATIENT
Start: 2025-03-15 | End: 2025-03-15

## 2025-03-15 RX ORDER — ALBUMIN (HUMAN) 12.5 G/50ML
25 SOLUTION INTRAVENOUS EVERY 4 HOURS
Status: COMPLETED | OUTPATIENT
Start: 2025-03-15 | End: 2025-03-15

## 2025-03-15 RX ORDER — METHOCARBAMOL 500 MG/1
250 TABLET ORAL 4 TIMES DAILY
Status: DISCONTINUED | OUTPATIENT
Start: 2025-03-15 | End: 2025-03-22 | Stop reason: HOSPADM

## 2025-03-15 RX ADMIN — AMIODARONE HYDROCHLORIDE 150 MG: 1.5 INJECTION, SOLUTION INTRAVENOUS at 18:50

## 2025-03-15 RX ADMIN — ONDANSETRON 4 MG: 2 INJECTION, SOLUTION INTRAMUSCULAR; INTRAVENOUS at 21:39

## 2025-03-15 RX ADMIN — HEPARIN SODIUM 5000 UNITS: 5000 INJECTION, SOLUTION INTRAVENOUS; SUBCUTANEOUS at 13:09

## 2025-03-15 RX ADMIN — ALBUMIN HUMAN 25 G: 0.25 SOLUTION INTRAVENOUS at 01:15

## 2025-03-15 RX ADMIN — POTASSIUM CHLORIDE 20 MEQ: 20 SOLUTION ORAL at 05:07

## 2025-03-15 RX ADMIN — AMIODARONE HYDROCHLORIDE 1 MG/MIN: 1.8 INJECTION, SOLUTION INTRAVENOUS at 23:59

## 2025-03-15 RX ADMIN — ATORVASTATIN CALCIUM 40 MG: 40 TABLET, FILM COATED ORAL at 20:03

## 2025-03-15 RX ADMIN — ALBUMIN HUMAN 25 G: 0.25 SOLUTION INTRAVENOUS at 05:14

## 2025-03-15 RX ADMIN — ACETAMINOPHEN 975 MG: 325 TABLET, FILM COATED ORAL at 09:03

## 2025-03-15 RX ADMIN — METHOCARBAMOL 250 MG: 500 TABLET ORAL at 12:37

## 2025-03-15 RX ADMIN — ALBUMIN HUMAN 25 G: 0.05 INJECTION, SOLUTION INTRAVENOUS at 11:13

## 2025-03-15 RX ADMIN — OXYCODONE HYDROCHLORIDE 5 MG: 5 TABLET ORAL at 16:19

## 2025-03-15 RX ADMIN — OXYCODONE HYDROCHLORIDE 5 MG: 5 TABLET ORAL at 12:36

## 2025-03-15 RX ADMIN — AMIODARONE HYDROCHLORIDE 1 MG/MIN: 1.8 INJECTION, SOLUTION INTRAVENOUS at 18:53

## 2025-03-15 RX ADMIN — Medication 40 MG: at 07:38

## 2025-03-15 RX ADMIN — OXYCODONE HYDROCHLORIDE 5 MG: 5 TABLET ORAL at 15:21

## 2025-03-15 RX ADMIN — METHOCARBAMOL 250 MG: 500 TABLET ORAL at 22:04

## 2025-03-15 RX ADMIN — METHOCARBAMOL 250 MG: 500 TABLET ORAL at 17:01

## 2025-03-15 RX ADMIN — HYDROMORPHONE HYDROCHLORIDE 0.2 MG: 0.2 INJECTION, SOLUTION INTRAMUSCULAR; INTRAVENOUS; SUBCUTANEOUS at 16:51

## 2025-03-15 RX ADMIN — HYDROMORPHONE HYDROCHLORIDE 0.2 MG: 0.2 INJECTION, SOLUTION INTRAMUSCULAR; INTRAVENOUS; SUBCUTANEOUS at 20:01

## 2025-03-15 RX ADMIN — SODIUM CHLORIDE, POTASSIUM CHLORIDE, SODIUM LACTATE AND CALCIUM CHLORIDE 500 ML: 600; 310; 30; 20 INJECTION, SOLUTION INTRAVENOUS at 22:20

## 2025-03-15 RX ADMIN — POLYETHYLENE GLYCOL 3350 17 G: 17 POWDER, FOR SOLUTION ORAL at 07:38

## 2025-03-15 RX ADMIN — ASPIRIN 162 MG: 81 TABLET, CHEWABLE ORAL at 07:39

## 2025-03-15 RX ADMIN — MAGNESIUM SULFATE HEPTAHYDRATE 2 G: 40 INJECTION, SOLUTION INTRAVENOUS at 04:59

## 2025-03-15 RX ADMIN — SENNOSIDES AND DOCUSATE SODIUM 1 TABLET: 50; 8.6 TABLET ORAL at 20:04

## 2025-03-15 RX ADMIN — ACETAMINOPHEN 975 MG: 325 TABLET, FILM COATED ORAL at 17:00

## 2025-03-15 RX ADMIN — OXYCODONE HYDROCHLORIDE 5 MG: 5 TABLET ORAL at 22:05

## 2025-03-15 RX ADMIN — METHOCARBAMOL 250 MG: 500 TABLET ORAL at 09:33

## 2025-03-15 RX ADMIN — CEFAZOLIN 1 G: 1 INJECTION, POWDER, FOR SOLUTION INTRAMUSCULAR; INTRAVENOUS at 02:11

## 2025-03-15 RX ADMIN — HEPARIN SODIUM 5000 UNITS: 5000 INJECTION, SOLUTION INTRAVENOUS; SUBCUTANEOUS at 21:47

## 2025-03-15 RX ADMIN — VANCOMYCIN HYDROCHLORIDE 1000 MG: 1 INJECTION, SOLUTION INTRAVENOUS at 11:30

## 2025-03-15 RX ADMIN — OXYCODONE HYDROCHLORIDE 5 MG: 5 TABLET ORAL at 02:04

## 2025-03-15 RX ADMIN — HYDROMORPHONE HYDROCHLORIDE 0.2 MG: 0.2 INJECTION, SOLUTION INTRAMUSCULAR; INTRAVENOUS; SUBCUTANEOUS at 05:09

## 2025-03-15 RX ADMIN — ONDANSETRON 4 MG: 2 INJECTION, SOLUTION INTRAMUSCULAR; INTRAVENOUS at 15:21

## 2025-03-15 RX ADMIN — CEFAZOLIN 1 G: 1 INJECTION, POWDER, FOR SOLUTION INTRAMUSCULAR; INTRAVENOUS at 09:34

## 2025-03-15 RX ADMIN — OXYCODONE HYDROCHLORIDE 5 MG: 5 TABLET ORAL at 09:33

## 2025-03-15 RX ADMIN — LIDOCAINE 1 PATCH: 4 PATCH TOPICAL at 20:07

## 2025-03-15 RX ADMIN — SODIUM CHLORIDE, POTASSIUM CHLORIDE, SODIUM LACTATE AND CALCIUM CHLORIDE 500 ML: 600; 310; 30; 20 INJECTION, SOLUTION INTRAVENOUS at 14:04

## 2025-03-15 RX ADMIN — ACETAMINOPHEN 975 MG: 325 TABLET, FILM COATED ORAL at 02:04

## 2025-03-15 ASSESSMENT — ACTIVITIES OF DAILY LIVING (ADL)
ADLS_ACUITY_SCORE: 50
ADLS_ACUITY_SCORE: 50
ADLS_ACUITY_SCORE: 49
ADLS_ACUITY_SCORE: 46
ADLS_ACUITY_SCORE: 49
ADLS_ACUITY_SCORE: 46
ADLS_ACUITY_SCORE: 49
ADLS_ACUITY_SCORE: 50
ADLS_ACUITY_SCORE: 49
ADLS_ACUITY_SCORE: 50
ADLS_ACUITY_SCORE: 53
ADLS_ACUITY_SCORE: 49
ADLS_ACUITY_SCORE: 50
ADLS_ACUITY_SCORE: 50
ADLS_ACUITY_SCORE: 49
ADLS_ACUITY_SCORE: 46
ADLS_ACUITY_SCORE: 53
ADLS_ACUITY_SCORE: 50
ADLS_ACUITY_SCORE: 50
ADLS_ACUITY_SCORE: 53
ADLS_ACUITY_SCORE: 49

## 2025-03-15 NOTE — PLAN OF CARE
Goal Outcome Evaluation:      Plan of Care Reviewed With: patient    Overall Patient Progress: improvingOverall Patient Progress: improving    Outcome Evaluation: - 2000: 500mL LR Bolus, Phenyl gtt Paused 45min. - 0115: MAP <65, CVP 5, Low UOP- 25% Albumin 100mL ordered 2 doses q4. Pt remains intubated, Off sedation- failed SBT x4 (Low Volumes/Apnea Trigger). Follows commands, moves all extremities, nods appropriately. PERRL intact. A Paced @ 80bpm, SBP 80s-110s, MAP >65 on Phenyl gtt. Chest Tube Total Output MS: 200mL, PL: 65mL. - Vent: A/C, RR 16, Vt 450, FiO2 30%, PEEP 5, SpO2 >94%. LS: Clear/Dim. OG Clamped, Abdomen- hypoactive, soft, nontender. Reed in place, UOP now ~20-30mL/hr. Midsternal incision- open to air. Chest tube sites- WDL, Dressing changed. PRN Oxycodone 5mg PO, Dilaudid 0.2mg IV given for pain. K+ Replaced x2, Mg replaced x1.        Problem: Cardiovascular Surgery  Goal: Improved Activity Tolerance  Outcome: Progressing  Intervention: Optimize Tolerance for Activity  Recent Flowsheet Documentation  Taken 3/15/2025 0400 by Francis Dennison RN  Environmental Support:   calm environment promoted   caregiver consistency promoted   environmental consistency promoted   rest periods encouraged  Taken 3/15/2025 0000 by Francis Dennison RN  Environmental Support:   calm environment promoted   caregiver consistency promoted   environmental consistency promoted   rest periods encouraged  Taken 3/14/2025 2000 by Francis Dennison RN  Environmental Support:   calm environment promoted   caregiver consistency promoted   environmental consistency promoted   rest periods encouraged     Problem: Cardiovascular Surgery  Goal: Optimal Coping with Heart Surgery  Outcome: Progressing  Intervention: Support Psychosocial Response to Surgery  Recent Flowsheet Documentation  Taken 3/15/2025 0400 by Francis Dennison, RN  Supportive Measures:   positive reinforcement provided   problem-solving facilitated    relaxation techniques promoted  Taken 3/15/2025 0000 by Francis Dennison RN  Supportive Measures:   positive reinforcement provided   problem-solving facilitated   relaxation techniques promoted  Taken 3/14/2025 2000 by Francis Dennison RN  Supportive Measures:   positive reinforcement provided   problem-solving facilitated   relaxation techniques promoted  Family/Support System Care: presence promoted     Problem: Cardiovascular Surgery  Goal: Effective Cardiac Function  Outcome: Progressing  Intervention: Optimize Cardiac Output and Blood Flow  Recent Flowsheet Documentation  Taken 3/15/2025 0400 by Francis Dennison RN  Stabilization Measures: fluid resuscitation initiated  Taken 3/15/2025 0000 by Francis Dennison RN  Stabilization Measures: fluid resuscitation initiated  Taken 3/14/2025 2000 by Francis Dennison RN  Stabilization Measures: fluid resuscitation initiated

## 2025-03-15 NOTE — CONSULTS
CARDIAC SURGERY NUTRITION CONSULT     Received standing order to assess and educate patient.  Will follow and complete assessment once patient is extubated and/or is transferred to medical unit.     Patient will receive nutrition education during the Outpatient Cardiac Rehab Program (nutrition classes/dietitian counseling).    Estrellita Bonner RD, LD, CNSC   Available via [employer]

## 2025-03-15 NOTE — PROGRESS NOTES
[facility] ICU RESPIRATORY NOTE        Date of Admission: 3/14/2025    Date of Intubation (most recent): 3/14/2025    Reason for Mechanical Ventilation: airway protection    Number of Days on Mechanical Ventilation: 1    Met Criteria for Spontaneous Breathing Trial: Yes    Bite Block: No    Significant Events Today: failed SBTs     ABG Results:   Recent Labs   Lab 03/15/25  0423 03/14/25  1757 03/14/25  1557 03/14/25  1516   PH 7.47* 7.43 7.45 7.41   PCO2 30* 33* 34* 42   PO2 142* 206* 410* 355*   HCO3 21 22 24 26   O2PER 30 80 80.0 70.0         Current Vent Settings: FiO2 (%): 35 %, Resp: 16, Vent Mode: CMV/AC, Resp Rate (Set): 16 breaths/min, Tidal Volume (Set, mL): 450 mL, PEEP (cm H2O): 5 cmH2O, Resp Rate (Set): 16 breaths/min, Tidal Volume (Set, mL): 450 mL, PEEP (cm H2O): 5 cmH2O    Plan: Continue vent support and wean to extubate    RT Jovanny on 3/15/2025 at 4:33 AM

## 2025-03-15 NOTE — PROGRESS NOTES
[facility]  Cardiovascular and Thoracic Surgery Daily Note      Assessment and Plan  Tri Walden is a 74 year old female with a PMH of hypertension, dyslipidemia, IBS, and progressive severe aortic stenosis and single vessel CAD ( LAD) who was  recently referred for valve replacement and surgical revascularization. Admitted 03/14 following bioprosthetic AVR (23 mm Inspiris Resilia) and CABG x1 ([place] to LAD) with Dr. Robert eMdellin.     POD # 1 s/p bioprosthetic AVR (23 mm Inspiris Resilia) and CABG x1 (LIM[place] to LAD) on 03/14/2025 with Dr. Robert Medellin.    - CVS:   Pre-op TTE with preserved biventricular function, severe aortic stenosis.  Postop vasoplegic shock, improving. Antoni to MAP goal 65, wean as able.  Filling pressures low normal, volume resuscitate PRN.   Afib RVR POD1, start amio bolus and infusion.  Aspirin 162 mg daily, change PTA simvastatin to atorvastatin (interaction with amiodarone)  Chest tubes: output 486, +intermittent air leak. TPW: capped     - Resp: Postoperative mechanical ventilation, resolved. Extubated POD1 AM (failed PST x 4 POD0 with apnea and low tidal volumes). IS, pulmonary toilet.    - Neuro: Neuro intact, pain controlled on current regimen    - Renal: No history of significant renal disease. Cr stable WNL. Trend BMP. Diuretic as above.  Recent Labs   Lab 03/15/25  0422 03/14/25  1753 03/14/25  1557   CR 0.69 0.50* 0.48*       - GI: -BM, -flatus, continue bowel regimen    - : Hill in place, continue today.    - Endo: Postop stress hyperglycemia, improved. Insulin infusion transitioned to sliding scale insulin.   Hemoglobin A1C   Date Value Ref Range Status   02/24/2025 5.4 <5.7 % Final     Comment:     Normal <5.7%   Prediabetes 5.7-6.4%    Diabetes 6.5% or higher     Note: Adopted from ADA consensus guidelines.        - FEN: Replace electrolytes as needed. Regular diet.     - ID: Postop leukocytosis, likely reactive from surgery. Afebrile. WBC WNL. Periop abx  prophylaxis complete. Trend CBC and fever curve.   Recent Labs   Lab 03/15/25  0422 03/14/25  1753 03/14/25  1557   WBC 9.8 10.1 14.3*       - Heme: Acute blood loss anemia and due to surgery. Hgb and PLT 9.6/219. Trend CBC, transfuse PRN.   Recent Labs   Lab 03/15/25  0422 03/14/25  1753 03/14/25  1557   HGB 9.6* 10.7* 9.8*  10.0*    184 207       - Proph: SCD, subcutaneous heparin, PPI    - Other:  Clinically Significant Risk Factors        # Hypokalemia: Lowest K = 3.2 mmol/L in last 2 days, will replace as needed  # Hyponatremia: Lowest Na = 132 mmol/L in last 2 days, will monitor as appropriate   # Hypocalcemia: Lowest Ca = 8.3 mg/dL in last 2 days, will monitor and replace as appropriate     # Hypoalbuminemia: Lowest albumin = 3.2 g/dL at 3/14/2025  5:53 PM, will monitor as appropriate  # Coagulation Defect: INR = 1.35 (Ref range: 0.85 - 1.15) and/or PTT = 35 Seconds (Ref range: 22 - 38 Seconds), will monitor for bleeding    # Hypertension: Noted on problem list                       - Dispo: ICU      Interval History  Extubated this AM. Denies pain. Up to chair. Working with therapies.       Medications  Current Facility-Administered Medications   Medication Dose Route Frequency Provider Last Rate Last Admin    acetaminophen (TYLENOL) tablet 975 mg  975 mg Oral Q8H Matilda Maher PA-C   975 mg at 03/15/25 0903    aspirin (ASA) chewable tablet 162 mg  162 mg Oral or NG Tube Daily Matilda Maher PA-C   162 mg at 03/15/25 0739    heparin ANTICOAGULANT injection 5,000 Units  5,000 Units Subcutaneous Q8H Matilda Maher PA-C        Lidocaine (LIDOCARE) 4 % Patch 1-2 patch  1-2 patch Transdermal Q24H Matilda Maher PA-C   1 patch at 03/14/25 2045    pantoprazole (PROTONIX) 2 mg/mL suspension 40 mg  40 mg Oral or NG Tube Daily Matilda Maher PA-C   40 mg at 03/15/25 0738    Or    pantoprazole (PROTONIX) EC tablet 40 mg  40 mg Oral Daily Matilda Maher PA-C        polyethylene glycol (MIRALAX)  Packet 17 g  17 g Oral Daily Matilda Maher PA-C   17 g at 03/15/25 0738    senna-docusate (SENOKOT-S/PERICOLACE) 8.6-50 MG per tablet 1 tablet  1 tablet Oral BID Matilda Maher PA-C   1 tablet at 03/14/25 2137    simvastatin (ZOCOR) tablet 20 mg  20 mg Oral QPM Roxana Daniel, NP        sodium chloride (PF) 0.9% PF flush 3 mL  3 mL Intracatheter Q8H MaherMatilda bryant PA-C   3 mL at 03/15/25 0838    vancomycin (VANCOCIN) 1,000 mg in 200 mL dextrose intermittent infusion  1,000 mg Intravenous Q12H Matilda Maher PA-C 200 mL/hr at 03/14/25 2248 1,000 mg at 03/14/25 2248     Current Facility-Administered Medications   Medication Dose Route Frequency Provider Last Rate Last Admin    alum & mag hydroxide-simethicone (MAALOX) suspension 30 mL  30 mL Oral Q4H PRN Matilda Maher PA-C        [START ON 3/17/2025] bisacodyl (DULCOLAX) suppository 10 mg  10 mg Rectal Daily PRN MaherMatilda bryant PA-C        calcium carbonate (TUMS) chewable tablet 500 mg  500 mg Oral 4x Daily PRN Matilda Maher PA-C        calcium gluconate 1 g in 50 mL in sodium chloride intermittent infusion  1 g Intravenous Q6H PRN MaherMatilda bryant PA-C        calcium gluconate 2 g in  mL intermittent infusion  2 g Intravenous Q6H PRN MaherMatilda PA-C        calcium gluconate 3 g in sodium chloride 0.9 % 100 mL intermittent infusion  3 g Intravenous Q6H PRN MaherMtailda PA-C        glucose gel 15-30 g  15-30 g Oral Q15 Min PRN Matilda Maher PA-C        Or    dextrose 50 % injection 25-50 mL  25-50 mL Intravenous Q15 Min PRN Matilda Maher PA-C        Or    glucagon injection 1 mg  1 mg Subcutaneous Q15 Min PRN MaherMatilda bryant PA-C        EPINEPHrine (ADRENALIN) 5 mg in  mL infusion  0.01-0.1 mcg/kg/min Intravenous Continuous PRN Matilda Maher PA-C        hydrALAZINE (APRESOLINE) injection 10 mg  10 mg Intravenous Q30 Min PRN Matilda Maher PA-C        HYDROmorphone (DILAUDID) injection 0.2 mg  0.2 mg Intravenous Q2H PRN  Matilda Maher PA-C   0.2 mg at 03/15/25 0509    Or    HYDROmorphone (DILAUDID) injection 0.4 mg  0.4 mg Intravenous Q2H PRN Matilda Maher PA-C        hydrOXYzine HCl (ATARAX) tablet 10 mg  10 mg Oral Q6H PRN Matilda Maher, PA-C        lactated ringers BOLUS 250 mL  250 mL Intravenous Q10 Min PRN Matilda Maher PA-C        lidocaine (LMX4) cream   Topical Q1H PRN Matilda Maher, PA-C        lidocaine 1 % 0.1-1 mL  0.1-1 mL Other Q1H PRN Matilda Maher PA-C        [START ON 3/16/2025] magnesium hydroxide (MILK OF MAGNESIA) suspension 30 mL  30 mL Oral Daily PRN Matilda Maher PA-C        propofol (DIPRIVAN) bolus from bag or syringe pump  10 mg Intravenous Q15 Min PRN Maura Reis MD        And    Medication Instruction   Does not apply Continuous PRN Maura Reis MD        methocarbamol (ROBAXIN) half-tab 250 mg  250 mg Oral Q6H PRN Matilda Maher PA-C   250 mg at 03/15/25 0933    naloxone (NARCAN) injection 0.2 mg  0.2 mg Intravenous Q2 Min PRN Alek Wilcox RPH        Or    naloxone (NARCAN) injection 0.4 mg  0.4 mg Intravenous Q2 Min PRN Alek Wilcox RPH        Or    naloxone (NARCAN) injection 0.2 mg  0.2 mg Intramuscular Q2 Min PRN Alek Wilcox RPH        Or    naloxone (NARCAN) injection 0.4 mg  0.4 mg Intramuscular Q2 Min PRN Alek Wilcox RPH        nitroGLYcerin 50 mg in D5W 250 mL (adult std) infusion CENTRAL   mcg/min Intravenous Continuous PRN Matilda Maher PA-C        norepinephrine (LEVOPHED) 4 mg in  mL infusion PREMIX  0.01-0.15 mcg/kg/min Intravenous Continuous PRN Matilda Maher PA-C        ondansetron (ZOFRAN ODT) ODT tab 4 mg  4 mg Oral Q6H PRN Matilda Maher PA-C        Or    ondansetron (ZOFRAN) injection 4 mg  4 mg Intravenous Q6H PRN Maher, Matilda AVALOS PA-C   4 mg at 03/14/25 1906    oxyCODONE (ROXICODONE) tablet 5 mg  5 mg Oral Q4H PRN Maher, Matilda AVALOS PA-C   5 mg at 03/15/25 0933    Or    oxyCODONE (ROXICODONE) tablet 10 mg  10 mg Oral Q4H  "PRN Matilda Maher PA-C        prochlorperazine (COMPAZINE) injection 5 mg  5 mg Intravenous Q6H PRN Matilda Maher PA-C        Or    prochlorperazine (COMPAZINE) tablet 5 mg  5 mg Oral Q6H PRN Matilda Maher PA-C        Reason beta blocker order not selected   Does not apply DOES NOT GO TO Matilda Hernandez PA-C        sodium chloride (PF) 0.9% PF flush 3 mL  3 mL Intracatheter q1 min prn Matilda Maher PA-C             Physical Exam  Vitals were reviewed  Blood pressure 101/54, pulse 79, temperature 100.2  F (37.9  C), resp. rate 14, height 1.6 m (5' 3\"), weight 61.7 kg (136 lb 0.4 oz), SpO2 98%.  Rhythm: NSR    Lungs: diminished bases    Cardiovascular: rrr, no m/r/g    Abdomen: soft, NT, ND, +BS    Extremeties: warm, trace LE edema    Incision: CDI    CT: serosang output 485 mL, no air leak    Weight:   Vitals:    03/14/25 0905 03/15/25 0630   Weight: 59.1 kg (130 lb 4.8 oz) 61.7 kg (136 lb 0.4 oz)         Data  Recent Labs   Lab 03/15/25  0422 03/15/25  0418 03/15/25  0022 03/14/25  2125 03/14/25  1753 03/14/25  1557   WBC 9.8  --   --   --  10.1 14.3*   HGB 9.6*  --   --   --  10.7* 9.8*  10.0*   MCV 89  --   --   --  88 88     --   --   --  184 207   INR  --   --   --   --  1.35* 1.53*     --   --   --  135 135  134*   POTASSIUM 3.8  --   --   --  3.5 3.4  3.7   CHLORIDE 102  --   --   --  103 102   CO2 20*  --   --   --  21* 23   BUN 12.3  --   --   --  9.1 9.1   CR 0.69  --   --   --  0.50* 0.48*   ANIONGAP 13  --   --   --  11 10   IGNACIO 8.3*  --   --   --  8.3* 9.1   * 113* 143*   < > 125* 129*  136*   ALBUMIN 3.6  --   --   --  3.2*  --    PROTTOTAL 5.6*  --   --   --  5.3*  --    BILITOTAL 0.6  --   --   --  0.6  --    ALKPHOS 38*  --   --   --  42  --    ALT 8  --   --   --  10  --    AST 37  --   --   --  34  --     < > = values in this interval not displayed.       Imaging:  Recent Results (from the past 24 hours)   XR Chest Port 1 View    Narrative    EXAM: XR CHEST " PORT 1 VIEW  LOCATION: [facility]  DATE: 3/14/2025    INDICATION: Post Op CVTS Surgery  COMPARISON: CT 2/24/2025      Impression    IMPRESSION: Endotracheal tube tip 3.5 cm above the dolores. Enteric tube tip and sidehole in the stomach. Right IJ central line tip at the level of the lower superior vena cava. Chest tubes project over the mediastinum and lung bases. No consolidation or   pleural effusion. Trace left apical pneumothorax. Normal heart size. Aortic valve replacement. Intact sternotomy wires.   XR Chest Port 1 View    Narrative    EXAM: XR CHEST PORT 1 VIEW  LOCATION: [facility]  DATE: 3/15/2025    INDICATION: Post Op CVTS Surgery  COMPARISON: 3/14/2025      Impression    IMPRESSION: Stable endotracheal tube, enteric tube, right IJ central venous catheter, bilateral chest tubes, and mediastinal drains. Median sternotomy wires. Aortic valve prosthesis. Unchanged trace left apical pneumothorax. New trace right apical   pneumothorax. No pleural effusions. Minimal retrocardiac atelectasis. Stable cardiomediastinal silhouette.         Patient seen and discussed with Dr. Denisha Maher PA-C  Cardiothoracic Surgery  Available for paging [phone] (personal pager or CV Surgery Rounding Pager)  Personal Pager: [phone]  CV Surgery Rounding Pager: [phone]  After hours please page surgeon on-call

## 2025-03-15 NOTE — PLAN OF CARE
Goal Outcome Evaluation:      Plan of Care Reviewed With: patient, family    Overall Patient Progress: improving    [facility]    ICU Nursing Progress Note     Major Event: CAB x1 (LIMA to LAD) w/ AVR.    NEURO: Following commands, PERRL, no c/o pain.     CV: A-paced @ 80bpm, underlying SB- 50's w/ hypotension. Pulses +2, MAP >65.   CI: 2.0 SVR: 1500 CO: 3.8 CVP: 6-8 (all VS when supine)  Chest tube total- 111ml in 1.5 hrs.     RESP: A/C FIO2 50% PEEP 5, LS- clear/dim.     GI: NPO, BS- hypoactive, no BM.    : 400 UOP    SKIN: Chest incision C/D/I, L foot scab.     BG: WNL    GTTS: Prop @ 25 - weaning    Lines/tubes/drains: 4x chest tube, OG @ 63cm, L PIV  Hill Catheter: Yes-surgery  Central Lines: R CVC w/introducer, R radial A-line    Lab/Imaging: WDL    Plan: Progressing on pathway.    Other: Family at bedside side, questions answered.       Problem: Delirium  Goal: Optimal Coping  Outcome: Progressing  Intervention: Optimize Psychosocial Adjustment to Delirium  Recent Flowsheet Documentation  Taken 3/14/2025 1800 by Flori Ricketts RN  Supportive Measures: positive reinforcement provided  Family/Support System Care: presence promoted  Goal: Improved Behavioral Control  Outcome: Progressing  Intervention: Prevent and Manage Agitation  Recent Flowsheet Documentation  Taken 3/14/2025 1800 by Flori Ricketts RN  Environment Familiarity/Consistency: daily routine followed  Intervention: Minimize Safety Risk  Recent Flowsheet Documentation  Taken 3/14/2025 1800 by Flori Ricketts RN  Enhanced Safety Measures:   pain management   room near unit station  Trust Relationship/Rapport:   care explained   reassurance provided  Goal: Improved Attention and Thought Clarity  Outcome: Progressing  Intervention: Maximize Cognitive Function  Recent Flowsheet Documentation  Taken 3/14/2025 1800 by Flori Ricketts RN  Sensory Stimulation Regulation: care  "clustered  Reorientation Measures:   clock in view   familiar social contact encouraged  Goal: Improved Sleep  Outcome: Progressing  Intervention: Promote Sleep  Recent Flowsheet Documentation  Taken 3/14/2025 1800 by Flori Ricketts RN  Sleep/Rest Enhancement:   comfort measures   family presence promoted     Problem: Adult Inpatient Plan of Care  Goal: Plan of Care Review  Description: The Plan of Care Review/Shift note should be completed every shift.  The Outcome Evaluation is a brief statement about your assessment that the patient is improving, declining, or no change.  This information will be displayed automatically on your shift  note.  Outcome: Progressing  Flowsheets (Taken 3/14/2025 1945)  Plan of Care Reviewed With:   patient   family  Overall Patient Progress: improving  Goal: Patient-Specific Goal (Individualized)  Description: You can add care plan individualizations to a care plan. Examples of Individualization might be:  \"Parent requests to be called daily at 9am for status\", \"I have a hard time hearing out of my right ear\", or \"Do not touch me to wake me up as it startles  me\".  Outcome: Progressing  Goal: Absence of Hospital-Acquired Illness or Injury  Outcome: Progressing  Intervention: Identify and Manage Fall Risk  Recent Flowsheet Documentation  Taken 3/14/2025 1800 by Flori Ricketts RN  Safety Promotion/Fall Prevention:   increased rounding and observation   increase visualization of patient   safety round/check completed  Intervention: Prevent Skin Injury  Recent Flowsheet Documentation  Taken 3/14/2025 1800 by Flori Ricketts RN  Body Position: heels elevated  Goal: Optimal Comfort and Wellbeing  Outcome: Progressing  Intervention: Provide Person-Centered Care  Recent Flowsheet Documentation  Taken 3/14/2025 1800 by Flori Ricketts RN  Trust Relationship/Rapport:   care explained   reassurance provided  Goal: Readiness for Transition of Care  Outcome: " Progressing     Problem: Cardiovascular Surgery  Goal: Improved Activity Tolerance  Outcome: Progressing  Goal: Optimal Coping with Heart Surgery  Outcome: Progressing  Intervention: Support Psychosocial Response to Surgery  Recent Flowsheet Documentation  Taken 3/14/2025 1800 by Flori Ricketts RN  Supportive Measures: positive reinforcement provided  Family/Support System Care: presence promoted  Goal: Absence of Bleeding  Outcome: Progressing  Goal: Effective Bowel Elimination  Outcome: Progressing  Goal: Effective Cardiac Function  Outcome: Progressing  Goal: Optimal Cerebral Tissue Perfusion  Outcome: Progressing  Intervention: Protect and Optimize Cerebral Perfusion  Recent Flowsheet Documentation  Taken 3/14/2025 1800 by Flori Ricketts RN  Sensory Stimulation Regulation: care clustered  Head of Bed (HOB) Positioning: HOB at 30-45 degrees  Goal: Fluid and Electrolyte Balance  Outcome: Progressing  Goal: Blood Glucose Level Within Targeted Range  Outcome: Progressing  Goal: Absence of Infection Signs and Symptoms  Outcome: Progressing  Goal: Anesthesia/Sedation Recovery  Outcome: Progressing  Intervention: Optimize Anesthesia Recovery  Recent Flowsheet Documentation  Taken 3/14/2025 1800 by Flori Ricketts RN  Safety Promotion/Fall Prevention:   increased rounding and observation   increase visualization of patient   safety round/check completed  Reorientation Measures:   clock in view   familiar social contact encouraged  Goal: Acceptable Pain Control  Outcome: Progressing  Goal: Nausea and Vomiting Relief  Outcome: Progressing  Goal: Effective Urinary Elimination  Outcome: Progressing  Goal: Effective Oxygenation and Ventilation  Outcome: Progressing  Intervention: Promote Airway Secretion Clearance  Recent Flowsheet Documentation  Taken 3/14/2025 1800 by Flori Ricketts RN  Cough And Deep Breathing: unable to perform

## 2025-03-15 NOTE — PROGRESS NOTES
03/15/25 1100   Appointment Info   Signing Clinician's Name / Credentials (PT) Lois Goldberg PT, DPT   Living Environment   People in Home spouse   Current Living Arrangements house   Home Accessibility stairs to enter home;stairs within home   Number of Stairs, Main Entrance other (see comments)  (Multi level home, once in can stay on the main level. Plans to stay in a guest bedroom at first, will then venture to the upstairs when feelingmore confident.)   Stair Railings, Main Entrance railings on both sides of stairs   Stair Railings, Within Home, Primary railings safe and in good condition   Transportation Anticipated family or friend will provide;car, drives self   Living Environment Comments Cares for her grand children a few days per week. States her  is unable to physically assist her if needed at home; therefore, plans to stay with sister until feeling confident to return home.   Self-Care   Usual Activity Tolerance excellent   Current Activity Tolerance moderate   Regular Exercise Yes   Activity/Exercise Type walking   Exercise Amount/Frequency daily;30 mins   Equipment Currently Used at Home none   Fall history within last six months no   Activity/Exercise/Self-Care Comment Walks on a TM at home ~ 2.4-2.5 mph for 30 min a day.   General Information   Onset of Illness/Injury or Date of Surgery 03/14/25   Referring Physician Matilda Maher, PAGiselaC   Patient/Family Therapy Goals Statement (PT) Plan's to stay with her sister at discharge, then return home with her .   Pertinent History of Current Problem (include personal factors and/or comorbidities that impact the POC) 73 yo female POD #1 CABG x1 and bioprosthetic AVR. PMH includes  IBS, chronically occluded LAD and moderate to severe Aortic Stenosis.   Existing Precautions/Restrictions cardiac;fall;oxygen therapy device and L/min  (HFNC 35% 35L)   General Observations Family at bedside   Cognition   Affect/Mental Status (Cognition) WNL  "  Orientation Status (Cognition) oriented x 4   Follows Commands (Cognition) WNL   Cognitive Status Comments No deficits noted with PT basic assessment, not formally assessed.   Pain Assessment   Patient Currently in Pain   (\"I'm doing okay now, I had some pain meds.\" does not rate.)   Integumentary/Edema   Integumentary/Edema Comments Sternal incision, L radial art line; CTs 4:2; verde; temp pacer and all cardiac lines still present.   Posture    Posture Forward head position;Protracted shoulders   Range of Motion (ROM)   ROM Comment B LEs WFL, B UEs WFL within sternal prec range.   Strength (Manual Muscle Testing)   Strength Comments Demonstrates antigravity strength with mobility, impaired functional activity tolerance from baseline.   Bed Mobility   Comment, (Bed Mobility) Not assessed, per RN pt did well with A x 1.   Transfers   Comment, (Transfers) Sit>stand CGA up from recliner, limited by precautions, breath hold and post op wealkness.   Gait/Stairs (Locomotion)   Comment, (Gait/Stairs) Bedside gait with CGA/Min A for balance, use of pillow for sternal brace. Limtid by fatigue and impaired act hay from baseline.   Balance   Balance Comments Standing toe taps, pt with need for Min A for balance, pt no acute LOB, but unsteady. Limited in assesment/testing secondary to very short lines.   Sensory Examination   Sensory Perception Comments Denies nt   Clinical Impression   Criteria for Skilled Therapeutic Intervention Yes, treatment indicated   PT Diagnosis (PT) Impaired functional activity tolerance   Influenced by the following impairments Post op weakness, fatigue, sternal precautions, impaired balance   Functional limitations due to impairments Decreased functional independence iwth moblity and activity/exercise progression.   Clinical Presentation (PT Evaluation Complexity) stable   Clinical Presentation Rationale see MR   Clinical Decision Making (Complexity) low complexity   Planned Therapy Interventions " (PT) balance training;bed mobility training;gait training;home exercise program;patient/family education;postural re-education;ROM (range of motion);stair training;strengthening;stretching;transfer training;progressive activity/exercise;risk factor education;home program guidelines   Risk & Benefits of therapy have been explained evaluation/treatment results reviewed;care plan/treatment goals reviewed;risks/benefits reviewed;current/potential barriers reviewed;participants voiced agreement with care plan;participants included;patient;sibling   PT Total Evaluation Time   PT Eval, Low Complexity Minutes (23098) 10   Physical Therapy Goals   PT Frequency 2x/day   PT Predicted Duration/Target Date for Goal Attainment 03/21/25   PT Goals Bed Mobility;Transfers;Cardiac Phase 1   PT: Bed Mobility Supervision/stand-by assist;Supine to/from sit;Rolling;Bridging;Within precautions  (sternal)   PT: Transfers Supervision/stand-by assist;Sit to/from stand;Bed to/from chair;Assistive device;Within precautions  (sternal)   PT: Understanding of cardiac education to maximize quality of life, condition management, and health outcomes Patient;Caregiver;Verbalize;Demonstrate   PT: Perform aerobic activity with stable cardiovascular response continuous;15 minutes;treadmill;ambulation   PT: Functional/aerobic ambulation tolerance with stable cardiovascular response in order to return to home and community environment Supervision/SBA;Greater than 300 feet   PT: Navigation of stairs simulating home set up with stable cardiovascular response in order to return to home and community environment Supervision/SBA;10 stairs;Rail on both sides   Interventions   Interventions Quick Adds Therapeutic Activity;Therapeutic Procedure;Cardiac Rehab   Therapeutic Procedure/Exercise   Ther. Procedure: strength, endurance, ROM, flexibillity Minutes (53086) 25   Treatment Detail/Skilled Intervention Edu pt on aerobic exercise and benefits; reviewed goals  for ambulation daily during LOS. Edu on OP CR phase II, family interested in learning and educated them upon entry. Edu pt on conversational pace with activity, signs and sx of act intolerance and completed VS pre, during and post activity. Pt completed standing marching, side stepping L and R, forward and back stepping and toe taps all with CGA/Min A for balance, cues for exhale-x 30 seconds each, seated rest x 4 min and a second set completed. Pt tolrated well, admits to feeling 'alittle tired.' Pt is very active at baseline and eager to return to it.   Therapeutic Activity   Therapeutic Activities: dynamic activities to improve functional performance Minutes (76128) 10   Treatment Detail/Skilled Intervention Reveiwed sternal precautions, useo f the pillow and implications for mobility. Edu pt on seated scoot, lateral weight shift and anterior weight shift to standing. STand>sit cues for ensuring bottom is back in the chair for improved lumbar support. Single flat pillow placed for comfort, back adjusted and LEs elevated, all lines accounted for, VSS and pt set up with yogurt for breakfast/lunch. All needs in reach, RN updated at PT exit.   Cardiac Education   Education Provided OMNI Scale;Home exercise program;Outpatient Cardiac Rehab;Precautions;Resuming home activities;Signs and symptoms   Education Packet Given to Patient No   Cardiac Rehab Phase II Plan   Phase II Order Received Yes   Phase II Appointment Status Not scheduled  (Friday surgery)   PT Discharge Planning   PT Plan Bedside gait as able; pt only on 30L 30% today-could try face mask for hallway ambulation if less lines otherwise.   PT Discharge Recommendation (DC Rec) home with assist;home with outpatient cardiac rehab   PT Rationale for DC Rec Anticipate pt to be SBA or less with all functional mobility by the time of discharge. Pt will benefit from OP CR phase II for on going skilled monitoring and progression of exercise/activity tolerance as  well as continued education for optimal heart health. Pt plan's to stay with her sister for 2 weeks local. Sister states she is okay driving the pt to/from CR even if it is at [place] as long as pt is okay to be in the car [id number] min [address]. Otherwise pt would need to initate OP CR after she is home. Lives with her  in a split level home, but her  is unable to assist; therefore, staying with family for 2 weeks.   PT Brief overview of current status Goals of therapy will be to address safe mobility and make recs for d/c to next level of care. Pt and RN will continue to follow all falls risk precautions as documented by RN staff while hospitalized.  (A x 1 sit<>stand and bedside gait; A x 2 for lines and pt with bed moblity.)   PT Total Distance Amb During Session (feet) 10   Physical Therapy Time and Intention   Timed Code Treatment Minutes 35   Total Session Time (sum of timed and untimed services) 45

## 2025-03-15 NOTE — PROGRESS NOTES
Pt extubated to HFNC 30L 30% per MD order.  LS are diminished t/o and no stridor noted.  Will continue to follow  Shon Covarrubias, RT  3/15/2025

## 2025-03-15 NOTE — PROGRESS NOTES
CV  Pressors (which pressors and any increase/decrease in pressor needs):  Phenyl @ 0.6mcg/kg/min.   HR range: A Paced @ 80bpm.  Chest tube output: Shift Total- Mediastinal 200mL, Pleural 65mL.    Neuro  Orientation: Nods approp, follows commands (Remains Intubated d/t failed SBT x4 w/ low volumes/apnea)  Delirium present?(y/n): No  Sleep: Minimal  Pain: PRN Oxycodone & Dilaudid given, pain level acceptable per pt.    GI/  BM? (y/n): No  Urine output: ~20-30mL/hr over last 4hrs of shift.      Lines:  R)internal jugular TLC w/ Introducer, R) Radial Art line.

## 2025-03-15 NOTE — PROGRESS NOTES
ICU Progress Note  Date of Service: 03/14/25    Interval History:  - Extubated to HFNC  - Remains on kirk    Plan for Today:  - Wean vasopressors and HFNC    Assessment and Plan:  Aaron Walden is a 74 year old female with a history of IBS, chronically occluded LAD on recent angiogram after positive stress test, and moderate to severe aortic stenosis who is admitted s/p CABG x1 and bioprosthetic AVR with Dr. Medellin on 3/14/25. The patient was admitted to the ICU post-operatively.    Neuro:  Post-operative pain management  - Multimodal pain control    CV:  LAD CAD s/p CABG x1  Moderate to severe aortic stenosis s/p bioprosthetic AVR  Dyslipidemia  - MAP goal > 65, remains on kirk  - Chest tube management per CVTS  - Heparin prophylaxis, ASA ordered  - AV wires, paced at 80 bpm    Pulm:  Post-operative respiratory insufficiency  Bilateral trace apical pneumothoraces  - Extubated to HFNC  - Daily CXR  - Chest tubes to suction, air leak noted, continue  - Maintain O2 >92%    GI:  GERD  IBS  - Advance diet as tolerated  - PPI prophylaxis    Renal:  No acute issues  - Monitor UOP, Cr, I/O    ID:  Marcelle-operative prophylaxis  - Cefazolin and vancomycin completed    Heme:  Acute blood loss anemia  - CBC daily  - Transfuse hemoglobin <7    Endo:  Risk for stress hyperglycemia  - ISS  - Goal glucose <180    PPx:  1. DVT: heparin   2. VAP: HOB 30 degrees, chlorhexidine rinse  3. Stress Ulcer: PPI  4. Restraints: not indicated  5. Wound care - per unit routine   6. Feeding - advance as tolerated  7. Family updated at bedside    Clinically Significant Risk Factors        # Hypokalemia: Lowest K = 3.2 mmol/L in last 2 days, will replace as needed  # Hyponatremia: Lowest Na = 132 mmol/L in last 2 days, will monitor as appropriate   # Hypocalcemia: Lowest Ca = 8.3 mg/dL in last 2 days, will monitor and replace as appropriate     # Hypoalbuminemia: Lowest albumin = 3.2 g/dL at 3/14/2025  5:53 PM, will monitor as appropriate    #  "Coagulation Defect: INR = 1.35 (Ref range: 0.85 - 1.15) and/or PTT = 35 Seconds (Ref range: 22 - 38 Seconds), will monitor for bleeding    # Hypertension: Noted on problem list                          Dispo: ICU    HPI: Aaron Walden is a 74 year old female with a history of IBS, chronically occluded LAD on recent angiogram after positive stress test, and moderate to severe aortic stenosis who is admitted s/p CABG x1 and bioprosthetic AVR with Dr. Medellin on 3/14/25. The patient was admitted to the ICU post-operatively.    Intra-operatively, the patient received 2400mL crystalloid and 229 mL cell saver. EBL was 350mL and UOP 1000mL. She has both A and V wires with a rate of 80 and is currently being paced. She was shocked twice coming off pump. Currently, she is off all vasopressors. 4 chest tubes in place, two pleural and two mediastinal.    Blood Pressure 101/54 (BP Location: Left arm)   Pulse 79   Temperature 100  F (37.8  C)   Respiration 29   Height 1.6 m (5' 3\")   Weight 61.7 kg (136 lb 0.4 oz)   Oxygen Saturation 99%   Body Mass Index 24.10 kg/m        Intake/Output Summary (Last 24 hours) at 3/15/2025 1114  Last data filed at 3/15/2025 1000  Gross per 24 hour   Intake 4600.74 ml   Output 2501 ml   Net 2099.74 ml     Physical Exam:  Constitutional: healthy and no distress, alert  Head: Normocephalic. No masses, lesions, tenderness or abnormalities  Neck: Neck supple. No adenopathy.   ENT: normal, no neck nodes or sinus tenderness  Cardiovascular: RRR, no murmur. No JVD.  Respiratory: Percussion normal. Good diaphragmatic excursion. Lungs clear bilaterally.  Gastrointestinal: Abdomen soft, non-tender. BS normal. No masses, organomegaly  : deferred  Musculoskeletal: extremities normal - no gross deformities noted and normal muscle tone  Skin: sternotomy with clean, dry, intact dressing and chest tubes taped inferiorly  Neurologic: Aox4, moving all extremities, following commands  Psychiatric: Pleasant, " appropriate    CBC RESULTS:   Recent Labs   Lab Test 03/15/25  0422   WBC 9.8   RBC 3.07*   HGB 9.6*   HCT 27.4*   MCV 89   MCH 31.3   MCHC 35.0   RDW 13.2        Last Comprehensive Metabolic Panel:  Lab Results   Component Value Date     03/15/2025    POTASSIUM 3.8 03/15/2025    CHLORIDE 102 03/15/2025    CO2 20 (L) 03/15/2025    ANIONGAP 13 03/15/2025     (H) 03/15/2025    BUN 12.3 03/15/2025    CR 0.69 03/15/2025    GFRESTIMATED 90 03/15/2025    IGNACIO 8.3 (L) 03/15/2025     Last Arterial Blood Gas:  pH Arterial   Date Value Ref Range Status   03/15/2025 7.47 (H) 7.35 - 7.45 Final     pCO2 Arterial   Date Value Ref Range Status   03/15/2025 30 (L) 35 - 45 mm Hg Final     pO2 Arterial   Date Value Ref Range Status   03/15/2025 142 (H) 80 - 105 mm Hg Final     Bicarbonate Arterial   Date Value Ref Range Status   03/15/2025 21 21 - 28 mmol/L Final     O2 Sat, Arterial   Date Value Ref Range Status   03/15/2025 99.8 (H) 95.0 - 96.0 % Final     Base Excess/Deficit Arterial   Date Value Ref Range Status   03/15/2025 -1.7 -3.0 - 3.0 mmol/L Final     EXAM: XR CHEST PORT 1 VIEW  LOCATION: [facility]  DATE: 3/15/2025     INDICATION: Post Op CVTS Surgery  COMPARISON: 3/14/2025                                                                   IMPRESSION: Stable endotracheal tube, enteric tube, right IJ central venous catheter, bilateral chest tubes, and mediastinal drains. Median sternotomy wires. Aortic valve prosthesis. Unchanged trace left apical pneumothorax. New trace right apical   pneumothorax. No pleural effusions. Minimal retrocardiac atelectasis. Stable cardiomediastinal silhouette.    Discussed with Dr. Cody.    Maura Reis MD  Surgical Critical Care Fellow    Physician Attestation   I spent a total of 30 minutes with the patient, personally observing as the resident/fellow performed evaluation and progress note.     Key findings: 74 year old female with a history  of IBS, chronically occluded LAD and moderate to severe aortic stenosis ,s/p CABG x1 and bioprosthetic AVR   Got extubated today and remains on pressors     Kahlil Cody MD  Date of Service (when I saw the patient): 03/15/25

## 2025-03-15 NOTE — PROGRESS NOTES
CV  Pressors (which pressors and any increase/decrease in pressor needs): Off at 1430  HR range: pacer capped, HR 70s SR for most of shift  AF -170  Chest tube output: thin pink clear, intermittent air leak in mediastinal CT x 2     Extubated 0820 3/15 to 30/30 hi stevie NC, IS 1000, weaned to 2LPM NC    Neuro  Orientation: A/O CHOW strongly  Delirium present?(y/n): No  Sleep: resting between cares  Pain: pain controlled with 5 mg po oxycodone/ robaxin, participating in activity and cardiac rehab    GI/  BM? (y/n): No  Urine output: 20-30/ hour  providers notified thruout day      Lines: Right introducer with CVC in place

## 2025-03-16 ENCOUNTER — APPOINTMENT (OUTPATIENT)
Dept: PHYSICAL THERAPY | Facility: CLINIC | Age: 75
DRG: 219 | End: 2025-03-16
Attending: STUDENT IN AN ORGANIZED HEALTH CARE EDUCATION/TRAINING PROGRAM
Payer: COMMERCIAL

## 2025-03-16 ENCOUNTER — APPOINTMENT (OUTPATIENT)
Dept: GENERAL RADIOLOGY | Facility: CLINIC | Age: 75
DRG: 219 | End: 2025-03-16
Attending: STUDENT IN AN ORGANIZED HEALTH CARE EDUCATION/TRAINING PROGRAM
Payer: COMMERCIAL

## 2025-03-16 LAB
ANION GAP SERPL CALCULATED.3IONS-SCNC: 8 MMOL/L (ref 7–15)
ATRIAL RATE - MUSE: 105 BPM
ATRIAL RATE - MUSE: 59 BPM
BUN SERPL-MCNC: 15.2 MG/DL (ref 8–23)
CA-I BLD-MCNC: 4.7 MG/DL (ref 4.4–5.2)
CALCIUM SERPL-MCNC: 8.6 MG/DL (ref 8.8–10.4)
CHLORIDE SERPL-SCNC: 99 MMOL/L (ref 98–107)
CREAT SERPL-MCNC: 0.6 MG/DL (ref 0.51–0.95)
DIASTOLIC BLOOD PRESSURE - MUSE: NORMAL MMHG
DIASTOLIC BLOOD PRESSURE - MUSE: NORMAL MMHG
EGFRCR SERPLBLD CKD-EPI 2021: >90 ML/MIN/1.73M2
ERYTHROCYTE [DISTWIDTH] IN BLOOD BY AUTOMATED COUNT: 13.7 % (ref 10–15)
GLUCOSE BLDC GLUCOMTR-MCNC: 104 MG/DL (ref 70–99)
GLUCOSE BLDC GLUCOMTR-MCNC: 106 MG/DL (ref 70–99)
GLUCOSE BLDC GLUCOMTR-MCNC: 92 MG/DL (ref 70–99)
GLUCOSE SERPL-MCNC: 128 MG/DL (ref 70–99)
HCO3 SERPL-SCNC: 24 MMOL/L (ref 22–29)
HCT VFR BLD AUTO: 26.1 % (ref 35–47)
HGB BLD-MCNC: 8.8 G/DL (ref 11.7–15.7)
INTERPRETATION ECG - MUSE: NORMAL
INTERPRETATION ECG - MUSE: NORMAL
MAGNESIUM SERPL-MCNC: 1.9 MG/DL (ref 1.7–2.3)
MCH RBC QN AUTO: 30.6 PG (ref 26.5–33)
MCHC RBC AUTO-ENTMCNC: 33.7 G/DL (ref 31.5–36.5)
MCV RBC AUTO: 91 FL (ref 78–100)
P AXIS - MUSE: 62 DEGREES
P AXIS - MUSE: NORMAL DEGREES
PHOSPHATE SERPL-MCNC: 2.5 MG/DL (ref 2.5–4.5)
PLATELET # BLD AUTO: 159 10E3/UL (ref 150–450)
POTASSIUM SERPL-SCNC: 3.9 MMOL/L (ref 3.4–5.3)
PR INTERVAL - MUSE: 142 MS
PR INTERVAL - MUSE: NORMAL MS
QRS DURATION - MUSE: 114 MS
QRS DURATION - MUSE: 140 MS
QT - MUSE: 416 MS
QT - MUSE: 484 MS
QTC - MUSE: 477 MS
QTC - MUSE: 479 MS
R AXIS - MUSE: 69 DEGREES
R AXIS - MUSE: 89 DEGREES
RBC # BLD AUTO: 2.88 10E6/UL (ref 3.8–5.2)
SODIUM SERPL-SCNC: 131 MMOL/L (ref 135–145)
SYSTOLIC BLOOD PRESSURE - MUSE: NORMAL MMHG
SYSTOLIC BLOOD PRESSURE - MUSE: NORMAL MMHG
T AXIS - MUSE: 19 DEGREES
T AXIS - MUSE: 57 DEGREES
VENTRICULAR RATE- MUSE: 59 BPM
VENTRICULAR RATE- MUSE: 79 BPM
WBC # BLD AUTO: 11.4 10E3/UL (ref 4–11)

## 2025-03-16 PROCEDURE — 80048 BASIC METABOLIC PNL TOTAL CA: CPT | Performed by: PHYSICIAN ASSISTANT

## 2025-03-16 PROCEDURE — 250N000011 HC RX IP 250 OP 636: Performed by: PHYSICIAN ASSISTANT

## 2025-03-16 PROCEDURE — 250N000013 HC RX MED GY IP 250 OP 250 PS 637: Performed by: PHYSICIAN ASSISTANT

## 2025-03-16 PROCEDURE — 258N000003 HC RX IP 258 OP 636: Performed by: INTERNAL MEDICINE

## 2025-03-16 PROCEDURE — 99233 SBSQ HOSP IP/OBS HIGH 50: CPT | Mod: 24 | Performed by: ANESTHESIOLOGY

## 2025-03-16 PROCEDURE — 250N000013 HC RX MED GY IP 250 OP 250 PS 637: Performed by: SURGERY

## 2025-03-16 PROCEDURE — 71045 X-RAY EXAM CHEST 1 VIEW: CPT

## 2025-03-16 PROCEDURE — 250N000011 HC RX IP 250 OP 636: Performed by: SURGERY

## 2025-03-16 PROCEDURE — 85014 HEMATOCRIT: CPT | Performed by: PHYSICIAN ASSISTANT

## 2025-03-16 PROCEDURE — 97530 THERAPEUTIC ACTIVITIES: CPT | Mod: GP | Performed by: PHYSICAL THERAPIST

## 2025-03-16 PROCEDURE — 250N000011 HC RX IP 250 OP 636: Performed by: ANESTHESIOLOGY

## 2025-03-16 PROCEDURE — 258N000003 HC RX IP 258 OP 636: Performed by: ANESTHESIOLOGY

## 2025-03-16 PROCEDURE — 84100 ASSAY OF PHOSPHORUS: CPT | Performed by: STUDENT IN AN ORGANIZED HEALTH CARE EDUCATION/TRAINING PROGRAM

## 2025-03-16 PROCEDURE — 83735 ASSAY OF MAGNESIUM: CPT | Performed by: STUDENT IN AN ORGANIZED HEALTH CARE EDUCATION/TRAINING PROGRAM

## 2025-03-16 PROCEDURE — 97110 THERAPEUTIC EXERCISES: CPT | Mod: GP | Performed by: PHYSICAL THERAPIST

## 2025-03-16 PROCEDURE — 258N000003 HC RX IP 258 OP 636: Performed by: PHYSICIAN ASSISTANT

## 2025-03-16 PROCEDURE — 200N000001 HC R&B ICU

## 2025-03-16 PROCEDURE — 82330 ASSAY OF CALCIUM: CPT | Performed by: PHYSICIAN ASSISTANT

## 2025-03-16 PROCEDURE — 250N000009 HC RX 250: Performed by: PHYSICIAN ASSISTANT

## 2025-03-16 RX ORDER — MAGNESIUM SULFATE HEPTAHYDRATE 40 MG/ML
2 INJECTION, SOLUTION INTRAVENOUS ONCE
Status: COMPLETED | OUTPATIENT
Start: 2025-03-16 | End: 2025-03-16

## 2025-03-16 RX ORDER — FUROSEMIDE 20 MG/1
20 TABLET ORAL ONCE
Status: DISCONTINUED | OUTPATIENT
Start: 2025-03-16 | End: 2025-03-16

## 2025-03-16 RX ORDER — FUROSEMIDE 10 MG/ML
20 INJECTION INTRAMUSCULAR; INTRAVENOUS ONCE
Status: COMPLETED | OUTPATIENT
Start: 2025-03-16 | End: 2025-03-16

## 2025-03-16 RX ORDER — AMIODARONE HYDROCHLORIDE 200 MG/1
400 TABLET ORAL 2 TIMES DAILY
Status: DISCONTINUED | OUTPATIENT
Start: 2025-03-16 | End: 2025-03-17

## 2025-03-16 RX ORDER — METOPROLOL TARTRATE 1 MG/ML
5 INJECTION, SOLUTION INTRAVENOUS EVERY 5 MIN PRN
Status: DISCONTINUED | OUTPATIENT
Start: 2025-03-16 | End: 2025-03-19

## 2025-03-16 RX ADMIN — METHOCARBAMOL 250 MG: 500 TABLET ORAL at 17:40

## 2025-03-16 RX ADMIN — OXYCODONE HYDROCHLORIDE 10 MG: 5 TABLET ORAL at 13:23

## 2025-03-16 RX ADMIN — HEPARIN SODIUM 5000 UNITS: 5000 INJECTION, SOLUTION INTRAVENOUS; SUBCUTANEOUS at 13:23

## 2025-03-16 RX ADMIN — POTASSIUM & SODIUM PHOSPHATES POWDER PACK 280-160-250 MG 1 PACKET: 280-160-250 PACK at 06:36

## 2025-03-16 RX ADMIN — METHOCARBAMOL 250 MG: 500 TABLET ORAL at 21:12

## 2025-03-16 RX ADMIN — METHOCARBAMOL 250 MG: 500 TABLET ORAL at 13:23

## 2025-03-16 RX ADMIN — OXYCODONE HYDROCHLORIDE 10 MG: 5 TABLET ORAL at 07:38

## 2025-03-16 RX ADMIN — SODIUM CHLORIDE, SODIUM LACTATE, POTASSIUM CHLORIDE, AND CALCIUM CHLORIDE 500 ML: .6; .31; .03; .02 INJECTION, SOLUTION INTRAVENOUS at 08:02

## 2025-03-16 RX ADMIN — POTASSIUM & SODIUM PHOSPHATES POWDER PACK 280-160-250 MG 1 PACKET: 280-160-250 PACK at 09:26

## 2025-03-16 RX ADMIN — OXYCODONE HYDROCHLORIDE 5 MG: 5 TABLET ORAL at 17:40

## 2025-03-16 RX ADMIN — PHENYLEPHRINE HYDROCHLORIDE 1 MCG/KG/MIN: 10 INJECTION INTRAVENOUS at 10:50

## 2025-03-16 RX ADMIN — METOPROLOL TARTRATE 5 MG: 5 INJECTION INTRAVENOUS at 09:26

## 2025-03-16 RX ADMIN — ONDANSETRON 4 MG: 2 INJECTION, SOLUTION INTRAMUSCULAR; INTRAVENOUS at 06:24

## 2025-03-16 RX ADMIN — PANTOPRAZOLE SODIUM 40 MG: 40 TABLET, DELAYED RELEASE ORAL at 07:40

## 2025-03-16 RX ADMIN — METHOCARBAMOL 250 MG: 500 TABLET ORAL at 08:03

## 2025-03-16 RX ADMIN — ACETAMINOPHEN 975 MG: 325 TABLET, FILM COATED ORAL at 17:39

## 2025-03-16 RX ADMIN — ACETAMINOPHEN 975 MG: 325 TABLET, FILM COATED ORAL at 01:00

## 2025-03-16 RX ADMIN — HYDROMORPHONE HYDROCHLORIDE 0.2 MG: 0.2 INJECTION, SOLUTION INTRAMUSCULAR; INTRAVENOUS; SUBCUTANEOUS at 06:22

## 2025-03-16 RX ADMIN — FUROSEMIDE 20 MG: 10 INJECTION, SOLUTION INTRAMUSCULAR; INTRAVENOUS at 17:39

## 2025-03-16 RX ADMIN — SENNOSIDES AND DOCUSATE SODIUM 1 TABLET: 50; 8.6 TABLET ORAL at 20:16

## 2025-03-16 RX ADMIN — HEPARIN SODIUM 5000 UNITS: 5000 INJECTION, SOLUTION INTRAVENOUS; SUBCUTANEOUS at 21:22

## 2025-03-16 RX ADMIN — AMIODARONE HYDROCHLORIDE 0.5 MG/MIN: 1.8 INJECTION, SOLUTION INTRAVENOUS at 10:50

## 2025-03-16 RX ADMIN — OXYCODONE HYDROCHLORIDE 5 MG: 5 TABLET ORAL at 03:44

## 2025-03-16 RX ADMIN — ASPIRIN 162 MG: 81 TABLET, CHEWABLE ORAL at 07:39

## 2025-03-16 RX ADMIN — LIDOCAINE 1 PATCH: 4 PATCH TOPICAL at 20:21

## 2025-03-16 RX ADMIN — AMIODARONE HYDROCHLORIDE 0.5 MG/MIN: 1.8 INJECTION, SOLUTION INTRAVENOUS at 00:56

## 2025-03-16 RX ADMIN — POLYETHYLENE GLYCOL 3350 17 G: 17 POWDER, FOR SOLUTION ORAL at 07:33

## 2025-03-16 RX ADMIN — OXYCODONE HYDROCHLORIDE 5 MG: 5 TABLET ORAL at 21:42

## 2025-03-16 RX ADMIN — HYDROMORPHONE HYDROCHLORIDE 0.2 MG: 0.2 INJECTION, SOLUTION INTRAMUSCULAR; INTRAVENOUS; SUBCUTANEOUS at 00:05

## 2025-03-16 RX ADMIN — HEPARIN SODIUM 5000 UNITS: 5000 INJECTION, SOLUTION INTRAVENOUS; SUBCUTANEOUS at 06:22

## 2025-03-16 RX ADMIN — AMIODARONE HYDROCHLORIDE 400 MG: 200 TABLET ORAL at 21:13

## 2025-03-16 RX ADMIN — SODIUM CHLORIDE 500 ML: 0.9 INJECTION, SOLUTION INTRAVENOUS at 22:32

## 2025-03-16 RX ADMIN — ATORVASTATIN CALCIUM 40 MG: 40 TABLET, FILM COATED ORAL at 20:15

## 2025-03-16 RX ADMIN — ACETAMINOPHEN 975 MG: 325 TABLET, FILM COATED ORAL at 08:05

## 2025-03-16 RX ADMIN — PROCHLORPERAZINE EDISYLATE 5 MG: 5 INJECTION INTRAMUSCULAR; INTRAVENOUS at 07:33

## 2025-03-16 RX ADMIN — MAGNESIUM SULFATE HEPTAHYDRATE 2 G: 40 INJECTION, SOLUTION INTRAVENOUS at 06:27

## 2025-03-16 ASSESSMENT — ACTIVITIES OF DAILY LIVING (ADL)
ADLS_ACUITY_SCORE: 46
ADLS_ACUITY_SCORE: 50
ADLS_ACUITY_SCORE: 46

## 2025-03-16 NOTE — PROVIDER NOTIFICATION
VSS and advanced hemodynamics reviewed with ICU MD Maura AVALOS and Matilda ARNDT. Pt continues to be nauseated mid back pain poorly controlled, AF with -160, BP better with higher HR, HR 70-90 SR with fluid bolus over noc, on maintenance amio gtt 0.5mg/hr, phenyl gtt restarted for BP support, UOP 10-20/hr, CT unremarkable. See flowsheets for details.  RN aggressively treating pain and nausea, LR bolus per order.

## 2025-03-16 NOTE — PROGRESS NOTES
CV  Pressors (which pressors and any increase/decrease in pressor needs): None, Phenyl Paused @ 0435.  HR range: 60s-120s, Afib - SR - Afib.  Chest tube output: 10-50mL q2h. Serous/Serosanguinous.    Neuro  Orientation: A/O x4.  Delirium present?(y/n): No  Sleep: 2-3hrs  Pain: 4-7 /10, Incisional chest pain. Dilaudid x3, Oxycodone x2, Robaxin, Tylenol given.    GI/  BM? (y/n): No  Urine output: 20-30mL/hr      Lines:  R) internal jugular Introducer w/ TLC. R) Radial Art line.

## 2025-03-16 NOTE — PROVIDER NOTIFICATION
Pt in AF with [id number], 12 lead confirmed, no CP, SOB, BP stable, higher than previous, UOP 30/hour  Dr Wen ICU and Matilda Maher CVSPA updated new orders obtained

## 2025-03-16 NOTE — PROGRESS NOTES
ICU Progress Note  Date of Service: 03/14/25    Interval History:  - Remains on kirk  - Afib with RVR    Plan for Today:  - Wean vasopressors as able    Assessment and Plan:  Aaron Walden is a 74 year old female with a history of IBS, chronically occluded LAD on recent angiogram after positive stress test, and moderate to severe aortic stenosis who is admitted s/p CABG x1 and bioprosthetic AVR with Dr. Medellin on 3/14/25. The patient was admitted to the ICU post-operatively.    Neuro:  Post-operative pain management  - Multimodal pain control    CV:  LAD CAD s/p CABG x1  Moderate to severe aortic stenosis s/p bioprosthetic AVR  Atrial fibrillation with RVR  Dyslipidemia  - MAP goal > 65, remains on kirk  - Chest tube management per CVTS  - Heparin prophylaxis, ASA ordered  - Continue amiodarone drip    Pulm:  Post-operative respiratory insufficiency  Bilateral trace apical pneumothoraces  - Daily CXR  - Chest tubes to suction, air leak noted  - Maintain O2 >92%    GI:  GERD  IBS  - Advance diet as tolerated  - PPI prophylaxis    Renal:  Hyponatremia  - Monitor UOP, Cr, I/O    ID:  Marcelle-operative prophylaxis  - Cefazolin and vancomycin completed    Heme:  Acute blood loss anemia  - CBC daily  - Transfuse hemoglobin <7    Endo:  Risk for stress hyperglycemia  - ISS  - Goal glucose <180    PPx:  1. DVT: heparin subq  2. VAP: HOB 30 degrees, chlorhexidine rinse  3. Stress Ulcer: PPI  4. Restraints: not indicated  5. Wound care - per unit routine   6. Feeding - advance as tolerated  7. Family to be updated at bedside    Clinically Significant Risk Factors        # Hypokalemia: Lowest K = 3.2 mmol/L in last 2 days, will replace as needed  # Hyponatremia: Lowest Na = 131 mmol/L in last 2 days, will monitor as appropriate   # Hypocalcemia: Lowest Ca = 8.3 mg/dL in last 2 days, will monitor and replace as appropriate     # Hypoalbuminemia: Lowest albumin = 3.2 g/dL at 3/14/2025  5:53 PM, will monitor as appropriate    #  "Coagulation Defect: INR = 1.35 (Ref range: 0.85 - 1.15) and/or PTT = 35 Seconds (Ref range: 22 - 38 Seconds), will monitor for bleeding    # Hypertension: Noted on problem list                          Dispo: ICU    Subjective: Pain and nausea are controlled. Coughing well. No other complaints.    BP (!) 82/66   Pulse (!) 124   Temp 99.5  F (37.5  C)   Resp 14   Ht 1.6 m (5' 3\")   Wt 63.3 kg (139 lb 8.8 oz)   SpO2 96%   BMI 24.72 kg/m        Intake/Output Summary (Last 24 hours) at 3/15/2025 1114  Last data filed at 3/15/2025 1000  Gross per 24 hour   Intake 4600.74 ml   Output 2501 ml   Net 2099.74 ml     Physical Exam:  Constitutional: healthy and no distress, alert  Head: Normocephalic. No masses, lesions, tenderness or abnormalities  Neck: Neck supple. No adenopathy.   ENT: normal, no neck nodes or sinus tenderness  Cardiovascular: RRR, no murmur. No JVD.  Respiratory: Percussion normal. Good diaphragmatic excursion. Lungs clear bilaterally.  Gastrointestinal: Abdomen soft, non-tender. BS normal. No masses, organomegaly  : deferred  Musculoskeletal: extremities normal - no gross deformities noted and normal muscle tone  Skin: sternotomy with clean, dry, intact dressing and chest tubes taped inferiorly  Neurologic: Aox4, moving all extremities, following commands  Psychiatric: Pleasant, appropriate    CBC RESULTS:   Recent Labs   Lab Test 03/16/25  0442   WBC 11.4*   RBC 2.88*   HGB 8.8*   HCT 26.1*   MCV 91   MCH 30.6   MCHC 33.7   RDW 13.7        Last Comprehensive Metabolic Panel:  Lab Results   Component Value Date     (L) 03/16/2025    POTASSIUM 3.9 03/16/2025    CHLORIDE 99 03/16/2025    CO2 24 03/16/2025    ANIONGAP 8 03/16/2025     (H) 03/16/2025    BUN 15.2 03/16/2025    CR 0.60 03/16/2025    GFRESTIMATED >90 03/16/2025    IGNACIO 8.6 (L) 03/16/2025     Discussed with Dr. Cody.    Maura Reis MD  Surgical Critical Care Fellow    Physician Attestation   I spent a total of 30 " minutes with the patient, personally observing as the resident/fellow performed daily progress evaluation, labs and imaging.     Key findings: 74 year old female with a history of IBS, chronically occluded LAD on recent angiogram after positive stress test, and moderate to severe aortic stenosis who is admitted s/p CABG x1 and bioprosthetic AVR with Dr. Medellin on 3/14/25. Was on Neosynephrine      Kahlil Cody MD  Date of Service (when I saw the patient): 03/16/25    Level III Billing

## 2025-03-16 NOTE — PLAN OF CARE
Goal Outcome Evaluation:      Plan of Care Reviewed With: patient    Overall Patient Progress: improvingOverall Patient Progress: improving      Outcome Evaluation:   - 2230: SBP 80s, MAP <65, UOP Diminished, MD notified- 500mL LR Bolus given. Pt. converted to SR around 2330 however MAP <65- Intensivist verbal order to Restart Phenyl @ low dose. MAP >65 on Phenyl @ 0.2mcg/kg/min.   - 0300: Pt went back into Afib, SBP 90s-100s, MAP >65 on Phenyl gtt.   - 0430: Paused Phenyl gtt w/ SBP 120s, MAP >70, UOP increased.   - 0630: Pt movement during bathing caused Hypotension- SBP 80s, MAP <65. Nursing judgement to forgo getting pt up to chair to prevent harm from possible syncopal episode.  - 0700: Afib, HR 70s-120s, SBP 80s-90s, MAP 60-65.   - Chest Tube Output 10-50mL q2h.   - Zofran given x2 for nausea.   - UOP 20-30mL/hr.   - PRN Pain meds given Oxycodone, Dilaudid, Robaxin, Tylenol for chest pain rating 4-7 /10.   - Amio @ 0.5mg/min.   - Mg Replaced w/ 2g IV.   - Phos replacing w/ PO Phos packet BID.

## 2025-03-16 NOTE — PROGRESS NOTES
[facility]  Cardiovascular and Thoracic Surgery Daily Note      Assessment and Plan  Tri Walden is a 74 year old female with a PMH of hypertension, dyslipidemia, IBS, and progressive severe aortic stenosis and single vessel CAD ( LAD) who was  recently referred for valve replacement and surgical revascularization. Admitted 03/14 following bioprosthetic AVR (23 mm Inspiris Resilia) and CABG x1 ([place] to LAD) with Dr. Robert Medellin.     POD # 2 s/p bioprosthetic AVR (23 mm Inspiris Resilia) and CABG x1 (LIM[place] to LAD) on 03/14/2025 with Dr. Robert Medellin.    - CVS:   Pre-op TTE with preserved biventricular function, severe aortic stenosis.  Postop vasoplegic shock, improving. Antoni to MAP goal 65, wean as able.  Filling pressures elevated, Lasix 20 mg IV x1  Afib RVR POD1, start amio bolus and infusion. Transition to PO this PM.  Aspirin 162 mg daily, change PTA simvastatin to atorvastatin (interaction with amiodarone)  Chest tubes: output 486, +intermittent air leak. TPW: capped     - Resp: Postoperative mechanical ventilation, resolved. Extubated POD1 AM (failed PST x 4 POD0 with apnea and low tidal volumes). IS, pulmonary toilet.    - Neuro: Neuro intact, pain controlled on current regimen    - Renal: No history of significant renal disease. Cr stable WNL. Trend BMP. Diuretic as above.  Recent Labs   Lab 03/16/25  0442 03/15/25  0422 03/14/25  1753   CR 0.60 0.69 0.50*       - GI: -BM, +flatus, continue bowel regimen    - : Hill in place, continue today.    - Endo: Postop stress hyperglycemia, improved. Insulin infusion transitioned to sliding scale insulin.   Hemoglobin A1C   Date Value Ref Range Status   02/24/2025 5.4 <5.7 % Final     Comment:     Normal <5.7%   Prediabetes 5.7-6.4%    Diabetes 6.5% or higher     Note: Adopted from ADA consensus guidelines.        - FEN: Replace electrolytes as needed. Regular diet.     - ID: Postop leukocytosis, likely reactive from surgery. Afebrile. WBC  WNL. Periop abx prophylaxis complete. Trend CBC and fever curve.   Recent Labs   Lab 03/16/25  0442 03/15/25  0422 03/14/25  1753   WBC 11.4* 9.8 10.1       - Heme: Acute blood loss anemia due to surgery. Hgb and PLT 8.8/159. Trend CBC, transfuse PRN.   Recent Labs   Lab 03/16/25  0442 03/15/25  0422 03/14/25  1753   HGB 8.8* 9.6* 10.7*    219 184       - Proph: SCD, subcutaneous heparin, PPI    - Other:  Clinically Significant Risk Factors         # Hyponatremia: Lowest Na = 131 mmol/L in last 2 days, will monitor as appropriate   # Hypocalcemia: Lowest Ca = 8.3 mg/dL in last 2 days, will monitor and replace as appropriate     # Hypoalbuminemia: Lowest albumin = 3.2 g/dL at 3/14/2025  5:53 PM, will monitor as appropriate    # Coagulation Defect: INR = 1.35 (Ref range: 0.85 - 1.15) and/or PTT = 35 Seconds (Ref range: 22 - 38 Seconds), will monitor for bleeding    # Hypertension: Noted on problem list                       - Dispo: ICU pending pressor wean. Therapies recommending discharge to home with assist when medically ready. Medically Ready for Discharge: Anticipated in 2-4 Days, dispo pending medical progress.          Interval History  Afib RVR overnight. Feeling tired and nauseated. Denies pain. Up to chair.       Medications  Current Facility-Administered Medications   Medication Dose Route Frequency Provider Last Rate Last Admin    acetaminophen (TYLENOL) tablet 975 mg  975 mg Oral Q8H Matilda Maher PA-C   975 mg at 03/16/25 1739    amiodarone (PACERONE) tablet 400 mg  400 mg Oral BID Matilda Maher PA-C        aspirin (ASA) chewable tablet 162 mg  162 mg Oral or NG Tube Daily Matilda Maher PA-C   162 mg at 03/16/25 0739    atorvastatin (LIPITOR) tablet 40 mg  40 mg Oral QPM Matilda Maher PA-C   40 mg at 03/15/25 2003    heparin ANTICOAGULANT injection 5,000 Units  5,000 Units Subcutaneous Q8H Matilda Maher PA-C   5,000 Units at 03/16/25 1323    insulin aspart (NovoLOG) injection  (RAPID ACTING)  1-7 Units Subcutaneous TID AC Matilda Maher PA-C        insulin aspart (NovoLOG) injection (RAPID ACTING)  1-5 Units Subcutaneous At Bedtime Matilda Maher PA-C        Lidocaine (LIDOCARE) 4 % Patch 1-2 patch  1-2 patch Transdermal Q24H Matilda Maher PA-C   1 patch at 03/15/25 2007    methocarbamol (ROBAXIN) half-tab 250 mg  250 mg Oral 4x Daily MaherMatilda bryant PA-C   250 mg at 03/16/25 1740    pantoprazole (PROTONIX) 2 mg/mL suspension 40 mg  40 mg Oral or NG Tube Daily MaherMatilda bryant PA-C   40 mg at 03/15/25 0738    Or    pantoprazole (PROTONIX) EC tablet 40 mg  40 mg Oral Daily Matilda Maher PA-C   40 mg at 03/16/25 0740    polyethylene glycol (MIRALAX) Packet 17 g  17 g Oral Daily MaherMatilda bryant PA-C   17 g at 03/16/25 0733    senna-docusate (SENOKOT-S/PERICOLACE) 8.6-50 MG per tablet 1 tablet  1 tablet Oral BID Matilda Maher PA-C   1 tablet at 03/15/25 2004    sodium chloride (PF) 0.9% PF flush 3 mL  3 mL Intracatheter Q8H Matilda Maher PA-C   3 mL at 03/16/25 0805     Current Facility-Administered Medications   Medication Dose Route Frequency Provider Last Rate Last Admin    alum & mag hydroxide-simethicone (MAALOX) suspension 30 mL  30 mL Oral Q4H PRN Matilda Maher PA-C        [START ON 3/17/2025] bisacodyl (DULCOLAX) suppository 10 mg  10 mg Rectal Daily PRN Matilda Maher PA-C        calcium carbonate (TUMS) chewable tablet 500 mg  500 mg Oral 4x Daily PRN Matilda Maher PA-C        calcium gluconate 1 g in 50 mL in sodium chloride intermittent infusion  1 g Intravenous Q6H PRN Matilda Maher PA-C        calcium gluconate 2 g in  mL intermittent infusion  2 g Intravenous Q6H PRN Matilda Maher PA-C        calcium gluconate 3 g in sodium chloride 0.9 % 100 mL intermittent infusion  3 g Intravenous Q6H PRN Matilda Maher PA-C        glucose gel 15-30 g  15-30 g Oral Q15 Min PRN Matilda Maher PA-C        Or    dextrose 50 % injection 25-50 mL  25-50  mL Intravenous Q15 Min PRN Maher, SUKHDEV Blake-C        Or    glucagon injection 1 mg  1 mg Subcutaneous Q15 Min PRN Maher, Matilda AVALOS PA-C        hydrALAZINE (APRESOLINE) injection 10 mg  10 mg Intravenous Q30 Min PRN Maher, Matilda AVALOS, PA-C        HYDROmorphone (DILAUDID) injection 0.2 mg  0.2 mg Intravenous Q2H PRN Maher, Matilda AVALOS PA-C   0.2 mg at 03/16/25 0622    Or    HYDROmorphone (DILAUDID) injection 0.4 mg  0.4 mg Intravenous Q2H PRN Maher, Matilda AVALOS, PA-C        hydrOXYzine HCl (ATARAX) tablet 10 mg  10 mg Oral Q6H PRN Maher, Matilda AVALOS PA-C        lactated ringers BOLUS 250 mL  250 mL Intravenous Q10 Min PRN Maher, Matilda AVALOS, PA-C        lidocaine (LMX4) cream   Topical Q1H PRN Maher, Matilda AVALOS PA-C        lidocaine 1 % 0.1-1 mL  0.1-1 mL Other Q1H PRN Maher, Matilda AVALOS PA-C        magnesium hydroxide (MILK OF MAGNESIA) suspension 30 mL  30 mL Oral Daily PRN Maher, Matilda AVALOS PA-C        metoprolol (LOPRESSOR) injection 5 mg  5 mg Intravenous Q5 Min PRN Maher, Matilda AVALOS PA-C   5 mg at 03/16/25 0926    naloxone (NARCAN) injection 0.2 mg  0.2 mg Intravenous Q2 Min PRN Alek Wilcox RPH        Or    naloxone (NARCAN) injection 0.4 mg  0.4 mg Intravenous Q2 Min PRN Alek Wilcox RPH        Or    naloxone (NARCAN) injection 0.2 mg  0.2 mg Intramuscular Q2 Min PRN Alek Wilcox RPH        Or    naloxone (NARCAN) injection 0.4 mg  0.4 mg Intramuscular Q2 Min PRN Alek Wilcox RPH        ondansetron (ZOFRAN ODT) ODT tab 4 mg  4 mg Oral Q6H PRN Maher, TIMUR BlakeC        Or    ondansetron (ZOFRAN) injection 4 mg  4 mg Intravenous Q6H PRN MaherMatilda PA-C   4 mg at 03/16/25 0624    oxyCODONE (ROXICODONE) tablet 5 mg  5 mg Oral Q4H PRN Maher, Matilda AVALOS PA-C   5 mg at 03/16/25 1740    Or    oxyCODONE (ROXICODONE) tablet 10 mg  10 mg Oral Q4H PRN Maher, Matilda AVALOS PA-C   10 mg at 03/16/25 1323    prochlorperazine (COMPAZINE) injection 5 mg  5 mg Intravenous Q6H PRN Maher, Matilda AVALOS PA-C   5 mg at 03/16/25 0742  "   Or    prochlorperazine (COMPAZINE) tablet 5 mg  5 mg Oral Q6H PRN Matilda Maher PA-C        Reason beta blocker order not selected   Does not apply DOES NOT GO TO Matilda Hernandez PA-C        sodium chloride (PF) 0.9% PF flush 3 mL  3 mL Intracatheter q1 min prn Matilda Maher PA-C             Physical Exam  Vitals were reviewed  Blood pressure 97/53, pulse 69, temperature 99.1  F (37.3  C), resp. rate 11, height 1.6 m (5' 3\"), weight 63.3 kg (139 lb 8.8 oz), SpO2 96%.  Rhythm: NSR    Lungs: diminished bases    Cardiovascular: rrr, no m/r/g    Abdomen: soft, NT, ND, +BS    Extremeties: warm, trace LE edema    Incision: CDI    CT: serosang output 565 mL, no air leak    Weight:   Vitals:    03/14/25 0905 03/15/25 0630 03/16/25 0645   Weight: 59.1 kg (130 lb 4.8 oz) 61.7 kg (136 lb 0.4 oz) 63.3 kg (139 lb 8.8 oz)         Data  Recent Labs   Lab 03/16/25  1749 03/16/25  1331 03/16/25  0442 03/15/25  1251 03/15/25  0422 03/14/25  2125 03/14/25  1753 03/14/25  1557   WBC  --   --  11.4*  --  9.8  --  10.1 14.3*   HGB  --   --  8.8*  --  9.6*  --  10.7* 9.8*  10.0*   MCV  --   --  91  --  89  --  88 88   PLT  --   --  159  --  219  --  184 207   INR  --   --   --   --   --   --  1.35* 1.53*   NA  --   --  131*  --  135  --  135 135  134*   POTASSIUM  --   --  3.9  --  3.8  --  3.5 3.4  3.7   CHLORIDE  --   --  99  --  102  --  103 102   CO2  --   --  24  --  20*  --  21* 23   BUN  --   --  15.2  --  12.3  --  9.1 9.1   CR  --   --  0.60  --  0.69  --  0.50* 0.48*   ANIONGAP  --   --  8  --  13  --  11 10   IGANCIO  --   --  8.6*  --  8.3*  --  8.3* 9.1   GLC 92 104* 128*   < > 124*   < > 125* 129*  136*   ALBUMIN  --   --   --   --  3.6  --  3.2*  --    PROTTOTAL  --   --   --   --  5.6*  --  5.3*  --    BILITOTAL  --   --   --   --  0.6  --  0.6  --    ALKPHOS  --   --   --   --  38*  --  42  --    ALT  --   --   --   --  8  --  10  --    AST  --   --   --   --  37  --  34  --     < > = values in this interval " not displayed.       Imaging:  Recent Results (from the past 24 hours)   XR Chest Port 1 View    Narrative    EXAM: XR CHEST PORT 1 VIEW  LOCATION: [facility]  DATE: 3/16/2025    INDICATION: Follow-up right pneumothorax.  COMPARISON: 3/15/2025      Impression    IMPRESSION: Endotracheal and orogastric tubes have been removed. Other lines and tubes unchanged. 8mm right apical pneumothorax previously measured 10 mm. Sternotomy and aortic valve replacement redemonstrated. Heart size and vascularity are normal.   Minimal bibasilar atelectasis and small effusions unchanged.           Patient seen and discussed with Dr. Denisha Maher PA-C  Cardiothoracic Surgery  Available for paging [phone] (personal pager or CV Surgery Rounding Pager)  Personal Pager: [phone]  CV Surgery Rounding Pager: [phone]  After hours please page surgeon on-call

## 2025-03-17 ENCOUNTER — APPOINTMENT (OUTPATIENT)
Dept: PHYSICAL THERAPY | Facility: CLINIC | Age: 75
DRG: 219 | End: 2025-03-17
Attending: STUDENT IN AN ORGANIZED HEALTH CARE EDUCATION/TRAINING PROGRAM
Payer: COMMERCIAL

## 2025-03-17 ENCOUNTER — APPOINTMENT (OUTPATIENT)
Dept: CARDIOLOGY | Facility: CLINIC | Age: 75
DRG: 219 | End: 2025-03-17
Attending: PHYSICIAN ASSISTANT
Payer: COMMERCIAL

## 2025-03-17 DIAGNOSIS — Z95.1 S/P CABG (CORONARY ARTERY BYPASS GRAFT): Primary | ICD-10-CM

## 2025-03-17 LAB
ANION GAP SERPL CALCULATED.3IONS-SCNC: 8 MMOL/L (ref 7–15)
BUN SERPL-MCNC: 16.3 MG/DL (ref 8–23)
CA-I BLD-MCNC: 4.6 MG/DL (ref 4.4–5.2)
CALCIUM SERPL-MCNC: 8.2 MG/DL (ref 8.8–10.4)
CHLORIDE SERPL-SCNC: 97 MMOL/L (ref 98–107)
CREAT SERPL-MCNC: 0.58 MG/DL (ref 0.51–0.95)
EGFRCR SERPLBLD CKD-EPI 2021: >90 ML/MIN/1.73M2
ERYTHROCYTE [DISTWIDTH] IN BLOOD BY AUTOMATED COUNT: 13.8 % (ref 10–15)
GLUCOSE SERPL-MCNC: 97 MG/DL (ref 70–99)
HCO3 SERPL-SCNC: 26 MMOL/L (ref 22–29)
HCT VFR BLD AUTO: 25.5 % (ref 35–47)
HGB BLD-MCNC: 8.5 G/DL (ref 11.7–15.7)
INR PPP: 1.11 (ref 0.85–1.15)
LVEF ECHO: NORMAL
MAGNESIUM SERPL-MCNC: 1.8 MG/DL (ref 1.7–2.3)
MCH RBC QN AUTO: 30.4 PG (ref 26.5–33)
MCHC RBC AUTO-ENTMCNC: 33.3 G/DL (ref 31.5–36.5)
MCV RBC AUTO: 91 FL (ref 78–100)
PHOSPHATE SERPL-MCNC: 2.8 MG/DL (ref 2.5–4.5)
PLATELET # BLD AUTO: 202 10E3/UL (ref 150–450)
POTASSIUM SERPL-SCNC: 3.9 MMOL/L (ref 3.4–5.3)
RBC # BLD AUTO: 2.8 10E6/UL (ref 3.8–5.2)
SODIUM SERPL-SCNC: 131 MMOL/L (ref 135–145)
WBC # BLD AUTO: 11.3 10E3/UL (ref 4–11)

## 2025-03-17 PROCEDURE — 80048 BASIC METABOLIC PNL TOTAL CA: CPT | Performed by: PHYSICIAN ASSISTANT

## 2025-03-17 PROCEDURE — 93325 DOPPLER ECHO COLOR FLOW MAPG: CPT | Mod: 26 | Performed by: INTERNAL MEDICINE

## 2025-03-17 PROCEDURE — 258N000003 HC RX IP 258 OP 636: Performed by: ANESTHESIOLOGY

## 2025-03-17 PROCEDURE — 250N000011 HC RX IP 250 OP 636: Performed by: INTERNAL MEDICINE

## 2025-03-17 PROCEDURE — 93308 TTE F-UP OR LMTD: CPT | Mod: 26 | Performed by: INTERNAL MEDICINE

## 2025-03-17 PROCEDURE — 85610 PROTHROMBIN TIME: CPT | Performed by: PHYSICIAN ASSISTANT

## 2025-03-17 PROCEDURE — 250N000013 HC RX MED GY IP 250 OP 250 PS 637: Performed by: PHYSICIAN ASSISTANT

## 2025-03-17 PROCEDURE — 99232 SBSQ HOSP IP/OBS MODERATE 35: CPT | Mod: 24 | Performed by: INTERNAL MEDICINE

## 2025-03-17 PROCEDURE — 82330 ASSAY OF CALCIUM: CPT | Performed by: PHYSICIAN ASSISTANT

## 2025-03-17 PROCEDURE — 85018 HEMOGLOBIN: CPT | Performed by: PHYSICIAN ASSISTANT

## 2025-03-17 PROCEDURE — 200N000001 HC R&B ICU

## 2025-03-17 PROCEDURE — 93321 DOPPLER ECHO F-UP/LMTD STD: CPT | Mod: 26 | Performed by: INTERNAL MEDICINE

## 2025-03-17 PROCEDURE — 255N000002 HC RX 255 OP 636: Performed by: PHYSICIAN ASSISTANT

## 2025-03-17 PROCEDURE — 83735 ASSAY OF MAGNESIUM: CPT | Performed by: SURGERY

## 2025-03-17 PROCEDURE — 250N000011 HC RX IP 250 OP 636: Performed by: ANESTHESIOLOGY

## 2025-03-17 PROCEDURE — 250N000011 HC RX IP 250 OP 636: Mod: JZ | Performed by: PHYSICIAN ASSISTANT

## 2025-03-17 PROCEDURE — 97530 THERAPEUTIC ACTIVITIES: CPT | Mod: GP

## 2025-03-17 PROCEDURE — P9045 ALBUMIN (HUMAN), 5%, 250 ML: HCPCS | Performed by: PHYSICIAN ASSISTANT

## 2025-03-17 PROCEDURE — 84100 ASSAY OF PHOSPHORUS: CPT | Performed by: SURGERY

## 2025-03-17 PROCEDURE — 250N000013 HC RX MED GY IP 250 OP 250 PS 637: Performed by: STUDENT IN AN ORGANIZED HEALTH CARE EDUCATION/TRAINING PROGRAM

## 2025-03-17 PROCEDURE — 250N000009 HC RX 250: Performed by: PHYSICIAN ASSISTANT

## 2025-03-17 RX ORDER — FUROSEMIDE 10 MG/ML
40 INJECTION INTRAMUSCULAR; INTRAVENOUS ONCE
Status: COMPLETED | OUTPATIENT
Start: 2025-03-17 | End: 2025-03-17

## 2025-03-17 RX ORDER — WARFARIN SODIUM 3 MG/1
3 TABLET ORAL
Status: COMPLETED | OUTPATIENT
Start: 2025-03-17 | End: 2025-03-17

## 2025-03-17 RX ORDER — NOREPINEPHRINE BITARTRATE 0.02 MG/ML
.01-.6 INJECTION, SOLUTION INTRAVENOUS CONTINUOUS
Status: DISCONTINUED | OUTPATIENT
Start: 2025-03-17 | End: 2025-03-18

## 2025-03-17 RX ORDER — MAGNESIUM SULFATE HEPTAHYDRATE 40 MG/ML
2 INJECTION, SOLUTION INTRAVENOUS ONCE
Status: COMPLETED | OUTPATIENT
Start: 2025-03-17 | End: 2025-03-17

## 2025-03-17 RX ADMIN — AMIODARONE HYDROCHLORIDE 150 MG: 1.5 INJECTION, SOLUTION INTRAVENOUS at 17:54

## 2025-03-17 RX ADMIN — ATORVASTATIN CALCIUM 40 MG: 40 TABLET, FILM COATED ORAL at 20:20

## 2025-03-17 RX ADMIN — METHOCARBAMOL 250 MG: 500 TABLET ORAL at 21:28

## 2025-03-17 RX ADMIN — OXYCODONE HYDROCHLORIDE 10 MG: 5 TABLET ORAL at 08:25

## 2025-03-17 RX ADMIN — OXYCODONE HYDROCHLORIDE 10 MG: 5 TABLET ORAL at 12:23

## 2025-03-17 RX ADMIN — ONDANSETRON 4 MG: 2 INJECTION, SOLUTION INTRAMUSCULAR; INTRAVENOUS at 20:28

## 2025-03-17 RX ADMIN — METHOCARBAMOL 250 MG: 500 TABLET ORAL at 18:00

## 2025-03-17 RX ADMIN — PERFLUTREN 1.5 ML: 6.52 INJECTION, SUSPENSION INTRAVENOUS at 17:42

## 2025-03-17 RX ADMIN — ASPIRIN 162 MG: 81 TABLET, CHEWABLE ORAL at 08:23

## 2025-03-17 RX ADMIN — ACETAMINOPHEN 975 MG: 325 TABLET, FILM COATED ORAL at 15:35

## 2025-03-17 RX ADMIN — PROCHLORPERAZINE EDISYLATE 5 MG: 5 INJECTION INTRAMUSCULAR; INTRAVENOUS at 21:29

## 2025-03-17 RX ADMIN — METHOCARBAMOL 250 MG: 500 TABLET ORAL at 12:23

## 2025-03-17 RX ADMIN — ACETAMINOPHEN 975 MG: 325 TABLET, FILM COATED ORAL at 00:15

## 2025-03-17 RX ADMIN — ALBUMIN HUMAN 12.5 G: 0.05 INJECTION, SOLUTION INTRAVENOUS at 15:35

## 2025-03-17 RX ADMIN — POLYETHYLENE GLYCOL 3350 17 G: 17 POWDER, FOR SOLUTION ORAL at 08:24

## 2025-03-17 RX ADMIN — PROCHLORPERAZINE EDISYLATE 5 MG: 5 INJECTION INTRAMUSCULAR; INTRAVENOUS at 14:16

## 2025-03-17 RX ADMIN — OXYCODONE HYDROCHLORIDE 10 MG: 5 TABLET ORAL at 03:20

## 2025-03-17 RX ADMIN — PHENYLEPHRINE HYDROCHLORIDE 0.5 MCG/KG/MIN: 10 INJECTION INTRAVENOUS at 10:34

## 2025-03-17 RX ADMIN — HYDROMORPHONE HYDROCHLORIDE 0.2 MG: 0.2 INJECTION, SOLUTION INTRAMUSCULAR; INTRAVENOUS; SUBCUTANEOUS at 06:26

## 2025-03-17 RX ADMIN — ACETAMINOPHEN 975 MG: 325 TABLET, FILM COATED ORAL at 08:23

## 2025-03-17 RX ADMIN — MAGNESIUM SULFATE HEPTAHYDRATE 2 G: 40 INJECTION, SOLUTION INTRAVENOUS at 05:42

## 2025-03-17 RX ADMIN — AMIODARONE HYDROCHLORIDE 1 MG/MIN: 1.8 INJECTION, SOLUTION INTRAVENOUS at 18:29

## 2025-03-17 RX ADMIN — FUROSEMIDE 40 MG: 10 INJECTION, SOLUTION INTRAVENOUS at 08:23

## 2025-03-17 RX ADMIN — NOREPINEPHRINE BITARTRATE 0.08 MCG/KG/MIN: 0.02 INJECTION, SOLUTION INTRAVENOUS at 22:50

## 2025-03-17 RX ADMIN — ALBUMIN HUMAN 12.5 G: 0.05 INJECTION, SOLUTION INTRAVENOUS at 16:44

## 2025-03-17 RX ADMIN — ONDANSETRON 4 MG: 2 INJECTION, SOLUTION INTRAMUSCULAR; INTRAVENOUS at 13:03

## 2025-03-17 RX ADMIN — METHOCARBAMOL 250 MG: 500 TABLET ORAL at 08:23

## 2025-03-17 RX ADMIN — PANTOPRAZOLE SODIUM 40 MG: 40 TABLET, DELAYED RELEASE ORAL at 08:23

## 2025-03-17 RX ADMIN — HEPARIN SODIUM 5000 UNITS: 5000 INJECTION, SOLUTION INTRAVENOUS; SUBCUTANEOUS at 14:16

## 2025-03-17 RX ADMIN — OXYCODONE HYDROCHLORIDE 10 MG: 5 TABLET ORAL at 20:21

## 2025-03-17 RX ADMIN — HEPARIN SODIUM 5000 UNITS: 5000 INJECTION, SOLUTION INTRAVENOUS; SUBCUTANEOUS at 21:29

## 2025-03-17 RX ADMIN — SENNOSIDES AND DOCUSATE SODIUM 1 TABLET: 50; 8.6 TABLET ORAL at 08:23

## 2025-03-17 RX ADMIN — LIDOCAINE 1 PATCH: 4 PATCH TOPICAL at 20:24

## 2025-03-17 RX ADMIN — AMIODARONE HYDROCHLORIDE 400 MG: 200 TABLET ORAL at 08:23

## 2025-03-17 RX ADMIN — METOPROLOL TARTRATE 5 MG: 5 INJECTION INTRAVENOUS at 11:41

## 2025-03-17 RX ADMIN — WARFARIN SODIUM 3 MG: 3 TABLET ORAL at 18:00

## 2025-03-17 RX ADMIN — HEPARIN SODIUM 5000 UNITS: 5000 INJECTION, SOLUTION INTRAVENOUS; SUBCUTANEOUS at 05:42

## 2025-03-17 RX ADMIN — NOREPINEPHRINE BITARTRATE 0.03 MCG/KG/MIN: 0.02 INJECTION, SOLUTION INTRAVENOUS at 12:49

## 2025-03-17 ASSESSMENT — ACTIVITIES OF DAILY LIVING (ADL)
ADLS_ACUITY_SCORE: 46
ADLS_ACUITY_SCORE: 50
ADLS_ACUITY_SCORE: 46
ADLS_ACUITY_SCORE: 50
ADLS_ACUITY_SCORE: 46
ADLS_ACUITY_SCORE: 50
ADLS_ACUITY_SCORE: 46
ADLS_ACUITY_SCORE: 50
ADLS_ACUITY_SCORE: 50
ADLS_ACUITY_SCORE: 46
ADLS_ACUITY_SCORE: 50

## 2025-03-17 NOTE — PHARMACY-ANTICOAGULATION SERVICE
Clinical Pharmacy - Warfarin Dosing Consult     Pharmacy has been consulted to manage this patient s warfarin therapy.  Indication: Bioprosthetic Heart Valve  Therapy Goal: INR 2-3  Warfarin Prior to Admission: No  Significant drug interactions: Amiodarone, atorvastatin, aspirin, heparin sq, pantoprazole, chlorthalidone & pta supplements (cranberry, kamini, vitamin E)  Recent documented change in oral intake/nutrition: Unknown    INR   Date Value Ref Range Status   03/17/2025 1.11 0.85 - 1.15 Final   03/14/2025 1.35 (H) 0.85 - 1.15 Final       Recommend warfarin 3 mg today based on age, drug interactions etc.  Pharmacy will monitor Tri Walden daily and order warfarin doses to achieve specified goal.      Please contact pharmacy as soon as possible if the warfarin needs to be held for a procedure or if the warfarin goals change.      Maura Bowser, PharmD

## 2025-03-17 NOTE — PLAN OF CARE
Problem: Delirium  Goal: Improved Sleep  Intervention: Promote Sleep  Recent Flowsheet Documentation  Taken 3/16/2025 0915 by Beth Lin RN  Sleep/Rest Enhancement:   awakenings minimized   comfort measures   consistent schedule promoted   family presence promoted   medication   noise level reduced   regular sleep/rest pattern promoted   relaxation techniques promoted     Problem: Adult Inpatient Plan of Care  Goal: Optimal Comfort and Wellbeing  Intervention: Monitor Pain and Promote Comfort  Recent Flowsheet Documentation  Taken 3/16/2025 1200 by Beth Lin RN  Pain Management Interventions:   medication (see MAR)   MD notified (comment)   care clustered   back rub   emotional support   environmental changes   pain management plan reviewed with patient/caregiver   pillow support provided   quiet environment facilitated   repositioned   rest  Taken 3/16/2025 0800 by Beth Lin RN  Pain Management Interventions:   medication (see MAR)   MD notified (comment)   care clustered   back rub   emotional support   environmental changes   pain management plan reviewed with patient/caregiver   pillow support provided   quiet environment facilitated   repositioned   rest     Problem: Adult Inpatient Plan of Care  Goal: Absence of Hospital-Acquired Illness or Injury  Intervention: Prevent and Manage VTE (Venous Thromboembolism) Risk  Recent Flowsheet Documentation  Taken 3/16/2025 1600 by Beth Lin RN  VTE Prevention/Management: SCDs on (sequential compression devices)  Taken 3/16/2025 1200 by Beth Lin RN  VTE Prevention/Management: SCDs on (sequential compression devices)  Taken 3/16/2025 0800 by Beth Lin RN  VTE Prevention/Management: SCDs on (sequential compression devices)       CV  Pressors (which pressors and any increase/decrease in pressor needs): Phenylephrine low dose on/off  HR range: SR 60s on amiodarone, 500 ml LR and 5mg metoprolol IV  Chest tube output: large output serous drainage  with improved CO/BP    Neuro  Orientation: A/O  Delirium present?(y/n): No  Sleep: sleeping between cares feels unrested  Pain: well controlled with current ordered prns    GI/  BM? (y/n): No  Urine output: low 10-20/hour, IV lasix ordered with moderate output      Lines: Yes RIJ TLC needed for pressor            Goal Outcome Evaluation: progressing toward goal, family and pt updated with POC emotional support provided.

## 2025-03-17 NOTE — PROGRESS NOTES
Pt went into Afib RVR around 11:37 am. HR 140s. 5mg of IV metoprolol given.    Converted back to NSR at 12:14 pm. Amio bolus not given.    Went back into Afib RVR at 12:27 pm. BP low and not responding to phenylephrine gtt. Provider ordered to switch from phenyl to norepi.

## 2025-03-17 NOTE — PROGRESS NOTES
ICU Progress Note  Date of Service: 03/14/25    Interval History:  - Remains on kirk  - Afib with RVR    Plan for Today:  - Wean vasopressors as able    Assessment and Plan:  Aaron Walden is a 74 year old female with a history of IBS, chronically occluded LAD on recent angiogram after positive stress test, and moderate to severe aortic stenosis who is admitted s/p CABG x1 and bioprosthetic AVR with Dr. Medellin on 3/14/25. The patient was admitted to the ICU post-operatively.    Neuro:  Post-operative pain management  - Multimodal pain control    CV:  LAD CAD s/p CABG x1  Moderate to severe aortic stenosis s/p bioprosthetic AVR  Atrial fibrillation with RVR  Dyslipidemia  - MAP goal > 65, remains on kirk, try transition to levo  - Chest tube management per CVTS  - Heparin prophylaxis, ASA ordered  - Continue amiodarone drip  - Warfarin per pharmacy dosing  - Metoprolol PRN for atrial fibrillation  - Lasix 40mg IV once    Pulm:  Post-operative respiratory insufficiency  Bilateral trace apical pneumothoraces  - Daily CXR  - Chest tubes to suction  - Maintain O2 >92%    GI:  GERD  IBS  - Regular diet  - PPI prophylaxis    Renal:  Hyponatremia  - Monitor UOP, Cr, I/O    ID:  Marcelle-operative prophylaxis  - Cefazolin and vancomycin completed    Heme:  Acute blood loss anemia  - CBC daily  - Transfuse hemoglobin <7    Endo:  Risk for stress hyperglycemia  - ISS  - Goal glucose <180    PPx:  1. DVT: heparin subq  2. VAP: HOB 30 degrees, chlorhexidine rinse  3. Stress Ulcer: PPI  4. Restraints: not indicated  5. Wound care - per unit routine   6. Feeding - advance as tolerated  7. Family updated at bedside    Clinically Significant Risk Factors         # Hyponatremia: Lowest Na = 131 mmol/L in last 2 days, will monitor as appropriate  # Hypochloremia: Lowest Cl = 97 mmol/L in last 2 days, will monitor as appropriate      # Hypoalbuminemia: Lowest albumin = 3.2 g/dL at 3/14/2025  5:53 PM, will monitor as appropriate       #  "Hypertension: Noted on problem list            # Overweight: Estimated body mass index is 25.42 kg/m  as calculated from the following:    Height as of this encounter: 1.6 m (5' 3\").    Weight as of this encounter: 65.1 kg (143 lb 8.3 oz)., PRESENT ON ADMISSION     # Financial/Environmental Concerns: none   # History of CABG: noted on surgical history          Dispo: ICU    Subjective: Pain and nausea are controlled. Coughing well. No other complaints.    BP (!) 85/47   Pulse 75   Temp 98.8  F (37.1  C)   Resp 11   Ht 1.6 m (5' 3\")   Wt 65.1 kg (143 lb 8.3 oz)   SpO2 96%   BMI 25.42 kg/m        Intake/Output Summary (Last 24 hours) at 3/17/2025 1652  Last data filed at 3/17/2025 1644  Gross per 24 hour   Intake 3548.37 ml   Output 3200 ml   Net 348.37 ml     Physical Exam:  Constitutional: healthy and no distress, alert  Head: Normocephalic. No masses, lesions, tenderness or abnormalities  Neck: Neck supple. No adenopathy.   ENT: normal, no neck nodes or sinus tenderness  Cardiovascular: RRR, no murmur. No JVD.  Respiratory: Percussion normal. Good diaphragmatic excursion. Lungs clear bilaterally.  Gastrointestinal: Abdomen soft, non-tender. BS normal. No masses, organomegaly  : deferred  Musculoskeletal: extremities normal - no gross deformities noted and normal muscle tone  Skin: sternotomy with clean, dry, intact dressing and chest tubes taped inferiorly  Neurologic: Aox4, moving all extremities, following commands  Psychiatric: Pleasant, appropriate    CBC RESULTS:   Recent Labs   Lab Test 03/17/25  0420   WBC 11.3*   RBC 2.80*   HGB 8.5*   HCT 25.5*   MCV 91   MCH 30.4   MCHC 33.3   RDW 13.8        Last Comprehensive Metabolic Panel:  Lab Results   Component Value Date     (L) 03/17/2025    POTASSIUM 3.9 03/17/2025    CHLORIDE 97 (L) 03/17/2025    CO2 26 03/17/2025    ANIONGAP 8 03/17/2025    GLC 97 03/17/2025    BUN 16.3 03/17/2025    CR 0.58 03/17/2025    GFRESTIMATED >90 03/17/2025    " IGNACIO 8.2 (L) 03/17/2025     Discussed with Dr. Arce.    Maura Reis MD  Surgical Critical Care Fellow

## 2025-03-17 NOTE — PROGRESS NOTES
CV  Pressors (which pressors and any increase/decrease in pressor needs): Phenyl @ 0.6mcg/kg/min  HR range: 59-70s  Chest tube output: Total Shift Outputs: Mediastinal 120mL. Pleural 170mL.    Neuro  Orientation: A/Ox4  Delirium present?(y/n): No  Sleep: Yes, slept well.  Pain: Better controlled throughout shift, Incisional chest pain rating 3-4 /10.    GI/  BM? (y/n): No  Urine output: 25-30mL/hr      Lines:  R) internal jugular Introducer w/ TLC. R) Radial Art line

## 2025-03-17 NOTE — CONSULTS
Care Management Initial Consult    General Information  Assessment completed with: Patient, Family, Patient, Sister Bianca Barnett and Son Ronaldo  Type of CM/SW Visit: CM Role Introduction    Primary Care Provider verified and updated as needed: Yes   Readmission within the last 30 days: no previous admission in last 30 days      Reason for Consult: discharge planning  Advance Care Planning:            Communication Assessment  Patient's communication style: spoken language (English or Bilingual)    Hearing Difficulty or Deaf: no   Wear Glasses or Blind: yes    Cognitive  Cognitive/Neuro/Behavioral: .WDL except  Level of Consciousness: alert  Arousal Level: opens eyes spontaneously, arouses to voice  Orientation: oriented x 4  Mood/Behavior: behavior appropriate to situation, calm, cooperative  Best Language: 0 - No aphasia  Speech: logical, spontaneous, clear    Living Environment:   People in home: spouse  Anselmo  Current living Arrangements: house      Able to return to prior arrangements: yes (will be staying with her sister Bianca Barnett locally until able to return home)       Family/Social Support:  Care provided by: self  Provides care for: spouse (patient notes her family is helping with her spouse while she recovers from surgery)  Marital Status:   Support system: , Children, Sibling(s)  Anselmo       Description of Support System: Supportive, Involved    Support Assessment: Adequate family and caregiver support, Adequate social supports    Current Resources:   Patient receiving home care services: No        Community Resources: None  Equipment currently used at home: none  Supplies currently used at home: None    Employment/Financial:  Employment Status: retired        Financial Concerns: none   Referral to Financial Worker: No       Does the patient's insurance plan have a 3 day qualifying hospital stay waiver?  Yes     Which insurance plan 3 day waiver is available? Alternative insurance waiver    Will the  waiver be used for post-acute placement? Undetermined at this time    Lifestyle & Psychosocial Needs:  Social Drivers of Health     Food Insecurity: Low Risk  (11/17/2023)    Food Insecurity     Within the past 12 months, did you worry that your food would run out before you got money to buy more?: No     Within the past 12 months, did the food you bought just not last and you didn t have money to get more?: No   Depression: Not at risk (1/30/2025)    PHQ-2     PHQ-2 Score: 0   Housing Stability: Low Risk  (11/17/2023)    Housing Stability     Do you have housing? : Yes     Are you worried about losing your housing?: No   Tobacco Use: Low Risk  (3/14/2025)    Patient History     Smoking Tobacco Use: Never     Smokeless Tobacco Use: Never     Passive Exposure: Not on file   Financial Resource Strain: Low Risk  (11/17/2023)    Financial Resource Strain     Within the past 12 months, have you or your family members you live with been unable to get utilities (heat, electricity) when it was really needed?: No   Alcohol Use: Not on file   Transportation Needs: Low Risk  (11/17/2023)    Transportation Needs     Within the past 12 months, has lack of transportation kept you from medical appointments, getting your medicines, non-medical meetings or appointments, work, or from getting things that you need?: No   Physical Activity: Not on file   Interpersonal Safety: Low Risk  (3/14/2025)    Interpersonal Safety     Do you feel physically and emotionally safe where you currently live?: Yes     Within the past 12 months, have you been hit, slapped, kicked or otherwise physically hurt by someone?: No     Within the past 12 months, have you been humiliated or emotionally abused in other ways by your partner or ex-partner?: No   Stress: Not on file   Social Connections: Not on file   Health Literacy: Not on file       Functional Status:  Prior to admission patient needed assistance:   Dependent ADLs:: Independent, Ambulation-no  assistive device          Mental Health Status:  Mental Health Status: No Current Concerns       Chemical Dependency Status:  Chemical Dependency Status: No Current Concerns             Values/Beliefs:  Spiritual, Cultural Beliefs, Christianity Practices, Values that affect care: no               Discussed  Partnership in Safe Discharge Planning  document with patient/family: No    Additional Information:  Writer met with Patient, Sister Bianca Barnett and Son Ronaldo at the bedside. Explained role and scope of safe discharge planning.  Prior to this admission patient independent with all ADL's without the use of assistive equipment. Patient noted that she is the primary caregiver for her Spouse Anselmo and that family is currently assisting with his needs while she recovers.  Patient is aware that we can assist with discharge planning needs as identified.  Patient plans on staying locally with her Sister Bianca Barnett who resides on a one level home and will assist the patient with transportation and help in the home. Patient is wondering if she can start outpatient cardiac rehab in [place] and if/when she is able to get back home to [place] she [place] [facility] [place]  Writer called [facility] and got the patient scheduled for [place] to start. Patient can discuss further with the outpatient clinic regarding ongoing recommendations.  Patients Sister is asking for information on diet prior to discharge writer noted that there should be a diet for discharge and nutrition is following.  Patient and family did not have any additional questions at this time.  Care Transitions will continue to follow for further discharge planning orders as identified.        Ursula Lopez RN, BSN, M   Care Transitions Specialist  [facility]  Care Transitions Specialist  [id number] [place] [address]  [email or web address]  Office: [phone]   Fax: [phone]  [facility]

## 2025-03-17 NOTE — PROGRESS NOTES
"NUTRITION ASSESSMENT    REASON FOR ASSESSMENT:  Cardiac Surgery Nutrition Consult    CURRENT DIET / INTAKE:  Regular diet     50% intakes are documented. Patient ordered 2 meals yesterday and 1 so far this morning. Meals are very small - usually only ordering yogurt and toast.     Met with patient in room today. Patient reports poor appetite, denies n/v.   RD encouraged small frequent meals to increase appetite and intake. Reviewed protein sources to include with meals to help with recovery after surgery. RD offered oral nutrition supplements to increase intake, patient was agreeable.     ANTHROPOMETRICS:   Ht: 5'3\"  Wt: 143 lb 8.3 oz  BMI: 25.42 kg/m2  IBW: 115 lbs  Weight Status: Normal BMI  %IBW: 124%    MALNUTRITION:  Nutrition Focused Physical Assessment (NFPA) not appropriate at this time.    NUTRITION DIAGNOSIS:   Food and nutrition related knowledge deficit r/t post-op as evidenced by need for nutrition education on optimal nutrition for recovery.     INTERVENTIONS:  Nutrition Prescription:  Diet per MD    Implementation:  Nutrition Education (Content):  Discussed the importance of adequate nutrition post-op for healing and energy, emphasizing protein foods, and encouraged patient to order a protein food at each meal.    Goals:  Patient to consume ~75% at meals in the next 3 - 5 days    Follow Up/Monitoring (InPatient):  Food and Fluid intake -  Monitor for adequacy    Follow Up/Monitoring (OutPatient):  Patient will participate in out-patient cardiac rehab and attend nutrition classes during the program       Therese Walker, MS, RD, LD  Pager: [phone]  Available on Vocera     "

## 2025-03-17 NOTE — PROGRESS NOTES
[facility]  Cardiovascular and Thoracic Surgery Daily Note      Assessment and Plan  Tri Walden is a 74 year old female with a PMH of hypertension, dyslipidemia, IBS, and progressive severe aortic stenosis and single vessel CAD ( LAD) who was  recently referred for valve replacement and surgical revascularization. Admitted 03/14 following bioprosthetic AVR (23 mm Inspiris Resilia) and CABG x1 ([place] to LAD) with Dr. Robert Medellin.     POD # 3 s/p bioprosthetic AVR (23 mm Inspiris Resilia) and CABG x1 (LIMA to LAD) on 03/14/2025 with Dr. Robert Medellin.    - CVS:   Pre-op TTE with preserved biventricular function, severe aortic stenosis.  Postop vasoplegic shock, improving. Antoni to MAP goal 65, wean as able.  Filling pressures elevated, Lasix 40 mg IV x1  Afib RVR POD1, converted following IV amiodarone protocol and transitioned to PO with taper. Brief recurrence POD3, converted with PRN IV Lopressor. Can rebolus with IV amio if recurrent.  Warfarin to INR goal 2-3 x 3 months for bioprosthetic AVR per surgeon. Can be switched after 3 months if needed for afib prophylaxis.   Aspirin 162 mg daily, change PTA simvastatin to atorvastatin (interaction with amiodarone)  Chest tubes: output 960, +intermittent air leak. TPW: capped     - Resp: Postoperative mechanical ventilation, resolved. Extubated POD1 AM (failed PST x 4 POD0 with apnea and low tidal volumes). IS, pulmonary toilet.    - Neuro: Neuro intact, pain controlled on current regimen    - Renal: No history of significant renal disease. Cr stable WNL. Trend BMP. Diuretic as above.  Recent Labs   Lab 03/17/25  0420 03/16/25  0442 03/15/25  0422   CR 0.58 0.60 0.69       - GI: -BM, +flatus, continue bowel regimen    - : Hill in place, remove today.    - Endo: Postop stress hyperglycemia, improved. Insulin infusion transitioned to sliding scale insulin, now discontinued.   Hemoglobin A1C   Date Value Ref Range Status   02/24/2025 5.4 <5.7 % Final  "    Comment:     Normal <5.7%   Prediabetes 5.7-6.4%    Diabetes 6.5% or higher     Note: Adopted from ADA consensus guidelines.        - FEN: Replace electrolytes as needed. Regular diet.     - ID: Postop leukocytosis, likely reactive from surgery. Afebrile. WBC mild ly elevated. Periop abx prophylaxis complete. Trend CBC and fever curve.   Recent Labs   Lab 03/17/25  0420 03/16/25  0442 03/15/25  0422   WBC 11.3* 11.4* 9.8       - Heme: Acute blood loss anemia due to surgery. Hgb and PLT stable. Trend CBC, transfuse PRN.   Recent Labs   Lab 03/17/25  0420 03/16/25  0442 03/15/25  0422   HGB 8.5* 8.8* 9.6*    159 219       - Proph: SCD, subcutaneous heparin, PPI    - Other:  Clinically Significant Risk Factors         # Hyponatremia: Lowest Na = 131 mmol/L in last 2 days, will monitor as appropriate  # Hypochloremia: Lowest Cl = 97 mmol/L in last 2 days, will monitor as appropriate      # Hypoalbuminemia: Lowest albumin = 3.2 g/dL at 3/14/2025  5:53 PM, will monitor as appropriate       # Hypertension: Noted on problem list            # Overweight: Estimated body mass index is 25.42 kg/m  as calculated from the following:    Height as of this encounter: 1.6 m (5' 3\").    Weight as of this encounter: 65.1 kg (143 lb 8.3 oz)., PRESENT ON ADMISSION            - Dispo: ICU pending pressor wean. Therapies recommending discharge to home with assist when medically ready. Medically Ready for Discharge: Anticipated in 2-4 Days, dispo pending chest tube removal, diuresis, stable HR/rhtyhm        Interval History  Maintained NSR overnight, brief recurrence this AM and converted with PRN IV Lopressor. Up to chair. Pain controlled.       Medications  Current Facility-Administered Medications   Medication Dose Route Frequency Provider Last Rate Last Admin    acetaminophen (TYLENOL) tablet 975 mg  975 mg Oral Q8H Matilda Maher PA-C   975 mg at 03/17/25 0015    amiodarone (PACERONE) tablet 400 mg  400 mg Oral BID Maher, " TIMUR BlakeC   400 mg at 03/16/25 2113    aspirin (ASA) chewable tablet 162 mg  162 mg Oral or NG Tube Daily MaherMatilda bryant PA-C   162 mg at 03/16/25 0739    atorvastatin (LIPITOR) tablet 40 mg  40 mg Oral QPM Matilda Maher PA-C   40 mg at 03/16/25 2015    heparin ANTICOAGULANT injection 5,000 Units  5,000 Units Subcutaneous Q8H MaherMatilda bryant PA-C   5,000 Units at 03/17/25 0542    insulin aspart (NovoLOG) injection (RAPID ACTING)  1-7 Units Subcutaneous TID AC MaherMatilda bryant PA-C        insulin aspart (NovoLOG) injection (RAPID ACTING)  1-5 Units Subcutaneous At Bedtime Matilda Maher PA-C        Lidocaine (LIDOCARE) 4 % Patch 1-2 patch  1-2 patch Transdermal Q24H MaherMatilda bryant PA-C   1 patch at 03/16/25 2021    methocarbamol (ROBAXIN) half-tab 250 mg  250 mg Oral 4x Daily MaherMatilda bryant PA-C   250 mg at 03/16/25 2112    pantoprazole (PROTONIX) 2 mg/mL suspension 40 mg  40 mg Oral or NG Tube Daily MaherMatilda bryant PA-C   40 mg at 03/15/25 0738    Or    pantoprazole (PROTONIX) EC tablet 40 mg  40 mg Oral Daily MaherMatilda byrant PA-C   40 mg at 03/16/25 0740    polyethylene glycol (MIRALAX) Packet 17 g  17 g Oral Daily Matilda Maher PA-C   17 g at 03/16/25 0733    senna-docusate (SENOKOT-S/PERICOLACE) 8.6-50 MG per tablet 1 tablet  1 tablet Oral BID Matilda Maher PA-C   1 tablet at 03/16/25 2016    sodium chloride (PF) 0.9% PF flush 3 mL  3 mL Intracatheter Q8H Matilda Maher PA-C   3 mL at 03/17/25 0007     Current Facility-Administered Medications   Medication Dose Route Frequency Provider Last Rate Last Admin    alum & mag hydroxide-simethicone (MAALOX) suspension 30 mL  30 mL Oral Q4H PRN Matilda Maher PA-C        bisacodyl (DULCOLAX) suppository 10 mg  10 mg Rectal Daily PRN Matilda Maher PA-C        calcium carbonate (TUMS) chewable tablet 500 mg  500 mg Oral 4x Daily PRN Matilda Maher PA-C        calcium gluconate 1 g in 50 mL in sodium chloride intermittent infusion  1 g  Intravenous Q6H PRN Maher, SUKHDEV Blake-C        calcium gluconate 2 g in  mL intermittent infusion  2 g Intravenous Q6H PRN Maher, SUKHDEV Blake-C        calcium gluconate 3 g in sodium chloride 0.9 % 100 mL intermittent infusion  3 g Intravenous Q6H PRN Maher, SUKHDEV Blake-C        glucose gel 15-30 g  15-30 g Oral Q15 Min PRN MaherMatilda PA-C        Or    dextrose 50 % injection 25-50 mL  25-50 mL Intravenous Q15 Min PRN Maher, SUKHDEV Blake-C        Or    glucagon injection 1 mg  1 mg Subcutaneous Q15 Min PRN Maher, SUKHDEV Blake-C        hydrALAZINE (APRESOLINE) injection 10 mg  10 mg Intravenous Q30 Min PRN MaherMatilda PA-C        HYDROmorphone (DILAUDID) injection 0.2 mg  0.2 mg Intravenous Q2H PRN MaherMatilda PA-C   0.2 mg at 03/17/25 0626    Or    HYDROmorphone (DILAUDID) injection 0.4 mg  0.4 mg Intravenous Q2H PRN Maher, TIMUR BlakeC        hydrOXYzine HCl (ATARAX) tablet 10 mg  10 mg Oral Q6H PRN MaherMatilda PA-C        lactated ringers BOLUS 250 mL  250 mL Intravenous Q10 Min PRN Maher, TIMUR BlakeC        lidocaine (LMX4) cream   Topical Q1H PRN MaherMatilda PA-C        lidocaine 1 % 0.1-1 mL  0.1-1 mL Other Q1H PRN MaherMatilda PA-C        magnesium hydroxide (MILK OF MAGNESIA) suspension 30 mL  30 mL Oral Daily PRN Maher, Matilda AVALOS PA-C        metoprolol (LOPRESSOR) injection 5 mg  5 mg Intravenous Q5 Min PRN MaherMatilda PA-C   5 mg at 03/16/25 0926    naloxone (NARCAN) injection 0.2 mg  0.2 mg Intravenous Q2 Min PRN Alek Wilcox RPH        Or    naloxone (NARCAN) injection 0.4 mg  0.4 mg Intravenous Q2 Min PRN Alek Wilcox RPH        Or    naloxone (NARCAN) injection 0.2 mg  0.2 mg Intramuscular Q2 Min PRN Alek Wilcox RPH        Or    naloxone (NARCAN) injection 0.4 mg  0.4 mg Intramuscular Q2 Min PRN Alek Wilcox RPH        ondansetron (ZOFRAN ODT) ODT tab 4 mg  4 mg Oral Q6H PRN Matilda Maher PA-C        Or    ondansetron (ZOFRAN) injection 4  "mg  4 mg Intravenous Q6H PRN Maher, Matilda C, PA-C   4 mg at 03/16/25 0624    oxyCODONE (ROXICODONE) tablet 5 mg  5 mg Oral Q4H PRN Maher, Matilda C, PA-C   5 mg at 03/16/25 2142    Or    oxyCODONE (ROXICODONE) tablet 10 mg  10 mg Oral Q4H PRN Maher, Matilda C, PA-C   10 mg at 03/17/25 0320    prochlorperazine (COMPAZINE) injection 5 mg  5 mg Intravenous Q6H PRN Maher, Matilda C, PA-C   5 mg at 03/16/25 0733    Or    prochlorperazine (COMPAZINE) tablet 5 mg  5 mg Oral Q6H PRN Maher, Matilda C, PA-C        Reason beta blocker order not selected   Does not apply DOES NOT GO TO MAR Maher, Matilda C, PA-C        sodium chloride (PF) 0.9% PF flush 3 mL  3 mL Intracatheter q1 min prn Maher, Matilda C, PA-C             Physical Exam  Vitals were reviewed  Blood pressure 106/58, pulse 65, temperature 98.6  F (37  C), resp. rate 10, height 1.6 m (5' 3\"), weight 65.1 kg (143 lb 8.3 oz), SpO2 95%.  Rhythm: NSR    Lungs: diminished bases    Cardiovascular: rrr, no m/r/g    Abdomen: soft, NT, ND, +BS    Extremeties: warm, trace LE edema    Incision: CDI    CT: serosang output 920 mL, no air leak    Weight:   Vitals:    03/14/25 0905 03/15/25 0630 03/16/25 0645 03/17/25 0600   Weight: 59.1 kg (130 lb 4.8 oz) 61.7 kg (136 lb 0.4 oz) 63.3 kg (139 lb 8.8 oz) 65.1 kg (143 lb 8.3 oz)         Data  Recent Labs   Lab 03/17/25  0420 03/16/25 2128 03/16/25  1749 03/16/25  1331 03/16/25  0442 03/15/25  1251 03/15/25  0422 03/14/25  2125 03/14/25  1753 03/14/25  1557   WBC 11.3*  --   --   --  11.4*  --  9.8  --  10.1 14.3*   HGB 8.5*  --   --   --  8.8*  --  9.6*  --  10.7* 9.8*  10.0*   MCV 91  --   --   --  91  --  89  --  88 88     --   --   --  159  --  219  --  184 207   INR  --   --   --   --   --   --   --   --  1.35* 1.53*   *  --   --   --  131*  --  135  --  135 135  134*   POTASSIUM 3.9  --   --   --  3.9  --  3.8  --  3.5 3.4  3.7   CHLORIDE 97*  --   --   --  99  --  102  --  103 102   CO2 26  --   --   --  24  --  " 20*  --  21* 23   BUN 16.3  --   --   --  15.2  --  12.3  --  9.1 9.1   CR 0.58  --   --   --  0.60  --  0.69  --  0.50* 0.48*   ANIONGAP 8  --   --   --  8  --  13  --  11 10   IGNACIO 8.2*  --   --   --  8.6*  --  8.3*  --  8.3* 9.1   GLC 97 106* 92   < > 128*   < > 124*   < > 125* 129*  136*   ALBUMIN  --   --   --   --   --   --  3.6  --  3.2*  --    PROTTOTAL  --   --   --   --   --   --  5.6*  --  5.3*  --    BILITOTAL  --   --   --   --   --   --  0.6  --  0.6  --    ALKPHOS  --   --   --   --   --   --  38*  --  42  --    ALT  --   --   --   --   --   --  8  --  10  --    AST  --   --   --   --   --   --  37  --  34  --     < > = values in this interval not displayed.       Imaging:  Recent Results (from the past 24 hours)   XR Chest Port 1 View    Narrative    EXAM: XR CHEST PORT 1 VIEW  LOCATION: [facility]  DATE: 3/16/2025    INDICATION: Follow-up right pneumothorax.  COMPARISON: 3/15/2025      Impression    IMPRESSION: Endotracheal and orogastric tubes have been removed. Other lines and tubes unchanged. 8mm right apical pneumothorax previously measured 10 mm. Sternotomy and aortic valve replacement redemonstrated. Heart size and vascularity are normal.   Minimal bibasilar atelectasis and small effusions unchanged.           Patient seen and discussed with Dr. Vignesh Maher PA-C  Cardiothoracic Surgery  Available for paging [phone] (personal pager or CV Surgery Rounding Pager)  Personal Pager: [phone]  CV Surgery Rounding Pager: [phone]  After hours please page surgeon on-call

## 2025-03-17 NOTE — PLAN OF CARE
Goal Outcome Evaluation:      Plan of Care Reviewed With: patient, family          Outcome Evaluation: discharge to sisters home with outpatient cardiac rehab and specialty follow up

## 2025-03-18 ENCOUNTER — APPOINTMENT (OUTPATIENT)
Dept: PHYSICAL THERAPY | Facility: CLINIC | Age: 75
DRG: 219 | End: 2025-03-18
Attending: STUDENT IN AN ORGANIZED HEALTH CARE EDUCATION/TRAINING PROGRAM
Payer: COMMERCIAL

## 2025-03-18 LAB
ANION GAP SERPL CALCULATED.3IONS-SCNC: 6 MMOL/L (ref 7–15)
ANION GAP SERPL CALCULATED.3IONS-SCNC: 9 MMOL/L (ref 7–15)
BUN SERPL-MCNC: 10.5 MG/DL (ref 8–23)
BUN SERPL-MCNC: 12.8 MG/DL (ref 8–23)
CA-I BLD-MCNC: 4.5 MG/DL (ref 4.4–5.2)
CALCIUM SERPL-MCNC: 8 MG/DL (ref 8.8–10.4)
CALCIUM SERPL-MCNC: 8 MG/DL (ref 8.8–10.4)
CHLORIDE SERPL-SCNC: 95 MMOL/L (ref 98–107)
CHLORIDE SERPL-SCNC: 97 MMOL/L (ref 98–107)
CREAT SERPL-MCNC: 0.52 MG/DL (ref 0.51–0.95)
CREAT SERPL-MCNC: 0.55 MG/DL (ref 0.51–0.95)
EGFRCR SERPLBLD CKD-EPI 2021: >90 ML/MIN/1.73M2
EGFRCR SERPLBLD CKD-EPI 2021: >90 ML/MIN/1.73M2
ERYTHROCYTE [DISTWIDTH] IN BLOOD BY AUTOMATED COUNT: 13.5 % (ref 10–15)
GLUCOSE BLDC GLUCOMTR-MCNC: 126 MG/DL (ref 70–99)
GLUCOSE BLDC GLUCOMTR-MCNC: 98 MG/DL (ref 70–99)
GLUCOSE SERPL-MCNC: 109 MG/DL (ref 70–99)
GLUCOSE SERPL-MCNC: 86 MG/DL (ref 70–99)
HCO3 SERPL-SCNC: 29 MMOL/L (ref 22–29)
HCO3 SERPL-SCNC: 30 MMOL/L (ref 22–29)
HCT VFR BLD AUTO: 24 % (ref 35–47)
HGB BLD-MCNC: 8.1 G/DL (ref 11.7–15.7)
INR PPP: 1.19 (ref 0.85–1.15)
MAGNESIUM SERPL-MCNC: 2 MG/DL (ref 1.7–2.3)
MAGNESIUM SERPL-MCNC: 2 MG/DL (ref 1.7–2.3)
MCH RBC QN AUTO: 30.7 PG (ref 26.5–33)
MCHC RBC AUTO-ENTMCNC: 33.8 G/DL (ref 31.5–36.5)
MCV RBC AUTO: 91 FL (ref 78–100)
PHOSPHATE SERPL-MCNC: 2.1 MG/DL (ref 2.5–4.5)
PHOSPHATE SERPL-MCNC: 2.5 MG/DL (ref 2.5–4.5)
PLATELET # BLD AUTO: 176 10E3/UL (ref 150–450)
POTASSIUM SERPL-SCNC: 3.3 MMOL/L (ref 3.4–5.3)
POTASSIUM SERPL-SCNC: 3.8 MMOL/L (ref 3.4–5.3)
POTASSIUM SERPL-SCNC: 4.2 MMOL/L (ref 3.4–5.3)
RBC # BLD AUTO: 2.64 10E6/UL (ref 3.8–5.2)
SODIUM SERPL-SCNC: 133 MMOL/L (ref 135–145)
SODIUM SERPL-SCNC: 133 MMOL/L (ref 135–145)
WBC # BLD AUTO: 6.9 10E3/UL (ref 4–11)

## 2025-03-18 PROCEDURE — 85610 PROTHROMBIN TIME: CPT | Performed by: PHYSICIAN ASSISTANT

## 2025-03-18 PROCEDURE — 84295 ASSAY OF SERUM SODIUM: CPT | Performed by: PHYSICIAN ASSISTANT

## 2025-03-18 PROCEDURE — 84100 ASSAY OF PHOSPHORUS: CPT | Performed by: STUDENT IN AN ORGANIZED HEALTH CARE EDUCATION/TRAINING PROGRAM

## 2025-03-18 PROCEDURE — 85027 COMPLETE CBC AUTOMATED: CPT | Performed by: PHYSICIAN ASSISTANT

## 2025-03-18 PROCEDURE — 84100 ASSAY OF PHOSPHORUS: CPT | Performed by: INTERNAL MEDICINE

## 2025-03-18 PROCEDURE — 83735 ASSAY OF MAGNESIUM: CPT | Performed by: INTERNAL MEDICINE

## 2025-03-18 PROCEDURE — 250N000009 HC RX 250: Performed by: PHYSICIAN ASSISTANT

## 2025-03-18 PROCEDURE — 250N000013 HC RX MED GY IP 250 OP 250 PS 637: Performed by: STUDENT IN AN ORGANIZED HEALTH CARE EDUCATION/TRAINING PROGRAM

## 2025-03-18 PROCEDURE — 97110 THERAPEUTIC EXERCISES: CPT | Mod: GP | Performed by: PHYSICAL THERAPIST

## 2025-03-18 PROCEDURE — 84132 ASSAY OF SERUM POTASSIUM: CPT | Performed by: STUDENT IN AN ORGANIZED HEALTH CARE EDUCATION/TRAINING PROGRAM

## 2025-03-18 PROCEDURE — 250N000013 HC RX MED GY IP 250 OP 250 PS 637: Performed by: PHYSICIAN ASSISTANT

## 2025-03-18 PROCEDURE — 82330 ASSAY OF CALCIUM: CPT | Performed by: PHYSICIAN ASSISTANT

## 2025-03-18 PROCEDURE — 97530 THERAPEUTIC ACTIVITIES: CPT | Mod: GP | Performed by: PHYSICAL THERAPIST

## 2025-03-18 PROCEDURE — 250N000011 HC RX IP 250 OP 636: Performed by: PHYSICIAN ASSISTANT

## 2025-03-18 PROCEDURE — 120N000013 HC R&B IMCU

## 2025-03-18 PROCEDURE — 83735 ASSAY OF MAGNESIUM: CPT | Performed by: STUDENT IN AN ORGANIZED HEALTH CARE EDUCATION/TRAINING PROGRAM

## 2025-03-18 PROCEDURE — 82435 ASSAY OF BLOOD CHLORIDE: CPT | Performed by: PHYSICIAN ASSISTANT

## 2025-03-18 RX ORDER — AMIODARONE HYDROCHLORIDE 200 MG/1
400 TABLET ORAL 2 TIMES DAILY
Status: DISCONTINUED | OUTPATIENT
Start: 2025-03-18 | End: 2025-03-22 | Stop reason: HOSPADM

## 2025-03-18 RX ORDER — POTASSIUM CHLORIDE 1.5 G/1.58G
40 POWDER, FOR SOLUTION ORAL ONCE
Status: COMPLETED | OUTPATIENT
Start: 2025-03-18 | End: 2025-03-18

## 2025-03-18 RX ORDER — MAGNESIUM OXIDE 400 MG/1
400 TABLET ORAL EVERY 4 HOURS
Status: COMPLETED | OUTPATIENT
Start: 2025-03-18 | End: 2025-03-18

## 2025-03-18 RX ORDER — ASPIRIN 81 MG/1
81 TABLET, CHEWABLE ORAL DAILY
Status: DISCONTINUED | OUTPATIENT
Start: 2025-03-19 | End: 2025-03-22 | Stop reason: HOSPADM

## 2025-03-18 RX ORDER — WARFARIN SODIUM 3 MG/1
3 TABLET ORAL
Status: COMPLETED | OUTPATIENT
Start: 2025-03-18 | End: 2025-03-18

## 2025-03-18 RX ADMIN — AMIODARONE HYDROCHLORIDE 400 MG: 200 TABLET ORAL at 21:25

## 2025-03-18 RX ADMIN — SENNOSIDES AND DOCUSATE SODIUM 1 TABLET: 50; 8.6 TABLET ORAL at 09:02

## 2025-03-18 RX ADMIN — POTASSIUM CHLORIDE 40 MEQ: 1.5 POWDER, FOR SOLUTION ORAL at 06:36

## 2025-03-18 RX ADMIN — METHOCARBAMOL 250 MG: 500 TABLET ORAL at 18:22

## 2025-03-18 RX ADMIN — ACETAMINOPHEN 975 MG: 325 TABLET, FILM COATED ORAL at 00:39

## 2025-03-18 RX ADMIN — PANTOPRAZOLE SODIUM 40 MG: 40 TABLET, DELAYED RELEASE ORAL at 09:02

## 2025-03-18 RX ADMIN — METOPROLOL TARTRATE 5 MG: 5 INJECTION INTRAVENOUS at 21:24

## 2025-03-18 RX ADMIN — METOPROLOL TARTRATE 12.5 MG: 25 TABLET, FILM COATED ORAL at 22:43

## 2025-03-18 RX ADMIN — HEPARIN SODIUM 5000 UNITS: 5000 INJECTION, SOLUTION INTRAVENOUS; SUBCUTANEOUS at 21:25

## 2025-03-18 RX ADMIN — AMIODARONE HYDROCHLORIDE 0.5 MG/MIN: 1.8 INJECTION, SOLUTION INTRAVENOUS at 00:11

## 2025-03-18 RX ADMIN — ASPIRIN 162 MG: 81 TABLET, CHEWABLE ORAL at 09:02

## 2025-03-18 RX ADMIN — METHOCARBAMOL 250 MG: 500 TABLET ORAL at 13:00

## 2025-03-18 RX ADMIN — POLYETHYLENE GLYCOL 3350 17 G: 17 POWDER, FOR SOLUTION ORAL at 09:02

## 2025-03-18 RX ADMIN — HEPARIN SODIUM 5000 UNITS: 5000 INJECTION, SOLUTION INTRAVENOUS; SUBCUTANEOUS at 05:09

## 2025-03-18 RX ADMIN — ACETAMINOPHEN 975 MG: 325 TABLET, FILM COATED ORAL at 09:02

## 2025-03-18 RX ADMIN — AMIODARONE HYDROCHLORIDE 400 MG: 200 TABLET ORAL at 09:02

## 2025-03-18 RX ADMIN — WARFARIN SODIUM 3 MG: 3 TABLET ORAL at 18:22

## 2025-03-18 RX ADMIN — Medication 400 MG: at 09:33

## 2025-03-18 RX ADMIN — OXYCODONE HYDROCHLORIDE 5 MG: 5 TABLET ORAL at 04:41

## 2025-03-18 RX ADMIN — LIDOCAINE 2 PATCH: 4 PATCH TOPICAL at 21:25

## 2025-03-18 RX ADMIN — POTASSIUM & SODIUM PHOSPHATES POWDER PACK 280-160-250 MG 1 PACKET: 280-160-250 PACK at 06:36

## 2025-03-18 RX ADMIN — METHOCARBAMOL 250 MG: 500 TABLET ORAL at 09:02

## 2025-03-18 RX ADMIN — POTASSIUM & SODIUM PHOSPHATES POWDER PACK 280-160-250 MG 1 PACKET: 280-160-250 PACK at 09:33

## 2025-03-18 RX ADMIN — HEPARIN SODIUM 5000 UNITS: 5000 INJECTION, SOLUTION INTRAVENOUS; SUBCUTANEOUS at 13:00

## 2025-03-18 RX ADMIN — OXYCODONE HYDROCHLORIDE 5 MG: 5 TABLET ORAL at 18:30

## 2025-03-18 RX ADMIN — Medication 400 MG: at 06:36

## 2025-03-18 RX ADMIN — METHOCARBAMOL 250 MG: 500 TABLET ORAL at 21:25

## 2025-03-18 RX ADMIN — ATORVASTATIN CALCIUM 40 MG: 40 TABLET, FILM COATED ORAL at 21:25

## 2025-03-18 RX ADMIN — ACETAMINOPHEN 975 MG: 325 TABLET, FILM COATED ORAL at 16:19

## 2025-03-18 ASSESSMENT — ACTIVITIES OF DAILY LIVING (ADL)
ADLS_ACUITY_SCORE: 42
ADLS_ACUITY_SCORE: 42
ADLS_ACUITY_SCORE: 46
ADLS_ACUITY_SCORE: 44
ADLS_ACUITY_SCORE: 42
ADLS_ACUITY_SCORE: 42
ADLS_ACUITY_SCORE: 44
ADLS_ACUITY_SCORE: 44
ADLS_ACUITY_SCORE: 46
ADLS_ACUITY_SCORE: 44
ADLS_ACUITY_SCORE: 41
ADLS_ACUITY_SCORE: 42
ADLS_ACUITY_SCORE: 41
ADLS_ACUITY_SCORE: 44
ADLS_ACUITY_SCORE: 41
ADLS_ACUITY_SCORE: 42

## 2025-03-18 NOTE — PROGRESS NOTES
CV  Pressors (which pressors and any increase/decrease in pressor needs): Weaned off kirk (ineffective while in A-fib), switched to levo. Levo at 0.1  HR range: . Converted back to SR around 1700.  Chest tube output: 590    Neuro  Orientation: A&Ox4.   Delirium present?(y/n):  No  Sleep: Poor sleep overnight.  Pain: 4-7/10. 10mg Oxy and scheduled robaxin and tylenol.     GI/  BM? (y/n): No  Urine output: 1,785      Lines:  R internal jugular with introducer. R radial A-Line

## 2025-03-18 NOTE — PLAN OF CARE
Neuro: Aox4. Afebrile.   CV: MAP >65. SBP high 's. Levo gtt stopped @ 0500. HR 60-70 in SR.   Resp: 2L NC during night. LS clear.   Endocrine: -120's.   GI/: Voiding spontaneously to bedside commode. No BM. BS active.   Skin: See flowsheet.   Lines/Gtts: R internal jugular and R radial Art. Amio gtt. Levo gtt off @ 0500. Chest tubes draining adequately.   POC: Transfer when able.   Other: PRN Oxycodone given for pain. K+, Mg, Phos replaced this AM.     Goal Outcome Evaluation:      Plan of Care Reviewed With: patient    Overall Patient Progress: improvingOverall Patient Progress: improving    Problem: Adult Inpatient Plan of Care  Goal: Plan of Care Review  Description: The Plan of Care Review/Shift note should be completed every shift.  The Outcome Evaluation is a brief statement about your assessment that the patient is improving, declining, or no change.  This information will be displayed automatically on your shift  note.  Outcome: Progressing  Flowsheets (Taken 3/18/2025 0755)  Plan of Care Reviewed With: patient  Overall Patient Progress: improving     Problem: Cardiovascular Surgery  Goal: Improved Activity Tolerance  Outcome: Progressing  Intervention: Optimize Tolerance for Activity  Recent Flowsheet Documentation  Taken 3/18/2025 0400 by Brandi Abdalla RN  Environmental Support:   calm environment promoted   caregiver consistency promoted   environmental consistency promoted   rest periods encouraged  Taken 3/18/2025 0000 by Brandi Abdalla RN  Environmental Support:   calm environment promoted   caregiver consistency promoted   environmental consistency promoted   rest periods encouraged  Goal: Optimal Coping with Heart Surgery  Outcome: Progressing  Intervention: Support Psychosocial Response to Surgery  Recent Flowsheet Documentation  Taken 3/18/2025 0400 by Brandi Abdalla RN  Supportive Measures:   active listening utilized   positive reinforcement provided    problem-solving facilitated   relaxation techniques promoted   verbalization of feelings encouraged  Family/Support System Care: support provided  Taken 3/18/2025 0000 by Brandi Abdalla RN  Supportive Measures:   active listening utilized   positive reinforcement provided   problem-solving facilitated   relaxation techniques promoted   verbalization of feelings encouraged  Family/Support System Care: support provided  Goal: Absence of Bleeding  Outcome: Progressing  Intervention: Monitor and Manage Bleeding  Recent Flowsheet Documentation  Taken 3/18/2025 0400 by Brandi Abdalla RN  Bleeding Management: dressing monitored  Taken 3/18/2025 0000 by Brandi Abdalla RN  Bleeding Management: dressing monitored  Goal: Effective Bowel Elimination  Outcome: Progressing  Goal: Effective Cardiac Function  Outcome: Progressing  Intervention: Optimize Cardiac Output and Blood Flow  Recent Flowsheet Documentation  Taken 3/18/2025 0400 by Brandi Abdalla RN  Dysrhythmia Management: pacing wires maintained  Taken 3/18/2025 0000 by Brandi Abdalla RN  Dysrhythmia Management: pacing wires maintained  Goal: Optimal Cerebral Tissue Perfusion  Outcome: Progressing  Intervention: Protect and Optimize Cerebral Perfusion  Recent Flowsheet Documentation  Taken 3/18/2025 0600 by Brandi Abdalla RN  Head of Bed (HOB) Positioning: HOB at 30 degrees  Taken 3/18/2025 0400 by Brandi Abdalla RN  Sensory Stimulation Regulation:   auditory stimulation minimized   care clustered   quiet environment promoted  Cerebral Perfusion Promotion:   blood pressure monitored   normothermia promoted  Glycemic Management: blood glucose monitored  Head of Bed (HOB) Positioning: HOB at 30 degrees  Taken 3/18/2025 0200 by Brandi Abdalla RN  Head of Bed (HOB) Positioning: HOB at 30 degrees  Taken 3/18/2025 0000 by Brandi Abdalla RN  Sensory Stimulation Regulation:   auditory  stimulation minimized   care clustered   quiet environment promoted  Cerebral Perfusion Promotion:   blood pressure monitored   normothermia promoted  Glycemic Management: blood glucose monitored  Head of Bed (HOB) Positioning: HOB at 30 degrees  Goal: Fluid and Electrolyte Balance  Outcome: Progressing  Intervention: Monitor and Manage Fluid and Electrolyte Balance  Recent Flowsheet Documentation  Taken 3/18/2025 0400 by Brandi Abdalla RN  Fluid/Electrolyte Management: fluids provided  Taken 3/18/2025 0000 by Brandi Abdalla RN  Fluid/Electrolyte Management: fluids provided  Goal: Absence of Infection Signs and Symptoms  Outcome: Progressing  Goal: Acceptable Pain Control  Outcome: Progressing  Goal: Effective Urinary Elimination  Outcome: Progressing  Intervention: Monitor and Manage Urinary Retention  Recent Flowsheet Documentation  Taken 3/18/2025 0400 by Brandi Abdalla RN  Urinary Elimination Promotion:   positioned for ease of voiding   toileting device within reach   toileting offered   voiding relaxation promoted  Taken 3/18/2025 0000 by Brandi Abdalla RN  Urinary Elimination Promotion:   positioned for ease of voiding   toileting device within reach   toileting offered   voiding relaxation promoted  Goal: Effective Oxygenation and Ventilation  Outcome: Progressing  Intervention: Promote Airway Secretion Clearance  Recent Flowsheet Documentation  Taken 3/18/2025 0400 by Brandi Abdalla RN  Administration (IS): self-administered  Cough And Deep Breathing: done independently per patient  Taken 3/18/2025 0000 by Brandi Abdalla RN  Administration (IS): self-administered  Cough And Deep Breathing: done independently per patient  Intervention: Optimize Oxygenation and Ventilation  Recent Flowsheet Documentation  Taken 3/18/2025 0400 by Brandi Abdalla RN  Chest Tube Safety:   all connections secured   all tubing connections taped   petroleum gauze  dressing with patient   rubber-tipped hemostat(s) with patient   suction checked  Taken 3/18/2025 0000 by Brandi Abdalla, RN  Chest Tube Safety:   all connections secured   all tubing connections taped   petroleum gauze dressing with patient   rubber-tipped hemostat(s) with patient   suction checked

## 2025-03-18 NOTE — PLAN OF CARE
Goal Outcome Evaluation:      Plan of Care Reviewed With: patient, child, family    Pt here with CAD, Aortic stenosis. POD #4 from a CABG x1.Chest tube in place w/ adequate output, dressing CDI.  A&O X4. Neuros see chart.  VSS on 2L NC. Tele  SR. Regular diet, takes pills whole with thin liquids. R. Triple lumen internal jugular, w/ R A-line. Amio infusion stopped this shift and switched to PO per order. Continent of B&B. Up with A1/to BSC. Pain managed with scheduled tylenol.     Problem: Cardiovascular Surgery  Goal: Improved Activity Tolerance  Outcome: Progressing  Intervention: Optimize Tolerance for Activity  Recent Flowsheet Documentation  Taken 3/18/2025 1200 by Markell Sandoval RN  Environmental Support:   calm environment promoted   caregiver consistency promoted   environmental consistency promoted   rest periods encouraged   distractions minimized  Taken 3/18/2025 0800 by Markell Sandoval RN  Environmental Support:   calm environment promoted   caregiver consistency promoted   environmental consistency promoted   rest periods encouraged   distractions minimized  Goal: Optimal Coping with Heart Surgery  Outcome: Progressing  Intervention: Support Psychosocial Response to Surgery  Recent Flowsheet Documentation  Taken 3/18/2025 1200 by Markell Sandoval RN  Supportive Measures:   active listening utilized   positive reinforcement provided   relaxation techniques promoted   verbalization of feelings encouraged   decision-making supported  Taken 3/18/2025 0800 by Markell Sandoval RN  Supportive Measures:   active listening utilized   positive reinforcement provided   relaxation techniques promoted   verbalization of feelings encouraged   decision-making supported  Goal: Absence of Bleeding  Outcome: Progressing  Intervention: Monitor and Manage Bleeding  Recent Flowsheet Documentation  Taken 3/18/2025 1200 by Markell Sandoval RN  Bleeding Management: dressing monitored  Taken 3/18/2025 0800 by Jaime  Markell SIFUENTES RN  Bleeding Management: dressing monitored  Goal: Effective Bowel Elimination  Outcome: Progressing  Goal: Effective Cardiac Function  Outcome: Progressing  Intervention: Optimize Cardiac Output and Blood Flow  Recent Flowsheet Documentation  Taken 3/18/2025 1200 by Markell Sandoval RN  Dysrhythmia Management: pacing wires maintained  Stabilization Measures: legs elevated  Taken 3/18/2025 0800 by Markell Sandoval RN  Dysrhythmia Management: pacing wires maintained  Stabilization Measures: legs elevated  Goal: Optimal Cerebral Tissue Perfusion  Outcome: Progressing  Intervention: Protect and Optimize Cerebral Perfusion  Recent Flowsheet Documentation  Taken 3/18/2025 1400 by Markell Sandoval RN  Head of Bed (HOB) Positioning: HOB at 30 degrees  Taken 3/18/2025 1200 by Markell Sandoval RN  Sensory Stimulation Regulation:   auditory stimulation minimized   care clustered   lighting decreased   quiet environment promoted  Cerebral Perfusion Promotion:   blood pressure monitored   normothermia promoted  Glycemic Management: blood glucose monitored  Head of Bed (HOB) Positioning: HOB at 30 degrees  Taken 3/18/2025 1000 by Markell Sandoval RN  Head of Bed (HOB) Positioning: HOB at 30 degrees  Taken 3/18/2025 0800 by Markell Sandoval RN  Sensory Stimulation Regulation:   auditory stimulation minimized   care clustered   lighting decreased   quiet environment promoted  Cerebral Perfusion Promotion:   blood pressure monitored   normothermia promoted  Glycemic Management: blood glucose monitored  Head of Bed (HOB) Positioning: HOB at 30 degrees  Goal: Fluid and Electrolyte Balance  Outcome: Progressing  Goal: Blood Glucose Level Within Targeted Range  Intervention: Optimize Glycemic Control  Recent Flowsheet Documentation  Taken 3/18/2025 1200 by Markell Sandoval RN  Glycemic Management: blood glucose monitored  Taken 3/18/2025 0800 by Markell Sandoval RN  Glycemic Management: blood glucose  monitored  Goal: Absence of Infection Signs and Symptoms  Outcome: Progressing  Goal: Anesthesia/Sedation Recovery  Intervention: Optimize Anesthesia Recovery  Recent Flowsheet Documentation  Taken 3/18/2025 1400 by Markell Sandoval RN  Safety Promotion/Fall Prevention: safety round/check completed  Taken 3/18/2025 1200 by Markell Sandoval RN  Safety Promotion/Fall Prevention:   activity supervised   clutter free environment maintained   increased rounding and observation   increase visualization of patient   lighting adjusted   nonskid shoes/slippers when out of bed   patient and family education   room door open   room near nurse's station   safety round/check completed   treat reversible contributory factors   assistive device/personal items within reach  Administration (IS): self-administered  Reorientation Measures:   clock in view   glasses use encouraged  Patient Tolerance (IS): good  Stabilization Measures: legs elevated  Taken 3/18/2025 1000 by Markell Sandoval RN  Safety Promotion/Fall Prevention: safety round/check completed  Taken 3/18/2025 0800 by Markell Sandoval RN  Safety Promotion/Fall Prevention:   activity supervised   clutter free environment maintained   increased rounding and observation   increase visualization of patient   lighting adjusted   nonskid shoes/slippers when out of bed   patient and family education   room door open   room near nurse's station   safety round/check completed   treat reversible contributory factors   assistive device/personal items within reach  Administration (IS): self-administered  Reorientation Measures:   clock in view   glasses use encouraged  Patient Tolerance (IS): good  Stabilization Measures: legs elevated  Goal: Acceptable Pain Control  Outcome: Progressing  Goal: Effective Urinary Elimination  Outcome: Progressing  Intervention: Monitor and Manage Urinary Retention  Recent Flowsheet Documentation  Taken 3/18/2025 1200 by Markell Sandoval RN  Urinary  Elimination Promotion: catheter patency maintained  Taken 3/18/2025 0800 by Markell Sandoval RN  Urinary Elimination Promotion: catheter patency maintained  Goal: Effective Oxygenation and Ventilation  Outcome: Progressing  Intervention: Promote Airway Secretion Clearance  Recent Flowsheet Documentation  Taken 3/18/2025 1200 by Markell Sandoval RN  Administration (IS): self-administered  Cough And Deep Breathing: done independently per patient  Patient Tolerance (IS): good  Taken 3/18/2025 0800 by Markell Sandoval RN  Administration (IS): self-administered  Cough And Deep Breathing: done independently per patient  Patient Tolerance (IS): good  Intervention: Optimize Oxygenation and Ventilation  Recent Flowsheet Documentation  Taken 3/18/2025 1200 by Markell Sandoval RN  Chest Tube Safety:   all connections secured   all tubing connections taped   petroleum gauze dressing with patient   rubber-tipped hemostat(s) with patient   suction checked  Taken 3/18/2025 0800 by Markell Sandoval RN  Chest Tube Safety:   all connections secured   all tubing connections taped   petroleum gauze dressing with patient   rubber-tipped hemostat(s) with patient   suction checked     Problem: Cardiovascular Surgery  Goal: Absence of Bleeding  Outcome: Progressing  Intervention: Monitor and Manage Bleeding  Recent Flowsheet Documentation  Taken 3/18/2025 1200 by Markell Sandoval RN  Bleeding Management: dressing monitored  Taken 3/18/2025 0800 by Markell Sandoval RN  Bleeding Management: dressing monitored

## 2025-03-18 NOTE — PROGRESS NOTES
Pt 360 TD, Pt NA is 133, do you want to add a sodium tab for her?  Do you still want us monitoring C/O, CI and transducing her?  Are you planning on transferring her out today?  Via [facility] to Matilda Maher. Cardiothoracic surg.

## 2025-03-18 NOTE — PROGRESS NOTES
[facility]  Cardiovascular and Thoracic Surgery Daily Note      Assessment and Plan  Tri Walden is a 74 year old female with a PMH of hypertension, dyslipidemia, IBS, and progressive severe aortic stenosis and single vessel CAD ( LAD) who was  recently referred for valve replacement and surgical revascularization. Admitted 03/14 following bioprosthetic AVR (23 mm Inspiris Resilia) and CABG x1 ([place] to LAD) with Dr. Robert Medellin.     POD # 4 s/p bioprosthetic AVR (23 mm Inspiris Resilia) and CABG x1 ([place] to LAD) on 03/14/2025 with Dr. Robert Medellin.    - CVS:   Pre-op TTE with preserved biventricular function, severe aortic stenosis.  Postop vasoplegic shock, improving. Weaned off pressor POD4.  Filling pressures elevated and weight. Robust response to Lasix 40 mg IV yesterday, and was given 500 albumin back. Defer diuretic today with soft BP.   Afib RVR POD1, converted following IV amiodarone protocol and transitioned to PO with taper. Recurrent afib RVR POD3 with hypotension, rebolused amio and resumed infusion, now transitioned to PO with taper.  Warfarin to INR goal 2-3 x 3 months for bioprosthetic AVR per surgeon. Can be switched to DOAC after 3 months if needed for afib prophylaxis. Defer heparin bridigning at present unless she has an other occurrence of afib per surgeon.    Aspirin 81 mg daily, change PTA simvastatin to atorvastatin (interaction with amiodarone)  Chest tubes: output 960, no air leak. TPW: remove today    - Resp: Postoperative mechanical ventilation, resolved. Extubated POD1 AM (failed PST x 4 POD0 with apnea and low tidal volumes). IS, pulmonary toilet.    - Neuro: Neuro intact, pain controlled on current regimen    - Renal: No history of significant renal disease. Cr stable WNL. Trend BMP. Diuretic as above.  Recent Labs   Lab 03/18/25  0520 03/17/25  0420 03/16/25  0442   CR 0.52 0.58 0.60       - GI: +BM, +flatus, continue bowel regimen    - : Hill removed,  "voiding without issue.    - Endo: Postop stress hyperglycemia, resolved. Insulin infusion transitioned to sliding scale insulin, now discontinued.   Hemoglobin A1C   Date Value Ref Range Status   02/24/2025 5.4 <5.7 % Final     Comment:     Normal <5.7%   Prediabetes 5.7-6.4%    Diabetes 6.5% or higher     Note: Adopted from ADA consensus guidelines.        - FEN: Replace electrolytes as needed. Regular diet.     - ID: Postop leukocytosis, likely reactive from surgery. Afebrile. WBC WNL. Periop abx prophylaxis complete. Trend CBC and fever curve.   Recent Labs   Lab 03/18/25  0520 03/17/25  0420 03/16/25  0442   WBC 6.9 11.3* 11.4*       - Heme: Acute blood loss anemia due to surgery. Hgb and PLT stable. Trend CBC, transfuse PRN.   Recent Labs   Lab 03/18/25  0520 03/17/25  0420 03/16/25  0442   HGB 8.1* 8.5* 8.8*    202 159       - Proph: SCD, subcutaneous heparin, PPI    - Other:  Clinically Significant Risk Factors        # Hypokalemia: Lowest K = 3.3 mmol/L in last 2 days, will replace as needed  # Hyponatremia: Lowest Na = 131 mmol/L in last 2 days, will monitor as appropriate  # Hypochloremia: Lowest Cl = 95 mmol/L in last 2 days, will monitor as appropriate      # Hypoalbuminemia: Lowest albumin = 3.2 g/dL at 3/14/2025  5:53 PM, will monitor as appropriate       # Hypertension: Noted on problem list            # Overweight: Estimated body mass index is 25.42 kg/m  as calculated from the following:    Height as of this encounter: 1.6 m (5' 3\").    Weight as of this encounter: 65.1 kg (143 lb 8.3 oz).      # Financial/Environmental Concerns: none   # History of CABG: noted on surgical history       - Dispo: ICU pending pressor wean. Therapies recommending discharge to home with assist when medically ready. Medically Ready for Discharge: Anticipated in 2-4 Days, dispo pending chest tube removal, diuresis, stable HR/rhtyhm        Interval History  No further afib today. Off pressor. Pain controlled. " Working with therapies.       Medications  Current Facility-Administered Medications   Medication Dose Route Frequency Provider Last Rate Last Admin    acetaminophen (TYLENOL) tablet 975 mg  975 mg Oral Q8H Matilda Maher PA-C   975 mg at 03/18/25 0902    amiodarone (PACERONE) tablet 400 mg  400 mg Oral BID Matilda Maher PA-C   400 mg at 03/18/25 0902    [START ON 3/19/2025] aspirin (ASA) chewable tablet 81 mg  81 mg Oral or NG Tube Daily MaherMatilda bryant PA-C        atorvastatin (LIPITOR) tablet 40 mg  40 mg Oral QPM MaherMatilda bryant PA-C   40 mg at 03/17/25 2020    heparin ANTICOAGULANT injection 5,000 Units  5,000 Units Subcutaneous Q8H Matilda Maher PA-C   5,000 Units at 03/18/25 1300    Lidocaine (LIDOCARE) 4 % Patch 1-2 patch  1-2 patch Transdermal Q24H MaherMatilda bryant PA-C   1 patch at 03/17/25 2024    methocarbamol (ROBAXIN) half-tab 250 mg  250 mg Oral 4x Daily Matilda Maher PA-C   250 mg at 03/18/25 1300    pantoprazole (PROTONIX) 2 mg/mL suspension 40 mg  40 mg Oral or NG Tube Daily Matilda Maher PA-C   40 mg at 03/15/25 0738    Or    pantoprazole (PROTONIX) EC tablet 40 mg  40 mg Oral Daily MaherMatilda bryant PA-C   40 mg at 03/18/25 0902    polyethylene glycol (MIRALAX) Packet 17 g  17 g Oral Daily Matilda Maher PA-C   17 g at 03/18/25 0902    senna-docusate (SENOKOT-S/PERICOLACE) 8.6-50 MG per tablet 1 tablet  1 tablet Oral BID Matilda Maher PA-C   1 tablet at 03/18/25 0902    warfarin ANTICOAGULANT (COUMADIN) tablet 3 mg  3 mg Oral ONCE at 18:00 Tevin Medellin MD        Warfarin Dose Required Daily - Pharmacist Managed  1 each Oral See Admin Instructions Matilda Maher PA-C         Current Facility-Administered Medications   Medication Dose Route Frequency Provider Last Rate Last Admin    alum & mag hydroxide-simethicone (MAALOX) suspension 30 mL  30 mL Oral Q4H PRN Matilda Maher PA-C        bisacodyl (DULCOLAX) suppository 10 mg  10 mg Rectal Daily PRN Matilda Maher,  EDUARDO        calcium carbonate (TUMS) chewable tablet 500 mg  500 mg Oral 4x Daily PRN Maher, Matilda AVALOS PA-C        calcium gluconate 1 g in 50 mL in sodium chloride intermittent infusion  1 g Intravenous Q6H PRN Maher, SUKHDEV Blake-ROBBIE        calcium gluconate 2 g in  mL intermittent infusion  2 g Intravenous Q6H PRN Maher, SUKHDEV Blake-ROBBIE        calcium gluconate 3 g in sodium chloride 0.9 % 100 mL intermittent infusion  3 g Intravenous Q6H PRN Maher, TIMUR BlakeC        glucose gel 15-30 g  15-30 g Oral Q15 Min PRN Maher, TIMUR BlakeC        Or    dextrose 50 % injection 25-50 mL  25-50 mL Intravenous Q15 Min PRN MaherMatilda PA-C        Or    glucagon injection 1 mg  1 mg Subcutaneous Q15 Min PRN Maher, SUKHDEV Blake-C        hydrALAZINE (APRESOLINE) injection 10 mg  10 mg Intravenous Q30 Min PRN MaherMatilda PA-C        HYDROmorphone (DILAUDID) injection 0.2 mg  0.2 mg Intravenous Q2H PRN MaherMatilda PA-C   0.2 mg at 03/17/25 0626    Or    HYDROmorphone (DILAUDID) injection 0.4 mg  0.4 mg Intravenous Q2H PRN Maher, TIMUR BlakeC        hydrOXYzine HCl (ATARAX) tablet 10 mg  10 mg Oral Q6H PRN Maher, TIMUR BlakeC        lidocaine (LMX4) cream   Topical Q1H PRN MaherMatilda PA-C        lidocaine 1 % 0.1-1 mL  0.1-1 mL Other Q1H PRN MaherMatilda PA-C        magnesium hydroxide (MILK OF MAGNESIA) suspension 30 mL  30 mL Oral Daily PRN MaherMatilda PA-C        metoprolol (LOPRESSOR) injection 5 mg  5 mg Intravenous Q5 Min PRN Maher, Matilda AVALOS PA-C   5 mg at 03/17/25 1141    naloxone (NARCAN) injection 0.2 mg  0.2 mg Intravenous Q2 Min PRN Alek Wilcox JOCELYNE        Or    naloxone (NARCAN) injection 0.4 mg  0.4 mg Intravenous Q2 Min PRN Wilcox, Alek S, RPH        Or    naloxone (NARCAN) injection 0.2 mg  0.2 mg Intramuscular Q2 Min PRN Alek Wilcox RPH        Or    naloxone (NARCAN) injection 0.4 mg  0.4 mg Intramuscular Q2 Min PRN Alek Wilcox RPH        ondansetron (ZOFRAN  "ODT) ODT tab 4 mg  4 mg Oral Q6H PRN Maher, Matilda C, PA-C        Or    ondansetron (ZOFRAN) injection 4 mg  4 mg Intravenous Q6H PRN Maher, Matilda C, PA-C   4 mg at 03/17/25 2028    oxyCODONE (ROXICODONE) tablet 5 mg  5 mg Oral Q4H PRN Maher, Matilda C, PA-C   5 mg at 03/18/25 0441    Or    oxyCODONE (ROXICODONE) tablet 10 mg  10 mg Oral Q4H PRN Maher, Matilda C, PA-C   10 mg at 03/17/25 2021    prochlorperazine (COMPAZINE) injection 5 mg  5 mg Intravenous Q6H PRN Maher, Matilda C, PA-C   5 mg at 03/17/25 2129    Or    prochlorperazine (COMPAZINE) tablet 5 mg  5 mg Oral Q6H PRN Maher, Matilda C, PA-C        Reason beta blocker order not selected   Does not apply DOES NOT GO TO MAR Matilda Maher PA-C        sodium chloride (PF) 0.9% PF flush 3 mL  3 mL Intracatheter q1 min prn Maher, Matilda C, PA-C             Physical Exam  Vitals were reviewed  Blood pressure (!) 85/47, pulse 77, temperature 98.3  F (36.8  C), temperature source Oral, resp. rate 16, height 1.6 m (5' 3\"), weight 65.1 kg (143 lb 8.3 oz), SpO2 98%.  Rhythm: NSR    Lungs: diminished bases    Cardiovascular: rrr, no m/r/g    Abdomen: soft, NT, ND, +BS    Extremeties: warm, trace LE edema    Incision: CDI    CT: serosang output 960 mL, no air leak    Weight:   Vitals:    03/14/25 0905 03/15/25 0630 03/16/25 0645 03/17/25 0600   Weight: 59.1 kg (130 lb 4.8 oz) 61.7 kg (136 lb 0.4 oz) 63.3 kg (139 lb 8.8 oz) 65.1 kg (143 lb 8.3 oz)         Data  Recent Labs   Lab 03/18/25  1112 03/18/25  0911 03/18/25  0520 03/18/25  0059 03/17/25  1257 03/17/25  0420 03/16/25  1331 03/16/25  0442 03/15/25  1251 03/15/25  0422 03/14/25  2125 03/14/25  1753   WBC  --   --  6.9  --   --  11.3*  --  11.4*  --  9.8  --  10.1   HGB  --   --  8.1*  --   --  8.5*  --  8.8*  --  9.6*  --  10.7*   MCV  --   --  91  --   --  91  --  91  --  89  --  88   PLT  --   --  176  --   --  202  --  159  --  219  --  184   INR  --   --  1.19*  --  1.11  --   --   --   --   --   --  1.35*   NA "  --   --  133*  --   --  131*  --  131*  --  135  --  135   POTASSIUM 4.2  --  3.3*  --   --  3.9  --  3.9  --  3.8  --  3.5   CHLORIDE  --   --  95*  --   --  97*  --  99  --  102  --  103   CO2  --   --  29  --   --  26  --  24  --  20*  --  21*   BUN  --   --  12.8  --   --  16.3  --  15.2  --  12.3  --  9.1   CR  --   --  0.52  --   --  0.58  --  0.60  --  0.69  --  0.50*   ANIONGAP  --   --  9  --   --  8  --  8  --  13  --  11   IGNACIO  --   --  8.0*  --   --  8.2*  --  8.6*  --  8.3*  --  8.3*   GLC  --  98 109* 126*  --  97   < > 128*   < > 124*   < > 125*   ALBUMIN  --   --   --   --   --   --   --   --   --  3.6  --  3.2*   PROTTOTAL  --   --   --   --   --   --   --   --   --  5.6*  --  5.3*   BILITOTAL  --   --   --   --   --   --   --   --   --  0.6  --  0.6   ALKPHOS  --   --   --   --   --   --   --   --   --  38*  --  42   ALT  --   --   --   --   --   --   --   --   --  8  --  10   AST  --   --   --   --   --   --   --   --   --  37  --  34    < > = values in this interval not displayed.       Imaging:  Recent Results (from the past 24 hours)   Echocardiogram Limited   Result Value    LVEF  55-60%    [facility]    [id number]  [id number]  [id number]  [id number]^MABLE^SADAF^ROBBIE     [facility]  Echocardiography Laboratory  [address] [place] [id number]     Name: DUNCAN TINOCO  MRN: [id number]  : 1950  Study Date: 2025 05:07 PM  Age: 74 yrs  Gender: Female  Patient Location: [facility]  Reason For Study: Aortic Valve Replacement  Ordering Physician: SADAF AKINS  Performed By: ADDIE Siddiqui     BSA: 1.7 m2  Height: 63 in  Weight: 143 lb  HR: 69  BP: 121/49 mmHg  ______________________________________________________________________________  Procedure  Limited Echocardiogram with two-dimensional, color and spectral Doppler.  Definity (NDC #66979-309) given intravenously. Contrast Definity. Poor  contrast images. Technically difficult study. Technically  difficult  study.Extremely difficult acoustic windows despite the use of contrast for  endcardial border definition.  ______________________________________________________________________________  Interpretation Summary     The left ventricle is normal in size.  Left ventricular systolic function is normal.  The visual ejection fraction is 55-60%.  Normal left ventricular wall motion  There is a bioprosthetic aortic valve (Inspiris Resilia 23mm AV)  The gradient is normal for this prosthetic aortic valve. The mean AoV pressure  gradient is 10mmHg. The peak AoV pressure gradient is 18mmHg.  No aortic regurgitation is present.  There is moderate (2+) tricuspid regurgitation.     Compared to the previous study, AVR is new and wall motion abnormalities are  no longer seen.  ______________________________________________________________________________  Left Ventricle  The left ventricle is normal in size. There is normal left ventricular wall  thickness. Left ventricular systolic function is normal. The visual ejection  fraction is 55-60%. Normal left ventricular wall motion.     Right Ventricle  The right ventricle is mildly dilated. Mildly decreased right ventricular  systolic function.     Atria  Normal left atrial size. Right atrial size is normal. There is no atrial shunt  seen.     Mitral Valve  The mitral valve is normal in structure and function. There is trace mitral  regurgitation.     Tricuspid Valve  There is moderate (2+) tricuspid regurgitation. The right ventricular systolic  pressure is approximated at 22.9 mmHg plus the right atrial pressure. Right  ventricular systolic pressure is normal.     Aortic Valve  No aortic regurgitation is present. The mean AoV pressure gradient is 10mmHg.  The peak AoV pressure gradient is 18mmHg. There is a bioprosthetic aortic  valve. (Inspiris Resilia 23mm AV). The gradient is normal for this prosthetic  aortic valve.     Pulmonic Valve  The pulmonic valve is not well  seen, but is grossly normal. There is trace  pulmonic valvular regurgitation.     Vessels  Normal size aorta.     Pericardium  Trivial pericardial effusion.     ______________________________________________________________________________  MMode/2D Measurements & Calculations  LVOT diam: 2.0 cm  LVOT area: 3.1 cm2  TAPSE: 1.1 cm     Doppler Measurements & Calculations  Ao V2 max: 210.8 cm/sec  Ao max P.0 mmHg  Ao V2 mean: 148.9 cm/sec  Ao mean P.7 mmHg  Ao V2 VTI: 39.3 cm  NAYELI(I,D): 1.9 cm2  NAYELI(V,D): 1.8 cm2  Ao acc time: 0.06 sec  LV V1 max P.8 mmHg  LV V1 max: 120.0 cm/sec  LV V1 VTI: 23.6 cm  MR PISA: 2.4 cm2  SV(LVOT): 73.1 ml  SI(LVOT): 43.6 ml/m2  TR max terrell: 239.3 cm/sec  TR max P.9 mmHg  AV Terrell Ratio (DI): 0.57  NAYELI Index (cm2/m2): 1.1     ______________________________________________________________________________  Report approved by: Gerardo Barragan MD on 2025 06:35 PM                 Patient seen and discussed with Dr. Vignesh Maher PA-C  Cardiothoracic Surgery  Available for paging [phone] (personal pager or CV Surgery Rounding Pager)  Personal Pager: [phone]  CV Surgery Rounding Pager: [phone]  After hours please page surgeon on-call

## 2025-03-19 ENCOUNTER — APPOINTMENT (OUTPATIENT)
Dept: PHYSICAL THERAPY | Facility: CLINIC | Age: 75
DRG: 219 | End: 2025-03-19
Attending: STUDENT IN AN ORGANIZED HEALTH CARE EDUCATION/TRAINING PROGRAM
Payer: COMMERCIAL

## 2025-03-19 LAB
ANION GAP SERPL CALCULATED.3IONS-SCNC: 7 MMOL/L (ref 7–15)
ATRIAL RATE - MUSE: 174 BPM
BUN SERPL-MCNC: 10.3 MG/DL (ref 8–23)
CA-I BLD-MCNC: 4.2 MG/DL (ref 4.4–5.2)
CALCIUM SERPL-MCNC: 7.8 MG/DL (ref 8.8–10.4)
CHLORIDE SERPL-SCNC: 95 MMOL/L (ref 98–107)
CREAT SERPL-MCNC: 0.54 MG/DL (ref 0.51–0.95)
DIASTOLIC BLOOD PRESSURE - MUSE: NORMAL MMHG
EGFRCR SERPLBLD CKD-EPI 2021: >90 ML/MIN/1.73M2
ERYTHROCYTE [DISTWIDTH] IN BLOOD BY AUTOMATED COUNT: 13.6 % (ref 10–15)
GLUCOSE SERPL-MCNC: 92 MG/DL (ref 70–99)
HCO3 SERPL-SCNC: 30 MMOL/L (ref 22–29)
HCT VFR BLD AUTO: 23.5 % (ref 35–47)
HGB BLD-MCNC: 7.8 G/DL (ref 11.7–15.7)
INR PPP: 1.72 (ref 0.85–1.15)
INTERPRETATION ECG - MUSE: NORMAL
MAGNESIUM SERPL-MCNC: 2.1 MG/DL (ref 1.7–2.3)
MCH RBC QN AUTO: 30.6 PG (ref 26.5–33)
MCHC RBC AUTO-ENTMCNC: 33.2 G/DL (ref 31.5–36.5)
MCV RBC AUTO: 92 FL (ref 78–100)
P AXIS - MUSE: NORMAL DEGREES
PATH REPORT.COMMENTS IMP SPEC: NORMAL
PATH REPORT.COMMENTS IMP SPEC: NORMAL
PATH REPORT.FINAL DX SPEC: NORMAL
PATH REPORT.GROSS SPEC: NORMAL
PATH REPORT.MICROSCOPIC SPEC OTHER STN: NORMAL
PATH REPORT.RELEVANT HX SPEC: NORMAL
PHOSPHATE SERPL-MCNC: 2.9 MG/DL (ref 2.5–4.5)
PHOTO IMAGE: NORMAL
PLATELET # BLD AUTO: 185 10E3/UL (ref 150–450)
POTASSIUM SERPL-SCNC: 4.3 MMOL/L (ref 3.4–5.3)
PR INTERVAL - MUSE: NORMAL MS
QRS DURATION - MUSE: 118 MS
QT - MUSE: 248 MS
QTC - MUSE: 395 MS
R AXIS - MUSE: 97 DEGREES
RBC # BLD AUTO: 2.55 10E6/UL (ref 3.8–5.2)
SODIUM SERPL-SCNC: 132 MMOL/L (ref 135–145)
SYSTOLIC BLOOD PRESSURE - MUSE: NORMAL MMHG
T AXIS - MUSE: -82 DEGREES
VENTRICULAR RATE- MUSE: 153 BPM
WBC # BLD AUTO: 5.9 10E3/UL (ref 4–11)

## 2025-03-19 PROCEDURE — 82330 ASSAY OF CALCIUM: CPT | Performed by: PHYSICIAN ASSISTANT

## 2025-03-19 PROCEDURE — 97530 THERAPEUTIC ACTIVITIES: CPT | Mod: GP | Performed by: PHYSICAL THERAPIST

## 2025-03-19 PROCEDURE — 250N000011 HC RX IP 250 OP 636: Performed by: PHYSICIAN ASSISTANT

## 2025-03-19 PROCEDURE — 85610 PROTHROMBIN TIME: CPT | Performed by: PHYSICIAN ASSISTANT

## 2025-03-19 PROCEDURE — 83735 ASSAY OF MAGNESIUM: CPT | Performed by: STUDENT IN AN ORGANIZED HEALTH CARE EDUCATION/TRAINING PROGRAM

## 2025-03-19 PROCEDURE — 250N000013 HC RX MED GY IP 250 OP 250 PS 637: Performed by: PHYSICIAN ASSISTANT

## 2025-03-19 PROCEDURE — 80051 ELECTROLYTE PANEL: CPT | Performed by: PHYSICIAN ASSISTANT

## 2025-03-19 PROCEDURE — 250N000013 HC RX MED GY IP 250 OP 250 PS 637: Performed by: STUDENT IN AN ORGANIZED HEALTH CARE EDUCATION/TRAINING PROGRAM

## 2025-03-19 PROCEDURE — 80048 BASIC METABOLIC PNL TOTAL CA: CPT | Performed by: PHYSICIAN ASSISTANT

## 2025-03-19 PROCEDURE — 85027 COMPLETE CBC AUTOMATED: CPT | Performed by: PHYSICIAN ASSISTANT

## 2025-03-19 PROCEDURE — 97110 THERAPEUTIC EXERCISES: CPT | Mod: GP | Performed by: PHYSICAL THERAPIST

## 2025-03-19 PROCEDURE — 88305 TISSUE EXAM BY PATHOLOGIST: CPT | Mod: 26 | Performed by: PATHOLOGY

## 2025-03-19 PROCEDURE — 84100 ASSAY OF PHOSPHORUS: CPT | Performed by: STUDENT IN AN ORGANIZED HEALTH CARE EDUCATION/TRAINING PROGRAM

## 2025-03-19 PROCEDURE — 120N000013 HC R&B IMCU

## 2025-03-19 RX ORDER — MAGNESIUM OXIDE 400 MG/1
400 TABLET ORAL EVERY 4 HOURS
Status: COMPLETED | OUTPATIENT
Start: 2025-03-19 | End: 2025-03-19

## 2025-03-19 RX ORDER — POTASSIUM CHLORIDE 20MEQ/15ML
20 LIQUID (ML) ORAL ONCE
Status: COMPLETED | OUTPATIENT
Start: 2025-03-19 | End: 2025-03-19

## 2025-03-19 RX ORDER — WARFARIN SODIUM 2 MG/1
2 TABLET ORAL
Status: COMPLETED | OUTPATIENT
Start: 2025-03-19 | End: 2025-03-19

## 2025-03-19 RX ORDER — METOPROLOL SUCCINATE 25 MG/1
25 TABLET, EXTENDED RELEASE ORAL DAILY
Status: DISCONTINUED | OUTPATIENT
Start: 2025-03-19 | End: 2025-03-22 | Stop reason: HOSPADM

## 2025-03-19 RX ORDER — FUROSEMIDE 10 MG/ML
20 INJECTION INTRAMUSCULAR; INTRAVENOUS ONCE
Status: COMPLETED | OUTPATIENT
Start: 2025-03-19 | End: 2025-03-19

## 2025-03-19 RX ADMIN — FUROSEMIDE 20 MG: 10 INJECTION, SOLUTION INTRAVENOUS at 12:03

## 2025-03-19 RX ADMIN — ACETAMINOPHEN 975 MG: 325 TABLET, FILM COATED ORAL at 00:38

## 2025-03-19 RX ADMIN — PANTOPRAZOLE SODIUM 40 MG: 40 TABLET, DELAYED RELEASE ORAL at 08:03

## 2025-03-19 RX ADMIN — METHOCARBAMOL 250 MG: 500 TABLET ORAL at 17:21

## 2025-03-19 RX ADMIN — ACETAMINOPHEN 975 MG: 325 TABLET, FILM COATED ORAL at 08:03

## 2025-03-19 RX ADMIN — POTASSIUM CHLORIDE 20 MEQ: 20 SOLUTION ORAL at 00:38

## 2025-03-19 RX ADMIN — LIDOCAINE 2 PATCH: 4 PATCH TOPICAL at 19:42

## 2025-03-19 RX ADMIN — METHOCARBAMOL 250 MG: 500 TABLET ORAL at 14:12

## 2025-03-19 RX ADMIN — HEPARIN SODIUM 5000 UNITS: 5000 INJECTION, SOLUTION INTRAVENOUS; SUBCUTANEOUS at 14:12

## 2025-03-19 RX ADMIN — HEPARIN SODIUM 5000 UNITS: 5000 INJECTION, SOLUTION INTRAVENOUS; SUBCUTANEOUS at 04:28

## 2025-03-19 RX ADMIN — POTASSIUM & SODIUM PHOSPHATES POWDER PACK 280-160-250 MG 1 PACKET: 280-160-250 PACK at 00:38

## 2025-03-19 RX ADMIN — OXYCODONE HYDROCHLORIDE 5 MG: 5 TABLET ORAL at 04:27

## 2025-03-19 RX ADMIN — OXYCODONE HYDROCHLORIDE 5 MG: 5 TABLET ORAL at 12:03

## 2025-03-19 RX ADMIN — POTASSIUM & SODIUM PHOSPHATES POWDER PACK 280-160-250 MG 1 PACKET: 280-160-250 PACK at 08:03

## 2025-03-19 RX ADMIN — PROCHLORPERAZINE MALEATE 5 MG: 5 TABLET ORAL at 04:32

## 2025-03-19 RX ADMIN — POTASSIUM & SODIUM PHOSPHATES POWDER PACK 280-160-250 MG 1 PACKET: 280-160-250 PACK at 04:28

## 2025-03-19 RX ADMIN — METOPROLOL SUCCINATE 25 MG: 25 TABLET, EXTENDED RELEASE ORAL at 08:03

## 2025-03-19 RX ADMIN — ACETAMINOPHEN 975 MG: 325 TABLET, FILM COATED ORAL at 15:34

## 2025-03-19 RX ADMIN — WARFARIN SODIUM 2 MG: 2 TABLET ORAL at 17:21

## 2025-03-19 RX ADMIN — AMIODARONE HYDROCHLORIDE 400 MG: 200 TABLET ORAL at 07:30

## 2025-03-19 RX ADMIN — ASPIRIN 81 MG: 81 TABLET, CHEWABLE ORAL at 08:03

## 2025-03-19 RX ADMIN — HEPARIN SODIUM 5000 UNITS: 5000 INJECTION, SOLUTION INTRAVENOUS; SUBCUTANEOUS at 21:18

## 2025-03-19 RX ADMIN — AMIODARONE HYDROCHLORIDE 400 MG: 200 TABLET ORAL at 21:18

## 2025-03-19 RX ADMIN — ATORVASTATIN CALCIUM 40 MG: 40 TABLET, FILM COATED ORAL at 19:42

## 2025-03-19 RX ADMIN — OXYCODONE HYDROCHLORIDE 5 MG: 5 TABLET ORAL at 15:35

## 2025-03-19 RX ADMIN — ACETAMINOPHEN 975 MG: 325 TABLET, FILM COATED ORAL at 23:27

## 2025-03-19 RX ADMIN — METHOCARBAMOL 250 MG: 500 TABLET ORAL at 08:03

## 2025-03-19 RX ADMIN — Medication 400 MG: at 04:28

## 2025-03-19 RX ADMIN — METHOCARBAMOL 250 MG: 500 TABLET ORAL at 21:18

## 2025-03-19 RX ADMIN — Medication 400 MG: at 00:38

## 2025-03-19 ASSESSMENT — ACTIVITIES OF DAILY LIVING (ADL)
ADLS_ACUITY_SCORE: 42

## 2025-03-19 NOTE — PROGRESS NOTES
[facility]  Cardiovascular and Thoracic Surgery Daily Note      Assessment and Plan  Tri Walden is a 74 year old female with a PMH of hypertension, dyslipidemia, IBS, and progressive severe aortic stenosis and single vessel CAD ( LAD) who was  recently referred for valve replacement and surgical revascularization. Admitted 03/14 following bioprosthetic AVR (23 mm Inspiris Resilia) and CABG x1 ([place] to LAD) with Dr. Robert Medellin.     POD # 5 s/p bioprosthetic AVR (23 mm Inspiris Resilia) and CABG x1 (LIM[place] to LAD) on 03/14/2025 with Dr. Robert Medellin.    - CVS:   Pre-op TTE with preserved biventricular function, severe aortic stenosis.  Postop vasoplegic shock, improving. Weaned off pressor POD4.  Weight up 6 kg since admission, no diuresis yesterday for soft BP. 20 mg lasix IV today. Monitor fluid status  Afib RVR POD1, converted following IV amiodarone protocol and transitioned to PO with taper. Recurrent afib RVR POD3 with hypotension, rebolused amio and resumed infusion, transitioned to PO with taper. Recurrent afib RVR overnight POD4 converted back to NSR post IV metoprolol and 250 albumin. Continue on PO amiodarone, switch from lopressor to toprol XL 25 mg daily.  Warfarin to INR goal 2-3 x 3 months for bioprosthetic AVR per surgeon. Can be switched to DOAC after 3 months if needed for afib prophylaxis. Will defer on heparin bridge today due to pt back in SR.  Aspirin 81 mg daily, change PTA simvastatin to atorvastatin (interaction with amiodarone)  Chest tubes: mediastinal 250 mL output yesterday, pleural 510 mL output yesterday, no air leak. Remove today. TPW: removed 3/18    - Resp: Extubated POD1 AM (failed PST x 4 POD0 with apnea and low tidal volumes). IS, pulmonary toilet.    - Neuro: Neuro intact, pain controlled on current regimen    - Renal: No history of significant renal disease. Cr stable WNL. Trend BMP. Diuretic as above     Recent Labs   Lab 03/19/25  0603 03/18/25  8458  "03/18/25  0520   CR 0.54 0.55 0.52       - GI: +BM, continue bowel regimen    - : Hill removed, voiding without issue.    - Endo: sliding scale insulin discontinued.   Hemoglobin A1C   Date Value Ref Range Status   02/24/2025 5.4 <5.7 % Final     Comment:     Normal <5.7%   Prediabetes 5.7-6.4%    Diabetes 6.5% or higher     Note: Adopted from ADA consensus guidelines.        - FEN: Replace electrolytes as needed. Regular diet. PRN zofran for intermittent nausea    - ID: Postop leukocytosis, likely reactive from surgery now resolved. Afebrile. WBC WNL. Periop abx prophylaxis complete. Trend CBC and fever curve.   Recent Labs   Lab 03/19/25  0603 03/18/25  0520 03/17/25  0420   WBC 5.9 6.9 11.3*       - Heme: Acute blood loss anemia due to surgery. Hgb and PLT stable. Trend CBC, transfuse PRN.   Recent Labs   Lab 03/19/25  0603 03/18/25  0520 03/17/25  0420   HGB 7.8* 8.1* 8.5*    176 202       - Proph: SCD, subcutaneous heparin, PPI    - Other:  Clinically Significant Risk Factors        # Hypokalemia: Lowest K = 3.3 mmol/L in last 2 days, will replace as needed  # Hyponatremia: Lowest Na = 132 mmol/L in last 2 days, will monitor as appropriate  # Hypochloremia: Lowest Cl = 95 mmol/L in last 2 days, will monitor as appropriate  # Hypocalcemia: Lowest iCa = 4.2 mg/dL in last 2 days, will monitor and replace as appropriate     # Hypoalbuminemia: Lowest albumin = 3.2 g/dL at 3/14/2025  5:53 PM, will monitor as appropriate       # Hypertension: Noted on problem list            # Overweight: Estimated body mass index is 25.42 kg/m  as calculated from the following:    Height as of this encounter: 1.6 m (5' 3\").    Weight as of this encounter: 65.1 kg (143 lb 8.3 oz).      # Financial/Environmental Concerns: none   # History of CABG: noted on surgical history       - Dispo: Encouraged IS/TCDB/amb. Sternal precautions. Ok to transfer to stepdown. Will change from lopressor to toprol XL 25 mg due to PAF. Continue " amiodarone taper. No heparin gtt bridge at this time. Continue coumadin with plans for 3 months due to AVR (tissue). Lasix 20 mg IV today. Chest xray in am tomorrow with chest tubes removed. Echocardiogram tomorrow due to AVR. Looking at possible discharge to home in 2 days. Continue to monitor.     Interval History  Recurrence of afib RVR overnight x2, now back in NSR after IV metoprolol and 250 mL albumin. Remains off pressor since yesterday. Pain controlled. Working with therapies. Weaned off O2 to room air. Ambulating, +BMs      Medications  Current Facility-Administered Medications   Medication Dose Route Frequency Provider Last Rate Last Admin    acetaminophen (TYLENOL) tablet 975 mg  975 mg Oral Q8H MaherMatilda bryant PA-C   975 mg at 03/19/25 0803    amiodarone (PACERONE) tablet 400 mg  400 mg Oral BID MaherMatilda PA-C   400 mg at 03/19/25 0730    aspirin (ASA) chewable tablet 81 mg  81 mg Oral or NG Tube Daily MaherMatilda PA-C   81 mg at 03/19/25 0803    atorvastatin (LIPITOR) tablet 40 mg  40 mg Oral QPM MaherMatilda PA-C   40 mg at 03/18/25 2125    heparin ANTICOAGULANT injection 5,000 Units  5,000 Units Subcutaneous Q8H MaherMatilda bryant PA-C   5,000 Units at 03/19/25 0428    Lidocaine (LIDOCARE) 4 % Patch 1-2 patch  1-2 patch Transdermal Q24H MaherMatilda bryant PA-C   2 patch at 03/18/25 2125    methocarbamol (ROBAXIN) half-tab 250 mg  250 mg Oral 4x Daily MaherMatilda bryant PA-C   250 mg at 03/19/25 0803    metoprolol succinate ER (TOPROL XL) 24 hr tablet 25 mg  25 mg Oral Daily Nir Morales PA-C   25 mg at 03/19/25 0803    pantoprazole (PROTONIX) EC tablet 40 mg  40 mg Oral Daily MaherMatilda PA-C   40 mg at 03/19/25 0803    polyethylene glycol (MIRALAX) Packet 17 g  17 g Oral Daily MaherMatilda PA-C   17 g at 03/18/25 0902    senna-docusate (SENOKOT-S/PERICOLACE) 8.6-50 MG per tablet 1 tablet  1 tablet Oral BID Matilda Maher PA-C   1 tablet at 03/18/25 0902    Warfarin Dose  Required Daily - Pharmacist Managed  1 each Oral See Admin Instructions Matilda Maher PA-C         Current Facility-Administered Medications   Medication Dose Route Frequency Provider Last Rate Last Admin    alum & mag hydroxide-simethicone (MAALOX) suspension 30 mL  30 mL Oral Q4H PRN Matilda Maher PA-C        bisacodyl (DULCOLAX) suppository 10 mg  10 mg Rectal Daily PRN MaherMatilda PA-C        calcium carbonate (TUMS) chewable tablet 500 mg  500 mg Oral 4x Daily PRN MaherMatilda PA-C        calcium gluconate 1 g in 50 mL in sodium chloride intermittent infusion  1 g Intravenous Q6H PRN MaherMatilda PA-C        calcium gluconate 2 g in  mL intermittent infusion  2 g Intravenous Q6H PRN MaherMatilda PA-C        calcium gluconate 3 g in sodium chloride 0.9 % 100 mL intermittent infusion  3 g Intravenous Q6H PRN MaherMatilda PA-C        glucose gel 15-30 g  15-30 g Oral Q15 Min PRN MaherMatilda PA-C        Or    dextrose 50 % injection 25-50 mL  25-50 mL Intravenous Q15 Min PRN Matilda Maher PA-C        Or    glucagon injection 1 mg  1 mg Subcutaneous Q15 Min PRN MaherMatilda bryant PA-C        hydrALAZINE (APRESOLINE) injection 10 mg  10 mg Intravenous Q30 Min PRN MaherMatilda PA-C        HYDROmorphone (DILAUDID) injection 0.2 mg  0.2 mg Intravenous Q2H PRN Matilda Maher PA-C   0.2 mg at 03/17/25 0626    Or    HYDROmorphone (DILAUDID) injection 0.4 mg  0.4 mg Intravenous Q2H PRN MaherMatilda PA-C        hydrOXYzine HCl (ATARAX) tablet 10 mg  10 mg Oral Q6H PRN MaherMatilda PA-C        lidocaine (LMX4) cream   Topical Q1H PRN MaherMatilda PA-C        lidocaine 1 % 0.1-1 mL  0.1-1 mL Other Q1H PRN MaherMatilda PA-C        magnesium hydroxide (MILK OF MAGNESIA) suspension 30 mL  30 mL Oral Daily PRN Matilda Maher PA-C        metoprolol (LOPRESSOR) injection 5 mg  5 mg Intravenous Q5 Min PRN Matilda Maher PA-C   5 mg at 03/18/25 2124    naloxone (NARCAN)  "injection 0.2 mg  0.2 mg Intravenous Q2 Min PRN Wilcox, Alek S, RPH        Or    naloxone (NARCAN) injection 0.4 mg  0.4 mg Intravenous Q2 Min PRN Wilcox, Alek S, RPH        Or    naloxone (NARCAN) injection 0.2 mg  0.2 mg Intramuscular Q2 Min PRN Wilcox, Alek S, RPH        Or    naloxone (NARCAN) injection 0.4 mg  0.4 mg Intramuscular Q2 Min PRN Wilcox, Alek S, RPH        ondansetron (ZOFRAN ODT) ODT tab 4 mg  4 mg Oral Q6H PRN Mhaer, Matilda AVALOS, PA-C        Or    ondansetron (ZOFRAN) injection 4 mg  4 mg Intravenous Q6H PRN Maher, Matilda AVALOS PA-C   4 mg at 03/17/25 2028    oxyCODONE (ROXICODONE) tablet 5 mg  5 mg Oral Q4H PRN Maher, Matilda AVALOS, PA-C   5 mg at 03/19/25 0427    Or    oxyCODONE (ROXICODONE) tablet 10 mg  10 mg Oral Q4H PRN Maher, Matilda AVALOS, PA-C   10 mg at 03/17/25 2021    prochlorperazine (COMPAZINE) injection 5 mg  5 mg Intravenous Q6H PRN Maher, Matilda AVALOS, PA-C   5 mg at 03/17/25 2129    Or    prochlorperazine (COMPAZINE) tablet 5 mg  5 mg Oral Q6H PRN Maher, Matilda AVALOS, PA-C   5 mg at 03/19/25 0432    Reason beta blocker order not selected   Does not apply DOES NOT GO TO Matilda Hernandez PA-C        sodium chloride (PF) 0.9% PF flush 3 mL  3 mL Intracatheter q1 min prn Maher, Matilda C, PA-C             Physical Exam  Vitals were reviewed  Blood pressure 102/60, pulse 81, temperature 98.2  F (36.8  C), temperature source Axillary, resp. rate 13, height 1.6 m (5' 3\"), weight 65.1 kg (143 lb 8.3 oz), SpO2 97%.  Rhythm: NSR rates 80s    Lungs: diminished bases, -1000 ml    Cardiovascular: rrr, no m/r/g    Abdomen: soft, NT, ND, +BS    Extremeties: warm, trace LE edema    Incision: CDI     CT: serosang output 660 mL total yesterday, no air leak    Weight:   Vitals:    03/14/25 0905 03/15/25 0630 03/16/25 0645 03/17/25 0600   Weight: 59.1 kg (130 lb 4.8 oz) 61.7 kg (136 lb 0.4 oz) 63.3 kg (139 lb 8.8 oz) 65.1 kg (143 lb 8.3 oz)         Data  Recent Labs   Lab 03/19/25  0603 03/18/25  3524 " 03/18/25  1112 03/18/25  0911 03/18/25  0520 03/18/25  0059 03/17/25  1257 03/17/25  0420 03/15/25  1251 03/15/25  0422 03/14/25  2125 03/14/25  1753   WBC 5.9  --   --   --  6.9  --   --  11.3*   < > 9.8  --  10.1   HGB 7.8*  --   --   --  8.1*  --   --  8.5*   < > 9.6*  --  10.7*   MCV 92  --   --   --  91  --   --  91   < > 89  --  88     --   --   --  176  --   --  202   < > 219  --  184   INR 1.72*  --   --   --  1.19*  --  1.11  --   --   --   --  1.35*   * 133*  --   --  133*  --   --  131*   < > 135  --  135   POTASSIUM 4.3 3.8 4.2  --  3.3*  --   --  3.9   < > 3.8  --  3.5   CHLORIDE 95* 97*  --   --  95*  --   --  97*   < > 102  --  103   CO2 30* 30*  --   --  29  --   --  26   < > 20*  --  21*   BUN 10.3 10.5  --   --  12.8  --   --  16.3   < > 12.3  --  9.1   CR 0.54 0.55  --   --  0.52  --   --  0.58   < > 0.69  --  0.50*   ANIONGAP 7 6*  --   --  9  --   --  8   < > 13  --  11   IGNACIO 7.8* 8.0*  --   --  8.0*  --   --  8.2*   < > 8.3*  --  8.3*   GLC 92 86  --  98 109*   < >  --  97   < > 124*   < > 125*   ALBUMIN  --   --   --   --   --   --   --   --   --  3.6  --  3.2*   PROTTOTAL  --   --   --   --   --   --   --   --   --  5.6*  --  5.3*   BILITOTAL  --   --   --   --   --   --   --   --   --  0.6  --  0.6   ALKPHOS  --   --   --   --   --   --   --   --   --  38*  --  42   ALT  --   --   --   --   --   --   --   --   --  8  --  10   AST  --   --   --   --   --   --   --   --   --  37  --  34    < > = values in this interval not displayed.       Imaging:  No results found for this or any previous visit (from the past 24 hours).      Patient seen and discussed with Dr. Mulvihill Dustin Carda, PA-C  Cardiothoracic Surgery

## 2025-03-19 NOTE — PROVIDER NOTIFICATION
Provider, Dr. Holt, notified of KIAN. Rajiv RVR. Electrolyte labs drawn. Patient converted into sinus rhythm after metoprolol push. PO metoprolol started. Will keep provider updated of recurrence.

## 2025-03-19 NOTE — PROGRESS NOTES
Writer accessed patient chart in preparation to receive patient from ICU. Charge nurse changed plans.

## 2025-03-19 NOTE — PLAN OF CARE
Problem: Delirium  Goal: Optimal Coping  Intervention: Optimize Psychosocial Adjustment to Delirium  Recent Flowsheet Documentation  Taken 3/19/2025 0000 by Anna Baptiste RN  Supportive Measures:   active listening utilized   positive reinforcement provided   relaxation techniques promoted   verbalization of feelings encouraged   decision-making supported  Taken 3/18/2025 2000 by Anna Baptiste RN  Supportive Measures:   active listening utilized   positive reinforcement provided   relaxation techniques promoted   verbalization of feelings encouraged   decision-making supported  Goal: Improved Behavioral Control  Intervention: Prevent and Manage Agitation  Recent Flowsheet Documentation  Taken 3/19/2025 0000 by Anna Baptiste RN  Environment Familiarity/Consistency: daily routine followed  Taken 3/18/2025 2000 by Anna Baptiste RN  Environment Familiarity/Consistency: daily routine followed  Intervention: Minimize Safety Risk  Recent Flowsheet Documentation  Taken 3/19/2025 0000 by Anna Baptiste RN  Enhanced Safety Measures: pain management  Trust Relationship/Rapport:   care explained   choices provided   reassurance provided   thoughts/feelings acknowledged  Taken 3/18/2025 2000 by Anna Baptiste RN  Enhanced Safety Measures: pain management  Trust Relationship/Rapport:   care explained   choices provided   reassurance provided   thoughts/feelings acknowledged  Goal: Improved Attention and Thought Clarity  Intervention: Maximize Cognitive Function  Recent Flowsheet Documentation  Taken 3/19/2025 0000 by Anna Baptiste RN  Sensory Stimulation Regulation:   auditory stimulation minimized   care clustered   lighting decreased   quiet environment promoted  Reorientation Measures:   clock in view   glasses use encouraged  Taken 3/18/2025 2000 by Anna Baptiste RN  Sensory Stimulation Regulation:   auditory stimulation minimized   care clustered   lighting decreased   quiet environment promoted  Reorientation  Measures:   clock in view   glasses use encouraged  Goal: Improved Sleep  Intervention: Promote Sleep  Recent Flowsheet Documentation  Taken 3/19/2025 0000 by Anna Baptiste RN  Sleep/Rest Enhancement:   awakenings minimized   comfort measures   consistent schedule promoted   family presence promoted   medication   noise level reduced   regular sleep/rest pattern promoted   relaxation techniques promoted  Taken 3/18/2025 2000 by Anna Baptiste RN  Sleep/Rest Enhancement:   awakenings minimized   comfort measures   consistent schedule promoted   family presence promoted   medication   noise level reduced   regular sleep/rest pattern promoted   relaxation techniques promoted     Problem: Adult Inpatient Plan of Care  Goal: Plan of Care Review  Description: The Plan of Care Review/Shift note should be completed every shift.  The Outcome Evaluation is a brief statement about your assessment that the patient is improving, declining, or no change.  This information will be displayed automatically on your shift  note.  Outcome: Progressing  Flowsheets (Taken 3/19/2025 0338)  Plan of Care Reviewed With: patient  Overall Patient Progress: improving  Goal: Absence of Hospital-Acquired Illness or Injury  Intervention: Identify and Manage Fall Risk  Recent Flowsheet Documentation  Taken 3/19/2025 0000 by Anna Baptiste RN  Safety Promotion/Fall Prevention: safety round/check completed  Taken 3/18/2025 2000 by Anna Baptiste RN  Safety Promotion/Fall Prevention: safety round/check completed  Intervention: Prevent Skin Injury  Recent Flowsheet Documentation  Taken 3/19/2025 0000 by Anna Baptiste RN  Body Position:   heels elevated   legs elevated   lower extremity elevated   upper extremity elevated   position changed independently  Taken 3/18/2025 2200 by Anna Baptiste RN  Body Position:   heels elevated   legs elevated   lower extremity elevated   upper extremity elevated   position changed independently  Taken 3/18/2025 2000  by Anna Baptiste RN  Body Position:   heels elevated   legs elevated   lower extremity elevated   upper extremity elevated   position changed independently  Intervention: Prevent and Manage VTE (Venous Thromboembolism) Risk  Recent Flowsheet Documentation  Taken 3/19/2025 0000 by Anna Baptiste RN  VTE Prevention/Management: SCDs on (sequential compression devices)  Taken 3/18/2025 2000 by Anna Baptiste RN  VTE Prevention/Management: SCDs on (sequential compression devices)  Goal: Optimal Comfort and Wellbeing  Intervention: Monitor Pain and Promote Comfort  Recent Flowsheet Documentation  Taken 3/19/2025 0038 by Anna Baptiste RN  Pain Management Interventions:   medication (see MAR)   repositioned   quiet environment facilitated   pillow support provided   rest  Intervention: Provide Person-Centered Care  Recent Flowsheet Documentation  Taken 3/19/2025 0000 by Anna Baptiste RN  Trust Relationship/Rapport:   care explained   choices provided   reassurance provided   thoughts/feelings acknowledged  Taken 3/18/2025 2000 by Anna Baptiste RN  Trust Relationship/Rapport:   care explained   choices provided   reassurance provided   thoughts/feelings acknowledged     Problem: Cardiovascular Surgery  Goal: Improved Activity Tolerance  Intervention: Optimize Tolerance for Activity  Recent Flowsheet Documentation  Taken 3/19/2025 0000 by Anna Baptiste RN  Environmental Support:   calm environment promoted   caregiver consistency promoted   environmental consistency promoted   rest periods encouraged   distractions minimized  Taken 3/18/2025 2000 by Anna Baptiste RN  Environmental Support:   calm environment promoted   caregiver consistency promoted   environmental consistency promoted   rest periods encouraged   distractions minimized  Goal: Optimal Coping with Heart Surgery  Intervention: Support Psychosocial Response to Surgery  Recent Flowsheet Documentation  Taken 3/19/2025 0000 by Anna Baptiste, NICHOLE  Supportive  Measures:   active listening utilized   positive reinforcement provided   relaxation techniques promoted   verbalization of feelings encouraged   decision-making supported  Taken 3/18/2025 2000 by Anna Baptiste RN  Supportive Measures:   active listening utilized   positive reinforcement provided   relaxation techniques promoted   verbalization of feelings encouraged   decision-making supported  Goal: Absence of Bleeding  Intervention: Monitor and Manage Bleeding  Recent Flowsheet Documentation  Taken 3/19/2025 0000 by Anna Baptiste RN  Bleeding Management: dressing monitored  Taken 3/18/2025 2000 by Anna Baptiste RN  Bleeding Management: dressing monitored  Goal: Effective Bowel Elimination  Intervention: Enhance Bowel Motility and Elimination  Recent Flowsheet Documentation  Taken 3/19/2025 0000 by Anna Baptiste RN  Bowel Motility Enhancement:   fluid intake encouraged   ambulation promoted  Taken 3/18/2025 2000 by Anna Baptiste RN  Bowel Motility Enhancement:   fluid intake encouraged   ambulation promoted  Goal: Optimal Cerebral Tissue Perfusion  Intervention: Protect and Optimize Cerebral Perfusion  Recent Flowsheet Documentation  Taken 3/19/2025 0000 by Anna Baptiste RN  Sensory Stimulation Regulation:   auditory stimulation minimized   care clustered   lighting decreased   quiet environment promoted  Head of Bed (HOB) Positioning: HOB at 30 degrees  Taken 3/18/2025 2200 by Anna Baptiste RN  Head of Bed (HOB) Positioning: HOB at 30 degrees  Taken 3/18/2025 2000 by Anna Baptiste RN  Sensory Stimulation Regulation:   auditory stimulation minimized   care clustered   lighting decreased   quiet environment promoted  Head of Bed (HOB) Positioning: HOB at 30 degrees  Goal: Anesthesia/Sedation Recovery  Intervention: Optimize Anesthesia Recovery  Recent Flowsheet Documentation  Taken 3/19/2025 0000 by Anna Baptiste RN  Safety Promotion/Fall Prevention: safety round/check completed  Reorientation Measures:    clock in view   glasses use encouraged  Taken 3/18/2025 2000 by Anna Baptiste RN  Safety Promotion/Fall Prevention: safety round/check completed  Reorientation Measures:   clock in view   glasses use encouraged  Goal: Acceptable Pain Control  Intervention: Prevent or Manage Pain  Recent Flowsheet Documentation  Taken 3/19/2025 0038 by Anna Baptiste RN  Pain Management Interventions:   medication (see MAR)   repositioned   quiet environment facilitated   pillow support provided   rest  Goal: Effective Oxygenation and Ventilation  Intervention: Promote Airway Secretion Clearance  Recent Flowsheet Documentation  Taken 3/19/2025 0000 by Anna Baptiste RN  Cough And Deep Breathing: done independently per patient  Taken 3/18/2025 2000 by Anna Baptiste RN  Cough And Deep Breathing: done independently per patient  Intervention: Optimize Oxygenation and Ventilation  Recent Flowsheet Documentation  Taken 3/19/2025 0000 by Anna Baptiste RN  Chest Tube Safety:   all connections secured   all tubing connections taped   petroleum gauze dressing with patient   rubber-tipped hemostat(s) with patient   suction checked  Taken 3/18/2025 2000 by Anna Baptiste RN  Chest Tube Safety:   all connections secured   all tubing connections taped   petroleum gauze dressing with patient   rubber-tipped hemostat(s) with patient   suction checked   Goal Outcome Evaluation:      Plan of Care Reviewed With: patient    Overall Patient Progress: improvingOverall Patient Progress: improving         Patient alert and oriented x4, strong throughout. Sinus rhythm initially, went into A Fib RVR, 5mg Metoprolol push given, converted into sinus. BP stable throughout. Room air. Urinating appropriately. Nausea with electrolyte replacement, compazine given per patient request.

## 2025-03-19 NOTE — PLAN OF CARE
"Goal Outcome Evaluation:  Patient Name: Justice  MRN: [id number]  Date of Admission: 3/14/2025  Reason for Admission: POD5 AVR and CABGx1  Level of Care: [facility]    Vitals:   BP Readings from Last 1 Encounters:   03/19/25 122/70     Pulse Readings from Last 1 Encounters:   03/19/25 81     Wt Readings from Last 1 Encounters:   03/19/25 66.5 kg (146 lb 8 oz)     Ht Readings from Last 1 Encounters:   03/14/25 1.6 m (5' 3\")     Estimated body mass index is 25.95 kg/m  as calculated from the following:    Height as of this encounter: 1.6 m (5' 3\").    Weight as of this encounter: 66.5 kg (146 lb 8 oz).  Temp Readings from Last 1 Encounters:   03/19/25 98.7  F (37.1  C) (Oral)       Pain: Pain goal 3/10 Pain Rating 4/10 Effective pain medication/regimen Oxy prn and scheduled meds.     CV Surgery Patient: Yes Post Op Day #: 5    Assessment    General: Afebrile yes  Rhythm: normal sinus rhythm w/ BBB and T wave abnormality  Blood Pressure Medications given/held: Amio Given Beta blocker Given   Resp: Oxygen Status: RA  Patient slept last night Yes   Incentive Spirometry Q 1-2 hour when awake: yes -750  Neuro: Alert yes Orientation: self, person, time, and place  GI/:          Bowel Activity: yes if yes indicate when: today          Bowel Medications: no          Urinary Catheter: no  Skin:          Incision: Incision status: healing well          Epicardial Pacing Wires: no  Chest Tubes removed    Assessment    Resp: RA  Telemetry: SR w/ BBB and T wave abnormality  Neuro: A&Ox4  GI/: BM+. Voiding well. Reg diet.   Skin/Wounds: Sternal incision. CT sites (Covered). Blanchable redness to buttocks.   Lines/Drains: PIV SL  Activity: Ax1 GB, W  Sleep: Fair  Abnormal Labs: See results tab    Aggression Stop Light: Green          Patient Care Plan: Echo, CXR, and shower tomorrow. IV lasix. Will discharge to sister's house when ready.   "

## 2025-03-19 NOTE — PROVIDER NOTIFICATION
CV surg provider, Dr. Baird, notified of patient converting into A Fib RVR at 0655. 250mL  Albumin ordered and PO amiodarone moved from 0900 to now.

## 2025-03-19 NOTE — PLAN OF CARE
"  Problem: Adult Inpatient Plan of Care  Goal: Plan of Care Review  Description: The Plan of Care Review/Shift note should be completed every shift.  The Outcome Evaluation is a brief statement about your assessment that the patient is improving, declining, or no change.  This information will be displayed automatically on your shift  note.  Outcome: Progressing  Flowsheets (Taken 3/18/2025 1952)  Outcome Evaluation:   patient ready for transfer to [facility] [place]. ART line pulled and CVP stopped. pacer wires pulled. still having runny stools. patient does not need mirilax anymore. still having incisional pain   gave oxy once.  Goal: Patient-Specific Goal (Individualized)  Description: You can add care plan individualizations to a care plan. Examples of Individualization might be:  \"Parent requests to be called daily at 9am for status\", \"I have a hard time hearing out of my right ear\", or \"Do not touch me to wake me up as it startles  me\".  Outcome: Progressing  Goal: Absence of Hospital-Acquired Illness or Injury  Outcome: Progressing  Intervention: Prevent and Manage VTE (Venous Thromboembolism) Risk  Recent Flowsheet Documentation  Taken 3/18/2025 1600 by Michi Dennison, RN  VTE Prevention/Management: SCDs on (sequential compression devices)  Goal: Optimal Comfort and Wellbeing  Outcome: Progressing  Intervention: Provide Person-Centered Care  Recent Flowsheet Documentation  Taken 3/18/2025 1600 by Michi Dennison, RN  Trust Relationship/Rapport:   care explained   choices provided   reassurance provided   thoughts/feelings acknowledged  Goal: Readiness for Transition of Care  Outcome: Progressing     Problem: Cardiovascular Surgery  Goal: Improved Activity Tolerance  Outcome: Progressing  Goal: Optimal Coping with Heart Surgery  Outcome: Progressing  Goal: Absence of Bleeding  Outcome: Progressing  Goal: Effective Bowel Elimination  Outcome: Progressing  Goal: Effective Cardiac Function  Outcome: Progressing  Goal: " Optimal Cerebral Tissue Perfusion  Outcome: Progressing  Intervention: Protect and Optimize Cerebral Perfusion  Recent Flowsheet Documentation  Taken 3/18/2025 1600 by Michi Dennison, RN  Cerebral Perfusion Promotion: blood pressure monitored  Goal: Fluid and Electrolyte Balance  Outcome: Progressing  Goal: Absence of Infection Signs and Symptoms  Outcome: Progressing  Goal: Acceptable Pain Control  Outcome: Progressing  Goal: Effective Urinary Elimination  Outcome: Progressing  Goal: Effective Oxygenation and Ventilation  Outcome: Progressing   Goal Outcome Evaluation:                 Outcome Evaluation: [place] [facility] [place] pulled and CVP stopped. pacer wires pulled. still having runny stools. patient does not need mirilax anymore. still having incisional pain; gave oxy once.

## 2025-03-20 ENCOUNTER — DOCUMENTATION ONLY (OUTPATIENT)
Dept: OTHER | Facility: CLINIC | Age: 75
End: 2025-03-20

## 2025-03-20 ENCOUNTER — APPOINTMENT (OUTPATIENT)
Dept: PHYSICAL THERAPY | Facility: CLINIC | Age: 75
DRG: 219 | End: 2025-03-20
Attending: STUDENT IN AN ORGANIZED HEALTH CARE EDUCATION/TRAINING PROGRAM
Payer: COMMERCIAL

## 2025-03-20 ENCOUNTER — APPOINTMENT (OUTPATIENT)
Dept: CARDIOLOGY | Facility: CLINIC | Age: 75
DRG: 219 | End: 2025-03-20
Attending: PHYSICIAN ASSISTANT
Payer: COMMERCIAL

## 2025-03-20 ENCOUNTER — APPOINTMENT (OUTPATIENT)
Dept: GENERAL RADIOLOGY | Facility: CLINIC | Age: 75
DRG: 219 | End: 2025-03-20
Attending: PHYSICIAN ASSISTANT
Payer: COMMERCIAL

## 2025-03-20 VITALS
SYSTOLIC BLOOD PRESSURE: 129 MMHG | RESPIRATION RATE: 20 BRPM | HEART RATE: 86 BPM | HEIGHT: 63 IN | WEIGHT: 146 LBS | OXYGEN SATURATION: 93 % | DIASTOLIC BLOOD PRESSURE: 62 MMHG | BODY MASS INDEX: 25.87 KG/M2 | TEMPERATURE: 98.2 F

## 2025-03-20 LAB
ANION GAP SERPL CALCULATED.3IONS-SCNC: 7 MMOL/L (ref 7–15)
BUN SERPL-MCNC: 11.5 MG/DL (ref 8–23)
CALCIUM SERPL-MCNC: 8.1 MG/DL (ref 8.8–10.4)
CHLORIDE SERPL-SCNC: 99 MMOL/L (ref 98–107)
CREAT SERPL-MCNC: 0.65 MG/DL (ref 0.51–0.95)
EGFRCR SERPLBLD CKD-EPI 2021: >90 ML/MIN/1.73M2
ERYTHROCYTE [DISTWIDTH] IN BLOOD BY AUTOMATED COUNT: 13.5 % (ref 10–15)
GLUCOSE SERPL-MCNC: 99 MG/DL (ref 70–99)
HCO3 SERPL-SCNC: 30 MMOL/L (ref 22–29)
HCT VFR BLD AUTO: 24.5 % (ref 35–47)
HGB BLD-MCNC: 8 G/DL (ref 11.7–15.7)
INR PPP: 2.22 (ref 0.85–1.15)
LVEF ECHO: NORMAL
MAGNESIUM SERPL-MCNC: 2 MG/DL (ref 1.7–2.3)
MCH RBC QN AUTO: 30.2 PG (ref 26.5–33)
MCHC RBC AUTO-ENTMCNC: 32.7 G/DL (ref 31.5–36.5)
MCV RBC AUTO: 93 FL (ref 78–100)
PHOSPHATE SERPL-MCNC: 2.9 MG/DL (ref 2.5–4.5)
PLATELET # BLD AUTO: 223 10E3/UL (ref 150–450)
POTASSIUM SERPL-SCNC: 4.1 MMOL/L (ref 3.4–5.3)
RBC # BLD AUTO: 2.65 10E6/UL (ref 3.8–5.2)
SODIUM SERPL-SCNC: 136 MMOL/L (ref 135–145)
WBC # BLD AUTO: 6.6 10E3/UL (ref 4–11)

## 2025-03-20 PROCEDURE — 97530 THERAPEUTIC ACTIVITIES: CPT | Mod: GP

## 2025-03-20 PROCEDURE — 85610 PROTHROMBIN TIME: CPT | Performed by: PHYSICIAN ASSISTANT

## 2025-03-20 PROCEDURE — 36415 COLL VENOUS BLD VENIPUNCTURE: CPT | Performed by: PHYSICIAN ASSISTANT

## 2025-03-20 PROCEDURE — 250N000013 HC RX MED GY IP 250 OP 250 PS 637: Performed by: STUDENT IN AN ORGANIZED HEALTH CARE EDUCATION/TRAINING PROGRAM

## 2025-03-20 PROCEDURE — 93325 DOPPLER ECHO COLOR FLOW MAPG: CPT | Mod: 26 | Performed by: INTERNAL MEDICINE

## 2025-03-20 PROCEDURE — 94640 AIRWAY INHALATION TREATMENT: CPT

## 2025-03-20 PROCEDURE — 250N000013 HC RX MED GY IP 250 OP 250 PS 637: Performed by: PHYSICIAN ASSISTANT

## 2025-03-20 PROCEDURE — 93308 TTE F-UP OR LMTD: CPT | Mod: 26 | Performed by: INTERNAL MEDICINE

## 2025-03-20 PROCEDURE — 93321 DOPPLER ECHO F-UP/LMTD STD: CPT | Mod: 26 | Performed by: INTERNAL MEDICINE

## 2025-03-20 PROCEDURE — 97110 THERAPEUTIC EXERCISES: CPT | Mod: GP

## 2025-03-20 PROCEDURE — 84100 ASSAY OF PHOSPHORUS: CPT | Performed by: PHYSICIAN ASSISTANT

## 2025-03-20 PROCEDURE — 999N000157 HC STATISTIC RCP TIME EA 10 MIN

## 2025-03-20 PROCEDURE — 71046 X-RAY EXAM CHEST 2 VIEWS: CPT

## 2025-03-20 PROCEDURE — 85027 COMPLETE CBC AUTOMATED: CPT | Performed by: PHYSICIAN ASSISTANT

## 2025-03-20 PROCEDURE — 250N000011 HC RX IP 250 OP 636: Performed by: PHYSICIAN ASSISTANT

## 2025-03-20 PROCEDURE — 80048 BASIC METABOLIC PNL TOTAL CA: CPT | Performed by: PHYSICIAN ASSISTANT

## 2025-03-20 PROCEDURE — 94640 AIRWAY INHALATION TREATMENT: CPT | Mod: 76

## 2025-03-20 PROCEDURE — 250N000013 HC RX MED GY IP 250 OP 250 PS 637

## 2025-03-20 PROCEDURE — 250N000009 HC RX 250: Performed by: PHYSICIAN ASSISTANT

## 2025-03-20 PROCEDURE — 93325 DOPPLER ECHO COLOR FLOW MAPG: CPT

## 2025-03-20 PROCEDURE — 120N000001 HC R&B MED SURG/OB

## 2025-03-20 PROCEDURE — 83735 ASSAY OF MAGNESIUM: CPT | Performed by: PHYSICIAN ASSISTANT

## 2025-03-20 RX ORDER — FUROSEMIDE 10 MG/ML
40 INJECTION INTRAMUSCULAR; INTRAVENOUS ONCE
Status: COMPLETED | OUTPATIENT
Start: 2025-03-20 | End: 2025-03-20

## 2025-03-20 RX ORDER — MAGNESIUM OXIDE 400 MG/1
400 TABLET ORAL EVERY 4 HOURS
Status: COMPLETED | OUTPATIENT
Start: 2025-03-20 | End: 2025-03-20

## 2025-03-20 RX ORDER — WARFARIN SODIUM 2 MG/1
2 TABLET ORAL
Status: COMPLETED | OUTPATIENT
Start: 2025-03-20 | End: 2025-03-20

## 2025-03-20 RX ORDER — IPRATROPIUM BROMIDE AND ALBUTEROL SULFATE 2.5; .5 MG/3ML; MG/3ML
3 SOLUTION RESPIRATORY (INHALATION)
Status: COMPLETED | OUTPATIENT
Start: 2025-03-20 | End: 2025-03-20

## 2025-03-20 RX ADMIN — Medication 400 MG: at 12:05

## 2025-03-20 RX ADMIN — METHOCARBAMOL 250 MG: 500 TABLET ORAL at 08:08

## 2025-03-20 RX ADMIN — IPRATROPIUM BROMIDE AND ALBUTEROL SULFATE 3 ML: .5; 3 SOLUTION RESPIRATORY (INHALATION) at 11:26

## 2025-03-20 RX ADMIN — IPRATROPIUM BROMIDE AND ALBUTEROL SULFATE 3 ML: .5; 3 SOLUTION RESPIRATORY (INHALATION) at 15:33

## 2025-03-20 RX ADMIN — ACETAMINOPHEN 975 MG: 325 TABLET, FILM COATED ORAL at 15:14

## 2025-03-20 RX ADMIN — HYDROMORPHONE HYDROCHLORIDE 0.2 MG: 0.2 INJECTION, SOLUTION INTRAMUSCULAR; INTRAVENOUS; SUBCUTANEOUS at 05:57

## 2025-03-20 RX ADMIN — METHOCARBAMOL 250 MG: 500 TABLET ORAL at 12:06

## 2025-03-20 RX ADMIN — LIDOCAINE 2 PATCH: 4 PATCH TOPICAL at 19:54

## 2025-03-20 RX ADMIN — AMIODARONE HYDROCHLORIDE 400 MG: 200 TABLET ORAL at 19:56

## 2025-03-20 RX ADMIN — ACETAMINOPHEN 975 MG: 325 TABLET, FILM COATED ORAL at 08:07

## 2025-03-20 RX ADMIN — ATORVASTATIN CALCIUM 40 MG: 40 TABLET, FILM COATED ORAL at 19:55

## 2025-03-20 RX ADMIN — PROCHLORPERAZINE MALEATE 5 MG: 5 TABLET ORAL at 22:00

## 2025-03-20 RX ADMIN — WARFARIN SODIUM 2 MG: 2 TABLET ORAL at 17:50

## 2025-03-20 RX ADMIN — ASPIRIN 81 MG: 81 TABLET, CHEWABLE ORAL at 08:09

## 2025-03-20 RX ADMIN — PANTOPRAZOLE SODIUM 40 MG: 40 TABLET, DELAYED RELEASE ORAL at 08:08

## 2025-03-20 RX ADMIN — HEPARIN SODIUM 5000 UNITS: 5000 INJECTION, SOLUTION INTRAVENOUS; SUBCUTANEOUS at 05:51

## 2025-03-20 RX ADMIN — AMIODARONE HYDROCHLORIDE 400 MG: 200 TABLET ORAL at 08:08

## 2025-03-20 RX ADMIN — ACETAMINOPHEN 975 MG: 325 TABLET, FILM COATED ORAL at 23:42

## 2025-03-20 RX ADMIN — METHOCARBAMOL 250 MG: 500 TABLET ORAL at 22:00

## 2025-03-20 RX ADMIN — PROCHLORPERAZINE MALEATE 5 MG: 5 TABLET ORAL at 05:57

## 2025-03-20 RX ADMIN — Medication 400 MG: at 08:20

## 2025-03-20 RX ADMIN — METHOCARBAMOL 250 MG: 500 TABLET ORAL at 17:50

## 2025-03-20 RX ADMIN — FUROSEMIDE 40 MG: 10 INJECTION, SOLUTION INTRAMUSCULAR; INTRAVENOUS at 12:45

## 2025-03-20 RX ADMIN — METOPROLOL SUCCINATE 25 MG: 25 TABLET, EXTENDED RELEASE ORAL at 08:08

## 2025-03-20 ASSESSMENT — ACTIVITIES OF DAILY LIVING (ADL)
ADLS_ACUITY_SCORE: 42
ADLS_ACUITY_SCORE: 39
ADLS_ACUITY_SCORE: 42
ADLS_ACUITY_SCORE: 39
ADLS_ACUITY_SCORE: 39
ADLS_ACUITY_SCORE: 42
ADLS_ACUITY_SCORE: 39
ADLS_ACUITY_SCORE: 42
ADLS_ACUITY_SCORE: 42
ADLS_ACUITY_SCORE: 39
ADLS_ACUITY_SCORE: 39
ADLS_ACUITY_SCORE: 42
ADLS_ACUITY_SCORE: 39
ADLS_ACUITY_SCORE: 42
ADLS_ACUITY_SCORE: 42
ADLS_ACUITY_SCORE: 39
ADLS_ACUITY_SCORE: 42
ADLS_ACUITY_SCORE: 39

## 2025-03-20 NOTE — PROGRESS NOTES
Pain: Pain goal 0 Pain Rating Effective pain medication/regimen scheduled methocarbamol, lidocaine patch on the chest.     CV Surgery Patient: Yes Post Op Day #: 5     Assessment     General: Afebrile yes  Rhythm: normal sinus rhythm w/ BBB and T wave inversion  Blood Pressure Medications given/held: Amio Given Beta blocker Given   Resp: Oxygen Status: RA  Patient slept last night Yes   Incentive Spirometry Q 1-2 hour when awake: yes -750 ml   Neuro: Alert yes Orientation: self, person, time, and place  GI/:          Bowel Activity: yes if yes indicate when: today          Bowel Medications: no          Urinary Catheter: no  Skin:          Incision: Sternal incision, healing well          Epicardial Pacing Wires: no  Chest Tubes removed 03/19     Assessment     Resp: RA  Telemetry: SR w/ BBB and T wave inversion  Neuro: A&Ox4  GI/: BM+. Voiding well. Reg diet.   Skin/Wounds: Sternal incision. CT sites (Covered). Blanchable redness on buttocks.   Lines/Drains: PIV SL  Activity: Ax1 GB, FWW  Sleep: Fair  Abnormal Labs: See results tab     Aggression Stop Light: Green          Patient Care Plan: P\ain control, encourage ambulation & pulmonary toilet. Plan to discharge to sister's house when ready.

## 2025-03-20 NOTE — PROGRESS NOTES
[facility]  Cardiovascular and Thoracic Surgery Daily Note      Assessment and Plan  Tri Walden is a 74 year old female with a PMH of hypertension, dyslipidemia, IBS, and progressive severe aortic stenosis and single vessel CAD ( LAD) who was  recently referred for valve replacement and surgical revascularization. Admitted 03/14 following bioprosthetic AVR (23 mm Inspiris Resilia) and CABG x1 ([place] to LAD) with Dr. Robert Medellin.     POD #6 s/p bioprosthetic AVR (23 mm Inspiris Resilia) and CABG x1 (LIM[place] to LAD) on 03/14/2025 with Dr. Robert Medellin.    - CVS:   Pre-op TTE with preserved biventricular function, severe aortic stenosis.  Postop vasoplegic shock, improving. Weaned off pressor POD4.  Weight up 6 kg since admission, increase lasix to 40 mg IV daily  Monitor fluid status  Afib RVR POD1, converted following IV amiodarone protocol and transitioned to PO with taper. Recurrent afib RVR POD3 with hypotension, rebolused amio and resumed infusion, transitioned to PO with taper. Recurrent afib RVR overnight POD4 converted back to NSR post IV metoprolol and 250 albumin. Continue on PO amiodarone, switch from lopressor to toprol XL 25 mg daily.  Warfarin to INR goal 2-3 x 3 months for bioprosthetic AVR per surgeon. Can be switched to DOAC after 3 months if needed for afib prophylaxis. INR therapeutic  Aspirin 81 mg daily, change PTA simvastatin to atorvastatin (interaction with amiodarone)  Chest tubes/TPW removed 3/18    - Resp: Extubated POD1 AM (failed PST x 4 POD0 with apnea and low tidal volumes). Working with IS, pulmonary toilet. Saturating well on RA    - Neuro: Neuro intact, pain controlled on current regimen    - Renal: No history of significant renal disease. Cr stable WNL. Trend BMP. Diuretic as above     Recent Labs   Lab 03/20/25  0721 03/19/25  0603 03/18/25  2244   CR 0.65 0.54 0.55       - GI: +BM, continue bowel regimen    - : Hill removed, voiding without issue.    - Endo:  "sliding scale insulin discontinued.   Hemoglobin A1C   Date Value Ref Range Status   02/24/2025 5.4 <5.7 % Final     Comment:     Normal <5.7%   Prediabetes 5.7-6.4%    Diabetes 6.5% or higher     Note: Adopted from ADA consensus guidelines.        - FEN: Replace electrolytes as needed. Regular diet. PRN zofran for intermittent nausea    - ID: Postop leukocytosis, likely reactive from surgery now resolved. Afebrile. WBC WNL. Periop abx prophylaxis complete. Trend CBC and fever curve.   Recent Labs   Lab 03/20/25  0721 03/19/25  0603 03/18/25  0520   WBC 6.6 5.9 6.9       - Heme: Acute blood loss anemia due to surgery. Hgb and PLT stable. Trend CBC, transfuse PRN.   Recent Labs   Lab 03/20/25  0721 03/19/25  0603 03/18/25  0520   HGB 8.0* 7.8* 8.1*    185 176       - Proph: SCD, subcutaneous heparin, PPI    - Other:  Clinically Significant Risk Factors         # Hyponatremia: Lowest Na = 132 mmol/L in last 2 days, will monitor as appropriate  # Hypochloremia: Lowest Cl = 95 mmol/L in last 2 days, will monitor as appropriate  # Hypocalcemia: Lowest iCa = 4.2 mg/dL in last 2 days, will monitor and replace as appropriate     # Hypoalbuminemia: Lowest albumin = 3.2 g/dL at 3/14/2025  5:53 PM, will monitor as appropriate       # Hypertension: Noted on problem list            # Overweight: Estimated body mass index is 25.86 kg/m  as calculated from the following:    Height as of this encounter: 1.6 m (5' 3\").    Weight as of this encounter: 66.2 kg (146 lb).      # Financial/Environmental Concerns: none   # History of CABG: noted on surgical history       - Dispo: Encouraged IS/TCDB/amb. Sternal precautions. Continue amiodarone taper. Would plan to decrease amiodarone and discharge on PO. Continue coumadin with plans for 3 months due to AVR (tissue). INR therapeutic. Increase lasix to 40 mg IV today. Ok to shower. Echocardiogram today due to AVR. Will add duoneb x 2 today to help clear up the lungs. Looking at " possible discharge to sister's house tomorrow. Continue to monitor.     Interval History  Pain controlled. Breathing is good. Working with IS. Appetite has improved. Some mild nausea this am. +BMs, ambulating, denies any popping/clicking Working with therapies. Weaned off O2 to room air. Ambulating, +BMs, does complain of a cough      Medications  Current Facility-Administered Medications   Medication Dose Route Frequency Provider Last Rate Last Admin    acetaminophen (TYLENOL) tablet 975 mg  975 mg Oral Q8H Nir Morales PA-C   975 mg at 03/20/25 0807    amiodarone (PACERONE) tablet 400 mg  400 mg Oral BID Nir Morales PA-C   400 mg at 03/20/25 0808    aspirin (ASA) chewable tablet 81 mg  81 mg Oral or NG Tube Daily Nir Morales PA-C   81 mg at 03/20/25 0809    atorvastatin (LIPITOR) tablet 40 mg  40 mg Oral QPM Nir Morales PA-C   40 mg at 03/19/25 1942    Lidocaine (LIDOCARE) 4 % Patch 1-2 patch  1-2 patch Transdermal Q24H Nir Morales PA-C   2 patch at 03/19/25 1942    magnesium oxide (MAG-OX) tablet 400 mg  400 mg Oral Q4H Tevin Medellin MD   400 mg at 03/20/25 0820    methocarbamol (ROBAXIN) half-tab 250 mg  250 mg Oral 4x Daily Nir Morales PA-C   250 mg at 03/20/25 0808    metoprolol succinate ER (TOPROL XL) 24 hr tablet 25 mg  25 mg Oral Daily Nir Morales PA-C   25 mg at 03/20/25 0808    pantoprazole (PROTONIX) EC tablet 40 mg  40 mg Oral Daily Nir Morales PA-C   40 mg at 03/20/25 0808    senna-docusate (SENOKOT-S/PERICOLACE) 8.6-50 MG per tablet 1 tablet  1 tablet Oral BID Nir Morales PA-C   1 tablet at 03/18/25 0902    warfarin ANTICOAGULANT (COUMADIN) tablet 2 mg  2 mg Oral ONCE at 18:00 Polly Montague, [facility]        Warfarin Dose Required Daily - Pharmacist Managed  1 each Oral See Admin Instructions Nir Morales, PAGiselaC         Current Facility-Administered Medications   Medication Dose Route Frequency Provider Last Rate Last Admin    alum & mag  hydroxide-simethicone (MAALOX) suspension 30 mL  30 mL Oral Q4H PRN Nir Morales PA-C        bisacodyl (DULCOLAX) suppository 10 mg  10 mg Rectal Daily PRN Nir Morales PA-C        calcium carbonate (TUMS) chewable tablet 500 mg  500 mg Oral 4x Daily PRN Nir Morales PA-C        glucose gel 15-30 g  15-30 g Oral Q15 Min PRN Nir Morales PA-C        Or    dextrose 50 % injection 25-50 mL  25-50 mL Intravenous Q15 Min PRN Nir Morales PA-C        Or    glucagon injection 1 mg  1 mg Subcutaneous Q15 Min PRN Nir Morales PA-C        hydrALAZINE (APRESOLINE) injection 10 mg  10 mg Intravenous Q30 Min PRN Nir Morales PA-C        HYDROmorphone (DILAUDID) injection 0.2 mg  0.2 mg Intravenous Q2H PRN Nir Morales PA-C   0.2 mg at 03/20/25 0557    Or    HYDROmorphone (DILAUDID) injection 0.4 mg  0.4 mg Intravenous Q2H PRN Nir Morales PA-C        hydrOXYzine HCl (ATARAX) tablet 10 mg  10 mg Oral Q6H PRN Nir Morales PA-C        lidocaine (LMX4) cream   Topical Q1H PRN Nir Morales PA-C        lidocaine 1 % 0.1-1 mL  0.1-1 mL Other Q1H PRN Nir Morales PA-C        magnesium hydroxide (MILK OF MAGNESIA) suspension 30 mL  30 mL Oral Daily PRN Nir Morales PA-C        naloxone (NARCAN) injection 0.2 mg  0.2 mg Intravenous Q2 Min PRN Nir Morales PA-C        Or    naloxone (NARCAN) injection 0.4 mg  0.4 mg Intravenous Q2 Min PRN Nir Morales PA-C        Or    naloxone (NARCAN) injection 0.2 mg  0.2 mg Intramuscular Q2 Min PRN Nir Morales PA-C        Or    naloxone (NARCAN) injection 0.4 mg  0.4 mg Intramuscular Q2 Min PRN Nir Morales PA-C        ondansetron (ZOFRAN ODT) ODT tab 4 mg  4 mg Oral Q6H PRN Nir Morales PA-C        Or    ondansetron (ZOFRAN) injection 4 mg  4 mg Intravenous Q6H PRN Nir Morales PA-C   4 mg at 03/17/25 2028    oxyCODONE (ROXICODONE) tablet 5 mg  5 mg Oral Q4H PRN Nir Morales PA-C   5 mg at 03/19/25 1535    Or    oxyCODONE  "(ROXICODONE) tablet 10 mg  10 mg Oral Q4H PRN Nir Morales PA-C   10 mg at 03/17/25 2021    prochlorperazine (COMPAZINE) injection 5 mg  5 mg Intravenous Q6H PRN Nir Morales PA-C   5 mg at 03/17/25 2129    Or    prochlorperazine (COMPAZINE) tablet 5 mg  5 mg Oral Q6H PRN Nir Morales PA-C   5 mg at 03/20/25 0557    sodium chloride (PF) 0.9% PF flush 3 mL  3 mL Intracatheter q1 min prn Nir Morales PA-C   3 mL at 03/20/25 0558         Physical Exam  Vitals were reviewed  Blood pressure 116/60, pulse 76, temperature 98.4  F (36.9  C), temperature source Oral, resp. rate 24, height 1.6 m (5' 3\"), weight 66.2 kg (146 lb), SpO2 96%.  Gen: up in bed, comfortable, -O2    Lungs: diminished bases, -1000 ml    Cardiovascular: RRR, telemetry SR 70s, +edema LE    Abdomen: BS+m NT/ND, soft    Derm: sternal incision D/I    CT: removed    CXR (3/20/25) - chest tubes removed, no significant pneumo, sm effusions bilaterally, atelectasis    Weight:   Vitals:    03/15/25 0630 03/16/25 0645 03/17/25 0600 03/19/25 1552   Weight: 61.7 kg (136 lb 0.4 oz) 63.3 kg (139 lb 8.8 oz) 65.1 kg (143 lb 8.3 oz) 66.5 kg (146 lb 8 oz)    03/20/25 0340   Weight: 66.2 kg (146 lb)         Data  Recent Labs   Lab 03/20/25  0721 03/19/25  0603 03/18/25  2244 03/18/25  0911 03/18/25  0520 03/15/25  1251 03/15/25  0422 03/14/25  2125 03/14/25  1753   WBC 6.6 5.9  --   --  6.9   < > 9.8  --  10.1   HGB 8.0* 7.8*  --   --  8.1*   < > 9.6*  --  10.7*   MCV 93 92  --   --  91   < > 89  --  88    185  --   --  176   < > 219  --  184   INR 2.22* 1.72*  --   --  1.19*   < >  --   --  1.35*    132* 133*  --  133*   < > 135  --  135   POTASSIUM 4.1 4.3 3.8   < > 3.3*   < > 3.8  --  3.5   CHLORIDE 99 95* 97*  --  95*   < > 102  --  103   CO2 30* 30* 30*  --  29   < > 20*  --  21*   BUN 11.5 10.3 10.5  --  12.8   < > 12.3  --  9.1   CR 0.65 0.54 0.55  --  0.52   < > 0.69  --  0.50*   ANIONGAP 7 7 6*  --  9   < > 13  --  11   IGNACIO " 8.1* 7.8* 8.0*  --  8.0*   < > 8.3*  --  8.3*   GLC 99 92 86   < > 109*   < > 124*   < > 125*   ALBUMIN  --   --   --   --   --   --  3.6  --  3.2*   PROTTOTAL  --   --   --   --   --   --  5.6*  --  5.3*   BILITOTAL  --   --   --   --   --   --  0.6  --  0.6   ALKPHOS  --   --   --   --   --   --  38*  --  42   ALT  --   --   --   --   --   --  8  --  10   AST  --   --   --   --   --   --  37  --  34    < > = values in this interval not displayed.       Imaging:  Recent Results (from the past 24 hours)   XR Chest 2 Views    Narrative    EXAM: XR CHEST 2 VIEWS  LOCATION: [facility]  DATE: 3/20/2025    INDICATION: s p AVR (tissue) CABG x 1, removal of chest tubes  COMPARISON: 325      Impression    IMPRESSION: Right IJ central venous catheter, bilateral chest tubes and mediastinal drains have been removed. Stable small right pneumothorax with 9 mm of pleural separation at the right lung apex. Stable trace bilateral pleural effusions and bibasilar   atelectasis. Median sternotomy, mediastinal surgical clips and aortic valvular prosthesis. Left shoulder arthroplasty.       Patient seen and discussed with Dr. Vignesh Morales, PA-C  Cardiothoracic Surgery

## 2025-03-20 NOTE — PLAN OF CARE
"Patient Name: Justice  MRN: [id number]  Date of Admission: 3/14/2025  Reason for Admission: CaBG/AVR  Level of Care: Cardiac    Vitals:   BP Readings from Last 1 Encounters:   03/20/25 117/49     Pulse Readings from Last 1 Encounters:   03/20/25 92     Wt Readings from Last 1 Encounters:   03/20/25 66.2 kg (146 lb)     Ht Readings from Last 1 Encounters:   03/14/25 1.6 m (5' 3\")     Estimated body mass index is 25.86 kg/m  as calculated from the following:    Height as of this encounter: 1.6 m (5' 3\").    Weight as of this encounter: 66.2 kg (146 lb).  Temp Readings from Last 1 Encounters:   03/20/25 98.7  F (37.1  C) (Oral)       Pain: Pain goal 0 Pain Rating 1-5 Effective pain medication/regimen Scheduled meds    CV Surgery Patient: Yes Post Op Day #: 6    Assessment    General: Afebrile yes  Rhythm: normal sinus rhythm  Blood Pressure Medications given/held: Amio Given Beta blocker Given Other -  Resp: Oxygen Status: RA  Patient slept last night Yes Approx hours slept 5  Incentive Spirometry Q 1-2 hour when awake: yes Volume: 750  Neuro: Alert yes Orientation: self, person, time, and place  GI/:          Bowel Activity: yes if yes indicate when: 3/20          Bowel Medications: yes          Urinary Catheter: no  Skin:          Incision: Incision status: healing well          Epicardial Pacing Wires: no  Chest Tubes   Pleural: no Draining: not applicable               Suction: not applicable              Mediastinal: no Draining: not applicable               Suction: not applicable   Dressing Change Daily: yes If no, why? -    Assessment    Resp: LSC - RA  Telemetry: SR  Neuro: A/Ox4  GI/: Adequate UO via toilet, BM today  Skin/Wounds: Sternal incision, CT sites WDL  Lines/Drains: 1 PIV - SL  Activity: SBA  Sleep: -  Abnormal Labs: Mag of 2    Aggression Stop Light: Green          Patient Care Plan: Pending discharge home tomorrow     Goal Outcome Evaluation:                        "

## 2025-03-21 ENCOUNTER — APPOINTMENT (OUTPATIENT)
Dept: PHYSICAL THERAPY | Facility: CLINIC | Age: 75
DRG: 219 | End: 2025-03-21
Attending: STUDENT IN AN ORGANIZED HEALTH CARE EDUCATION/TRAINING PROGRAM
Payer: COMMERCIAL

## 2025-03-21 LAB
ANION GAP SERPL CALCULATED.3IONS-SCNC: 9 MMOL/L (ref 7–15)
BUN SERPL-MCNC: 11.2 MG/DL (ref 8–23)
CALCIUM SERPL-MCNC: 8.1 MG/DL (ref 8.8–10.4)
CHLORIDE SERPL-SCNC: 99 MMOL/L (ref 98–107)
CREAT SERPL-MCNC: 0.65 MG/DL (ref 0.51–0.95)
EGFRCR SERPLBLD CKD-EPI 2021: >90 ML/MIN/1.73M2
ERYTHROCYTE [DISTWIDTH] IN BLOOD BY AUTOMATED COUNT: 14 % (ref 10–15)
GLUCOSE SERPL-MCNC: 93 MG/DL (ref 70–99)
HCO3 SERPL-SCNC: 28 MMOL/L (ref 22–29)
HCT VFR BLD AUTO: 23.9 % (ref 35–47)
HGB BLD-MCNC: 7.9 G/DL (ref 11.7–15.7)
INR PPP: 2.51 (ref 0.85–1.15)
MAGNESIUM SERPL-MCNC: 2 MG/DL (ref 1.7–2.3)
MCH RBC QN AUTO: 30.6 PG (ref 26.5–33)
MCHC RBC AUTO-ENTMCNC: 33.1 G/DL (ref 31.5–36.5)
MCV RBC AUTO: 93 FL (ref 78–100)
PHOSPHATE SERPL-MCNC: 3.6 MG/DL (ref 2.5–4.5)
PLATELET # BLD AUTO: 261 10E3/UL (ref 150–450)
POTASSIUM SERPL-SCNC: 4.1 MMOL/L (ref 3.4–5.3)
RBC # BLD AUTO: 2.58 10E6/UL (ref 3.8–5.2)
SODIUM SERPL-SCNC: 136 MMOL/L (ref 135–145)
WBC # BLD AUTO: 6.4 10E3/UL (ref 4–11)

## 2025-03-21 PROCEDURE — 85027 COMPLETE CBC AUTOMATED: CPT | Performed by: PHYSICIAN ASSISTANT

## 2025-03-21 PROCEDURE — 83735 ASSAY OF MAGNESIUM: CPT | Performed by: STUDENT IN AN ORGANIZED HEALTH CARE EDUCATION/TRAINING PROGRAM

## 2025-03-21 PROCEDURE — 250N000013 HC RX MED GY IP 250 OP 250 PS 637: Performed by: NURSE PRACTITIONER

## 2025-03-21 PROCEDURE — 250N000013 HC RX MED GY IP 250 OP 250 PS 637: Performed by: STUDENT IN AN ORGANIZED HEALTH CARE EDUCATION/TRAINING PROGRAM

## 2025-03-21 PROCEDURE — 250N000013 HC RX MED GY IP 250 OP 250 PS 637: Performed by: PHYSICIAN ASSISTANT

## 2025-03-21 PROCEDURE — 85610 PROTHROMBIN TIME: CPT | Performed by: PHYSICIAN ASSISTANT

## 2025-03-21 PROCEDURE — 84100 ASSAY OF PHOSPHORUS: CPT | Performed by: STUDENT IN AN ORGANIZED HEALTH CARE EDUCATION/TRAINING PROGRAM

## 2025-03-21 PROCEDURE — 120N000001 HC R&B MED SURG/OB

## 2025-03-21 PROCEDURE — 97110 THERAPEUTIC EXERCISES: CPT | Mod: GP

## 2025-03-21 PROCEDURE — 36415 COLL VENOUS BLD VENIPUNCTURE: CPT | Performed by: PHYSICIAN ASSISTANT

## 2025-03-21 PROCEDURE — 80048 BASIC METABOLIC PNL TOTAL CA: CPT | Performed by: PHYSICIAN ASSISTANT

## 2025-03-21 PROCEDURE — 97530 THERAPEUTIC ACTIVITIES: CPT | Mod: GP

## 2025-03-21 PROCEDURE — 250N000011 HC RX IP 250 OP 636: Performed by: NURSE PRACTITIONER

## 2025-03-21 RX ORDER — MAGNESIUM OXIDE 400 MG/1
400 TABLET ORAL EVERY 4 HOURS
Status: COMPLETED | OUTPATIENT
Start: 2025-03-21 | End: 2025-03-21

## 2025-03-21 RX ORDER — WARFARIN SODIUM 2 MG/1
2 TABLET ORAL
Status: COMPLETED | OUTPATIENT
Start: 2025-03-21 | End: 2025-03-21

## 2025-03-21 RX ORDER — AMOXICILLIN 250 MG
1 CAPSULE ORAL
Status: DISCONTINUED | OUTPATIENT
Start: 2025-03-21 | End: 2025-03-22 | Stop reason: HOSPADM

## 2025-03-21 RX ORDER — GUAIFENESIN 600 MG/1
600 TABLET, EXTENDED RELEASE ORAL 2 TIMES DAILY
Status: DISCONTINUED | OUTPATIENT
Start: 2025-03-21 | End: 2025-03-22 | Stop reason: HOSPADM

## 2025-03-21 RX ORDER — FUROSEMIDE 10 MG/ML
40 INJECTION INTRAMUSCULAR; INTRAVENOUS ONCE
Status: COMPLETED | OUTPATIENT
Start: 2025-03-21 | End: 2025-03-21

## 2025-03-21 RX ADMIN — METOPROLOL SUCCINATE 25 MG: 25 TABLET, EXTENDED RELEASE ORAL at 09:19

## 2025-03-21 RX ADMIN — METHOCARBAMOL 250 MG: 500 TABLET ORAL at 09:18

## 2025-03-21 RX ADMIN — Medication 400 MG: at 13:38

## 2025-03-21 RX ADMIN — LIDOCAINE 1 PATCH: 4 PATCH TOPICAL at 19:53

## 2025-03-21 RX ADMIN — GUAIFENESIN 600 MG: 600 TABLET, EXTENDED RELEASE ORAL at 19:53

## 2025-03-21 RX ADMIN — AMIODARONE HYDROCHLORIDE 400 MG: 200 TABLET ORAL at 19:52

## 2025-03-21 RX ADMIN — ACETAMINOPHEN 975 MG: 325 TABLET, FILM COATED ORAL at 19:51

## 2025-03-21 RX ADMIN — ASPIRIN 81 MG: 81 TABLET, CHEWABLE ORAL at 09:19

## 2025-03-21 RX ADMIN — METHOCARBAMOL 250 MG: 500 TABLET ORAL at 21:41

## 2025-03-21 RX ADMIN — METHOCARBAMOL 250 MG: 500 TABLET ORAL at 17:19

## 2025-03-21 RX ADMIN — GUAIFENESIN 600 MG: 600 TABLET, EXTENDED RELEASE ORAL at 13:38

## 2025-03-21 RX ADMIN — WARFARIN SODIUM 2 MG: 2 TABLET ORAL at 17:19

## 2025-03-21 RX ADMIN — Medication 400 MG: at 09:18

## 2025-03-21 RX ADMIN — FUROSEMIDE 40 MG: 10 INJECTION, SOLUTION INTRAMUSCULAR; INTRAVENOUS at 13:38

## 2025-03-21 RX ADMIN — ATORVASTATIN CALCIUM 40 MG: 40 TABLET, FILM COATED ORAL at 19:53

## 2025-03-21 RX ADMIN — PANTOPRAZOLE SODIUM 40 MG: 40 TABLET, DELAYED RELEASE ORAL at 09:19

## 2025-03-21 RX ADMIN — METHOCARBAMOL 250 MG: 500 TABLET ORAL at 13:38

## 2025-03-21 RX ADMIN — AMIODARONE HYDROCHLORIDE 400 MG: 200 TABLET ORAL at 09:18

## 2025-03-21 RX ADMIN — ACETAMINOPHEN 975 MG: 325 TABLET, FILM COATED ORAL at 09:18

## 2025-03-21 RX ADMIN — OXYCODONE HYDROCHLORIDE 5 MG: 5 TABLET ORAL at 04:08

## 2025-03-21 ASSESSMENT — ACTIVITIES OF DAILY LIVING (ADL)
ADLS_ACUITY_SCORE: 39

## 2025-03-21 NOTE — PROGRESS NOTES
Care Management Follow Up  Met with patient to discuss discharge plans. Discussed possible discharge tomorrow per notes from surgical team.   Patient stated she will be living with her sister in [facility]. Follow up appointment made for INR on Cont 3/24 at [facility] at 1 pm      Length of Stay (days): 7    Expected Discharge Date: 03/22/2025     Concerns to be Addressed: discharge planning     Patient plan of care discussed at interdisciplinary rounds: Yes    Anticipated Discharge Disposition: Home, Outpatient Rehab (PT, OT, SLP, Cardiac or Pulmonary)              Anticipated Discharge Services: Other (see comment) (pending continued recommendations)  Anticipated Discharge DME: Other (see comment) (pending recommendations)    Patient/family educated on Medicare website which has current facility and service quality ratings:    Education Provided on the Discharge Plan: Yes  Patient/Family in Agreement with the Plan: yes    Referrals Placed by CM/SW: External Care Coordination, Specialty Providers  Private pay costs discussed: Not applicable    Discussed  Partnership in Safe Discharge Planning  document with patient/family: No    Handoff Completed: Yes, [facility] PCP: Internal handoff referral completed      Next Steps: Cont to follow  for discharge plans.     Gila Rivera -[phone]

## 2025-03-21 NOTE — PLAN OF CARE
"Patient Name: Justice  MRN: [id number]  Date of Admission: 3/14/2025  Reason for Admission: CABG/AVR  Level of Care: Heart     Vitals:   BP Readings from Last 1 Encounters:   03/20/25 129/62     Pulse Readings from Last 1 Encounters:   03/20/25 86     Wt Readings from Last 1 Encounters:   03/20/25 66.2 kg (146 lb)     Ht Readings from Last 1 Encounters:   03/14/25 1.6 m (5' 3\")     Estimated body mass index is 25.86 kg/m  as calculated from the following:    Height as of this encounter: 1.6 m (5' 3\").    Weight as of this encounter: 66.2 kg (146 lb).  Temp Readings from Last 1 Encounters:   03/20/25 98.2  F (36.8  C) (Oral)       Pain: Pain Rating 6/10 Effective pain medication/regimen Scheduled tylenol & PRN oxy x1     CV Surgery Patient: Yes Post Op Day #: 9    Assessment    General: Afebrile yes  Rhythm: normal sinus rhythm w/ BBB  Blood Pressure Medications given/held: Amio Given Beta blocker Given   Resp: Oxygen Status: RA  Patient slept last night Yes Approx hours slept 6-7 hours  Incentive Spirometry Q 1-2 hour when awake: yes Volume: 750  Neuro: Alert yes Orientation: self, person, time, and place  GI/:          Bowel Activity: yes if yes indicate when: 3/20           Bowel Medications: yes          Urinary Catheter: no  Skin:          Incision: Incision status: healing well          Epicardial Pacing Wires: no  Chest Tubes Removed     Assessment    Resp: LS dim/clear on 2L NC when sleeping. RA when awake   Telemetry: SR w/BBB inverted T wave  Neuro: A&O x4  GI/: Continent of B/B.   Skin/Wounds: Sternal incision, CT sites.   Lines/Drains: 1 PIV, SL  Activity: SBA, sternal precautions   Sleep: good   Abnormal Labs: k/mg/phos protocol.     Aggression Stop Light: Green          Patient Care Plan: Planned to discharge to [facility] today. Continue plan of care   "

## 2025-03-21 NOTE — PROGRESS NOTES
[facility]  Cardiovascular and Thoracic Surgery Daily Note      Assessment and Plan  Tri Walden is a 74 year old female with a PMH of hypertension, dyslipidemia, IBS, and progressive severe aortic stenosis and single vessel CAD ( LAD) who was  recently referred for valve replacement and surgical revascularization. Admitted 03/14 following bioprosthetic AVR (23 mm Inspiris Resilia) and CABG x1 ([place] to LAD) with Dr. Robert Medellin.     POD #7 s/p bioprosthetic AVR (23 mm Inspiris Resilia) and CABG x1 ([place] to LAD) on 03/14/2025 with Dr. Robert Medellin.    - CVS:   Postoperative Vasoplegic Shock, resolved  Hypervolemia  Postop A-fib RVR, converted to sinus rhythm  Hemodynamically stable overnight. MAPs 70s-80s, HR 70s-90s  Weaned off pressor POD4.  Pre-op TTE with preserved biventricular function, severe aortic stenosis.  Echo 3/20/25 showed LVEF 60%, mild-moderate tricuspid regurg, bioAVR mean gradient 11 mmHg  Postop converted A-fib RVR to SR with amio, recurred POD3, rebolused amio, recurred POD4, maintained SR with IV metoprolol and PO amio 400 mg  Continue PO metoprolol succinate 25 mg every day   Weight up 6 kg since admission, 40 mg IV once  Monitor result, goal - 1 L daily  Warfarin to INR, today therapeutic at 2.51   goal 2-3 x 3 months for bioprosthetic AVR per surgeon. Can be switched to DOAC after 3 months if needed for afib prophylaxis.   Aspirin 81 mg daily, change PTA simvastatin to atorvastatin (interaction with amiodarone)  Chest tubes/TPW removed 3/18    - Resp:   Mechanical Ventilation  Extubated POD1 AM (failed PST x 4 POD0 with apnea and low tidal volumes). Working with IS, pulmonary toilet. Saturating well on RA today 03/21/25. Overnight oximetry study tonight. May need outpatient sleep study referral.    - Neuro:   Acute Postoperative Pain  Neuro intact, pain controlled on current regimen   -prn oxy, Robaxin   -discontinue iv dilaudid    - Renal: No history of significant  "renal disease. Cr stable WNL.  Trend BMP. Diuretic as above.     Recent Labs   Lab 03/21/25  0559 03/20/25  0721 03/19/25  0603   CR 0.65 0.65 0.54       - GI: +BM, continue bowel regimen    - :   Stress induced hyperglycemia  Hill removed, voiding without issue.    - Endo: sliding scale insulin discontinued.   Hemoglobin A1C   Date Value Ref Range Status   02/24/2025 5.4 <5.7 % Final     Comment:     Normal <5.7%   Prediabetes 5.7-6.4%    Diabetes 6.5% or higher     Note: Adopted from ADA consensus guidelines.      - FEN:   Replace electrolytes as needed. Regular diet. PRN zofran for intermittent nausea    - ID: Postop leukocytosis, likely reactive, resolved  Afebrile. WBC WNL. Periop abx prophylaxis complete. Trend CBC and fever curve.   Recent Labs   Lab 03/21/25  0559 03/20/25  0721 03/19/25  0603   WBC 6.4 6.6 5.9       - Heme: Acute blood loss anemia due to surgery.  Hgb stable at 7.9  Platelets stable at 261 stable.   Trend CBC, transfuse PRN.     Recent Labs   Lab 03/21/25  0559 03/20/25  0721 03/19/25  0603   HGB 7.9* 8.0* 7.8*    223 185       - Proph: SCD, subcutaneous heparin, PPI    - Other:  Clinically Significant Risk Factors               # Hypoalbuminemia: Lowest albumin = 3.2 g/dL at 3/14/2025  5:53 PM, will monitor as appropriate       # Hypertension: Noted on problem list            # Overweight: Estimated body mass index is 25.21 kg/m  as calculated from the following:    Height as of this encounter: 1.6 m (5' 3\").    Weight as of this encounter: 64.5 kg (142 lb 4.8 oz).      # Financial/Environmental Concerns: none   # History of CABG: noted on surgical history       - Dispo: Encouraged IS/TCDB/amb. Sternal precautions. Therapy recs home with assist and OP cardiac rehab, likely tomorrow.    Interval History  Pt states she feels like she's turned a corner for the better. Pain controlled. Breathing is comfortable on RA. Working with IS 10+ times an hour. Appetite has improved. Some mild " nausea this am. +BMs, ambulating, denies any popping/clicking Working with therapies..       Medications  Current Facility-Administered Medications   Medication Dose Route Frequency Provider Last Rate Last Admin    acetaminophen (TYLENOL) tablet 975 mg  975 mg Oral Q8H Nir Morales PA-C   975 mg at 03/21/25 0918    amiodarone (PACERONE) tablet 400 mg  400 mg Oral BID Nir Morales PA-C   400 mg at 03/21/25 0918    aspirin (ASA) chewable tablet 81 mg  81 mg Oral or NG Tube Daily Nir Morales PA-C   81 mg at 03/21/25 0919    atorvastatin (LIPITOR) tablet 40 mg  40 mg Oral QPM Nir Morales PA-C   40 mg at 03/20/25 1955    furosemide (LASIX) injection 40 mg  40 mg Intravenous Once Roxana Daniel NP        guaiFENesin (MUCINEX) 12 hr tablet 600 mg  600 mg Oral BID Roxana Daniel NP        Lidocaine (LIDOCARE) 4 % Patch 1-2 patch  1-2 patch Transdermal Q24H Nir Morales PA-C   2 patch at 03/20/25 1954    magnesium oxide (MAG-OX) tablet 400 mg  400 mg Oral Q4H Tevin Medellin MD   400 mg at 03/21/25 0918    methocarbamol (ROBAXIN) half-tab 250 mg  250 mg Oral 4x Daily Nir Morales PA-C   250 mg at 03/21/25 0918    metoprolol succinate ER (TOPROL XL) 24 hr tablet 25 mg  25 mg Oral Daily Nir Morales PA-C   25 mg at 03/21/25 0919    pantoprazole (PROTONIX) EC tablet 40 mg  40 mg Oral Daily Nir Morales PA-C   40 mg at 03/21/25 0919    warfarin ANTICOAGULANT (COUMADIN) tablet 2 mg  2 mg Oral ONCE at 18:00 Tevin Medellin MD        Warfarin Dose Required Daily - Pharmacist Managed  1 each Oral See Admin Instructions Nir Morales PA-C         Current Facility-Administered Medications   Medication Dose Route Frequency Provider Last Rate Last Admin    alum & mag hydroxide-simethicone (MAALOX) suspension 30 mL  30 mL Oral Q4H PRN Nir Morales PA-C        bisacodyl (DULCOLAX) suppository 10 mg  10 mg Rectal Daily PRN Nir Morales PA-C        calcium carbonate (TUMS)  chewable tablet 500 mg  500 mg Oral 4x Daily PRN Nir Morales PA-C        glucose gel 15-30 g  15-30 g Oral Q15 Min PRN Nir Morales PA-C        Or    dextrose 50 % injection 25-50 mL  25-50 mL Intravenous Q15 Min PRN Nir Morales PA-C        Or    glucagon injection 1 mg  1 mg Subcutaneous Q15 Min PRN Nir Morales PA-C        hydrALAZINE (APRESOLINE) injection 10 mg  10 mg Intravenous Q30 Min PRN Nir Morales PA-C        HYDROmorphone (DILAUDID) injection 0.2 mg  0.2 mg Intravenous Q2H PRN Nir Morales PA-C   0.2 mg at 03/20/25 0557    Or    HYDROmorphone (DILAUDID) injection 0.4 mg  0.4 mg Intravenous Q2H PRN Nir Morales PA-C        hydrOXYzine HCl (ATARAX) tablet 10 mg  10 mg Oral Q6H PRN Nir Morales PA-C        lidocaine (LMX4) cream   Topical Q1H PRN Nir Morales PA-C        lidocaine 1 % 0.1-1 mL  0.1-1 mL Other Q1H PRN iNr Morales PA-C        magnesium hydroxide (MILK OF MAGNESIA) suspension 30 mL  30 mL Oral Daily PRN Nir Morales PA-C        naloxone (NARCAN) injection 0.2 mg  0.2 mg Intravenous Q2 Min PRN Nir Morales PA-C        Or    naloxone (NARCAN) injection 0.4 mg  0.4 mg Intravenous Q2 Min PRN Nir Morales PA-C        Or    naloxone (NARCAN) injection 0.2 mg  0.2 mg Intramuscular Q2 Min PRN Nir Morales PA-C        Or    naloxone (NARCAN) injection 0.4 mg  0.4 mg Intramuscular Q2 Min PRN Nir Morales PA-C        ondansetron (ZOFRAN ODT) ODT tab 4 mg  4 mg Oral Q6H PRN Nir Morales PA-C        Or    ondansetron (ZOFRAN) injection 4 mg  4 mg Intravenous Q6H PRN Nir Morales PA-C   4 mg at 03/17/25 2028    oxyCODONE (ROXICODONE) tablet 5 mg  5 mg Oral Q4H PRN Nir Morales PA-C   5 mg at 03/21/25 0408    Or    oxyCODONE (ROXICODONE) tablet 10 mg  10 mg Oral Q4H PRN Nir Morales PA-C   10 mg at 03/17/25 2021    prochlorperazine (COMPAZINE) injection 5 mg  5 mg Intravenous Q6H PRN Nir Morales PA-C   5 mg at 03/17/25 2129  "   Or    prochlorperazine (COMPAZINE) tablet 5 mg  5 mg Oral Q6H PRN Nir Morales PA-C   5 mg at 03/20/25 2200    senna-docusate (SENOKOT-S/PERICOLACE) 8.6-50 MG per tablet 1 tablet  1 tablet Oral At Bedtime PRN Nir Morales PA-C        sodium chloride (PF) 0.9% PF flush 3 mL  3 mL Intracatheter q1 min prn Nir Morales PA-C   3 mL at 03/20/25 0558         Physical Exam  Vitals were reviewed  Blood pressure 113/59, pulse 74, temperature 97.8  F (36.6  C), temperature source Oral, resp. rate 20, height 1.6 m (5' 3\"), weight 64.5 kg (142 lb 4.8 oz), SpO2 99%.  Gen: up in chair, comfortable, off oxygen    Lungs: clear to auscultation, demonstrated  ml    Cardiovascular: S1, S2, RRR, telemetry SR 70s, BLE +1    Abdomen: Active BS, +BM, Abd NT/ND, soft    Derm: sternal incision D/I, no drainage      Weight:   Vitals:    03/16/25 0645 03/17/25 0600 03/19/25 1552 03/20/25 0340   Weight: 63.3 kg (139 lb 8.8 oz) 65.1 kg (143 lb 8.3 oz) 66.5 kg (146 lb 8 oz) 66.2 kg (146 lb)    03/21/25 0400   Weight: 64.5 kg (142 lb 4.8 oz)         Data  Recent Labs   Lab 03/21/25  0559 03/20/25  0721 03/19/25  0603 03/15/25  1251 03/15/25  0422 03/14/25  2125 03/14/25  1753   WBC 6.4 6.6 5.9   < > 9.8  --  10.1   HGB 7.9* 8.0* 7.8*   < > 9.6*  --  10.7*   MCV 93 93 92   < > 89  --  88    223 185   < > 219  --  184   INR 2.51* 2.22* 1.72*   < >  --   --  1.35*    136 132*   < > 135  --  135   POTASSIUM 4.1 4.1 4.3   < > 3.8  --  3.5   CHLORIDE 99 99 95*   < > 102  --  103   CO2 28 30* 30*   < > 20*  --  21*   BUN 11.2 11.5 10.3   < > 12.3  --  9.1   CR 0.65 0.65 0.54   < > 0.69  --  0.50*   ANIONGAP 9 7 7   < > 13  --  11   IGNACIO 8.1* 8.1* 7.8*   < > 8.3*  --  8.3*   GLC 93 99 92   < > 124*   < > 125*   ALBUMIN  --   --   --   --  3.6  --  3.2*   PROTTOTAL  --   --   --   --  5.6*  --  5.3*   BILITOTAL  --   --   --   --  0.6  --  0.6   ALKPHOS  --   --   --   --  38*  --  42   ALT  --   --   --   --  8  --  10 "   AST  --   --   --   --  37  --  34    < > = values in this interval not displayed.       Imaging:  Recent Results (from the past 24 hours)   Echocardiogram Limited   Result Value    LVEF  60%    Narrative    [id number]  [id number]  [id number]  [id number]^SIGIFREDO^JENNIFER^STEVIE     [facility]  Echocardiography Laboratory  [address] [place] [address]     Name: DUNCAN TINOCO  MRN: [id number]  : 1950  Study Date: 2025 12:17 PM  Age: 74 yrs  Gender: Female  Patient Location: [facility]  Reason For Study: Aortic Valve Replacement  Ordering Physician: JENNIFER MEI  Performed By: Omaira Davis     BSA: 1.7 m2  Height: 63 in  Weight: 143 lb  HR: 81  BP: 113/58 mmHg  ______________________________________________________________________________  Procedure  Limited Echocardiogram with two-dimensional, color and spectral Doppler.  ______________________________________________________________________________  Interpretation Summary     1. The left ventricle is normal in structure, function and size. The visual  ejection fraction is estimated at 60%.  2. The right ventricle is mildly dilated. Mildly decreased right ventricular  systolic function  3. There is mild to moderate (1-2+) tricuspid regurgitation.  4. s/p 23mm Resilia tissue AVR. Mean 11mmHg.     No changes from echo 3-17-25.  ______________________________________________________________________________  Left Ventricle  The left ventricle is normal in structure, function and size. The visual  ejection fraction is estimated at 60%. Normal left ventricular wall motion.     Right Ventricle  The right ventricle is mildly dilated. Mildly decreased right ventricular  systolic function.     Mitral Valve  There is mild (1+) mitral regurgitation.     Tricuspid Valve  There is mild to moderate (1-2+) tricuspid regurgitation.     Aortic Valve  There is a bioprosthetic aortic valve. s/p 23mm Resilia tissue AVR. Mean  11mmHg.     Vessels  Dilation of  the inferior vena cava is present with abnormal respiratory  variation in diameter.     Pericardium  There is no pericardial effusion.  ______________________________________________________________________________  MMode/2D Measurements & Calculations  IVSd: 1.0 cm  LVIDd: 4.7 cm  LVIDs: 3.3 cm  LVPWd: 1.0 cm  FS: 29.1 %  LV mass(C)d: 164.5 grams  LV mass(C)dI: 98.1 grams/m2  LVOT diam: 2.0 cm  LVOT area: 3.1 cm2  RWT: 0.43     Doppler Measurements & Calculations  Ao V2 max: 221.3 cm/sec  Ao max P.0 mmHg  Ao V2 mean: 153.3 cm/sec  Ao mean P.0 mmHg  Ao V2 VTI: 40.2 cm  NAYELI(I,D): 1.5 cm2  NAYELI(V,D): 1.4 cm2     Ao acc time: 0.07 sec  LV V1 max P.1 mmHg  LV V1 max: 101.0 cm/sec  LV V1 VTI: 19.4 cm  SV(LVOT): 60.4 ml  SI(LVOT): 36.0 ml/m2  TR max terrell: 236.2 cm/sec  TR max P.3 mmHg  AV Terrell Ratio (DI): 0.46  NAYELI Index (cm2/m2): 0.90     ______________________________________________________________________________  Report approved by: Raymundo You MD on 2025 03:56 PM             Patient seen and discussed with Dr. Medellin    Not drafted by Markie Joy, RN, MSN, DNP Student     Roxana Daniel, APRN, ACNPC-AG, CCRN  Nurse Practitioner  Cardiothoracic Surgery  Pager: [phone]

## 2025-03-21 NOTE — PROGRESS NOTES
Pain: Pain goal 0 Pain Rating Effective pain medication/regimen scheduled methocarbamol, lidocaine patch on the chest.     CV Surgery Patient: Yes Post Op Day #: 6     Assessment     General: Afebrile yes  Rhythm: normal sinus rhythm w/ BBB and T wave inversion  Blood Pressure Medications given/held: Amio Given Beta blocker Given   Resp: Oxygen Status: RA    Incentive Spirometry Q 1-2 hour when awake: yes -750 ml   Neuro: Alert yes Orientation: self, person, time, and place  GI/:          Bowel Activity: yes if yes indicate when: today          Bowel Medications: no          Urinary Catheter: no  Nauseous x1, prn compazine given.   Skin:          Incision: Sternal incision, healing well          Epicardial Pacing Wires: no  Chest Tubes removed 03/19, shower done 03/20     Assessment     Resp: RA  Telemetry: SR w/ BBB and T wave inversion  Neuro: A&Ox4  GI/: BM+. Voiding well. Reg diet.   Skin/Wounds: Sternal incision. CT sites (Covered). Blanchable redness on buttocks.   Lines/Drains: PIV SL  Activity: SBA, sternal precaution observed.  Abnormal Labs: See results tab     Aggression Stop Light: Green          Patient Care Plan: Pain control, encourage ambulation & pulmonary toilet. Plan to discharge to sister's house 03/21

## 2025-03-21 NOTE — PLAN OF CARE
"Patient Name: Justice  MRN: [id number]  Date of Admission: 3/14/2025  Reason for Admission: CABx1, AVR  Level of Care: Med surg    Vitals:   BP Readings from Last 1 Encounters:   03/21/25 113/59     Pulse Readings from Last 1 Encounters:   03/21/25 74     Wt Readings from Last 1 Encounters:   03/21/25 64.5 kg (142 lb 4.8 oz)     Ht Readings from Last 1 Encounters:   03/14/25 1.6 m (5' 3\")     Estimated body mass index is 25.21 kg/m  as calculated from the following:    Height as of this encounter: 1.6 m (5' 3\").    Weight as of this encounter: 64.5 kg (142 lb 4.8 oz).  Temp Readings from Last 1 Encounters:   03/21/25 97.8  F (36.6  C) (Oral)       Pain: Managed with scheduled tylenol and robaxin.    CV Surgery Patient: Yes Post Op Day #: 7    Assessment    General: Afebrile yes  Rhythm: sinus arrhythmia w/BBB and inverted T  Blood Pressure Medications given/held: Amio Given Beta blocker Given   Resp: Oxygen Status: RA  Incentive Spirometry Q 1-2 hour when awake: yes Volume: 2000  Neuro: Alert yes Orientation: self, person, time, and place  GI/:          Bowel Activity: yes if yes indicate when: 3/21          Bowel Medications: yes          Urinary Catheter: not applicable  Skin:          Incision: Incision status: healing well  Chest Tubes: Out, DANYEL - CDI.      Assessment    Resp: RA, no SOB noted with activity.  Telemetry: SR w/BBB and inverted T  Neuro: A&O  GI/: Bmx3. Tolerating Reg diet.  Skin/Wounds: Sternal incision and CT sites CDI.  Lines/Drains: PIV SL, given 40mg of lasix today.  Activity: SBA/IND, pt steady on feet and calls appropriately.  Abnormal Labs: See chart, magnesium replaced.    Aggression Stop Light: Green          Patient Care Plan: Stayed one more night for diuresing and overnight oxymetry study as she was placed on O2 last night. Plan to discharge tomorrow.   "

## 2025-03-22 ENCOUNTER — APPOINTMENT (OUTPATIENT)
Dept: PHYSICAL THERAPY | Facility: CLINIC | Age: 75
DRG: 219 | End: 2025-03-22
Attending: STUDENT IN AN ORGANIZED HEALTH CARE EDUCATION/TRAINING PROGRAM
Payer: COMMERCIAL

## 2025-03-22 VITALS
DIASTOLIC BLOOD PRESSURE: 69 MMHG | RESPIRATION RATE: 20 BRPM | HEIGHT: 63 IN | HEART RATE: 78 BPM | TEMPERATURE: 98.5 F | WEIGHT: 137.8 LBS | OXYGEN SATURATION: 95 % | BODY MASS INDEX: 24.41 KG/M2 | SYSTOLIC BLOOD PRESSURE: 123 MMHG

## 2025-03-22 PROBLEM — Z79.01 ANTICOAGULATED ON COUMADIN: Status: ACTIVE | Noted: 2025-03-22

## 2025-03-22 PROBLEM — E87.70 FLUID OVERLOAD: Status: ACTIVE | Noted: 2025-03-22

## 2025-03-22 PROBLEM — Z95.2 S/P AVR (AORTIC VALVE REPLACEMENT): Status: ACTIVE | Noted: 2025-03-22

## 2025-03-22 PROBLEM — D62 ANEMIA DUE TO BLOOD LOSS, ACUTE: Status: ACTIVE | Noted: 2025-03-22

## 2025-03-22 PROBLEM — Z95.2 S/P AVR: Status: ACTIVE | Noted: 2025-03-22

## 2025-03-22 LAB
ANION GAP SERPL CALCULATED.3IONS-SCNC: 8 MMOL/L (ref 7–15)
BUN SERPL-MCNC: 13.3 MG/DL (ref 8–23)
CALCIUM SERPL-MCNC: 8.2 MG/DL (ref 8.8–10.4)
CHLORIDE SERPL-SCNC: 96 MMOL/L (ref 98–107)
CREAT SERPL-MCNC: 0.64 MG/DL (ref 0.51–0.95)
EGFRCR SERPLBLD CKD-EPI 2021: >90 ML/MIN/1.73M2
ERYTHROCYTE [DISTWIDTH] IN BLOOD BY AUTOMATED COUNT: 13.8 % (ref 10–15)
GLUCOSE SERPL-MCNC: 96 MG/DL (ref 70–99)
HCO3 SERPL-SCNC: 28 MMOL/L (ref 22–29)
HCT VFR BLD AUTO: 25.1 % (ref 35–47)
HGB BLD-MCNC: 8.4 G/DL (ref 11.7–15.7)
INR PPP: 2.67 (ref 0.85–1.15)
MAGNESIUM SERPL-MCNC: 2 MG/DL (ref 1.7–2.3)
MCH RBC QN AUTO: 30.4 PG (ref 26.5–33)
MCHC RBC AUTO-ENTMCNC: 33.5 G/DL (ref 31.5–36.5)
MCV RBC AUTO: 91 FL (ref 78–100)
PHOSPHATE SERPL-MCNC: 3.6 MG/DL (ref 2.5–4.5)
PLATELET # BLD AUTO: 300 10E3/UL (ref 150–450)
POTASSIUM SERPL-SCNC: 4 MMOL/L (ref 3.4–5.3)
RBC # BLD AUTO: 2.76 10E6/UL (ref 3.8–5.2)
SODIUM SERPL-SCNC: 132 MMOL/L (ref 135–145)
WBC # BLD AUTO: 7 10E3/UL (ref 4–11)

## 2025-03-22 PROCEDURE — 84100 ASSAY OF PHOSPHORUS: CPT | Performed by: STUDENT IN AN ORGANIZED HEALTH CARE EDUCATION/TRAINING PROGRAM

## 2025-03-22 PROCEDURE — 85610 PROTHROMBIN TIME: CPT | Performed by: PHYSICIAN ASSISTANT

## 2025-03-22 PROCEDURE — 250N000013 HC RX MED GY IP 250 OP 250 PS 637: Performed by: PHYSICIAN ASSISTANT

## 2025-03-22 PROCEDURE — 83735 ASSAY OF MAGNESIUM: CPT | Performed by: NURSE PRACTITIONER

## 2025-03-22 PROCEDURE — 85027 COMPLETE CBC AUTOMATED: CPT | Performed by: NURSE PRACTITIONER

## 2025-03-22 PROCEDURE — 250N000013 HC RX MED GY IP 250 OP 250 PS 637: Performed by: STUDENT IN AN ORGANIZED HEALTH CARE EDUCATION/TRAINING PROGRAM

## 2025-03-22 PROCEDURE — 97110 THERAPEUTIC EXERCISES: CPT | Mod: GP

## 2025-03-22 PROCEDURE — 80048 BASIC METABOLIC PNL TOTAL CA: CPT | Performed by: NURSE PRACTITIONER

## 2025-03-22 PROCEDURE — 36415 COLL VENOUS BLD VENIPUNCTURE: CPT | Performed by: NURSE PRACTITIONER

## 2025-03-22 PROCEDURE — 250N000013 HC RX MED GY IP 250 OP 250 PS 637: Performed by: NURSE PRACTITIONER

## 2025-03-22 PROCEDURE — 97530 THERAPEUTIC ACTIVITIES: CPT | Mod: GP

## 2025-03-22 RX ORDER — WARFARIN SODIUM 2 MG/1
2 TABLET ORAL
Status: DISCONTINUED | OUTPATIENT
Start: 2025-03-22 | End: 2025-03-22 | Stop reason: HOSPADM

## 2025-03-22 RX ORDER — ATORVASTATIN CALCIUM 40 MG/1
40 TABLET, FILM COATED ORAL EVERY EVENING
Qty: 30 TABLET | Refills: 3 | Status: SHIPPED | OUTPATIENT
Start: 2025-03-22

## 2025-03-22 RX ORDER — GUAIFENESIN 600 MG/1
600 TABLET, EXTENDED RELEASE ORAL 2 TIMES DAILY
Qty: 30 TABLET | Refills: 0 | Status: SHIPPED | OUTPATIENT
Start: 2025-03-22

## 2025-03-22 RX ORDER — FUROSEMIDE 40 MG/1
40 TABLET ORAL DAILY
Qty: 5 TABLET | Refills: 0 | Status: SHIPPED | OUTPATIENT
Start: 2025-03-22 | End: 2025-04-01

## 2025-03-22 RX ORDER — OXYCODONE HYDROCHLORIDE 5 MG/1
5 TABLET ORAL EVERY 6 HOURS PRN
Qty: 20 TABLET | Refills: 0 | Status: SHIPPED | OUTPATIENT
Start: 2025-03-22 | End: 2025-04-01

## 2025-03-22 RX ORDER — ACETAMINOPHEN 325 MG/1
650 TABLET ORAL EVERY 6 HOURS PRN
Qty: 100 TABLET | Refills: 0 | Status: SHIPPED | OUTPATIENT
Start: 2025-03-22

## 2025-03-22 RX ORDER — AMIODARONE HYDROCHLORIDE 200 MG/1
200 TABLET ORAL DAILY
Qty: 30 TABLET | Refills: 0 | Status: SHIPPED | OUTPATIENT
Start: 2025-03-22

## 2025-03-22 RX ORDER — PANTOPRAZOLE SODIUM 40 MG/1
40 TABLET, DELAYED RELEASE ORAL DAILY
Qty: 14 TABLET | Refills: 0 | Status: SHIPPED | OUTPATIENT
Start: 2025-03-23

## 2025-03-22 RX ORDER — MAGNESIUM OXIDE 400 MG/1
400 TABLET ORAL EVERY 4 HOURS
Status: COMPLETED | OUTPATIENT
Start: 2025-03-22 | End: 2025-03-22

## 2025-03-22 RX ORDER — AMOXICILLIN 250 MG
1 CAPSULE ORAL 2 TIMES DAILY PRN
Qty: 30 TABLET | Refills: 0 | Status: SHIPPED | OUTPATIENT
Start: 2025-03-22

## 2025-03-22 RX ORDER — METHOCARBAMOL 500 MG/1
250 TABLET, FILM COATED ORAL 4 TIMES DAILY PRN
Qty: 40 TABLET | Refills: 1 | Status: SHIPPED | OUTPATIENT
Start: 2025-03-22

## 2025-03-22 RX ORDER — ASPIRIN 81 MG/1
162 TABLET ORAL DAILY
Qty: 60 TABLET | Refills: 3 | Status: SHIPPED | OUTPATIENT
Start: 2025-03-22 | End: 2025-03-22

## 2025-03-22 RX ORDER — METOPROLOL SUCCINATE 25 MG/1
25 TABLET, EXTENDED RELEASE ORAL DAILY
Qty: 30 TABLET | Refills: 3 | Status: SHIPPED | OUTPATIENT
Start: 2025-03-23

## 2025-03-22 RX ORDER — ASPIRIN 81 MG/1
81 TABLET ORAL DAILY
Qty: 60 TABLET | Refills: 3 | Status: SHIPPED | OUTPATIENT
Start: 2025-03-22

## 2025-03-22 RX ORDER — WARFARIN SODIUM 2 MG/1
2 TABLET ORAL DAILY
Qty: 30 TABLET | Refills: 3 | Status: SHIPPED | OUTPATIENT
Start: 2025-03-22

## 2025-03-22 RX ORDER — FUROSEMIDE 40 MG/1
40 TABLET ORAL DAILY
Status: DISCONTINUED | OUTPATIENT
Start: 2025-03-22 | End: 2025-03-22 | Stop reason: HOSPADM

## 2025-03-22 RX ADMIN — PANTOPRAZOLE SODIUM 40 MG: 40 TABLET, DELAYED RELEASE ORAL at 08:52

## 2025-03-22 RX ADMIN — ASPIRIN 81 MG: 81 TABLET, CHEWABLE ORAL at 08:52

## 2025-03-22 RX ADMIN — GUAIFENESIN 600 MG: 600 TABLET, EXTENDED RELEASE ORAL at 08:52

## 2025-03-22 RX ADMIN — AMIODARONE HYDROCHLORIDE 400 MG: 200 TABLET ORAL at 08:52

## 2025-03-22 RX ADMIN — Medication 400 MG: at 08:52

## 2025-03-22 RX ADMIN — ACETAMINOPHEN 975 MG: 325 TABLET, FILM COATED ORAL at 06:03

## 2025-03-22 RX ADMIN — ACETAMINOPHEN 975 MG: 325 TABLET, FILM COATED ORAL at 12:16

## 2025-03-22 RX ADMIN — METHOCARBAMOL 250 MG: 500 TABLET ORAL at 08:52

## 2025-03-22 RX ADMIN — Medication 400 MG: at 12:16

## 2025-03-22 RX ADMIN — METOPROLOL SUCCINATE 25 MG: 25 TABLET, EXTENDED RELEASE ORAL at 08:52

## 2025-03-22 RX ADMIN — OXYCODONE HYDROCHLORIDE 5 MG: 5 TABLET ORAL at 02:57

## 2025-03-22 RX ADMIN — METHOCARBAMOL 250 MG: 500 TABLET ORAL at 12:16

## 2025-03-22 RX ADMIN — FUROSEMIDE 40 MG: 40 TABLET ORAL at 12:16

## 2025-03-22 ASSESSMENT — ACTIVITIES OF DAILY LIVING (ADL)
ADLS_ACUITY_SCORE: 38
ADLS_ACUITY_SCORE: 39

## 2025-03-22 NOTE — PROGRESS NOTES
A&O. VSS on RA. CMS intact. Tele SR w/BBB. PIV removed. SBA/IND in room. Voiding adequate. Regular diet. No BM, BS active-passing gas.     Discharge instructions completed, all questions and concerns answered. Left floor with sister with all belongings and medications.

## 2025-03-22 NOTE — DISCHARGE SUMMARY
"Discharge Summary    Tri Walden MRN# [id number]   YOB: 1950 Age: 74 year old     Date of Admission:  3/14/2025  Date of Discharge:  3/22/2025  3:00 PM  Admitting Physician:  Tevin Medellin MD  Discharge Physician:  Ines Torres PA-C on behalf of Dr. Medellin  Discharging Service:  Cardiovascular and Thoracic Surgery     Home clinic:     Primary Address: [address] [place] [address]     Primary Phone: [phone] Primary Fax: [phone]     Primary Provider: Nehemias Wallace          Admission Diagnoses:   CAD (coronary artery disease) [I25.10]  Aortic stenosis [I35.0]  S/P CABG (coronary artery bypass graft) [Z95.1]          Discharge Diagnosis:     Patient Active Problem List   Diagnosis    Peptic ulcer    Irritable bowel syndrome    GERD    Pure hypercholesterolemia    Basal cell carcinoma of nose    Osteopenia    Benign essential hypertension    Nonrheumatic aortic valve stenosis    History of melanoma    Abnormal cardiovascular stress test    Status post coronary angiogram    Aortic valve stenosis, etiology of cardiac valve disease unspecified    S/P CABG (coronary artery bypass graft)    S/P AVR (aortic valve replacement)    Fluid overload    Anemia due to blood loss, acute    Anticoagulated on Coumadin for 3 months after bio valve    S/P AVR                 Discharge Disposition:     Discharged to home           Condition on Discharge:     Discharge condition: Stable   Discharge vitals: Blood pressure 123/69, pulse 78, temperature 98.5  F (36.9  C), temperature source Oral, resp. rate 20, height 1.6 m (5' 3\"), weight 62.5 kg (137 lb 12.8 oz), SpO2 95%.   Code status on discharge: Full Code           Procedures:      PROCEDURE PERFORMED:  1.  Coronary artery bypass grafting x 1   -in-situ left internal mammary artery to the left anterior descending coronary artery.  2.  Aortic Valve Replacement with a 23 mm Montano Inspiris Resilia Bioprosthetic Valve  3.  Epiaortic " ultrasonography     SURGEON:  AMY Medellin MD          Medications Prior to Admission:     Medications Prior to Admission   Medication Sig Dispense Refill Last Dose/Taking    Calcium Carbonate-Vit D-Min (CALTRATE 600+D PLUS MINERALS) 600-800 MG-UNIT TABS Take 1 tablet by mouth 2 times daily   3/5/2025    Cranberry 400 MG TABS Take 1 tablet by mouth daily.   3/5/2025    Ginger 500 MG CAPS Take 1 capsule by mouth daily.   3/5/2025    Vitamin D3 (CHOLECALCIFEROL) 25 mcg (1000 units) tablet Take 1 tablet by mouth daily.   3/5/2025    vitamin E (TOCOPHEROL) 400 units (180 mg) capsule Take 400 Units by mouth daily.   3/5/2025    [DISCONTINUED] acetaminophen (TYLENOL) 325 MG tablet Take 2 tablets (650 mg) by mouth every 4 hours as needed for other (mild pain) 100 tablet 0 3/5/2025    [DISCONTINUED] aspirin 81 MG EC tablet Take 81 mg by mouth daily.   3/4/2025    [DISCONTINUED] chlorthalidone (HYGROTON) 25 MG tablet Take 1 tablet (25 mg) by mouth daily. 90 tablet 4 3/7/2025    [DISCONTINUED] clobetasol (TEMOVATE) 0.05 % external solution Apply once to twice daily as needed. 50 mL 4 Past Month    [DISCONTINUED] losartan (COZAAR) 50 MG tablet Take 1 tablet (50 mg) by mouth daily. 90 tablet 4 3/11/2025    [DISCONTINUED] simvastatin (ZOCOR) 10 MG tablet TAKE ONE TABLET BY MOUTH EVERY NIGHT AT BEDTIME 90 tablet 4 3/12/2025             Discharge Medications:     Current Discharge Medication List        START taking these medications    Details   amiodarone (PACERONE) 200 MG tablet Take 1 tablet (200 mg) by mouth daily.  Qty: 30 tablet, Refills: 0    Associated Diagnoses: S/P AVR; S/P CABG (coronary artery bypass graft)      atorvastatin (LIPITOR) 40 MG tablet Take 1 tablet (40 mg) by mouth every evening.  Qty: 30 tablet, Refills: 3    Associated Diagnoses: S/P AVR; S/P CABG (coronary artery bypass graft)      furosemide (LASIX) 40 MG tablet Take 1 tablet (40 mg) by mouth daily for 5 days.  Qty: 5 tablet, Refills: 0     Associated Diagnoses: S/P AVR; S/P CABG (coronary artery bypass graft)      guaiFENesin (MUCINEX) 600 MG 12 hr tablet Take 1 tablet (600 mg) by mouth 2 times daily.  Qty: 30 tablet, Refills: 0    Associated Diagnoses: S/P AVR; S/P CABG (coronary artery bypass graft)      methocarbamol (ROBAXIN) 500 MG tablet Take 0.5 tablets (250 mg) by mouth 4 times daily as needed for muscle spasms.  Qty: 40 tablet, Refills: 1    Associated Diagnoses: S/P AVR; S/P CABG (coronary artery bypass graft)      metoprolol succinate ER (TOPROL XL) 25 MG 24 hr tablet Take 1 tablet (25 mg) by mouth daily.  Qty: 30 tablet, Refills: 3    Associated Diagnoses: S/P AVR; S/P CABG (coronary artery bypass graft)      oxyCODONE (ROXICODONE) 5 MG tablet Take 1 tablet (5 mg) by mouth every 6 hours as needed for moderate pain.  Qty: 20 tablet, Refills: 0    Associated Diagnoses: S/P AVR; S/P CABG (coronary artery bypass graft)      pantoprazole (PROTONIX) 40 MG EC tablet Take 1 tablet (40 mg) by mouth daily.  Qty: 14 tablet, Refills: 0    Associated Diagnoses: S/P AVR; S/P CABG (coronary artery bypass graft)      senna-docusate (SENOKOT-S/PERICOLACE) 8.6-50 MG tablet Take 1 tablet by mouth 2 times daily as needed for constipation.  Qty: 30 tablet, Refills: 0    Associated Diagnoses: S/P AVR; S/P CABG (coronary artery bypass graft)      warfarin ANTICOAGULANT (COUMADIN) 2 MG tablet Take 1 tablet (2 mg) by mouth daily.  Qty: 30 tablet, Refills: 3    Associated Diagnoses: S/P AVR; S/P CABG (coronary artery bypass graft)           CONTINUE these medications which have CHANGED    Details   acetaminophen (TYLENOL) 325 MG tablet Take 2 tablets (650 mg) by mouth every 6 hours as needed for other (mild pain).  Qty: 100 tablet, Refills: 0    Associated Diagnoses: Status post reverse total arthroplasty of left shoulder      aspirin 81 MG EC tablet Take 1 tablet (81 mg) by mouth daily.  Qty: 60 tablet, Refills: 3    Associated Diagnoses: S/P AVR; S/P CABG  (coronary artery bypass graft)           CONTINUE these medications which have NOT CHANGED    Details   Calcium Carbonate-Vit D-Min (CALTRATE 600+D PLUS MINERALS) 600-800 MG-UNIT TABS Take 1 tablet by mouth 2 times daily    Associated Diagnoses: Osteopenia      Cranberry 400 MG TABS Take 1 tablet by mouth daily.      Erin 500 MG CAPS Take 1 capsule by mouth daily.      Vitamin D3 (CHOLECALCIFEROL) 25 mcg (1000 units) tablet Take 1 tablet by mouth daily.      vitamin E (TOCOPHEROL) 400 units (180 mg) capsule Take 400 Units by mouth daily.           STOP taking these medications       chlorthalidone (HYGROTON) 25 MG tablet Comments:   Reason for Stopping:         clobetasol (TEMOVATE) 0.05 % external solution Comments:   Reason for Stopping:         losartan (COZAAR) 50 MG tablet Comments:   Reason for Stopping:         simvastatin (ZOCOR) 10 MG tablet Comments:   Reason for Stopping:                     Consultations:     Nutrition, Intensivist             Brief History of Illness:   Tri Walden is a 74-year-old female who had an abnormal stress test. Coronary angiography demonstrated a chronic total occlusion of her left anterior descending coronary artery. Echocardiography demonstrated preserved left ventricular function and moderate-severe aortic stenosis. Coronary artery bypass grafting with concomitant aortic valve replacement was recommended. The patient understood the risks and benefits of the procedure and wished to undergo the operation.           Hospital Course:      PROCEDURE PERFORMED:  1.  Coronary artery bypass grafting x 1   -in-situ left internal mammary artery to the left anterior descending coronary artery.  2.  Aortic Valve Replacement with a 23 mm Montano Inspiris Resilia Bioprosthetic Valve  3.  Epiaortic ultrasonography     SURGEON:  AMY Medellin MD    She was admitted post operatively to intensive care unit and intensivist was consulted to assist with management of mechanical  ventilation and vasoplegic shock. She was extubated POD#1- after failing PST x 4 on POD#0 with apnea and low tidal volumes). Her blood pressure was maintained on vasopressors and ionotropic agents which were continuously weaned until no longer needed. She was transferred to the intermediate care unit on POD#5    She went into atrial fibrillation on POD#1 and was treated with amiodarone bolus and infusion per protocol. Patient converted to NSR and was transitioned to oral taper once IV load complete. She had recurrent a fib with RVR during her hospital stay treated with additional amiodarone boluses and IV metoprolol. At discharge patient will continue oral amiodarone taper, she is anticoagulated with coumadin for bio AVR.     She has a bioprosthetic aortic valve and per surgeon recommendations patient will be anticoagulated with coumadin with INR goal of 2-3 for a duration of 3 months post operatively. After 3 months from surgical date- Will defer decision to discontinue or extend anticoagulation duration for other indications (PAF) to cardiology provider at follow up. If anticoagulation desired post 3 months- ok from surgical standpoint to transition to DOAC instead of coumadin.     She was fluid overloaded and treated with diuretic therapies. At discharge she remains mildly hypervolemic and will discharge with short course diuretic therapies. She tolerated diuretics without requiring potassium supplementation, will defer potassium supplementation at discharge. She was instructed to monitor weight at home and call our office if weight and/or swelling increases.    She was transiently hyperglycemic post operatively and treated with insulin infusion then transitioned to sliding scale insulin per protocol. She does not have history of DMII and her blood sugars remained controlled. No glycemic control agents required at discharge.    Her chest tubes and pacing wires were removed when appropriate and post removal CXR  "was stable. She had return of bowel and bladder function and was weaned off oxygen. She worked well with therapies and was determined medically ready to discharge to home on POD#8. SHe has CAD and was started on guideline therapies of ASA, BB, statin. Follow up with PCP, cardiac surgery, cardiac rehab and cardiology were arranged. Discharge teaching materials, education and contact information was provided to patient and family at bedside. All questions and concerns were addressed.              Significant Results:     Lab Results   Component Value Date    WBC 7.0 03/22/2025    WBC 11.7 06/28/2015     Lab Results   Component Value Date    RBC 2.76 03/22/2025    RBC 4.08 06/28/2015     Lab Results   Component Value Date    HGB 8.4 03/22/2025    HGB 12.0 06/28/2015     Lab Results   Component Value Date    HCT 25.1 03/22/2025    HCT 37.5 06/28/2015     No components found for: \"MCT\"  Lab Results   Component Value Date    MCV 91 03/22/2025    MCV 92 06/28/2015     Lab Results   Component Value Date    MCH 30.4 03/22/2025    MCH 29.4 06/28/2015     Lab Results   Component Value Date    MCHC 33.5 03/22/2025    MCHC 32.0 06/28/2015     Lab Results   Component Value Date    RDW 13.8 03/22/2025    RDW 13.1 06/28/2015     Lab Results   Component Value Date     03/22/2025     06/28/2015       Last Basic Metabolic Panel:  Lab Results   Component Value Date     03/22/2025     08/29/2016      Lab Results   Component Value Date    POTASSIUM 4.0 03/22/2025    POTASSIUM 3.4 03/14/2025    POTASSIUM 4.2 10/20/2021    POTASSIUM 4.1 08/29/2016     Lab Results   Component Value Date    CHLORIDE 96 03/22/2025    CHLORIDE 110 10/20/2021    CHLORIDE 106 08/29/2016     Lab Results   Component Value Date    IGNACIO 8.2 03/22/2025    IGNACIO 8.5 08/29/2016     Lab Results   Component Value Date    CO2 28 03/22/2025    CO2 29 10/20/2021    CO2 29 08/29/2016     Lab Results   Component Value Date    BUN 13.3 03/22/2025    BUN " 17 10/20/2021    BUN 13 08/29/2016     Lab Results   Component Value Date    CR 0.64 03/22/2025    CR 0.67 08/29/2016     Lab Results   Component Value Date    GLC 96 03/22/2025    GLC 98 03/18/2025    GLC 96 10/20/2021    GLC 90 10/08/2020                  Pending Results:     None           Discharge Instructions and Follow-Up:     Discharge diet: Regular   Discharge activity: Daily weights: Call if weight gain 2-3 lbs over 24 hours or if greater than 5 lbs in 1 week.  No lifting more than 10 lbs for 8-12 weeks.  No driving for 1 month.  Call for pain medication refill.  Call for temperature greater than 101.0  Daily shower with antibacterial soap.   Discharge follow-up: Follow-up Appointments  Follow-up Appointments  Follow-up and recommended labs and tests   *Follow up primary care provider in 7-10 days after discharge in order to review your medication, vital signs, obtain any necessary lab work your doctor may want, and to update them on your hospitalization and medical issues.  *Follow up with Kiersten/Ines/Roxana/Matilda/Nir/Maylin/Anna Mercedes with Dr Medellin, heart surgeon, on 4/3/25 at 1:15pm at [facility] at [facility] [address]. If any questions or concerns call [phone].  You will see us once at this visit and then if everything is going well you will not need to see us again.  You will follow long term with your cardiologist.  *Follow up with Kathryn PAIZ with cardiology, 5/7/25 12:55pm  *Follow up with Dr You, cardiologist, on 7/1/25 at 8:30am. This is who you will follow with long term about your heart issues. [phone].  *Please follow up with cardiac rehab on 3/28/25 at 10am, call [phone] if you need to reschedule.      Outpatient therapy: Cardiac rehab   Home Care agency: None    Supplies and equipment: None   Lines and drains: None    Wound care: Wash incision daily with antibacterial soap   Other instructions: None

## 2025-03-22 NOTE — PROGRESS NOTES
[facility]  Cardiovascular and Thoracic Surgery Daily Note      Assessment and Plan  Tri Walden is a 74 year old female with a PMH of hypertension, dyslipidemia, IBS, and progressive severe aortic stenosis and single vessel CAD ( LAD) who was  recently referred for valve replacement and surgical revascularization. Admitted 03/14 following bioprosthetic AVR (23 mm Inspiris Resilia) and CABG x1 ([place] to LAD) with Dr. Robert Medellin.     POD #8 s/p bioprosthetic AVR (23 mm Inspiris Resilia) and CABG x1 (LIM[place] to LAD) on 03/14/2025 with Dr. oRbert Medellin.    - CVS:   Postoperative Vasoplegic Shock, resolved  Hypervolemia  Postop A-fib RVR, converted to sinus rhythm  Hemodynamically stable overnight. MAPs 70s-80s, HR 70s-90s  Weaned off pressor POD4.  Pre-op TTE with preserved biventricular function, severe aortic stenosis.  Echo 3/20/25 showed LVEF 60%, mild-moderate tricuspid regurg, bioAVR mean gradient 11 mmHg  Postop converted A-fib RVR to SR with amio, recurred POD3, rebolused amio, recurred POD4, maintained SR with IV metoprolol and PO amio 400 mg  Continue PO metoprolol succinate 25 mg every day   Weight up 3 kg since admission, will discharge with 5 days of lasix 40mg PO  Monitor result, goal - 1 L daily  Warfarin to INR, today therapeutic at 2.67  goal 2-3 x 3 months for bioprosthetic AVR per surgeon. Can be switched to DOAC after 3 months if needed for afib prophylaxis.   Aspirin 81 mg daily, change PTA simvastatin to atorvastatin (interaction with amiodarone)  Chest tubes/TPW removed 3/18    - Resp:   Mechanical Ventilation  Extubated POD1 AM (failed PST x 4 POD0 with apnea and low tidal volumes). Working with IS, pulmonary toilet. Saturating well on RA today 03/21/25. Overnight oximetry study tonight. May need outpatient sleep study referral.    - Neuro:   Acute Postoperative Pain  Neuro intact, pain controlled on current regimen   -prn oxy, Robaxin   -discontinue iv dilaudid    - Renal:  No history of significant renal disease. Cr stable WNL.  Trend BMP. Diuretic as above.     Recent Labs   Lab 03/22/25  0651 03/21/25  0559 03/20/25  0721   CR 0.64 0.65 0.65       - GI: +BM, continue bowel regimen    - :   Stress induced hyperglycemia  Hill removed, voiding without issue.    - Endo: sliding scale insulin discontinued.   Hemoglobin A1C   Date Value Ref Range Status   02/24/2025 5.4 <5.7 % Final     Comment:     Normal <5.7%   Prediabetes 5.7-6.4%    Diabetes 6.5% or higher     Note: Adopted from ADA consensus guidelines.      - FEN:   Replace electrolytes as needed. Regular diet. PRN zofran for intermittent nausea    - ID: Postop leukocytosis, likely reactive, resolved  Afebrile. WBC WNL. Periop abx prophylaxis complete. Trend CBC and fever curve.   Recent Labs   Lab 03/22/25  0651 03/21/25  0559 03/20/25  0721   WBC 7.0 6.4 6.6       - Heme: Acute blood loss anemia due to surgery.  Hgb stable at 8.4  Platelets stable at 300 stable.   Trend CBC, transfuse PRN.     Recent Labs   Lab 03/22/25  0651 03/21/25  0559 03/20/25  0721   HGB 8.4* 7.9* 8.0*    261 223       - Proph: SCD, subcutaneous heparin, PPI    - Other:  Clinically Significant Risk Factors         # Hyponatremia: Lowest Na = 132 mmol/L in last 2 days, will monitor as appropriate  # Hypochloremia: Lowest Cl = 96 mmol/L in last 2 days, will monitor as appropriate      # Hypoalbuminemia: Lowest albumin = 3.2 g/dL at 3/14/2025  5:53 PM, will monitor as appropriate       # Hypertension: Noted on problem list                # Financial/Environmental Concerns: none   # History of CABG: noted on surgical history       - Dispo: Discharge to home today    Interval History  No acute events overnight. Saturating well on room air. Did not require oxygen overnight. Will discharge to home without oxygen. Pain controlled. Tolerating diet. +BM      Medications  Current Facility-Administered Medications   Medication Dose Route Frequency Provider  Last Rate Last Admin    acetaminophen (TYLENOL) tablet 975 mg  975 mg Oral Q8H Nir Morales PA-C   975 mg at 03/22/25 1216    amiodarone (PACERONE) tablet 400 mg  400 mg Oral BID Nir Morales PA-C   400 mg at 03/22/25 0852    aspirin (ASA) chewable tablet 81 mg  81 mg Oral or NG Tube Daily Nir Morales PA-C   81 mg at 03/22/25 0852    atorvastatin (LIPITOR) tablet 40 mg  40 mg Oral QPM Nir Morales PA-C   40 mg at 03/21/25 1953    furosemide (LASIX) tablet 40 mg  40 mg Oral Daily Ines Torres PA-C   40 mg at 03/22/25 1216    guaiFENesin (MUCINEX) 12 hr tablet 600 mg  600 mg Oral BID Roxana Daniel NP   600 mg at 03/22/25 0852    Lidocaine (LIDOCARE) 4 % Patch 1-2 patch  1-2 patch Transdermal Q24H Nir Morales PA-C   1 patch at 03/21/25 1953    methocarbamol (ROBAXIN) half-tab 250 mg  250 mg Oral 4x Daily Nir Morales PA-C   250 mg at 03/22/25 1216    metoprolol succinate ER (TOPROL XL) 24 hr tablet 25 mg  25 mg Oral Daily Nir Morales PA-C   25 mg at 03/22/25 0852    pantoprazole (PROTONIX) EC tablet 40 mg  40 mg Oral Daily Nir Morales PA-C   40 mg at 03/22/25 0852    warfarin ANTICOAGULANT (COUMADIN) tablet 2 mg  2 mg Oral ONCE at 18:00 Tevin Medellin MD        Warfarin Dose Required Daily - Pharmacist Managed  1 each Oral See Admin Instructions Nir Morales PA-C         Current Facility-Administered Medications   Medication Dose Route Frequency Provider Last Rate Last Admin    alum & mag hydroxide-simethicone (MAALOX) suspension 30 mL  30 mL Oral Q4H PRN Nir Morales PA-C        bisacodyl (DULCOLAX) suppository 10 mg  10 mg Rectal Daily PRN Nir Morales PA-C        calcium carbonate (TUMS) chewable tablet 500 mg  500 mg Oral 4x Daily PRN Nir Morales PA-C        glucose gel 15-30 g  15-30 g Oral Q15 Min PRN Nir Morales PA-C        Or    dextrose 50 % injection 25-50 mL  25-50 mL Intravenous Q15 Min PRN Nir Morales PA-C        Or    glucagon  injection 1 mg  1 mg Subcutaneous Q15 Min PRN Nir Morales PA-C        hydrALAZINE (APRESOLINE) injection 10 mg  10 mg Intravenous Q30 Min PRN Nir Morales PA-C        hydrOXYzine HCl (ATARAX) tablet 10 mg  10 mg Oral Q6H PRN Nir Morales PA-C        lidocaine (LMX4) cream   Topical Q1H PRN Nir Morales PA-C        lidocaine 1 % 0.1-1 mL  0.1-1 mL Other Q1H PRN Nir Morales PA-C        magnesium hydroxide (MILK OF MAGNESIA) suspension 30 mL  30 mL Oral Daily PRN Nir Morales PA-C        naloxone (NARCAN) injection 0.2 mg  0.2 mg Intravenous Q2 Min PRN Nir Morales PA-C        Or    naloxone (NARCAN) injection 0.4 mg  0.4 mg Intravenous Q2 Min PRN Nir Morales PA-C        Or    naloxone (NARCAN) injection 0.2 mg  0.2 mg Intramuscular Q2 Min PRN Nir Morales PA-C        Or    naloxone (NARCAN) injection 0.4 mg  0.4 mg Intramuscular Q2 Min PRN Nir Morales PA-C        ondansetron (ZOFRAN ODT) ODT tab 4 mg  4 mg Oral Q6H PRN Nir Morales PA-C        Or    ondansetron (ZOFRAN) injection 4 mg  4 mg Intravenous Q6H PRN iNr Morales PA-C   4 mg at 03/17/25 2028    oxyCODONE (ROXICODONE) tablet 5 mg  5 mg Oral Q4H PRN Nir Morales PA-C   5 mg at 03/22/25 0257    Or    oxyCODONE (ROXICODONE) tablet 10 mg  10 mg Oral Q4H PRN Nir Morales PA-C   10 mg at 03/17/25 2021    prochlorperazine (COMPAZINE) injection 5 mg  5 mg Intravenous Q6H PRN Nir Morales PA-C   5 mg at 03/17/25 2129    Or    prochlorperazine (COMPAZINE) tablet 5 mg  5 mg Oral Q6H PRN Nir Morales PA-C   5 mg at 03/20/25 2200    senna-docusate (SENOKOT-S/PERICOLACE) 8.6-50 MG per tablet 1 tablet  1 tablet Oral At Bedtime PRN Nir Morales PA-C        sodium chloride (PF) 0.9% PF flush 3 mL  3 mL Intracatheter q1 min prn Nir Morales PA-C   3 mL at 03/20/25 0558         Physical Exam  Vitals were reviewed  Blood pressure 99/50, pulse 78, temperature 98.5  F (36.9  C), temperature source Oral,  "resp. rate 20, height 1.6 m (5' 3\"), weight 62.5 kg (137 lb 12.8 oz), SpO2 95%.  Gen: up in chair, comfortable, off oxygen    Lungs: clear to auscultation, demonstrated  ml    Cardiovascular: S1, S2, RRR, telemetry SR 70s, BLE +1    Abdomen: Active BS, +BM, Abd NT/ND, soft    Derm: sternal incision D/I, no drainage      Weight:   Vitals:    03/17/25 0600 03/19/25 1552 03/20/25 0340 03/21/25 0400   Weight: 65.1 kg (143 lb 8.3 oz) 66.5 kg (146 lb 8 oz) 66.2 kg (146 lb) 64.5 kg (142 lb 4.8 oz)    03/22/25 0549   Weight: 62.5 kg (137 lb 12.8 oz)         Data  Recent Labs   Lab 03/22/25  0651 03/21/25  0559 03/20/25  0721   WBC 7.0 6.4 6.6   HGB 8.4* 7.9* 8.0*   MCV 91 93 93    261 223   INR 2.67* 2.51* 2.22*   * 136 136   POTASSIUM 4.0 4.1 4.1   CHLORIDE 96* 99 99   CO2 28 28 30*   BUN 13.3 11.2 11.5   CR 0.64 0.65 0.65   ANIONGAP 8 9 7   IGNACIO 8.2* 8.1* 8.1*   GLC 96 93 99       Imaging:  No results found for this or any previous visit (from the past 24 hours).      Patient seen and discussed with Dr. Vignesh Torres PA-C  Cardiothoracic Surgery  Pager: [phone]    "

## 2025-03-22 NOTE — DISCHARGE INSTRUCTIONS
AFTER YOU GO HOME FROM YOUR HEART SURGERY   bioprosthetic AVR (23 mm Inspiris Resilia) and CABG x1 (LIMA to LAD) on 03/14/2025 with Dr. Robert Medellin.     You had a sternotomy, avoid lifting, pushing, or pulling anything greater than ten pounds for 8 weeks after surgery and then less than 20-30 pounds for an additional 4 weeks. Do not reach backwards or use arms to push out of chair. Do not let people pull on your arms to assist with standing. Avoid twisting or reaching too far across your body.  Avoid strenuous activities such as bowling, vacuuming, raking, shoveling, golf or tennis for 12 weeks after your surgery. Splint your chest incision by hugging a pillow or bringing your arms across your chest when coughing or sneezing. Please try to sleep on your back for the first 4-6 weeks to avoid extra stress on your sternum (breastbone) while it is healing.     No driving for 4 weeks after surgery or while on pain medication.     Shower or wash your incisions daily with antibacterial soap and water (or as instructed), pat dry. Keep wound clean and dry. No baths or swimming for 6 weeks and/or until incisions are completely healed over. Cover chest tube sites with gauze or a Bandaid until they stop draining, then leave open to air. It is normal for chest tube sites to drain fluid for up to 2-3 weeks after surgery. It is not normal to have drainage from other incisions.     Watch for signs of infection: increased redness, tenderness, warmth or any drainage from sternum incision.  Also a temperature > 100.5 F or chills. Call your surgeon or primary care provider's office immediately.     Exercise is very important in your recovery. Please follow the guidelines set up for you in your cardiac rehab classes at the hospital. If outpatient cardiac rehab was ordered for you, we highly recommend you participate. If you have problems arranging your cardiac rehab, please call [phone] for all locations, with the exception of  [place], please call [phone] and [place], please call [phone].    Avoid sitting for prolonged periods of time, try to walk every hour during the day. If you have a leg incision, elevate your leg often when you are not walking.  Your goal is to work up to 30 minutes of physical activity per day.     Check your weight when you get home from the hospital and continue to check it daily through your recovery for at least a month. If you notice a weight gain of 2-3 pounds overnight, or 5 pounds in a week, notify your primary care physician, cardiologist, or surgeon. Check your blood pressure daily about one hour after you have taken your morning medications. Call you primary care doctor, cardiologist, or cardiac surgeon if your systolic blood pressure (top number) is consistently over 140. Write down your daily weight and blood pressure and bring this information to your follow up appointment.     Bowel activity may be slow after surgery. If necessary, you may take an over the counter laxative such as Milk of Magnesia or Miralax. You may have stool softeners prescribed (docusate sodium, Senokot). We recommend using stool softeners while using narcotics for pain (oxycodone/percocet, hydrocodone/vicodin, hydromorphone/dilaudid).      Wean OFF of narcotics (oxycodone, dilaudid, hydrocodone) as soon as possible. You should continue taking acetaminophen as long as you have any surgical pain as the first choice for pain control and add narcotics as necessary for pain to be tolerable.      DENTAL VISITS AFTER SURGERY  If you have had your heart valve repaired or replaced, we do not recommend having any dental work done for 6 months and you will need to take an antibiotic prior to dental visits from now on.  Please notify your dentist before any procedure for the proper treatment needed. The antibiotic is taken by mouth one hour prior to visit. This includes routine cleanings.    BLOOD THINNERS  You are on a blood  thinner, follow the instructions you were given in the hospital and DO NOT SKIP this medication. Try and take it the same time everyday. Your primary care physician or coumadin clinic will manage the dosing. INR goal is 2-3.      REGARDING PRESCRIPTION REFILLS.  If you need a refill on your pain medication contact us to discuss your pain and a possible one time refill.   All other medications will be adjusted, discontinued and re-filled by your primary care physician and/or your cardiologist as they were prior to your surgery. We have given you enough for one to three month with possibly one refill. You may have refills available to you for some of your medications through the [facility]. If you would like your refills sent to a different a different pharmacy, please contact your preferred pharmacy and let them know that you have prescriptions at [facility] that you would like transferred.    POST-OPERATIVE CLINIC VISITS  CV Surgery Advance Care Practitioners on 4/3/25 at 1:15pm at the Heart Clinic at [facility] in [place].    Primary Care Doctor, Valerio Wallace, on 4/1/25 at 1:15pm   Cardiology, Kathryn Goodson with cardiology, on 5/7/25 at 12:55pm at the Heart Center at [facility]  Cardiology, Dr. You, on 7/1/25 at 8:30am at the Heart [place] at [facility]  If you need to cancel or reschedule your cardiology appointments please call [phone].     SURGICAL QUESTIONS  Please call our nurse coordinators, Lindsay, at [phone] with questions or concerns related to surgery. For other non-urgent questions and requests, our nurse coordinators can also be reached via email or fax.   Email: [email or web address] or [email or web address]   Fax: [phone]    On weekends or after hours, please call [phone] and ask the  to page the Cardiothoracic Surgeon on-call.      Thank you,    Your Cardiothoracic Surgery Team

## 2025-03-22 NOTE — PLAN OF CARE
Goal Outcome Evaluation:             A/Ox4, VSS, LS clear, NSR.  Noted to have loose, productive cough.  Continuous pulse oximetry overnight, % while awake, 93-95% while asleep on RA.  Adequate void, +BM.  Up SBA in room.  Mild to moderate pain, controlled with scheduled Tylenol and 1x oxy PRN for pain with frequent coughing.  Possible discharge home today.

## 2025-03-23 NOTE — PLAN OF CARE
Physical Therapy Discharge Summary    Reason for therapy discharge:    Discharged to home with outpatient therapy.    Progress towards therapy goal(s). See goals on Care Plan in [facility] electronic health record for goal details.  Goals partially met.  Barriers to achieving goals:   discharge from facility.    Therapy recommendation(s):    Continued therapy is recommended.  Rationale/Recommendations:  For further cardiac education and endurance training.

## 2025-03-24 ENCOUNTER — PATIENT OUTREACH (OUTPATIENT)
Dept: CARE COORDINATION | Facility: CLINIC | Age: 75
End: 2025-03-24

## 2025-03-24 ENCOUNTER — TELEPHONE (OUTPATIENT)
Dept: CARDIOLOGY | Facility: CLINIC | Age: 75
End: 2025-03-24

## 2025-03-24 ENCOUNTER — DOCUMENTATION ONLY (OUTPATIENT)
Dept: ANTICOAGULATION | Facility: CLINIC | Age: 75
End: 2025-03-24

## 2025-03-24 ENCOUNTER — DOCUMENTATION ONLY (OUTPATIENT)
Dept: LAB | Facility: CLINIC | Age: 75
End: 2025-03-24

## 2025-03-24 ENCOUNTER — TELEPHONE (OUTPATIENT)
Dept: ANTICOAGULATION | Facility: CLINIC | Age: 75
End: 2025-03-24

## 2025-03-24 ENCOUNTER — LAB (OUTPATIENT)
Dept: LAB | Facility: CLINIC | Age: 75
End: 2025-03-24
Payer: COMMERCIAL

## 2025-03-24 DIAGNOSIS — Z95.2 S/P AVR: ICD-10-CM

## 2025-03-24 DIAGNOSIS — Z95.1 S/P CABG (CORONARY ARTERY BYPASS GRAFT): Primary | ICD-10-CM

## 2025-03-24 DIAGNOSIS — Z95.1 S/P CABG (CORONARY ARTERY BYPASS GRAFT): ICD-10-CM

## 2025-03-24 LAB — INR BLD: 2.8 (ref 0.9–1.1)

## 2025-03-24 PROCEDURE — 36416 COLLJ CAPILLARY BLOOD SPEC: CPT

## 2025-03-24 PROCEDURE — 85610 PROTHROMBIN TIME: CPT

## 2025-03-24 ASSESSMENT — ACTIVITIES OF DAILY LIVING (ADL): DEPENDENT_IADLS:: INDEPENDENT

## 2025-03-24 NOTE — CONFIDENTIAL NOTE
Nehemias Wallace MD,    Your patient, Tri Walden, is newly referred to [facility] at hospital discharge by the inpatient team. Anticoagulation clinic needing a new referring provider that will follow patient in ambulatory setting.     Indication(s):  bioprosthetic AVR   Goal Range:  2-3  Duration: 6/14/2025     Anticoagulation clinic requires a referral from one of [facility]'s [facility] ambulatory provider (PCP or specialist) in order to provide anticoagulation management. The referring provider should anticipate seeing the patient on an ongoing basis, will have INRs and warfarin Rx ordered under their name per protocol, and will be point of contact for ACC questions on patient's anticoagulation care (procedural holds, critical results, etc).     Please review and sign the pended referral order for Tri Walden if you are willing to oversee anticoagulation care.         Thank you,  Phuong Rincon RN  Anticoagulation clinic

## 2025-03-24 NOTE — PROGRESS NOTES
Tri Walden has an upcoming lab appointment:    Future Appointments   Date Time Provider Department Center   3/24/2025  1:00 PM EA LAB [facility]   3/28/2025 10:00 AM 3, Sh Cardiac Rehab [facility]   4/1/2025 11:00 AM Nehemias Wallace MD [facility]   4/3/2025  1:15 PM [facility] CARDIOTHORACIC SURGERY, [facility]   5/7/2025 12:55 PM Kathryn Goodson APRN CNP [facility]   7/1/2025  8:30 AM Raymundo You MD [facility] CL[facility]   8/7/2025 10:30 AM Jackelyn Fonseca PA-C [facility]     Patient is scheduled for the following lab(s):   Scheduling Notes: INR   Made On:  Confirmed: 3/21/2025 3:21 PM  3/21/2025 5:39 PM By:  By: [facility]       There is no order available. Please review and place either future orders or HMPO (Review of Health Maintenance Protocol Orders), as appropriate.    There are no preventive care reminders to display for this patient.    If no labs are needed at this time please have the Care Team contact patient regarding this information and cancel lab appointment. Thank you.     Mamie Jay. [facility]

## 2025-03-24 NOTE — TELEPHONE ENCOUNTER
"ANTICOAGULATION  MANAGEMENT: NEW REFERRAL      SUBJECTIVE/OBJECTIVE     Tri Walden, a 74 year old female  is newly referred to [facility].    Anticoagulation:    Previously on warfarin: No, new to anticoagulation  Warfarin initiation date (approximate): 3/17/25   Indication(s):  Bioprosthetic AVR  Admitted 03/14 following bioprosthetic AVR (23 mm Inspiris Resilia) and CABG x1 ([place]WE[place] to LAD) with Dr. Robert Medellin.    Goal Range:  2-3  (does not need to bridge if subtherapeutic)   goal 2-3 x 3 months for bioprosthetic AVR per surgeon. Can be switched to DOAC after 3 months if needed for afib prophylaxis.    Anticoagulation Bridge/Overlap: No   Referring provider: from inpatient provider; ambulatory responsible provider from primary or specialty care needed.  Request sent to Nehemias Wallace MD to oversee management.  General Dietary/Social Hx:    Typical vitamin K intake: moderate; variable     Other dietary considerations: None     Social History:   Social History     Tobacco Use    Smoking status: Never    Smokeless tobacco: Never   Vaping Use    Vaping status: Never Used   Substance Use Topics    Alcohol use: Yes     Comment: very rare    Drug use: Never       In the past 2 weeks, patient estimates taking medications as instructed % of time: 100%    Results:        Recent labs: (last 7 days)     03/24/25  1255   INR 2.8*       Wt Readings from Last 2 Encounters:   03/22/25 62.5 kg (137 lb 12.8 oz)   02/25/25 59.8 kg (131 lb 12.8 oz)      Estimated body mass index is 24.41 kg/m  as calculated from the following:    Height as of 3/14/25: 1.6 m (5' 3\").    Weight as of 3/22/25: 62.5 kg (137 lb 12.8 oz).  Lab Results   Component Value Date    AST 37 03/15/2025    ALT 8 03/15/2025    ALBUMIN 3.6 03/15/2025     Lab Results   Component Value Date    CR 0.64 03/22/2025     Estimated Creatinine Clearance: 74.9 mL/min (based on SCr of 0.64 mg/dL).    ASSESSMENT     Goal INR 2-3, standard " for indication(s) above  Establishing initial warfarin maintenance dose (on warfarin < 30 days)   Factors that may increase sensitivity to warfarin: Female gender  Factors that may reduce sensitivity to warfarin: No factors  Potential significant drug interactions with home medications: Amiodarone  Starting warfarin dose is appropriate for patient's anticipated sensitivity to warfarin  Amiodarone - take 200 mg daily  Aspirin 81 mg daily, change PTA simvastatin to atorvastatin (interaction with amiodarone)  Day 8 on warfarin    3/14/25 Verbal OK to discuss with sister Bianca Barnett    PLAN     Dosing Instructions: Continue your current warfarin dose with INR in 3 days       Summary  As of 3/24/2025      Full warfarin instructions:  2 mg every day   Next INR check:  3/27/2025               Education provided:   Please call back if any changes to your diet, medications or how you've been taking warfarin  Taking warfarin: purpose of warfarin and how it works, take warfarin at same time each day; preferably in the evening, prescribed tablet strength and color, importance of following ACC instructions vs instructions on the prescription bottle, Importance of taking warfarin as instructed, and warfarin tablet strength change; remove and/or discard previous strength from medication supply  Goal range and lab monitoring: goal range and significance of current result, Importance of therapeutic range, Importance of following up at instructed interval, and frequency of lab work when starting warfarin and importance of following up when instructed (extends after stability established)  Dietary considerations: importance of consistent vitamin K intake, impact of vitamin K foods on INR, vitamin K content of foods, Impact of protein intake on INR , and importance of notifying ACC to changes in diet  Healthy lifestyle considerations: potential interaction between warfarin and alcohol, limit alcohol intake to no more than 1 to 2 drinks in  24 hours if you choose to drink alcohol, avoid excessive alcohol drinking (binge drinking) while on warfarin due to increased risk of bleeding from effect on INR and fall risk, impact of smoking or tobacco on INR, impact of exercise/activity level on INR, impact of CBD, marijuana, or medical marijuana on INR, and avoid activities that have a high risk for falling or injury such as contact sports  Interaction IS anticipated between warfarin and amiodarone   Symptom monitoring: monitoring for bleeding signs and symptoms, monitoring for clotting signs and symptoms, monitoring for stroke signs and symptoms, when to seek medical attention/emergency care, if you hit your head or have a bad fall seek emergency care, and travel related clotting risk and prevention  Importance of notifying anticoagulation clinic for: changes in medications; a sooner lab recheck maybe needed, diarrhea, nausea/vomiting, reduced intake, cold/flu, and/or infections; a sooner lab recheck maybe needed, upcoming surgeries and procedures 2 weeks in advance, and if you did not receive dosing instructions on the same day as your labs were checked  Written instructions provided  Contact [phone] with any changes, questions or concerns.     Education still needed:   None required    Telephone call with Aaron And Sister Bianca Barnett who verbalizes understanding and agrees to plan    Lab visit scheduled    New patient packet mailed to Sister's house per request : [phone] [phone]    OK for detailed voicemail in the future     Standing orders placed in Epic: Point of Care INR (Lab 5000)    Plan made with [facility] Pharmacist Rosa Rincon, RN  Anticoagulation Clinic  3/24/2025

## 2025-03-24 NOTE — TELEPHONE ENCOUNTER
48 hour post op call - no answer or option to leave voicemail; will try to contact patient again

## 2025-03-24 NOTE — PROGRESS NOTES
Clinic Care Coordination Contact  Clinic Care Coordination Contact  OUTREACH    Referral Information:  Referral Source: IP Handoff    Primary Diagnosis: Cardiovascular - other    Chief Complaint   Patient presents with    Clinic Care Coordination - Post Hospital     Clinic Care Coordination RN         Universal Utilization:   3/14/2025 - 3/22/2025 (8 days)  [facility]      03/14   Admitted 0501      CORONARY ARTERY BYPASS GRAFT x 1 (LEFT INTERNAL MAMMARY ARTERY - LEFT ANTERIOR DESCENDING ARTERY)      Transferred to [facility] Intensive Care 1715   03/19   Transferred out of [facility] Intensive Care 1549   03/22   Discharged 1500     Clinic Utilization  Difficulty keeping appointments:: No  Compliance Concerns: No  No-Show Concerns: No  No PCP office visit in Past Year: No  Utilization      No Show Count (past year)  0             ED Visits  0             Hospital Admissions  2                    Current as of: 3/24/2025  8:14 AM                Clinical Concerns:  Current Medical Concerns:    Patient reports she is staying with her sister for 2 weeks and she is a nurse  Sternal Muscle soreness from  the surgery and is relieved by taking one half tab of Oxycodone   Surgical incision is looking good and no concerns   Patient is waling around the house for a total of 10 minute intervals   Patient lost 2# was 139# and now 137#Took a shower and used dial soap       Current Behavioral Concerns: No    Education Provided to patient: CC role introduced    Pain  Pain (GOAL):: No  Health Maintenance Reviewed: Not assessed  Clinical Pathway: None    Medication Management:  Medication review status: Medications reviewed.  Changes noted per patient report.       Functional Status:  Dependent ADLs:: Independent, Ambulation-no assistive device  Dependent IADLs:: Independent  Bed or wheelchair confined:: No  Mobility Status: Independent  Fallen 2 or more times in the past  year?: No  Any fall with injury in the past year?: No    Living Situation:  Current living arrangement:: I live in a private home    Lifestyle & Psychosocial Needs:    Social Drivers of Health     Food Insecurity: Low Risk  (3/19/2025)    Food Insecurity     Within the past 12 months, did you worry that your food would run out before you got money to buy more?: No     Within the past 12 months, did the food you bought just not last and you didn t have money to get more?: No   Depression: Not at risk (1/30/2025)    PHQ-2     PHQ-2 Score: 0   Housing Stability: Low Risk  (3/19/2025)    Housing Stability     Do you have housing? : Yes     Are you worried about losing your housing?: No   Tobacco Use: Low Risk  (3/14/2025)    Patient History     Smoking Tobacco Use: Never     Smokeless Tobacco Use: Never     Passive Exposure: Not on file   Financial Resource Strain: Low Risk  (3/19/2025)    Financial Resource Strain     Within the past 12 months, have you or your family members you live with been unable to get utilities (heat, electricity) when it was really needed?: No   Alcohol Use: Not on file   Transportation Needs: Low Risk  (3/19/2025)    Transportation Needs     Within the past 12 months, has lack of transportation kept you from medical appointments, getting your medicines, non-medical meetings or appointments, work, or from getting things that you need?: No   Physical Activity: Not on file   Interpersonal Safety: Low Risk  (3/14/2025)    Interpersonal Safety     Do you feel physically and emotionally safe where you currently live?: Yes     Within the past 12 months, have you been hit, slapped, kicked or otherwise physically hurt by someone?: No     Within the past 12 months, have you been humiliated or emotionally abused in other ways by your partner or ex-partner?: No   Stress: Not on file   Social Connections: Not on file   Health Literacy: Not on file     Diet:: Low saturated fat, No added salt  Inadequate  nutrition (GOAL):: No  Tube Feeding: No  Inadequate activity/exercise (GOAL):: No  Significant changes in sleep pattern (GOAL): No  Transportation means:: Family     Muslim or spiritual beliefs that impact treatment:: No  Mental health DX:: No  Mental health management concern (GOAL):: No  Chemical Dependency Status: No Current Concerns  Informal Support system:: Family, Children           Resources and Interventions:  Current Resources:      Community Resources: None  Supplies Currently Used at Home: None  Equipment Currently Used at Home: none  Employment Status: retired         Advance Care Plan/Directive  Advanced Care Plans/Directives on file:: No    Referrals Placed: None      Patient/Caregiver understanding: Expresses good understanding of discharge instructions        Future Appointments                Today EA LAB [facility], [facility]    In 4 days 3, Sh Cardiac Rehab [facility] Cardiac and Pulmonary Rehabilitation [facility]    In 1 week Nehemias Wallace MD [facility]  Arrive at: Clinic A    In 1 week [facility] CARDIOTHORACIC SURGERY, [place] [facility]    In 1 month Kathryn Goodson APRN CNP [facility] PSA CLIN    In 3 months Raymundo You MD [facility]    In 4 months Jackelyn Fonseca PA-C [facility]            Plan:   Patient will keep CR 3/28/2025, PCP appointment  4/1 and Post op scheduled 4/3/2025  No unmet needs, no further care coordination is needed at this time       [facility]   Nevaeh Shankar RN, Care Coordinator   [facility]'s   E-mail [email or web address]   [phone]

## 2025-03-25 ENCOUNTER — TELEPHONE (OUTPATIENT)
Dept: CARDIOLOGY | Facility: CLINIC | Age: 75
End: 2025-03-25
Payer: COMMERCIAL

## 2025-03-26 ENCOUNTER — TELEPHONE (OUTPATIENT)
Dept: OTHER | Facility: CLINIC | Age: 75
End: 2025-03-26
Payer: COMMERCIAL

## 2025-03-26 NOTE — TELEPHONE ENCOUNTER
48 hour post op call    ACTIVITY    How are you doing with activity? I am doing 30 mind, 10 min increments     Are you continuing to use your IS 3-4 times a day and do you have any shortness of breath. Good, IS 6094-7005 ml.     Are you weighing yourself daily and has it been stable?. Dropped 6 lbs. 132 lbs, baseline 130 lbs.     PAIN  How is your pain, what are you doing for your pain? Keeping in control with tylenol and Robaxin. On 4 Oxy needed since discharge.     Are you still doing sternal precautions? Yes    Do you hear any clicking when you are moving or taking a deep breath? No      INCISION:   It is good, wound care reviewed. No redness or drainage     ASSISTANCE  Do you have someone at home to help you with your daily activities and transportation needs? Sister, no questions       MEDICATIONS  I would like to review your discharge medications and answer any questions you may have.   Reviewed. Lasix      Are you on a blood thinner/Coumadin  Yes     Who is managing your Coumadin dosing/INR? INR 3/27        FOLLOW UP       Scheduled for cardiac rehab? 3/28   Scheduled to see our PA or Surgeon? 4/3  Scheduled to see your cardiologist ? Dr. You 7/1   Scheduled to see CHENCHO/Core 5/7   Scheduled to see your primary care physician? Dr Wallace 4/1  Do you have our contact information? Yes

## 2025-03-27 ENCOUNTER — ANTICOAGULATION THERAPY VISIT (OUTPATIENT)
Dept: ANTICOAGULATION | Facility: CLINIC | Age: 75
End: 2025-03-27

## 2025-03-27 ENCOUNTER — LAB (OUTPATIENT)
Dept: LAB | Facility: CLINIC | Age: 75
End: 2025-03-27
Payer: COMMERCIAL

## 2025-03-27 DIAGNOSIS — Z95.2 S/P AVR: ICD-10-CM

## 2025-03-27 DIAGNOSIS — Z95.1 S/P CABG (CORONARY ARTERY BYPASS GRAFT): ICD-10-CM

## 2025-03-27 DIAGNOSIS — Z95.1 S/P CABG (CORONARY ARTERY BYPASS GRAFT): Primary | ICD-10-CM

## 2025-03-27 LAB — INR BLD: 2.5 (ref 0.9–1.1)

## 2025-03-27 NOTE — PROGRESS NOTES
ANTICOAGULATION MANAGEMENT     Tri Walden 75 year old female is on warfarin with therapeutic INR result. (Goal INR 2.0-3.0)    Recent labs: (last 7 days)     03/27/25  1128   INR 2.5*       ASSESSMENT     Source(s): Chart Review and Patient/Caregiver Call     Warfarin doses taken: Warfarin taken as instructed  Diet: No new diet changes identified  Medication/supplement changes: None noted  New illness, injury, or hospitalization: No  Signs or symptoms of bleeding or clotting: No  Previous result: Therapeutic last 2(+) visits  Additional findings: Recent heart valve replacement in last 10 weeks and New start on day 11 of warfarin       PLAN     Recommended plan for ongoing change(s) affecting INR     Dosing Instructions: Continue your current warfarin dose with next INR in 4 days       Summary  As of 3/27/2025      Full warfarin instructions:  2 mg every day   Next INR check:  3/31/2025               Telephone call with Aaron who verbalizes understanding and agrees to plan    Lab visit scheduled    Education provided: Goal range and lab monitoring: goal range and significance of current result and frequency of lab work when starting warfarin and importance of following up when instructed (extends after stability established)  Contact [phone] with any changes, questions or concerns.     Plan made per [facility] anticoagulation protocol    Stephanie Wright RN  3/27/2025  Anticoagulation Clinic  [employer] for routing messages: colin KELLEY  [facility] patient phone line: [phone]        _______________________________________________________________________     Anticoagulation Episode Summary       Current INR goal:  2.0-3.0   TTR:  --   Target end date:  6/14/2025   Send INR reminders to:  HAYES KELLEY    Indications    S/P CABG (coronary artery bypass graft) [Z95.1]  S/P AVR [Z95.2]             Comments:  Does not need to bridge if subtherapeutic             Anticoagulation Care Providers       Provider  Role Specialty Phone number    Ines Torres PA-C Referring Cardiovascular & Thoracic Surgery [phone]    Nehemias Wallace MD Referring Family Medicine [phone]

## 2025-03-28 ENCOUNTER — HOSPITAL ENCOUNTER (OUTPATIENT)
Dept: CARDIAC REHAB | Facility: CLINIC | Age: 75
Discharge: HOME | End: 2025-03-28
Attending: STUDENT IN AN ORGANIZED HEALTH CARE EDUCATION/TRAINING PROGRAM
Payer: COMMERCIAL

## 2025-03-28 DIAGNOSIS — Z95.1 S/P CABG (CORONARY ARTERY BYPASS GRAFT): ICD-10-CM

## 2025-03-28 PROCEDURE — 93798 PHYS/QHP OP CAR RHAB W/ECG: CPT

## 2025-03-31 ENCOUNTER — LAB (OUTPATIENT)
Dept: LAB | Facility: CLINIC | Age: 75
End: 2025-03-31
Payer: COMMERCIAL

## 2025-03-31 ENCOUNTER — ANTICOAGULATION THERAPY VISIT (OUTPATIENT)
Dept: ANTICOAGULATION | Facility: CLINIC | Age: 75
End: 2025-03-31

## 2025-03-31 DIAGNOSIS — Z95.1 S/P CABG (CORONARY ARTERY BYPASS GRAFT): Primary | ICD-10-CM

## 2025-03-31 DIAGNOSIS — Z95.1 S/P CABG (CORONARY ARTERY BYPASS GRAFT): ICD-10-CM

## 2025-03-31 DIAGNOSIS — Z95.2 S/P AVR: ICD-10-CM

## 2025-03-31 LAB — INR BLD: 2.3 (ref 0.9–1.1)

## 2025-03-31 PROCEDURE — 36416 COLLJ CAPILLARY BLOOD SPEC: CPT

## 2025-03-31 PROCEDURE — 85610 PROTHROMBIN TIME: CPT

## 2025-03-31 NOTE — PROGRESS NOTES
ANTICOAGULATION MANAGEMENT     Tri Walden 75 year old female is on warfarin with therapeutic INR result. (Goal INR 2.0-3.0)    Recent labs: (last 7 days)     03/31/25  1125   INR 2.3*       ASSESSMENT     Warfarin Lab Questionnaire    Warfarin Doses Last 7 Days          3/30/2025   Warfarin Lab Questionnaire   Missed doses within past 14 days? No   Changes in diet or alcohol within past 14 days? No   Medication changes since last result? No   Injuries or illness since last result? No   New shortness of breath, severe headaches or sudden changes in vision since last result? No   Abnormal bleeding since last result? No   Upcoming surgery, procedure? No     Previous result: Therapeutic last 2(+) visits  Additional findings: None       PLAN     Recommended plan for no diet, medication or health factor changes affecting INR     Dosing Instructions: Continue your current warfarin dose with next INR in 4 days       Summary  As of 3/31/2025      Full warfarin instructions:  2 mg every day   Next INR check:  --               Telephone call with Aaron who verbalizes understanding and agrees to plan    Lab visit scheduled    Education provided: Please call back if any changes to your diet, medications or how you've been taking warfarin    Plan made per [facility] anticoagulation protocol    Adia Gray RN  3/31/2025  Anticoagulation Clinic  [employer] for routing messages: p HAYES KELLEY  [facility] patient phone line: [phone]        _______________________________________________________________________     Anticoagulation Episode Summary       Current INR goal:  2.0-3.0   TTR:  --   Target end date:  6/14/2025   Send INR reminders to:  HAYES KELLEY    Indications    S/P CABG (coronary artery bypass graft) [Z95.1]  S/P AVR [Z95.2]             Comments:  Does not need to bridge if subtherapeutic             Anticoagulation Care Providers       Provider Role Specialty Phone number    Ines Torres PA-C  Referring Cardiovascular & Thoracic Surgery [phone]    Nehemias Wallace MD Referring [facility] [phone]

## 2025-04-01 ENCOUNTER — OFFICE VISIT (OUTPATIENT)
Dept: FAMILY MEDICINE | Facility: CLINIC | Age: 75
End: 2025-04-01
Payer: COMMERCIAL

## 2025-04-01 VITALS
WEIGHT: 133.2 LBS | OXYGEN SATURATION: 98 % | TEMPERATURE: 97.7 F | HEIGHT: 62 IN | DIASTOLIC BLOOD PRESSURE: 66 MMHG | SYSTOLIC BLOOD PRESSURE: 132 MMHG | BODY MASS INDEX: 24.51 KG/M2 | RESPIRATION RATE: 16 BRPM | HEART RATE: 73 BPM

## 2025-04-01 DIAGNOSIS — D62 ANEMIA DUE TO BLOOD LOSS, ACUTE: Primary | ICD-10-CM

## 2025-04-01 DIAGNOSIS — Z95.2 S/P AVR (AORTIC VALVE REPLACEMENT): ICD-10-CM

## 2025-04-01 DIAGNOSIS — Z79.01 ANTICOAGULATED ON COUMADIN: ICD-10-CM

## 2025-04-01 DIAGNOSIS — Z95.1 S/P CABG (CORONARY ARTERY BYPASS GRAFT): ICD-10-CM

## 2025-04-01 PROCEDURE — 1126F AMNT PAIN NOTED NONE PRSNT: CPT | Performed by: FAMILY MEDICINE

## 2025-04-01 PROCEDURE — 99495 TRANSJ CARE MGMT MOD F2F 14D: CPT | Performed by: FAMILY MEDICINE

## 2025-04-01 PROCEDURE — 1111F DSCHRG MED/CURRENT MED MERGE: CPT | Performed by: FAMILY MEDICINE

## 2025-04-01 PROCEDURE — 3075F SYST BP GE 130 - 139MM HG: CPT | Performed by: FAMILY MEDICINE

## 2025-04-01 PROCEDURE — 3078F DIAST BP <80 MM HG: CPT | Performed by: FAMILY MEDICINE

## 2025-04-01 ASSESSMENT — PAIN SCALES - GENERAL: PAINLEVEL_OUTOF10: NO PAIN (0)

## 2025-04-01 NOTE — PROGRESS NOTES
Assessment & Plan     Anemia due to blood loss, acute  After bypass and AVR   - Iron and iron binding capacity; Future  - **CBC with platelets differential FUTURE 2mo; Future    S/P CABG (coronary artery bypass graft)  Doing well, follow up with cardiology they may restart the losartan.  Continue cardiac rehab  Discussed warfarin and her prior supplements and to avoid the kamini and vitamin E due to blood thinning by inhibiting platelet aggregation. Ok to continue cranberry if she takes it consistently as it true with all Vitamin K containing foods.    S/P AVR (aortic valve replacement)  Anticoagulated on Coumadin for 3 months after bio valve        MED REC REQUIRED  Post Medication Reconciliation Status:  Discharge medications reconciled, continue medications without change    See Patient Instructions    Juan Walker is a 75 year old, presenting for the following health issues:  Hospital F/U (3/13/25-3/22/25 [facility] )        4/1/2025    10:57 AM   Additional Questions   Roomed by Rola Kaiser   Accompanied by Sister (Bianca Barnett)         4/1/2025    10:57 AM   Patient Reported Additional Medications   Patient reports taking the following new medications none     HPI        Hospital Follow-up Visit:    Hospital/Nursing Home/IP Rehab Facility: [facility]  Most Recent Admission Date: 3/14/2025   Most Recent Admission Diagnosis: CAD (coronary artery disease) - I25.10  Aortic stenosis - I35.0  S/P CABG (coronary artery bypass graft) - Z95.1     Most Recent Discharge Date: 3/22/2025   Most Recent Discharge Diagnosis: S/P AVR - Z95.2  S/P CABG (coronary artery bypass graft) - Z95.1  Status post reverse total arthroplasty of left shoulder - Z96.612   Was the patient in the ICU or did the patient experience delirium during hospitalization?  Yes         4/1/2025    11:35 AM   Mini-Cog Total Score   Mini Cog Total Score 4     Do you have any other stressors you would like to discuss  "with your provider? No    Problems taking medications regularly:  None  Medication changes since discharge: None  Problems adhering to non-medication therapy:  None    Summary of hospitalization:  [facility] discharge summary reviewed  Diagnostic Tests/Treatments reviewed.  Follow up needed: cardiology and rehab  Other Healthcare Providers Involved in Patient s Care:         Specialist appointment - cardiology, cardiac rehab and ACC  Update since discharge: improved.       From discharge summary: \"abnormal stress test. Coronary angiography demonstrated a chronic total occlusion of her left anterior descending coronary artery. Echocardiography demonstrated preserved left ventricular function and moderate-severe aortic stenosis. Coronary artery bypass grafting with concomitant aortic valve replacement was recommended. So patient underwent procedures.\"  Plan of care communicated with patient and family             Review of Systems  Constitutional, HEENT, cardiovascular, pulmonary, gi and gu systems are negative, except as otherwise noted.      Objective    /66 (BP Location: Right arm, Patient Position: Sitting, Cuff Size: Adult Regular)   Pulse 73   Temp 97.7  F (36.5  C) (Tympanic)   Resp 16   Ht 1.583 m (5' 2.32\")   Wt 60.4 kg (133 lb 3.2 oz)   SpO2 98%   BMI 24.11 kg/m    Body mass index is 24.11 kg/m .  Physical Exam   GENERAL: alert and no distress  NECK: no adenopathy, no asymmetry, masses, or scars  RESP: lungs clear to auscultation - no rales, rhonchi or wheezes  Chest: sternotomy healing well.   CV: regular rate and rhythm, normal S1 S2, no S3 or S4, no murmur, click or rub, no peripheral edema  MS: no gross musculoskeletal defects noted, no edema            Signed Electronically by: Nehemias Wallace MD    "

## 2025-04-01 NOTE — PATIENT INSTRUCTIONS
Don't take supplements (kamini, vitamin E) besides vitamin D while taking the warfarin.  Ok to take cranberry daily consistently as this contains Vitamin K.    Continue iron rich in iron  Schedule a lab only appt in 2 months on a day that you need an INR.

## 2025-04-02 ENCOUNTER — HOSPITAL ENCOUNTER (OUTPATIENT)
Dept: CARDIAC REHAB | Facility: CLINIC | Age: 75
Discharge: HOME | End: 2025-04-02
Attending: STUDENT IN AN ORGANIZED HEALTH CARE EDUCATION/TRAINING PROGRAM
Payer: COMMERCIAL

## 2025-04-02 PROCEDURE — 93798 PHYS/QHP OP CAR RHAB W/ECG: CPT | Performed by: REHABILITATION PRACTITIONER

## 2025-04-03 ENCOUNTER — OFFICE VISIT (OUTPATIENT)
Dept: CARDIOLOGY | Facility: CLINIC | Age: 75
End: 2025-04-03
Payer: COMMERCIAL

## 2025-04-03 ENCOUNTER — ANTICOAGULATION THERAPY VISIT (OUTPATIENT)
Dept: ANTICOAGULATION | Facility: CLINIC | Age: 75
End: 2025-04-03

## 2025-04-03 ENCOUNTER — LAB (OUTPATIENT)
Dept: LAB | Facility: CLINIC | Age: 75
End: 2025-04-03
Payer: COMMERCIAL

## 2025-04-03 VITALS
BODY MASS INDEX: 23.74 KG/M2 | HEIGHT: 63 IN | HEART RATE: 71 BPM | OXYGEN SATURATION: 99 % | DIASTOLIC BLOOD PRESSURE: 79 MMHG | SYSTOLIC BLOOD PRESSURE: 169 MMHG | WEIGHT: 134 LBS

## 2025-04-03 DIAGNOSIS — Z95.1 S/P CABG (CORONARY ARTERY BYPASS GRAFT): Primary | ICD-10-CM

## 2025-04-03 DIAGNOSIS — Z95.2 S/P AVR: ICD-10-CM

## 2025-04-03 DIAGNOSIS — Z95.1 S/P CABG (CORONARY ARTERY BYPASS GRAFT): ICD-10-CM

## 2025-04-03 DIAGNOSIS — E87.79 OTHER HYPERVOLEMIA: Primary | ICD-10-CM

## 2025-04-03 LAB — INR BLD: 2.5 (ref 0.9–1.1)

## 2025-04-03 RX ORDER — FUROSEMIDE 20 MG/1
20 TABLET ORAL DAILY
Qty: 3 TABLET | Refills: 0 | Status: SHIPPED | OUTPATIENT
Start: 2025-04-03 | End: 2025-04-06

## 2025-04-03 NOTE — PROGRESS NOTES
CARDIOTHORACIC SURGERY FOLLOW-UP VISIT     Tri Walden   1950   [id number]      Reason for visit: Post-Op CABG x 1 and AVR (bioprosthetic) with Dr. Medellin on 03/14/2025    ASSESSMENT/PLAN:  Tri Walden is a 75 year old year old female status post AVR and CABG who returns to clinic for postop visit.     Severe single vessel CAD S/P CABG x 1  Moderate to severe aortic stenosis s/p aortic valve replacement (bioprosthetic)  HTN/HLD  Post-operative atrial fibrillation   Hx of PUD  IBS  Anemia    Today in clinic patient sounds to be in a regular rhythm with HR <100 bpm.   BP is elevated today with RN however reports her BP at home and with cardiac rehab have been verys table so wondering if it was her walk in and the machine they used. Cardiac rehab notes reviewed and -130s/60-80s so no changes made today in medications.   Discharge weight 137 lbs; weight today 134 lbs; Appears slightly hypervolemic euvolemic upon examinination with BLE and 1-2+ pitting edema in BLE (Worse on left). No appreciable JVD or abdominal bloating, and LS are CTA.  Discussed the importance of nutrition in healing and staying adequately hydrated.   Midline sternal incision healing well with no noted erythema or drainage or signs of infection. Increasing activity and strength overall.    Hemodynamics are stable. Medications reviewed and no changes were needed today.  Lasix 20mg daily x 3 days ordered for patient, reports she will start Saturday due to therapies and going back home tomorrow and with her drive to [place] after therapies doesn't want to have to stop multiple times to use the restroom.   Follow up with your cardiologist as scheduled.  Continue Outpatient Cardiac Rehab until completed.   Continue sternal precautions for 12 weeks from surgery date.   No driving for 4 weeks from surgery date.     Postoperative restrictions have been reviewed and all of the patient's questions were answered. Our contact information was  given to the patient if he should have any further questions/concerns. No further follow up is needed with us.  The total time spent with the patient was 30 minutes, > 50% of which was spent in counseling and coordination of care.    Anna Crowe PA-C   Cardiothoracic Surgery  Pager: [phone]    HPI: Per discharge summary:  She was admitted post operatively to intensive care unit and intensivist was consulted to assist with management of mechanical ventilation and vasoplegic shock. She was extubated POD#1- after failing PST x 4 on POD#0 with apnea and low tidal volumes). Her blood pressure was maintained on vasopressors and ionotropic agents which were continuously weaned until no longer needed. She was transferred to the intermediate care unit on POD#5     She went into atrial fibrillation on POD#1 and was treated with amiodarone bolus and infusion per protocol. Patient converted to NSR and was transitioned to oral taper once IV load complete. She had recurrent a fib with RVR during her hospital stay treated with additional amiodarone boluses and IV metoprolol. At discharge patient will continue oral amiodarone taper, she is anticoagulated with coumadin for bio AVR.      She has a bioprosthetic aortic valve and per surgeon recommendations patient will be anticoagulated with coumadin with INR goal of 2-3 for a duration of 3 months post operatively. After 3 months from surgical date- Will defer decision to discontinue or extend anticoagulation duration for other indications (PAF) to cardiology provider at follow up. If anticoagulation desired post 3 months- ok from surgical standpoint to transition to DOAC instead of coumadin.      She was fluid overloaded and treated with diuretic therapies. At discharge she remains mildly hypervolemic and will discharge with short course diuretic therapies. She tolerated diuretics without requiring potassium supplementation, will defer potassium supplementation at  discharge. She was instructed to monitor weight at home and call our office if weight and/or swelling increases.     She was transiently hyperglycemic post operatively and treated with insulin infusion then transitioned to sliding scale insulin per protocol. She does not have history of DMII and her blood sugars remained controlled. No glycemic control agents required at discharge.     Her chest tubes and pacing wires were removed when appropriate and post removal CXR was stable. She had return of bowel and bladder function and was weaned off oxygen. She worked well with therapies and was determined medically ready to discharge to home on POD#8. SHe has CAD and was started on guideline therapies of ASA, BB, statin. Follow up with PCP, cardiac surgery, cardiac rehab and cardiology were arranged. Discharge teaching materials, education and contact information was provided to patient and family at bedside. All questions and concerns were addressed.     Returns to clinic today for postoperative visit.    Since discharge, has been recovering well at home with her sister.    Patient is on Coumadin and INR is therapeutic. Will remain on for minimum of 3 months per Dr Medellin for her bioprosthetic valve, but after this time able to come off and just remain on ASA 162mg daily.     States pain is well controlled. He continues to need Tylenol and Robaxin  for pain management. Sleep is going well overnight. Participating in therapy at  Cardiac Rehab. Breathing has been stable/improving. Continues to work on C &DB. Denies SOB at rest, PND, orthopnea. No cough. Patient endorses edema.  Patient denies any fever, chills, chest pain, palpitations, SOB, lightheadedness, nausea, and vomiting. Has had a normal appetite. Having regular bowel movements and voiding without difficulty.  Denies  sternal popping or clicking or incisional drainage/erythema. No psychiatric concerns.    PAST MEDICAL HISTORY:  Past Medical History:   Diagnosis  Date    Basal cell carcinoma     Hypertension     Unsure if the date    Malignant melanoma (H)     Nonrheumatic aortic valve stenosis 3/14/2023       PAST SURGICAL HISTORY:  Past Surgical History:   Procedure Laterality Date    ABLATE VEIN VARICOSE RADIO FREQUENCY WITHOUT PHLEBECTOMY MULTIPLE STAB  12/06/2012    Procedure: ABLATE VEIN VARICOSE RADIO FREQUENCY WITHOUT PHLEBECTOMY MULTIPLE STAB;  Bilateral Ablation of Varicose Veins;  Surgeon: Fredis Marks MD;  Location: [facility] OR    BYPASS GRAFT ARTERY CORONARY N/A 3/14/2025    Procedure: CORONARY ARTERY BYPASS GRAFT x 1 (LEFT INTERNAL MAMMARY ARTERY - LEFT ANTERIOR DESCENDING ARTERY);  Surgeon: Tevin Medellin MD;  Location:  OR    CV CORONARY ANGIOGRAM N/A 2/24/2025    Procedure: Coronary Angiogram;  Surgeon: Merissa Rodgers MD;  Location:  HEART CARDIAC CATH LAB    REPLACE VALVE AORTIC N/A 3/14/2025    Procedure: AORTIC VALVE REPLACEMENT WITH TISSUE HEART VALVE INSPIRIS  RESILIA  AORTIC VALVE SIZE: 23 MM, AND ON CARDIOPULMONARY PUMP OXYGENATOR  (INTRAOPERATIVE TRANSESOPHAGEAL ECHOCARDIOGRAM BY ANESTHESIOLOGIST);  Surgeon: Tevin Medellin MD;  Location:  OR    REVERSE ARTHROPLASTY SHOULDER Left 3/22/2023    Procedure: left REVERSE Total Shoulder Arthroplasty;  Surgeon: Escobar Magana MD;  Location: [facility] OR       CURRENT MEDICATIONS:   Current Outpatient Medications   Medication Sig Dispense Refill    acetaminophen (TYLENOL) 325 MG tablet Take 2 tablets (650 mg) by mouth every 6 hours as needed for other (mild pain). 100 tablet 0    amiodarone (PACERONE) 200 MG tablet Take 1 tablet (200 mg) by mouth daily. 30 tablet 0    aspirin 81 MG EC tablet Take 1 tablet (81 mg) by mouth daily. 60 tablet 3    atorvastatin (LIPITOR) 40 MG tablet Take 1 tablet (40 mg) by mouth every evening. 30 tablet 3    Cranberry 400 MG TABS Take 1 tablet by mouth daily.      guaiFENesin (MUCINEX) 600 MG 12 hr tablet Take 1 tablet (600 mg) by mouth 2 times daily.  30 tablet 0    methocarbamol (ROBAXIN) 500 MG tablet Take 0.5 tablets (250 mg) by mouth 4 times daily as needed for muscle spasms. 40 tablet 1    metoprolol succinate ER (TOPROL XL) 25 MG 24 hr tablet Take 1 tablet (25 mg) by mouth daily. 30 tablet 3    pantoprazole (PROTONIX) 40 MG EC tablet Take 1 tablet (40 mg) by mouth daily. 14 tablet 0    senna-docusate (SENOKOT-S/PERICOLACE) 8.6-50 MG tablet Take 1 tablet by mouth 2 times daily as needed for constipation. 30 tablet 0    warfarin ANTICOAGULANT (COUMADIN) 2 MG tablet Take 1 tablet (2 mg) by mouth daily. 30 tablet 3    Calcium Carbonate-Vit D-Min (CALTRATE 600+D PLUS MINERALS) 600-800 MG-UNIT TABS Take 1 tablet by mouth 2 times daily (Patient not taking: Reported on 4/3/2025)      Vitamin D3 (CHOLECALCIFEROL) 25 mcg (1000 units) tablet Take 1 tablet by mouth daily. (Patient not taking: Reported on 4/3/2025)       No current facility-administered medications for this visit.       ALLERGIES:      Allergies   Allergen Reactions    Nkda [No Known Drug Allergy]        ROS:  Review of symptoms otherwise negative unless commented about in HPI.     LABS:  Last Basic Metabolic Panel:  Lab Results   Component Value Date     03/22/2025     08/29/2016      Lab Results   Component Value Date    POTASSIUM 4.0 03/22/2025    POTASSIUM 3.4 03/14/2025    POTASSIUM 4.2 10/20/2021    POTASSIUM 4.1 08/29/2016     Lab Results   Component Value Date    CHLORIDE 96 03/22/2025    CHLORIDE 110 10/20/2021    CHLORIDE 106 08/29/2016     Lab Results   Component Value Date    IGNACIO 8.2 03/22/2025    IGNACIO 8.5 08/29/2016     Lab Results   Component Value Date    CO2 28 03/22/2025    CO2 29 10/20/2021    CO2 29 08/29/2016     Lab Results   Component Value Date    BUN 13.3 03/22/2025    BUN 17 10/20/2021    BUN 13 08/29/2016     Lab Results   Component Value Date    CR 0.64 03/22/2025    CR 0.67 08/29/2016     Lab Results   Component Value Date    GLC 96 03/22/2025    GLC 98 03/18/2025  "   GLC 96 10/20/2021    GLC 90 10/08/2020       Last CBC:   Lab Results   Component Value Date    WBC 7.0 03/22/2025    WBC 11.7 06/28/2015     Lab Results   Component Value Date    RBC 2.76 03/22/2025    RBC 4.08 06/28/2015     Lab Results   Component Value Date    HGB 8.4 03/22/2025    HGB 12.0 06/28/2015     Lab Results   Component Value Date    HCT 25.1 03/22/2025    HCT 37.5 06/28/2015     No components found for: \"MCT\"  Lab Results   Component Value Date    MCV 91 03/22/2025    MCV 92 06/28/2015     Lab Results   Component Value Date    MCH 30.4 03/22/2025    MCH 29.4 06/28/2015     Lab Results   Component Value Date    MCHC 33.5 03/22/2025    MCHC 32.0 06/28/2015     Lab Results   Component Value Date    RDW 13.8 03/22/2025    RDW 13.1 06/28/2015     Lab Results   Component Value Date     03/22/2025     06/28/2015       INR:  Lab Results   Component Value Date    INR 2.5 04/03/2025    INR 2.3 03/31/2025    INR 2.5 03/27/2025    INR 2.8 03/24/2025    INR 2.67 03/22/2025    INR 2.51 03/21/2025    INR 2.22 03/20/2025    INR 1.72 03/19/2025    INR 1.19 03/18/2025    INR 1.11 03/17/2025    INR 1.35 03/14/2025    INR 1.53 03/14/2025    INR 0.98 02/24/2025         IMAGING:  None    PHYSICAL EXAM:   BP (!) 169/79   Pulse 71   Ht 1.588 m (5' 2.5\")   Wt 60.8 kg (134 lb)   SpO2 99%   BMI 24.12 kg/m    General: alert and oriented x 3, pleasant, no acute distress, normal mood and affect  Neuro: no focal deficits   CV: S1 S2, no murmurs, rubs or gallops, regular rate and rhythm, no peripheral edema  Pulm: bilateral breath sounds, clear to auscultation, easy work of breathing  Incision: incisions clean dry and intact without erythema, swelling or drainage    PROCEDURES: None         SARIKA Wallace"

## 2025-04-03 NOTE — PROGRESS NOTES
ANTICOAGULATION MANAGEMENT     Tri Walden 75 year old female is on warfarin with therapeutic INR result. (Goal INR 2.0-3.0)    Recent labs: (last 7 days)     04/03/25  1050   INR 2.5*       ASSESSMENT     Source(s): Chart Review and Patient/Caregiver Call     Warfarin doses taken: Warfarin taken as instructed  Diet: No new diet changes identified  Medication/supplement changes: None noted  New illness, injury, or hospitalization: No  Signs or symptoms of bleeding or clotting: No  Previous result: Therapeutic last 2(+) visits  Additional findings: Recent heart valve replacement in last 10 weeks       PLAN     Recommended plan for no diet, medication or health factor changes affecting INR     Dosing Instructions: Continue your current warfarin dose with next INR in 6 days       Summary  As of 4/3/2025      Full warfarin instructions:  2 mg every day   Next INR check:  4/9/2025               Telephone call with Aaron who verbalizes understanding and agrees to plan and who agrees to plan and repeated back plan correctly    Lab visit scheduled    Education provided: Please call back if any changes to your diet, medications or how you've been taking warfarin  Goal range and lab monitoring: goal range and significance of current result, Importance of therapeutic range, and Importance of following up at instructed interval    Plan made per [facility] anticoagulation protocol    Elvira Broderick, RN  4/3/2025  Anticoagulation Clinic  [employer] for routing messages: colin KELLEY  [facility] patient phone line: [phone]        _______________________________________________________________________     Anticoagulation Episode Summary       Current INR goal:  2.0-3.0   TTR:  100.0% (2 d)   Target end date:  6/14/2025   Send INR reminders to:  HAYES KELLEY    Indications    S/P CABG (coronary artery bypass graft) [Z95.1]  S/P AVR (Resolved) [Z95.2]             Comments:  Does not need to bridge if subtherapeutic              Anticoagulation Care Providers       Provider Role Specialty Phone number    Ines Torres PA-C Referring Cardiovascular & Thoracic Surgery [phone]    Nehemias Wallace MD Referring Family Medicine [phone]

## 2025-04-04 ENCOUNTER — HOSPITAL ENCOUNTER (OUTPATIENT)
Dept: CARDIAC REHAB | Facility: CLINIC | Age: 75
Discharge: HOME | End: 2025-04-04
Attending: STUDENT IN AN ORGANIZED HEALTH CARE EDUCATION/TRAINING PROGRAM
Payer: COMMERCIAL

## 2025-04-04 PROCEDURE — 93798 PHYS/QHP OP CAR RHAB W/ECG: CPT | Performed by: REHABILITATION PRACTITIONER

## 2025-04-07 ENCOUNTER — TELEPHONE (OUTPATIENT)
Dept: OTHER | Facility: CLINIC | Age: 75
End: 2025-04-07
Payer: COMMERCIAL

## 2025-04-07 NOTE — TELEPHONE ENCOUNTER
Follow up call to pt for follow up after diuresis initiated. 132.4 yesterday 130.8 today with one more lasix dose tomorrow. IS improving along with ease of breathing. Lower extremity swelling also improving. Will continue to weigh herself psot Lasix and call if increase in weight or difficulty with breathing.

## 2025-04-09 ENCOUNTER — HOSPITAL ENCOUNTER (OUTPATIENT)
Dept: CARDIAC REHAB | Facility: CLINIC | Age: 75
Discharge: HOME | End: 2025-04-09
Attending: FAMILY MEDICINE
Payer: COMMERCIAL

## 2025-04-09 ENCOUNTER — LAB (OUTPATIENT)
Dept: LAB | Facility: CLINIC | Age: 75
End: 2025-04-09
Payer: COMMERCIAL

## 2025-04-09 ENCOUNTER — ANTICOAGULATION THERAPY VISIT (OUTPATIENT)
Dept: ANTICOAGULATION | Facility: CLINIC | Age: 75
End: 2025-04-09

## 2025-04-09 ENCOUNTER — MYC MEDICAL ADVICE (OUTPATIENT)
Dept: ANTICOAGULATION | Facility: CLINIC | Age: 75
End: 2025-04-09

## 2025-04-09 DIAGNOSIS — Z95.1 S/P CABG (CORONARY ARTERY BYPASS GRAFT): Primary | ICD-10-CM

## 2025-04-09 DIAGNOSIS — Z95.2 S/P AVR: ICD-10-CM

## 2025-04-09 DIAGNOSIS — Z95.1 S/P CABG (CORONARY ARTERY BYPASS GRAFT): ICD-10-CM

## 2025-04-09 LAB — INR BLD: 3 (ref 0.9–1.1)

## 2025-04-09 PROCEDURE — 93798 PHYS/QHP OP CAR RHAB W/ECG: CPT

## 2025-04-09 PROCEDURE — 36416 COLLJ CAPILLARY BLOOD SPEC: CPT

## 2025-04-09 PROCEDURE — 85610 PROTHROMBIN TIME: CPT

## 2025-04-09 NOTE — PROGRESS NOTES
ANTICOAGULATION MANAGEMENT     Tri Walden 75 year old female is on warfarin with therapeutic INR result. (Goal INR 2.0-3.0)    Recent labs: (last 7 days)     04/09/25  1441   INR 3.0*       ASSESSMENT     Warfarin Lab Questionnaire    Warfarin Doses Last 7 Days      4/9/2025     8:48 AM   Dose in Tablet or Mg   TAB or MG? milligram (mg)     Pt Rptd Dose SUNDAY MONDAY TUESDAY WED THURS FRIDAY SATURDAY 4/9/2025   8:48 AM 2 2 2 2 2 2 2         4/9/2025   Warfarin Lab Questionnaire   Missed doses within past 14 days? No   Changes in diet or alcohol within past 14 days? No   Medication changes since last result? No   Injuries or illness since last result? No   New shortness of breath, severe headaches or sudden changes in vision since last result? No   Abnormal bleeding since last result? No   Upcoming surgery, procedure? No   Best number to call with results? [phone]     Previous result: Therapeutic last 2(+) visits  Additional findings: Recent heart valve replacement in last 10 weeks     PLAN     Recommended plan for no diet, medication or health factor changes affecting INR     Dosing Instructions: Continue your current warfarin dose with next INR in 1 week       Summary  As of 4/9/2025      Full warfarin instructions:  2 mg every day   Next INR check:  4/16/2025               Detailed VM left to check [employer] message. Unable to reach pt or Bianca Barnett. [employer] message sent with dosing.    Contact [phone] to schedule and with any changes, questions or concerns.     Education provided: Please call back if any changes to your diet, medications or how you've been taking warfarin    Plan made per [facility] anticoagulation protocol    Phuong Rincon, NICHOLE  4/9/2025  Anticoagulation Clinic  [employer] [place] for routing messages: colin KELLEY  [facility] patient phone line: [phone]        _______________________________________________________________________     Anticoagulation Episode Summary       Current INR goal:   2.0-3.0   TTR:  100.0% (1.1 wk)   Target end date:  6/14/2025   Send INR reminders to:  HAYES KELLEY    Indications    S/P CABG (coronary artery bypass graft) [Z95.1]  S/P AVR (Resolved) [Z95.2]             Comments:  Does not need to bridge if subtherapeutic             Anticoagulation Care Providers       Provider Role Specialty Phone number    Ines Torres PA-C Referring Cardiovascular & Thoracic Surgery [phone]    Nehemias Wallace MD Referring Family Medicine [phone]

## 2025-04-11 ENCOUNTER — HOSPITAL ENCOUNTER (OUTPATIENT)
Dept: CARDIAC REHAB | Facility: CLINIC | Age: 75
Discharge: HOME | End: 2025-04-11
Attending: FAMILY MEDICINE
Payer: COMMERCIAL

## 2025-04-11 PROCEDURE — 93798 PHYS/QHP OP CAR RHAB W/ECG: CPT

## 2025-04-14 ENCOUNTER — HOSPITAL ENCOUNTER (OUTPATIENT)
Dept: CARDIAC REHAB | Facility: CLINIC | Age: 75
Discharge: HOME | End: 2025-04-14
Attending: FAMILY MEDICINE
Payer: COMMERCIAL

## 2025-04-14 PROCEDURE — 93798 PHYS/QHP OP CAR RHAB W/ECG: CPT

## 2025-04-16 ENCOUNTER — ANTICOAGULATION THERAPY VISIT (OUTPATIENT)
Dept: ANTICOAGULATION | Facility: CLINIC | Age: 75
End: 2025-04-16

## 2025-04-16 ENCOUNTER — HOSPITAL ENCOUNTER (OUTPATIENT)
Dept: CARDIAC REHAB | Facility: CLINIC | Age: 75
Discharge: HOME | End: 2025-04-16
Attending: FAMILY MEDICINE
Payer: COMMERCIAL

## 2025-04-16 ENCOUNTER — LAB (OUTPATIENT)
Dept: LAB | Facility: CLINIC | Age: 75
End: 2025-04-16
Payer: COMMERCIAL

## 2025-04-16 DIAGNOSIS — Z95.1 S/P CABG (CORONARY ARTERY BYPASS GRAFT): Primary | ICD-10-CM

## 2025-04-16 DIAGNOSIS — Z95.2 S/P AVR: ICD-10-CM

## 2025-04-16 DIAGNOSIS — Z95.1 S/P CABG (CORONARY ARTERY BYPASS GRAFT): ICD-10-CM

## 2025-04-16 LAB — INR BLD: 2.8 (ref 0.9–1.1)

## 2025-04-16 PROCEDURE — 36416 COLLJ CAPILLARY BLOOD SPEC: CPT

## 2025-04-16 PROCEDURE — 85610 PROTHROMBIN TIME: CPT

## 2025-04-16 PROCEDURE — 93798 PHYS/QHP OP CAR RHAB W/ECG: CPT

## 2025-04-16 NOTE — PROGRESS NOTES
ANTICOAGULATION MANAGEMENT     Tri Walden 75 year old female is on warfarin with therapeutic INR result. (Goal INR 2.0-3.0)    Recent labs: (last 7 days)     04/16/25  1438   INR 2.8*       ASSESSMENT     Warfarin Lab Questionnaire    Warfarin Doses Last 7 Days      4/15/2025     3:25 PM   Dose in Tablet or Mg   TAB or MG? milligram (mg)     Pt Rptd Dose ABEL MONDAY TUESDAY WED THURS FRIDAY SATURDAY   4/15/2025   3:25 PM 2 2 2 2 2 2 2         4/15/2025   Warfarin Lab Questionnaire   Missed doses within past 14 days? No   Changes in diet or alcohol within past 14 days? No   Medication changes since last result? No   Injuries or illness since last result? No   New shortness of breath, severe headaches or sudden changes in vision since last result? No   Abnormal bleeding since last result? No   Upcoming surgery, procedure? No     Previous result: Therapeutic last 2(+) visits  Additional findings: None     PLAN     Recommended plan for no diet, medication or health factor changes affecting INR     Dosing Instructions: Continue your current warfarin dose with next INR in 1 week       Summary  As of 4/16/2025      Full warfarin instructions:  2 mg every day   Next INR check:  4/23/2025               Detailed voice message left for Aaron with dosing instructions and follow up date.     Contact [phone] to schedule and with any changes, questions or concerns.     Education provided: Please call back if any changes to your diet, medications or how you've been taking warfarin    Plan made per [facility] anticoagulation protocol    Phuong Rincon RN  4/16/2025  Anticoagulation Clinic  [facility] for routing messages: colin KELLEY  [facility] patient phone line: [phone]        _______________________________________________________________________     Anticoagulation Episode Summary       Current INR goal:  2.0-3.0   TTR:  100.0% (2.1 wk)   Target end date:  6/14/2025   Send INR reminders to:  HAYES KELLEY     Indications    S/P CABG (coronary artery bypass graft) [Z95.1]  S/P AVR (Resolved) [Z95.2]             Comments:  Does not need to bridge if subtherapeutic             Anticoagulation Care Providers       Provider Role Specialty Phone number    Ines Torres PA-C Referring Cardiovascular & Thoracic Surgery [phone]    Nehemias Wallace MD Referring [facility] [phone]

## 2025-04-17 ENCOUNTER — TELEPHONE (OUTPATIENT)
Dept: CARDIOLOGY | Facility: CLINIC | Age: 75
End: 2025-04-17
Payer: COMMERCIAL

## 2025-04-17 NOTE — TELEPHONE ENCOUNTER
Patient called wondering if it's normally to be more fatigued than normal after surgery; advised that it is normal because the body is using all available every to heal; patient verbalized understanding.  Also reports that BP is stable at 120s/70s and HR 80s.

## 2025-04-18 ENCOUNTER — HOSPITAL ENCOUNTER (OUTPATIENT)
Dept: CARDIAC REHAB | Facility: CLINIC | Age: 75
Discharge: HOME | End: 2025-04-18
Attending: FAMILY MEDICINE
Payer: COMMERCIAL

## 2025-04-18 PROCEDURE — 93798 PHYS/QHP OP CAR RHAB W/ECG: CPT

## 2025-04-21 ENCOUNTER — HOSPITAL ENCOUNTER (OUTPATIENT)
Dept: CARDIAC REHAB | Facility: CLINIC | Age: 75
Discharge: HOME | End: 2025-04-21
Attending: INTERNAL MEDICINE
Payer: COMMERCIAL

## 2025-04-21 PROCEDURE — 93798 PHYS/QHP OP CAR RHAB W/ECG: CPT

## 2025-04-23 ENCOUNTER — ANTICOAGULATION THERAPY VISIT (OUTPATIENT)
Dept: ANTICOAGULATION | Facility: CLINIC | Age: 75
End: 2025-04-23

## 2025-04-23 ENCOUNTER — LAB (OUTPATIENT)
Dept: LAB | Facility: CLINIC | Age: 75
End: 2025-04-23
Payer: COMMERCIAL

## 2025-04-23 ENCOUNTER — HOSPITAL ENCOUNTER (OUTPATIENT)
Dept: CARDIAC REHAB | Facility: CLINIC | Age: 75
Discharge: HOME | End: 2025-04-23
Attending: INTERNAL MEDICINE
Payer: COMMERCIAL

## 2025-04-23 DIAGNOSIS — Z95.2 S/P AVR: ICD-10-CM

## 2025-04-23 DIAGNOSIS — Z95.1 S/P CABG (CORONARY ARTERY BYPASS GRAFT): ICD-10-CM

## 2025-04-23 DIAGNOSIS — Z95.1 S/P CABG (CORONARY ARTERY BYPASS GRAFT): Primary | ICD-10-CM

## 2025-04-23 LAB — INR BLD: 3.5 (ref 0.9–1.1)

## 2025-04-23 PROCEDURE — 93798 PHYS/QHP OP CAR RHAB W/ECG: CPT

## 2025-04-23 PROCEDURE — 85610 PROTHROMBIN TIME: CPT

## 2025-04-23 PROCEDURE — 36416 COLLJ CAPILLARY BLOOD SPEC: CPT

## 2025-04-23 NOTE — PROGRESS NOTES
ANTICOAGULATION MANAGEMENT     Tri Walden 75 year old female is on warfarin with supratherapeutic INR result. (Goal INR 2.0-3.0)    Recent labs: (last 7 days)     04/23/25  1436   INR 3.5*       ASSESSMENT     Warfarin Lab Questionnaire    Warfarin Doses Last 7 Days      4/22/2025     3:23 PM   Dose in Tablet or Mg   TAB or MG? milligram (mg)     Pt Rptd Dose SUNDAY MONDAY TUESDAY WED THURS FRIDAY SATURDAY 4/22/2025   3:23 PM 2 2 2 2 2 2 2         4/22/2025   Warfarin Lab Questionnaire   Missed doses within past 14 days? No   Changes in diet or alcohol within past 14 days? No   Medication changes since last result? No   Injuries or illness since last result? No   New shortness of breath, severe headaches or sudden changes in vision since last result? No   Abnormal bleeding since last result? No   Upcoming surgery, procedure? No     Previous result: Therapeutic last 2(+) visits  Additional findings: Recent heart valve replacement in last 10 weeks     PLAN     Recommended plan for no diet, medication or health factor changes affecting INR     Dosing Instructions: decrease your warfarin dose (7.1% change) with next INR in 1 week       Summary  As of 4/23/2025      Full warfarin instructions:  1 mg every Wed; 2 mg all other days   Next INR check:  4/30/2025               Telephone call with aAron who verbalizes understanding and agrees to plan. Pt read back instructions to RN.    Lab visit scheduled    Education provided: Please call back if any changes to your diet, medications or how you've been taking warfarin  Symptom monitoring: monitoring for bleeding signs and symptoms, when to seek medical attention/emergency care, and if you hit your head or have a bad fall seek emergency care    Plan made per [facility] anticoagulation protocol    Phuong Rincon RN  4/23/2025  Anticoagulation Clinic  [facility] for routing messages: colin KELLEY  [facility] patient phone line:  [phone]        _______________________________________________________________________     Anticoagulation Episode Summary       Current INR goal:  2.0-3.0   TTR:  77.9% (3.1 wk)   Target end date:  6/14/2025   Send INR reminders to:  HAYES KELLEY    Indications    S/P CABG (coronary artery bypass graft) [Z95.1]  S/P AVR (Resolved) [Z95.2]             Comments:  Does not need to bridge if subtherapeutic             Anticoagulation Care Providers       Provider Role Specialty Phone number    Ines Torres PA-C Referring Cardiovascular & Thoracic Surgery [phone]    Nehemias Wallace MD Referring Family Medicine [phone]

## 2025-04-25 ENCOUNTER — HOSPITAL ENCOUNTER (OUTPATIENT)
Dept: CARDIAC REHAB | Facility: CLINIC | Age: 75
Discharge: HOME | End: 2025-04-25
Attending: INTERNAL MEDICINE
Payer: COMMERCIAL

## 2025-04-25 PROCEDURE — 93798 PHYS/QHP OP CAR RHAB W/ECG: CPT

## 2025-04-28 ENCOUNTER — HOSPITAL ENCOUNTER (OUTPATIENT)
Dept: CARDIAC REHAB | Facility: CLINIC | Age: 75
Discharge: HOME | End: 2025-04-28
Attending: INTERNAL MEDICINE
Payer: COMMERCIAL

## 2025-04-28 PROCEDURE — 93798 PHYS/QHP OP CAR RHAB W/ECG: CPT

## 2025-04-30 ENCOUNTER — HOSPITAL ENCOUNTER (OUTPATIENT)
Dept: CARDIAC REHAB | Facility: CLINIC | Age: 75
Discharge: HOME | End: 2025-04-30
Attending: INTERNAL MEDICINE
Payer: COMMERCIAL

## 2025-04-30 ENCOUNTER — ANTICOAGULATION THERAPY VISIT (OUTPATIENT)
Dept: ANTICOAGULATION | Facility: CLINIC | Age: 75
End: 2025-04-30

## 2025-04-30 ENCOUNTER — LAB (OUTPATIENT)
Dept: LAB | Facility: CLINIC | Age: 75
End: 2025-04-30
Payer: COMMERCIAL

## 2025-04-30 DIAGNOSIS — Z95.2 S/P AVR: ICD-10-CM

## 2025-04-30 DIAGNOSIS — Z95.1 S/P CABG (CORONARY ARTERY BYPASS GRAFT): ICD-10-CM

## 2025-04-30 DIAGNOSIS — Z95.1 S/P CABG (CORONARY ARTERY BYPASS GRAFT): Primary | ICD-10-CM

## 2025-04-30 LAB — INR BLD: 2.2 (ref 0.9–1.1)

## 2025-04-30 PROCEDURE — 93798 PHYS/QHP OP CAR RHAB W/ECG: CPT

## 2025-04-30 PROCEDURE — 36416 COLLJ CAPILLARY BLOOD SPEC: CPT

## 2025-04-30 PROCEDURE — 85610 PROTHROMBIN TIME: CPT

## 2025-04-30 NOTE — PROGRESS NOTES
ANTICOAGULATION MANAGEMENT     Tri Walden 75 year old female is on warfarin with therapeutic INR result. (Goal INR 2.0-3.0)    Recent labs: (last 7 days)     04/30/25  1134   INR 2.2*       ASSESSMENT     Warfarin Lab Questionnaire    Warfarin Doses Last 7 Days      4/30/2025     8:55 AM   Dose in Tablet or Mg   TAB or MG? milligram (mg)     Pt Rptd Dose SUNDAY MONDAY TUESDAY WED THURS FRIDAY SATURDAY 4/30/2025   8:55 AM 2 2 2 1 2 2 2         4/30/2025   Warfarin Lab Questionnaire   Missed doses within past 14 days? No   Changes in diet or alcohol within past 14 days? No   Medication changes since last result? No   Injuries or illness since last result? No   New shortness of breath, severe headaches or sudden changes in vision since last result? No   Abnormal bleeding since last result? No   Upcoming surgery, procedure? No     Previous result: Supratherapeutic  Additional findings: None       PLAN     Recommended plan for no diet, medication or health factor changes affecting INR     Dosing Instructions: Increase your warfarin dose (7.7% change) with next INR in 1 week       Summary  As of 4/30/2025      Full warfarin instructions:  2 mg every day   Next INR check:  5/7/2025               Telephone call with Aaron who agrees to plan and repeated back plan correctly    Lab visit scheduled    Education provided: Contact [phone] with any changes, questions or concerns.     Plan made per [facility] anticoagulation protocol    Ines Neff RN  4/30/2025  Anticoagulation Clinic  [facility] for routing messages: colin KELLEY  [facility] patient phone line: [phone]        _______________________________________________________________________     Anticoagulation Episode Summary       Current INR goal:  2.0-3.0   TTR:  74.1% (4.1 wk)   Target end date:  6/14/2025   Send INR reminders to:  HAYES KELLEY    Indications    S/P CABG (coronary artery bypass graft) [Z95.1]  S/P AVR (Resolved) [Z95.2]              Comments:  Does not need to bridge if subtherapeutic             Anticoagulation Care Providers       Provider Role Specialty Phone number    Ines Torres PA-C Referring Cardiovascular & Thoracic Surgery [phone]    Nehemias Wallace MD Referring Family Medicine [phone]

## 2025-05-02 ENCOUNTER — HOSPITAL ENCOUNTER (OUTPATIENT)
Dept: CARDIAC REHAB | Facility: CLINIC | Age: 75
Discharge: HOME | End: 2025-05-02
Attending: INTERNAL MEDICINE
Payer: COMMERCIAL

## 2025-05-02 PROCEDURE — 93798 PHYS/QHP OP CAR RHAB W/ECG: CPT

## 2025-05-05 ENCOUNTER — HOSPITAL ENCOUNTER (OUTPATIENT)
Dept: CARDIAC REHAB | Facility: CLINIC | Age: 75
Discharge: HOME | End: 2025-05-05
Attending: INTERNAL MEDICINE
Payer: COMMERCIAL

## 2025-05-05 PROCEDURE — 93798 PHYS/QHP OP CAR RHAB W/ECG: CPT

## 2025-05-07 ENCOUNTER — HOSPITAL ENCOUNTER (OUTPATIENT)
Dept: CARDIAC REHAB | Facility: CLINIC | Age: 75
Discharge: HOME | End: 2025-05-07
Attending: INTERNAL MEDICINE
Payer: COMMERCIAL

## 2025-05-07 ENCOUNTER — ANTICOAGULATION THERAPY VISIT (OUTPATIENT)
Dept: ANTICOAGULATION | Facility: CLINIC | Age: 75
End: 2025-05-07

## 2025-05-07 ENCOUNTER — LAB (OUTPATIENT)
Dept: LAB | Facility: CLINIC | Age: 75
End: 2025-05-07
Payer: COMMERCIAL

## 2025-05-07 ENCOUNTER — OFFICE VISIT (OUTPATIENT)
Dept: CARDIOLOGY | Facility: CLINIC | Age: 75
End: 2025-05-07
Payer: COMMERCIAL

## 2025-05-07 VITALS
SYSTOLIC BLOOD PRESSURE: 152 MMHG | HEART RATE: 76 BPM | BODY MASS INDEX: 23.96 KG/M2 | OXYGEN SATURATION: 98 % | WEIGHT: 135.2 LBS | RESPIRATION RATE: 16 BRPM | HEIGHT: 63 IN | DIASTOLIC BLOOD PRESSURE: 83 MMHG

## 2025-05-07 DIAGNOSIS — Z95.2 S/P AVR (AORTIC VALVE REPLACEMENT): ICD-10-CM

## 2025-05-07 DIAGNOSIS — I48.91 ATRIAL FIBRILLATION, UNSPECIFIED TYPE (H): ICD-10-CM

## 2025-05-07 DIAGNOSIS — Z95.1 S/P CABG (CORONARY ARTERY BYPASS GRAFT): Primary | ICD-10-CM

## 2025-05-07 DIAGNOSIS — Z95.2 S/P AVR: ICD-10-CM

## 2025-05-07 DIAGNOSIS — Z95.1 S/P CABG (CORONARY ARTERY BYPASS GRAFT): ICD-10-CM

## 2025-05-07 DIAGNOSIS — I25.810 CORONARY ARTERY DISEASE INVOLVING CORONARY BYPASS GRAFT OF NATIVE HEART, UNSPECIFIED WHETHER ANGINA PRESENT: Primary | ICD-10-CM

## 2025-05-07 DIAGNOSIS — E78.5 HYPERLIPIDEMIA LDL GOAL <50: ICD-10-CM

## 2025-05-07 DIAGNOSIS — I10 BENIGN ESSENTIAL HYPERTENSION: ICD-10-CM

## 2025-05-07 LAB — INR BLD: 2.1 (ref 0.9–1.1)

## 2025-05-07 PROCEDURE — 93798 PHYS/QHP OP CAR RHAB W/ECG: CPT

## 2025-05-07 PROCEDURE — 3077F SYST BP >= 140 MM HG: CPT | Performed by: NURSE PRACTITIONER

## 2025-05-07 PROCEDURE — 99214 OFFICE O/P EST MOD 30 MIN: CPT | Mod: 24 | Performed by: NURSE PRACTITIONER

## 2025-05-07 PROCEDURE — 3079F DIAST BP 80-89 MM HG: CPT | Performed by: NURSE PRACTITIONER

## 2025-05-07 PROCEDURE — 85610 PROTHROMBIN TIME: CPT

## 2025-05-07 PROCEDURE — 36416 COLLJ CAPILLARY BLOOD SPEC: CPT

## 2025-05-07 NOTE — LETTER
5/7/2025    Nehemias Wallace MD  [address] [facility] [place] [address]    RE: Tri Walden       Dear Colleague,     I had the pleasure of seeing Tri Walden in the [facility].    [facility]  [address] [facility] [place] [place] [address]  Phone: [phone]  Fax: [phone]    Patient:  Tri Walden, Date of birth 1950  Date of Visit:  05/07/2025  Referring Provider Kathryn Martinez  Aaron is a 75 year old female who presents for Follow Up (S/p CABG, S/p AVR, Aortic valve stenosis/) and Coronary Artery Disease    Problem List Items Addressed This Visit          Active Problems    Hyperlipidemia LDL goal <50    S/P AVR (aortic valve replacement)    Overview   23 mm Montano Inspiris Resilia Bioprosthetic Valve 3/2025         Coronary artery disease involving coronary bypass graft of native heart, unspecified whether angina present - Primary    Overview   CABG x 1 LIMA to LAD 3/2025         Atrial fibrillation, unspecified type (H)    Overview   Postop A-fib 3/2025  Elevated [id number] score         Benign essential hypertension     History of Present Illness    Aaron Walden, age: 75 years    Aortic Stenosis and Coronary Artery Disease  - Severe aortic stenosis progression noted  - Drop in ejection fraction (EF) and abnormal stress test prior to surgery  - Referred to [facility]  - Coronary angiogram revealed chronic total occlusion () of the left anterior descending artery (LAD)  - Underwent coronary artery bypass grafting (CABG) and aortic valve replacement (AVR) in March 2025  - Postoperative complications included shock and recurrent atrial fibrillation  - Discharged with amiodarone taper and anticoagulation  - Follow-up in April 2025 included Lasix for lower extremity edema  - Echo on March 20, 2025 showed EF improved to 60% after valve replacement    Atrial Fibrillation  - Experienced atrial  "fibrillation post-surgery   - No recurrence of atrial fibrillation since stopping amiodarone on April 18, 2025  - Cardiac rehab monitoring showed no atrial fibrillation    Hypertension  - Blood pressure well controlled at cardiac rehab, typically reducing from 140 to 120 during exercise sessions    Edema  - Lower extremity edema improved significantly with Lasix post-surgery    Hyperlipidemia  - Historically on atorvastatin 10 mg daily, increased to 40 mg daily during hospitalization in March 2025         Objective    Vital signs:  BP (!) 158/90 (BP Location: Left arm, Patient Position: Sitting, Cuff Size: Adult Regular)   Pulse 79   Resp 16   Ht 1.588 m (5' 2.5\")   Wt 61.3 kg (135 lb 3.2 oz)   SpO2 98%   BMI 24.33 kg/m    Blood pressure (!) 158/90, pulse 79, resp. rate 16, height 1.588 m (5' 2.5\"), weight 61.3 kg (135 lb 3.2 oz), SpO2 98%.  Physical Exam    - CARDIOVASCULAR: Heart regular, no murmurs or gallops.  - LUNGS: Lungs clear.  - EXTREMITIES: No edema in lower extremities.         Results    Lab results  - Sodium: mildly decreased (March 2025)  - Normal renal function (March 2025)  - Normal potassium (March 2025)    Imaging results  - Echo on March 20, 2025: EF 60% after valve replacement       Results reviewed today.         Assessment & Plan    Assessment  - Severe aortic stenosis, previously progressed from moderate to severe, requiring surgical intervention.  - Chronic total occlusion () of the left anterior descending (LAD) artery, addressed with coronary artery bypass grafting (CABG).  - Postoperative complications including shock and recurrent atrial fibrillation, managed with amiodarone taper and anticoagulation.  - Elevated blood pressure noted during office visit, though well-controlled at cardiac rehab.  - History of hyperlipidemia, with atorvastatin dosage increased during hospitalization.    Plan  - Recheck lipid profile with fasting lab to monitor hyperlipidemia. This will help assess " the effectiveness of the increased atorvastatin dosage.  - Wear a 14-day Zio patch before the July appointment with Dr. You to monitor for any recurrent atrial fibrillation. This will help determine if continuation of anticoagulation therapy is necessary.  - Arrange to receive the heart monitor in June, either by setting up an appointment to come in or having it mailed. Ensure the monitor is worn for the full 14 days and follow instructions for returning it.  - Contact the surgery team's nursing number for detailed information on lifting restrictions post-surgery. This will provide clarity on physical activity limitations.  - Monitor for signs of fluid retention and swelling. If these symptoms occur, contact the healthcare provider for further evaluation and potential adjustment of diuretic therapy.    Appointments  - Appointment with Dr. You for a postop follow-up in July 2025.  - Appointment to receive a 14-day Zio patch in June 2025.                   Use of an AI documentation tool was used in the creation of  this note.             Thank you for allowing me to participate in the care of your patient.      Sincerely,     BHARAT Hallman CNP     [facility]  cc:   BHARAT Bower CNP  [address] [place] [address]

## 2025-05-07 NOTE — PATIENT INSTRUCTIONS
Based on our discussion, I have outlined the following instructions for you:      - Schedule a blood test to check your cholesterol. Make sure you fast before the test.  - In June, we will send you the heart monitor. Wear the monitor for 14 days and follow the instructions to return it.  - Call the surgery team's nursing number to find out what you can and cannot lift after your surgery.  - Watch for any signs of swelling or fluid buildup in your body. If you notice these, contact your healthcare provider for advice.  - Check blood pressure at least 1 hour after medications. Call the clinic if your blood pressure is consistently greater than 130/80.     Next appointment(s): - Appointment with Dr. You for a postop follow-up in July 2025.  - Receive a 14-day Zio patch in June 2025.    Thank you again for your visit, and we look forward to supporting you in your journey to better health.          Call 6 months prior to schedule appointments.     Cardiology Scheduling~[phone]  Cardiology Clinic RN~[phone] (Samia RN, Ree RN; Ani RN)

## 2025-05-07 NOTE — PROGRESS NOTES
ANTICOAGULATION MANAGEMENT     Tri Walden 75 year old female is on warfarin with therapeutic INR result. (Goal INR 2.0-3.0)    Recent labs: (last 7 days)     05/07/25  1120   INR 2.1*       ASSESSMENT     Source(s): Chart Review and Patient/Caregiver Call     Warfarin doses taken: Warfarin taken as instructed  Diet: No new diet changes identified  Medication/supplement changes: None noted  New illness, injury, or hospitalization: No  Signs or symptoms of bleeding or clotting: No  Previous result: Therapeutic last visit; previously outside of goal range  Additional findings: None       PLAN     Recommended plan for no diet, medication or health factor changes affecting INR     Dosing Instructions: Continue your current warfarin dose with next INR in 41weeks       Summary  As of 5/7/2025      Full warfarin instructions:  2 mg every day   Next INR check:  5/14/2025               Telephone call with Aaron who verbalizes understanding and agrees to plan    Lab visit scheduled    Education provided: Please call back if any changes to your diet, medications or how you've been taking warfarin    Plan made per [facility] anticoagulation protocol    Adia Gray RN  5/7/2025  Anticoagulation Clinic  [facility] for routing messages: colin KELLEY  [facility] patient phone line: [phone]        _______________________________________________________________________     Anticoagulation Episode Summary       Current INR goal:  2.0-3.0   TTR:  79.0% (1.2 mo)   Target end date:  6/14/2025   Send INR reminders to:  HAYES KELLEY    Indications    S/P CABG (coronary artery bypass graft) [Z95.1]  S/P AVR (Resolved) [Z95.2]             Comments:  Does not need to bridge if subtherapeutic             Anticoagulation Care Providers       Provider Role Specialty Phone number    Ines Torres PA-C Referring Cardiovascular & Thoracic Surgery [phone]    Nehemias Wallace MD Referring [facility] Medicine [phone]

## 2025-05-09 ENCOUNTER — HOSPITAL ENCOUNTER (OUTPATIENT)
Dept: CARDIAC REHAB | Facility: CLINIC | Age: 75
Discharge: HOME | End: 2025-05-09
Attending: INTERNAL MEDICINE
Payer: COMMERCIAL

## 2025-05-09 PROCEDURE — 93798 PHYS/QHP OP CAR RHAB W/ECG: CPT

## 2025-05-14 ENCOUNTER — LAB (OUTPATIENT)
Dept: LAB | Facility: CLINIC | Age: 75
End: 2025-05-14
Payer: COMMERCIAL

## 2025-05-14 ENCOUNTER — HOSPITAL ENCOUNTER (OUTPATIENT)
Dept: CARDIAC REHAB | Facility: CLINIC | Age: 75
Discharge: HOME | End: 2025-05-14
Attending: INTERNAL MEDICINE
Payer: COMMERCIAL

## 2025-05-14 ENCOUNTER — RESULTS FOLLOW-UP (OUTPATIENT)
Dept: ANTICOAGULATION | Facility: CLINIC | Age: 75
End: 2025-05-14

## 2025-05-14 ENCOUNTER — ANTICOAGULATION THERAPY VISIT (OUTPATIENT)
Dept: ANTICOAGULATION | Facility: CLINIC | Age: 75
End: 2025-05-14

## 2025-05-14 DIAGNOSIS — Z95.2 S/P AVR: ICD-10-CM

## 2025-05-14 DIAGNOSIS — Z95.1 S/P CABG (CORONARY ARTERY BYPASS GRAFT): Primary | ICD-10-CM

## 2025-05-14 DIAGNOSIS — Z95.1 S/P CABG (CORONARY ARTERY BYPASS GRAFT): ICD-10-CM

## 2025-05-14 LAB — INR BLD: 1.5 (ref 0.9–1.1)

## 2025-05-14 PROCEDURE — 93798 PHYS/QHP OP CAR RHAB W/ECG: CPT

## 2025-05-14 PROCEDURE — 85610 PROTHROMBIN TIME: CPT

## 2025-05-14 PROCEDURE — 36416 COLLJ CAPILLARY BLOOD SPEC: CPT

## 2025-05-14 RX ORDER — WARFARIN SODIUM 2 MG/1
TABLET ORAL
Qty: 135 TABLET | Refills: 1 | Status: SHIPPED | OUTPATIENT
Start: 2025-05-14

## 2025-05-14 NOTE — PROGRESS NOTES
ANTICOAGULATION MANAGEMENT     Tri Walden 75 year old female is on warfarin with subtherapeutic INR result. (Goal INR 2.0-3.0)    Recent labs: (last 7 days)     05/14/25  1339   INR 1.5*       ASSESSMENT     Source(s): Chart Review and Patient/Caregiver Call     Warfarin doses taken: Warfarin taken as instructed  Diet: No new diet changes identified  Medication/supplement changes: None noted  New illness, injury, or hospitalization: No  Signs or symptoms of bleeding or clotting: No  Previous result: Therapeutic last 2(+) visits  Additional findings: Recent heart valve replacement in last 10 weeks       PLAN     Recommended plan for no diet, medication or health factor changes and recent heart valve replacement last 10 weeks affecting INR     Dosing Instructions: Increase your warfarin dose (21.4% change) with next INR in 1 week       Summary  As of 5/14/2025      Full warfarin instructions:  3 mg every Mon, Wed, Fri; 2 mg all other days   Next INR check:  5/21/2025               Telephone call with Aaron who verbalizes understanding and agrees to plan    Lab visit scheduled    Education provided: Please call back if any changes to your diet, medications or how you've been taking warfarin    Plan made per [facility] anticoagulation protocol    Arin Hernández RN  5/14/2025  Anticoagulation Clinic  [facility] for routing messages: colin KELLEY  [facility] patient phone line: [phone]        _______________________________________________________________________     Anticoagulation Episode Summary       Current INR goal:  2.0-3.0   TTR:  68.9% (1.4 mo)   Target end date:  6/14/2025   Send INR reminders to:  HAYES KELLEY    Indications    S/P CABG (coronary artery bypass graft) [Z95.1]  S/P AVR (Resolved) [Z95.2]             Comments:  Does not need to bridge if subtherapeutic             Anticoagulation Care Providers       Provider Role Specialty Phone number    Ines Torres PA-C Referring  Cardiovascular & Thoracic Surgery [phone]    Nehemias Wallace MD [facility] [phone]

## 2025-05-16 ENCOUNTER — HOSPITAL ENCOUNTER (OUTPATIENT)
Dept: CARDIAC REHAB | Facility: CLINIC | Age: 75
Discharge: HOME | End: 2025-05-16
Attending: INTERNAL MEDICINE
Payer: COMMERCIAL

## 2025-05-16 PROCEDURE — 93798 PHYS/QHP OP CAR RHAB W/ECG: CPT

## 2025-05-19 ENCOUNTER — HOSPITAL ENCOUNTER (OUTPATIENT)
Dept: CARDIAC REHAB | Facility: CLINIC | Age: 75
Discharge: HOME | End: 2025-05-19
Attending: INTERNAL MEDICINE
Payer: COMMERCIAL

## 2025-05-19 PROCEDURE — 93798 PHYS/QHP OP CAR RHAB W/ECG: CPT

## 2025-05-21 ENCOUNTER — ANTICOAGULATION THERAPY VISIT (OUTPATIENT)
Dept: ANTICOAGULATION | Facility: CLINIC | Age: 75
End: 2025-05-21

## 2025-05-21 ENCOUNTER — HOSPITAL ENCOUNTER (OUTPATIENT)
Dept: CARDIAC REHAB | Facility: CLINIC | Age: 75
Discharge: HOME | End: 2025-05-21
Attending: INTERNAL MEDICINE
Payer: COMMERCIAL

## 2025-05-21 ENCOUNTER — RESULTS FOLLOW-UP (OUTPATIENT)
Dept: ANTICOAGULATION | Facility: CLINIC | Age: 75
End: 2025-05-21

## 2025-05-21 ENCOUNTER — LAB (OUTPATIENT)
Dept: LAB | Facility: CLINIC | Age: 75
End: 2025-05-21
Payer: COMMERCIAL

## 2025-05-21 DIAGNOSIS — Z95.2 S/P AVR: ICD-10-CM

## 2025-05-21 DIAGNOSIS — Z95.1 S/P CABG (CORONARY ARTERY BYPASS GRAFT): ICD-10-CM

## 2025-05-21 DIAGNOSIS — Z95.1 S/P CABG (CORONARY ARTERY BYPASS GRAFT): Primary | ICD-10-CM

## 2025-05-21 LAB — INR BLD: 1.8 (ref 0.9–1.1)

## 2025-05-21 PROCEDURE — 85610 PROTHROMBIN TIME: CPT

## 2025-05-21 PROCEDURE — 36416 COLLJ CAPILLARY BLOOD SPEC: CPT

## 2025-05-21 PROCEDURE — 93798 PHYS/QHP OP CAR RHAB W/ECG: CPT

## 2025-05-21 NOTE — PROGRESS NOTES
ANTICOAGULATION MANAGEMENT     Tri Walden 75 year old female is on warfarin with subtherapeutic INR result. (Goal INR 2.0-3.0)    Recent labs: (last 7 days)     05/21/25  1125   INR 1.8*       ASSESSMENT     Warfarin Lab Questionnaire    Warfarin Doses Last 7 Days      5/20/2025     5:17 PM   Dose in Tablet or Mg   TAB or MG? milligram (mg)     Pt Rptd Dose SUNDAY MONDAY TUESDAY WED THURS FRIDAY SATURDAY 5/20/2025   5:17 PM 2 3 2 3 2 3 2         5/20/2025   Warfarin Lab Questionnaire   Missed doses within past 14 days? No   Changes in diet or alcohol within past 14 days? No   Medication changes since last result? No   Injuries or illness since last result? No   New shortness of breath, severe headaches or sudden changes in vision since last result? No   Abnormal bleeding since last result? No   Upcoming surgery, procedure? No     Previous result: Subtherapeutic  Additional findings: Recent heart valve replacement in last 10 weeks     PLAN     Recommended plan for no diet, medication or health factor changes affecting INR     Dosing Instructions: Increase your warfarin dose (17.6% change) with next INR in 1 week       Summary  As of 5/21/2025      Full warfarin instructions:  2 mg every Tue; 3 mg all other days   Next INR check:  5/28/2025               Telephone call with Aaron who verbalizes understanding and agrees to plan    Lab visit scheduled    Education provided: Please call back if any changes to your diet, medications or how you've been taking warfarin  Symptom monitoring: monitoring for clotting signs and symptoms, monitoring for stroke signs and symptoms, and when to seek medical attention/emergency care    Plan made per [facility] anticoagulation protocol    Phuong Rincon RN  5/21/2025  Anticoagulation Clinic  [facility] for routing messages: colin KELLEY  [facility] patient phone line: [phone]        _______________________________________________________________________     Anticoagulation Episode  Summary       Current INR goal:  2.0-3.0   TTR:  59.5% (1.7 mo)   Target end date:  6/14/2025   Send INR reminders to:  HAYES KELLEY    Indications    S/P CABG (coronary artery bypass graft) [Z95.1]  S/P AVR (Resolved) [Z95.2]             Comments:  3/20/25 AVR. Does not need to bridge if subtherapeutic             Anticoagulation Care Providers       Provider Role Specialty Phone number    Ines Torres PA-C Referring Cardiovascular & Thoracic Surgery [phone]    Nehemias Wallace MD Referring Family Medicine [phone]

## 2025-05-23 ENCOUNTER — HOSPITAL ENCOUNTER (OUTPATIENT)
Dept: CARDIAC REHAB | Facility: CLINIC | Age: 75
Discharge: HOME | End: 2025-05-23
Attending: INTERNAL MEDICINE
Payer: COMMERCIAL

## 2025-05-23 PROCEDURE — 93798 PHYS/QHP OP CAR RHAB W/ECG: CPT

## 2025-05-28 ENCOUNTER — ANTICOAGULATION THERAPY VISIT (OUTPATIENT)
Dept: ANTICOAGULATION | Facility: CLINIC | Age: 75
End: 2025-05-28

## 2025-05-28 ENCOUNTER — RESULTS FOLLOW-UP (OUTPATIENT)
Dept: ANTICOAGULATION | Facility: CLINIC | Age: 75
End: 2025-05-28

## 2025-05-28 ENCOUNTER — LAB (OUTPATIENT)
Dept: LAB | Facility: CLINIC | Age: 75
End: 2025-05-28
Payer: COMMERCIAL

## 2025-05-28 ENCOUNTER — HOSPITAL ENCOUNTER (OUTPATIENT)
Dept: CARDIAC REHAB | Facility: CLINIC | Age: 75
Discharge: HOME | End: 2025-05-28
Attending: INTERNAL MEDICINE
Payer: COMMERCIAL

## 2025-05-28 DIAGNOSIS — Z95.1 S/P CABG (CORONARY ARTERY BYPASS GRAFT): Primary | ICD-10-CM

## 2025-05-28 DIAGNOSIS — Z95.2 S/P AVR: ICD-10-CM

## 2025-05-28 DIAGNOSIS — Z95.1 S/P CABG (CORONARY ARTERY BYPASS GRAFT): ICD-10-CM

## 2025-05-28 LAB — INR BLD: 2.4 (ref 0.9–1.1)

## 2025-05-28 PROCEDURE — 36416 COLLJ CAPILLARY BLOOD SPEC: CPT

## 2025-05-28 PROCEDURE — 93798 PHYS/QHP OP CAR RHAB W/ECG: CPT

## 2025-05-28 PROCEDURE — 85610 PROTHROMBIN TIME: CPT

## 2025-05-28 NOTE — PROGRESS NOTES
ANTICOAGULATION MANAGEMENT     Tri Walden 75 year old female is on warfarin with therapeutic INR result. (Goal INR 2.0-3.0)    Recent labs: (last 7 days)     05/28/25  1311   INR 2.4*       ASSESSMENT     Warfarin Lab Questionnaire    Warfarin Doses Last 7 Days--patient verified correct warfarin dosing and not as listed in grid below.      5/27/2025     4:23 PM   Dose in Tablet or Mg   TAB or MG? milligram (mg)     Pt Rptd Dose SUNDAY MONDAY TUESDAY WED THURS FRIDAY SATURDAY 5/27/2025   4:23 PM 2.5 2.5 2.5 2.5 2.5 2.5 2.5         5/27/2025   Warfarin Lab Questionnaire   Missed doses within past 14 days? No   Changes in diet or alcohol within past 14 days? No   Medication changes since last result? No   Injuries or illness since last result? No   New shortness of breath, severe headaches or sudden changes in vision since last result? No   Abnormal bleeding since last result? No   Upcoming surgery, procedure? No     Previous result: Subtherapeutic  Additional findings: Recent heart valve replacement in last 10 weeks  Warfarin maintenance dose was increased at last 2 visits       PLAN     Recommended plan for no diet, medication or health factor changes affecting INR     Dosing Instructions: Increase your warfarin dose (5% change) with next INR in 9 days       Summary  As of 5/28/2025      Full warfarin instructions:  3 mg every day   Next INR check:  6/6/2025               Telephone call with Aaron who verbalizes understanding and agrees to plan and who agrees to plan and repeated back plan correctly    Lab visit scheduled    Education provided: Taking warfarin: prescribed tablet strength and color, importance of following [facility] instructions vs instructions on the prescription bottle, and Importance of taking warfarin as instructed    Plan made per [facility] anticoagulation protocol    Shannan Turner, RN  5/28/2025  Anticoagulation Clinic  [employer] for routing messages: colin KELLEY  [facility] patient phone line:  [phone]        _______________________________________________________________________     Anticoagulation Episode Summary       Current INR goal:  2.0-3.0   TTR:  60.3% (1.9 mo)   Target end date:  6/14/2025   Send INR reminders to:  HAYES KELLEY    Indications    S/P CABG (coronary artery bypass graft) [Z95.1]  S/P AVR (Resolved) [Z95.2]             Comments:  3/20/25 AVR. Does not need to bridge if subtherapeutic             Anticoagulation Care Providers       Provider Role Specialty Phone number    Ines Torres PA-C Referring Cardiovascular & Thoracic Surgery [phone]    Nehemias Wallace MD Referring Family Medicine [phone]

## 2025-05-30 ENCOUNTER — HOSPITAL ENCOUNTER (OUTPATIENT)
Dept: CARDIAC REHAB | Facility: CLINIC | Age: 75
Discharge: HOME | End: 2025-05-30
Attending: INTERNAL MEDICINE
Payer: COMMERCIAL

## 2025-05-30 PROCEDURE — 93798 PHYS/QHP OP CAR RHAB W/ECG: CPT

## 2025-06-02 ENCOUNTER — LAB (OUTPATIENT)
Dept: LAB | Facility: CLINIC | Age: 75
End: 2025-06-02
Payer: COMMERCIAL

## 2025-06-02 ENCOUNTER — RESULTS FOLLOW-UP (OUTPATIENT)
Dept: CARDIOLOGY | Facility: CLINIC | Age: 75
End: 2025-06-02

## 2025-06-02 ENCOUNTER — HOSPITAL ENCOUNTER (OUTPATIENT)
Dept: CARDIAC REHAB | Facility: CLINIC | Age: 75
Discharge: HOME | End: 2025-06-02
Attending: INTERNAL MEDICINE
Payer: COMMERCIAL

## 2025-06-02 ENCOUNTER — RESULTS FOLLOW-UP (OUTPATIENT)
Dept: FAMILY MEDICINE | Facility: CLINIC | Age: 75
End: 2025-06-02

## 2025-06-02 DIAGNOSIS — E78.5 HYPERLIPIDEMIA LDL GOAL <50: ICD-10-CM

## 2025-06-02 DIAGNOSIS — D62 ANEMIA DUE TO BLOOD LOSS, ACUTE: ICD-10-CM

## 2025-06-02 DIAGNOSIS — Z95.1 S/P CABG (CORONARY ARTERY BYPASS GRAFT): ICD-10-CM

## 2025-06-02 DIAGNOSIS — Z95.2 S/P AVR: ICD-10-CM

## 2025-06-02 DIAGNOSIS — I25.810 CORONARY ARTERY DISEASE INVOLVING CORONARY BYPASS GRAFT OF NATIVE HEART, UNSPECIFIED WHETHER ANGINA PRESENT: Primary | ICD-10-CM

## 2025-06-02 LAB
ALT SERPL W P-5'-P-CCNC: 14 U/L (ref 0–50)
BASOPHILS # BLD AUTO: 0 10E3/UL (ref 0–0.2)
BASOPHILS NFR BLD AUTO: 1 %
CHOLEST SERPL-MCNC: 212 MG/DL
EOSINOPHIL # BLD AUTO: 0.2 10E3/UL (ref 0–0.7)
EOSINOPHIL NFR BLD AUTO: 3 %
ERYTHROCYTE [DISTWIDTH] IN BLOOD BY AUTOMATED COUNT: 14.6 % (ref 10–15)
FASTING STATUS PATIENT QL REPORTED: YES
HCT VFR BLD AUTO: 37.1 % (ref 35–47)
HDLC SERPL-MCNC: 75 MG/DL
HGB BLD-MCNC: 11.5 G/DL (ref 11.7–15.7)
IMM GRANULOCYTES # BLD: 0 10E3/UL
IMM GRANULOCYTES NFR BLD: 0 %
IRON BINDING CAPACITY (ROCHE): 348 UG/DL (ref 240–430)
IRON SATN MFR SERPL: 9 % (ref 15–46)
IRON SERPL-MCNC: 31 UG/DL (ref 37–145)
LDLC SERPL CALC-MCNC: 122 MG/DL
LYMPHOCYTES # BLD AUTO: 1.5 10E3/UL (ref 0.8–5.3)
LYMPHOCYTES NFR BLD AUTO: 32 %
MCH RBC QN AUTO: 26.5 PG (ref 26.5–33)
MCHC RBC AUTO-ENTMCNC: 31 G/DL (ref 31.5–36.5)
MCV RBC AUTO: 86 FL (ref 78–100)
MONOCYTES # BLD AUTO: 0.4 10E3/UL (ref 0–1.3)
MONOCYTES NFR BLD AUTO: 8 %
NEUTROPHILS # BLD AUTO: 2.6 10E3/UL (ref 1.6–8.3)
NEUTROPHILS NFR BLD AUTO: 56 %
NONHDLC SERPL-MCNC: 137 MG/DL
PLATELET # BLD AUTO: 273 10E3/UL (ref 150–450)
RBC # BLD AUTO: 4.34 10E6/UL (ref 3.8–5.2)
TRIGL SERPL-MCNC: 75 MG/DL
WBC # BLD AUTO: 4.7 10E3/UL (ref 4–11)

## 2025-06-02 PROCEDURE — 80061 LIPID PANEL: CPT

## 2025-06-02 PROCEDURE — 85025 COMPLETE CBC W/AUTO DIFF WBC: CPT

## 2025-06-02 PROCEDURE — 84460 ALANINE AMINO (ALT) (SGPT): CPT

## 2025-06-02 PROCEDURE — 93798 PHYS/QHP OP CAR RHAB W/ECG: CPT

## 2025-06-02 PROCEDURE — 83550 IRON BINDING TEST: CPT

## 2025-06-02 PROCEDURE — 83540 ASSAY OF IRON: CPT

## 2025-06-02 PROCEDURE — 36415 COLL VENOUS BLD VENIPUNCTURE: CPT

## 2025-06-02 RX ORDER — ATORVASTATIN CALCIUM 40 MG/1
40 TABLET, FILM COATED ORAL EVERY EVENING
Qty: 90 TABLET | Refills: 1 | Status: SHIPPED | OUTPATIENT
Start: 2025-06-02

## 2025-06-04 ENCOUNTER — HOSPITAL ENCOUNTER (OUTPATIENT)
Dept: CARDIAC REHAB | Facility: CLINIC | Age: 75
Discharge: HOME | End: 2025-06-04
Attending: INTERNAL MEDICINE
Payer: COMMERCIAL

## 2025-06-04 PROCEDURE — 93797 PHYS/QHP OP CAR RHAB WO ECG: CPT

## 2025-06-04 PROCEDURE — 93798 PHYS/QHP OP CAR RHAB W/ECG: CPT

## 2025-06-06 ENCOUNTER — HOSPITAL ENCOUNTER (OUTPATIENT)
Dept: CARDIAC REHAB | Facility: CLINIC | Age: 75
Discharge: HOME | End: 2025-06-06
Attending: INTERNAL MEDICINE
Payer: COMMERCIAL

## 2025-06-06 PROCEDURE — 93798 PHYS/QHP OP CAR RHAB W/ECG: CPT

## 2025-06-07 ENCOUNTER — ORDERS ONLY (AUTO-RELEASED) (OUTPATIENT)
Dept: CARDIOLOGY | Facility: CLINIC | Age: 75
End: 2025-06-07
Payer: COMMERCIAL

## 2025-06-07 DIAGNOSIS — I48.91 ATRIAL FIBRILLATION, UNSPECIFIED TYPE (H): ICD-10-CM

## 2025-06-24 NOTE — PROGRESS NOTES
CARDIOLOGY VISIT    REASON FOR VISIT: AVR, CAD    SUBJECTIVE:  75-year-old female seen for AVR and CAD.  She has dyslipidemia, GERD, irritable bowel.     Stress echo February 2025 showed 8 minutes, no chest pain, baseline EF 55%, dropped to 45% with distal anteroseptal and apical hypokinesis with stress, aortic stenosis with mean 38 mmHg, area 0.9 cm , DI 0.27.    March 2025 she underwent 23 mm Montano Resilia tissue aortic valve replacement with single-vessel CABG, LIMA to LAD.  She had some postop A-fib.    Echo March 2025 showed EF 60%, mild RV dysfunction, 1-2+ TR, tissue AVR with mean 11 mmHg, DI 0.46.    She has done well since surgery.  She completed cardiac rehab and is continuing to use the treadmill at home and feels well.  Rhythm has been sinus, blood pressure is 120s to 130s.  She denies any fluid retention or chest pain.    MEDICATIONS:  Current Outpatient Medications   Medication Sig Dispense Refill    acetaminophen (TYLENOL) 325 MG tablet Take 2 tablets (650 mg) by mouth every 6 hours as needed for other (mild pain). 100 tablet 0    aspirin 81 MG EC tablet Take 1 tablet (81 mg) by mouth daily. 60 tablet 3    atorvastatin (LIPITOR) 40 MG tablet Take 1 tablet (40 mg) by mouth every evening. 90 tablet 1    Calcium Carbonate-Vit D-Min (CALTRATE 600+D PLUS MINERALS) 600-800 MG-UNIT TABS Take 1 tablet by mouth 2 times daily (Patient not taking: Reported on 5/7/2025)      Cranberry 400 MG TABS Take 1 tablet by mouth daily.      guaiFENesin (MUCINEX) 600 MG 12 hr tablet Take 1 tablet (600 mg) by mouth 2 times daily. (Patient not taking: Reported on 5/7/2025) 30 tablet 0    methocarbamol (ROBAXIN) 500 MG tablet Take 0.5 tablets (250 mg) by mouth 4 times daily as needed for muscle spasms. 40 tablet 1    metoprolol succinate ER (TOPROL XL) 25 MG 24 hr tablet Take 1 tablet (25 mg) by mouth daily. 30 tablet 3    pantoprazole (PROTONIX) 40 MG EC tablet Take 1 tablet (40 mg) by mouth daily. 14 tablet 0     "senna-docusate (SENOKOT-S/PERICOLACE) 8.6-50 MG tablet Take 1 tablet by mouth 2 times daily as needed for constipation. 30 tablet 0    Vitamin D3 (CHOLECALCIFEROL) 25 mcg (1000 units) tablet Take 1 tablet by mouth daily. (Patient not taking: Reported on 5/7/2025)      warfarin ANTICOAGULANT (COUMADIN) 2 MG tablet 3 mg Mon, Wed, Fri; 2 mg all other days or as directed by INR clinic 135 tablet 1     No current facility-administered medications for this visit.       ALLERGIES:  Allergies   Allergen Reactions    Nkda [No Known Drug Allergy]        REVIEW OF SYSTEMS:  Constitutional:  No weight loss, fever, chills  HEENT:  Eyes:  No visual loss, blurred vision, double vision or yellow sclerae. No hearing loss, sneezing, congestion, runny nose or sore throat.  Skin:  No rash or itching.  Cardiovascular: per HPI  Respiratory: per HPI  GI:  No anorexia, nausea, vomiting or diarrhea. No abdominal pain or blood.  :  No dysurea, hematuria  Neurologic:  No headache, paralysis, ataxia, numbness or tingling in the extremities. No change in bowel or bladder control.  Musculoskeletal:  No muscle pain  Hematologic:  No bleeding or bruising.  Lymphatics:  No enlarged nodes. No history of splenectomy.  Endocrine:  No reports of sweating, cold or heat intolerance. No polyuria or polydipsia.  Allergies:  No history of asthma, hives, eczema or rhinitis.    PHYSICAL EXAM:  BP (!) 185/81 (BP Location: Right arm, Patient Position: Sitting, Cuff Size: Adult Regular)   Pulse 72   Resp 16   Ht 1.588 m (5' 2.5\")   Wt 60.8 kg (134 lb)   SpO2 99%   BMI 24.12 kg/m        Constitutional: awake, alert, no distress  Eyes: PERRL, sclera nonicteric  ENT: trachea midline  Respiratory: Lungs clear  Cardiovascular: Regular rate and rhythm, 2/6 soft systolic murmur at the upper sternal border, no edema  GI: nondistended, nontender, bowel sounds present  Lymph/Hematologic: no lymphadenopathy  Skin: dry, no rash  Musculoskeletal: good muscle tone, " strength 5/5 in upper and lower extremities  Neurologic: no focal deficits  Neuropsychiatric: appropriate affact    DATA:  Lab: March 2025: Potassium 4.0, creatinine 0.6  Recent Labs   Lab Test 06/02/25  0932 11/19/24  0854   CHOL 212* 197   HDL 75 74   * 99   TRIG 75 119     ASSESSMENT:  75-year-old female seen for AVR and CAD.  She is doing very well 3 months out from her surgery.  She is euvolemic, rhythm has been sinus at rehab.  Zio monitor is pending.  If she did not have any A-fib on Zio, she will stop warfarin and metoprolol and restart losartan.  She will continue to monitor blood pressure at home.  She is off any activity restriction.  We talked about the importance of antibiotic prophylaxis prior to dental work.    RECOMMENDATIONS:  1.  Status post tissue AVR  - Continue aspirin 81 mg indefinitely  - Amoxicillin 2000 mg 1 hour prior to dental work  - Annual echoes    2.  CAD, status post single-vessel CABG  - Continue atorvastatin and aspirin    3.  Postop A-fib  - If no A-fib on Zio monitor, then discontinue warfarin and metoprolol, restart losartan 50 mg    Follow-up in 4 months with CHENCHO.    Raymundo You MD  Cardiology - [facility]  Pager:  [phone]  Text Page  July 1, 2025

## 2025-07-01 ENCOUNTER — OFFICE VISIT (OUTPATIENT)
Dept: CARDIOLOGY | Facility: CLINIC | Age: 75
End: 2025-07-01
Attending: INTERNAL MEDICINE
Payer: COMMERCIAL

## 2025-07-01 ENCOUNTER — LAB (OUTPATIENT)
Dept: LAB | Facility: CLINIC | Age: 75
End: 2025-07-01
Payer: COMMERCIAL

## 2025-07-01 ENCOUNTER — RESULTS FOLLOW-UP (OUTPATIENT)
Dept: ANTICOAGULATION | Facility: CLINIC | Age: 75
End: 2025-07-01

## 2025-07-01 ENCOUNTER — ANTICOAGULATION THERAPY VISIT (OUTPATIENT)
Dept: ANTICOAGULATION | Facility: CLINIC | Age: 75
End: 2025-07-01

## 2025-07-01 VITALS
WEIGHT: 134 LBS | HEIGHT: 63 IN | HEART RATE: 72 BPM | SYSTOLIC BLOOD PRESSURE: 130 MMHG | BODY MASS INDEX: 23.74 KG/M2 | OXYGEN SATURATION: 99 % | RESPIRATION RATE: 16 BRPM | DIASTOLIC BLOOD PRESSURE: 75 MMHG

## 2025-07-01 DIAGNOSIS — Z95.2 S/P AVR: ICD-10-CM

## 2025-07-01 DIAGNOSIS — I25.10 CORONARY ARTERY DISEASE INVOLVING NATIVE CORONARY ARTERY OF NATIVE HEART WITHOUT ANGINA PECTORIS: Primary | ICD-10-CM

## 2025-07-01 DIAGNOSIS — Z95.1 S/P CABG (CORONARY ARTERY BYPASS GRAFT): ICD-10-CM

## 2025-07-01 DIAGNOSIS — I35.0 NONRHEUMATIC AORTIC VALVE STENOSIS: ICD-10-CM

## 2025-07-01 DIAGNOSIS — Z95.1 S/P CABG (CORONARY ARTERY BYPASS GRAFT): Primary | ICD-10-CM

## 2025-07-01 LAB — INR BLD: 2.6 (ref 0.9–1.1)

## 2025-07-01 PROCEDURE — 85610 PROTHROMBIN TIME: CPT

## 2025-07-01 PROCEDURE — 36416 COLLJ CAPILLARY BLOOD SPEC: CPT

## 2025-07-01 RX ORDER — UREA 10 %
45 LOTION (ML) TOPICAL DAILY
COMMUNITY

## 2025-07-01 NOTE — LETTER
7/1/2025    Nehemias Wallace MD  [address] [facility] [address]    RE: Tri Walden       Dear Colleague,     I had the pleasure of seeing Tri Walden in the [facility].  CARDIOLOGY VISIT    REASON FOR VISIT: AVR, CAD    SUBJECTIVE:  75-year-old female seen for AVR and CAD.  She has dyslipidemia, GERD, irritable bowel.     Stress echo February 2025 showed 8 minutes, no chest pain, baseline EF 55%, dropped to 45% with distal anteroseptal and apical hypokinesis with stress, aortic stenosis with mean 38 mmHg, area 0.9 cm , DI 0.27.    March 2025 she underwent 23 mm Montano Resilia tissue aortic valve replacement with single-vessel CABG, LIMA to LAD.  She had some postop A-fib.    Echo March 2025 showed EF 60%, mild RV dysfunction, 1-2+ TR, tissue AVR with mean 11 mmHg, DI 0.46.    She has done well since surgery.  She completed cardiac rehab and is continuing to use the treadmill at home and feels well.  Rhythm has been sinus, blood pressure is 120s to 130s.  She denies any fluid retention or chest pain.    MEDICATIONS:  Current Outpatient Medications   Medication Sig Dispense Refill     acetaminophen (TYLENOL) 325 MG tablet Take 2 tablets (650 mg) by mouth every 6 hours as needed for other (mild pain). 100 tablet 0     aspirin 81 MG EC tablet Take 1 tablet (81 mg) by mouth daily. 60 tablet 3     atorvastatin (LIPITOR) 40 MG tablet Take 1 tablet (40 mg) by mouth every evening. 90 tablet 1     Calcium Carbonate-Vit D-Min (CALTRATE 600+D PLUS MINERALS) 600-800 MG-UNIT TABS Take 1 tablet by mouth 2 times daily (Patient not taking: Reported on 5/7/2025)       Cranberry 400 MG TABS Take 1 tablet by mouth daily.       guaiFENesin (MUCINEX) 600 MG 12 hr tablet Take 1 tablet (600 mg) by mouth 2 times daily. (Patient not taking: Reported on 5/7/2025) 30 tablet 0     methocarbamol (ROBAXIN) 500 MG tablet Take 0.5 tablets (250 mg) by mouth 4 times daily as needed for muscle spasms. 40 tablet 1  "    metoprolol succinate ER (TOPROL XL) 25 MG 24 hr tablet Take 1 tablet (25 mg) by mouth daily. 30 tablet 3     pantoprazole (PROTONIX) 40 MG EC tablet Take 1 tablet (40 mg) by mouth daily. 14 tablet 0     senna-docusate (SENOKOT-S/PERICOLACE) 8.6-50 MG tablet Take 1 tablet by mouth 2 times daily as needed for constipation. 30 tablet 0     Vitamin D3 (CHOLECALCIFEROL) 25 mcg (1000 units) tablet Take 1 tablet by mouth daily. (Patient not taking: Reported on 5/7/2025)       warfarin ANTICOAGULANT (COUMADIN) 2 MG tablet 3 mg Mon, Wed, Fri; 2 mg all other days or as directed by INR clinic 135 tablet 1     No current facility-administered medications for this visit.       ALLERGIES:  Allergies   Allergen Reactions     Nkda [No Known Drug Allergy]        REVIEW OF SYSTEMS:  Constitutional:  No weight loss, fever, chills  HEENT:  Eyes:  No visual loss, blurred vision, double vision or yellow sclerae. No hearing loss, sneezing, congestion, runny nose or sore throat.  Skin:  No rash or itching.  Cardiovascular: per HPI  Respiratory: per HPI  GI:  No anorexia, nausea, vomiting or diarrhea. No abdominal pain or blood.  :  No dysurea, hematuria  Neurologic:  No headache, paralysis, ataxia, numbness or tingling in the extremities. No change in bowel or bladder control.  Musculoskeletal:  No muscle pain  Hematologic:  No bleeding or bruising.  Lymphatics:  No enlarged nodes. No history of splenectomy.  Endocrine:  No reports of sweating, cold or heat intolerance. No polyuria or polydipsia.  Allergies:  No history of asthma, hives, eczema or rhinitis.    PHYSICAL EXAM:  BP (!) 185/81 (BP Location: Right arm, Patient Position: Sitting, Cuff Size: Adult Regular)   Pulse 72   Resp 16   Ht 1.588 m (5' 2.5\")   Wt 60.8 kg (134 lb)   SpO2 99%   BMI 24.12 kg/m        Constitutional: awake, alert, no distress  Eyes: PERRL, sclera nonicteric  ENT: trachea midline  Respiratory: Lungs clear  Cardiovascular: Regular rate and rhythm, " 2/6 soft systolic murmur at the upper sternal border, no edema  GI: nondistended, nontender, bowel sounds present  Lymph/Hematologic: no lymphadenopathy  Skin: dry, no rash  Musculoskeletal: good muscle tone, strength 5/5 in upper and lower extremities  Neurologic: no focal deficits  Neuropsychiatric: appropriate affact    DATA:  Lab: March 2025: Potassium 4.0, creatinine 0.6  Recent Labs   Lab Test 06/02/25  0932 11/19/24  0854   CHOL 212* 197   HDL 75 74   * 99   TRIG 75 119     ASSESSMENT:  75-year-old female seen for AVR and CAD.  She is doing very well 3 months out from her surgery.  She is euvolemic, rhythm has been sinus at rehab.  Zio monitor is pending.  If she did not have any A-fib on Zio, she will stop warfarin and metoprolol and restart losartan.  She will continue to monitor blood pressure at home.  She is off any activity restriction.  We talked about the importance of antibiotic prophylaxis prior to dental work.    RECOMMENDATIONS:  1.  Status post tissue AVR  - Continue aspirin 81 mg indefinitely  - Amoxicillin 2000 mg 1 hour prior to dental work  - Annual echoes    2.  CAD, status post single-vessel CABG  - Continue atorvastatin and aspirin    3.  Postop A-fib  - If no A-fib on Zio monitor, then discontinue warfarin and metoprolol, restart losartan 50 mg    Follow-up in 4 months with CHENCHO.    Raymundo You MD  Cardiology - [facility]  Pager:  [phone]  Text Page  July 1, 2025        Thank you for allowing me to participate in the care of your patient.      Sincerely,     Raymundo You MD     [facility]  cc:   Raymundo You MD  No address on file

## 2025-07-01 NOTE — PATIENT INSTRUCTIONS
Keep medications the same.    We will call you when the monitor comes back, if no afib will have you stop warfarin and metoprolol and restart losartan.    You will need antibiotics prior to any dental work.  Call our clinic, will have you take amoxicillin 2,000mg one hour before dental appointment.

## 2025-07-01 NOTE — PROGRESS NOTES
ANTICOAGULATION MANAGEMENT     Tri Walden 75 year old female is on warfarin with therapeutic INR result. (Goal INR 2.0-3.0)    Recent labs: (last 7 days)     07/01/25  0851   INR 2.6*       ASSESSMENT     Source(s): Chart Review and Patient/Caregiver Call     Warfarin doses taken: Warfarin taken as instructed  Diet: No new diet changes identified  Medication/supplement changes: None noted  New illness, injury, or hospitalization: No  Signs or symptoms of bleeding or clotting: No  Previous result: Therapeutic last 2(+) visits  Additional findings: Waiting on zio monitor results to determine if patient can stop warfarin or not. Tentatively scheduled INR for 3 weeks, but patient is hopeful she will be able to stop warfarin before then       PLAN     Recommended plan for ongoing change(s) affecting INR     Dosing Instructions: Continue your current warfarin dose with next INR in 3 weeks       Summary  As of 7/1/2025      Full warfarin instructions:  3 mg every day   Next INR check:  7/22/2025               Telephone call with Aaron who verbalizes understanding and agrees to plan    Lab visit scheduled    Education provided: Goal range and lab monitoring: goal range and significance of current result and Importance of therapeutic range    Plan made per [facility] anticoagulation protocol    Mary Fitch, RN  7/1/2025  Anticoagulation Clinic  [employer] for routing messages: colin KELLEY  [facility] patient phone line: [phone]        _______________________________________________________________________     Anticoagulation Episode Summary       Current INR goal:  2.0-3.0   TTR:  75.0% (3 mo)   Target end date:  6/14/2025   Send INR reminders to:  HAYES KELLEY    Indications    S/P CABG (coronary artery bypass graft) [Z95.1]  S/P AVR (Resolved) [Z95.2]             Comments:  3/20/25 AVR. Does not need to bridge if subtherapeutic             Anticoagulation Care Providers       Provider Role Specialty Phone number     Ines Torres PA-C Referring Cardiovascular & Thoracic Surgery [phone]    Nehemias Wallace MD Referring [facility] [phone]

## 2025-07-05 LAB — CV ZIO PRELIM RESULTS: NORMAL

## 2025-07-09 ENCOUNTER — TELEPHONE (OUTPATIENT)
Dept: ANTICOAGULATION | Facility: CLINIC | Age: 75
End: 2025-07-09
Payer: COMMERCIAL

## 2025-07-09 DIAGNOSIS — Z95.1 S/P CABG (CORONARY ARTERY BYPASS GRAFT): Primary | ICD-10-CM

## 2025-07-09 NOTE — TELEPHONE ENCOUNTER
ANTICOAGULATION  MANAGEMENT    Tri Walden is being discharged from the [facility] ([facility]).    Reason for discharge: warfarin therapy completed    Anticoagulation episode resolved, [facility] referral closed, and Standing order discontinued    If patient needs warfarin management in the future, please send a new referral    Yoselin Simpson RN

## 2025-07-16 DIAGNOSIS — Z95.1 S/P CABG (CORONARY ARTERY BYPASS GRAFT): ICD-10-CM

## 2025-07-16 DIAGNOSIS — Z95.2 S/P AVR: ICD-10-CM

## 2025-07-22 RX ORDER — METOPROLOL SUCCINATE 25 MG/1
25 TABLET, EXTENDED RELEASE ORAL DAILY
Qty: 30 TABLET | Refills: 3 | Status: SHIPPED | OUTPATIENT
Start: 2025-07-22

## 2025-07-22 NOTE — TELEPHONE ENCOUNTER
[facility] Cardiology Refill Guideline reviewed.  Medication meets criteria for refill.    Ani Schaeffer, RN

## 2025-08-04 ENCOUNTER — LAB (OUTPATIENT)
Dept: LAB | Facility: CLINIC | Age: 75
End: 2025-08-04
Payer: COMMERCIAL

## 2025-08-04 DIAGNOSIS — I10 BENIGN ESSENTIAL HYPERTENSION: Primary | ICD-10-CM

## 2025-08-04 DIAGNOSIS — I25.810 CORONARY ARTERY DISEASE INVOLVING CORONARY BYPASS GRAFT OF NATIVE HEART, UNSPECIFIED WHETHER ANGINA PRESENT: ICD-10-CM

## 2025-08-04 DIAGNOSIS — E78.5 HYPERLIPIDEMIA LDL GOAL <50: ICD-10-CM

## 2025-08-04 LAB
ALT SERPL W P-5'-P-CCNC: 18 U/L (ref 0–50)
CHOLEST SERPL-MCNC: 144 MG/DL
FASTING STATUS PATIENT QL REPORTED: YES
HDLC SERPL-MCNC: 66 MG/DL
LDLC SERPL CALC-MCNC: 62 MG/DL
NONHDLC SERPL-MCNC: 78 MG/DL
TRIGL SERPL-MCNC: 78 MG/DL

## 2025-08-04 PROCEDURE — 36415 COLL VENOUS BLD VENIPUNCTURE: CPT | Performed by: NURSE PRACTITIONER

## 2025-08-04 PROCEDURE — 80061 LIPID PANEL: CPT | Performed by: NURSE PRACTITIONER

## 2025-08-04 PROCEDURE — 84460 ALANINE AMINO (ALT) (SGPT): CPT | Performed by: NURSE PRACTITIONER

## 2025-08-04 RX ORDER — LOSARTAN POTASSIUM 50 MG/1
50 TABLET ORAL DAILY
Qty: 90 TABLET | Refills: 1 | Status: SHIPPED | OUTPATIENT
Start: 2025-08-04

## 2025-08-04 RX ORDER — LOSARTAN POTASSIUM 50 MG/1
50 TABLET ORAL DAILY
COMMUNITY
End: 2025-08-04

## 2025-08-07 ENCOUNTER — OFFICE VISIT (OUTPATIENT)
Dept: DERMATOLOGY | Facility: CLINIC | Age: 75
End: 2025-08-07
Payer: COMMERCIAL

## 2025-08-07 DIAGNOSIS — L81.4 LENTIGO: ICD-10-CM

## 2025-08-07 DIAGNOSIS — Z86.006 HISTORY OF MELANOMA IN SITU: ICD-10-CM

## 2025-08-07 DIAGNOSIS — L66.12 FRONTAL FIBROSING ALOPECIA: ICD-10-CM

## 2025-08-07 DIAGNOSIS — L57.0 AK (ACTINIC KERATOSIS): ICD-10-CM

## 2025-08-07 DIAGNOSIS — L57.0 ACTINIC KERATOSIS: ICD-10-CM

## 2025-08-07 DIAGNOSIS — D48.5 NEOPLASM OF UNCERTAIN BEHAVIOR OF SKIN: ICD-10-CM

## 2025-08-07 DIAGNOSIS — Z85.828 HISTORY OF BASAL CELL CANCER: ICD-10-CM

## 2025-08-07 DIAGNOSIS — D18.01 CHERRY ANGIOMA: ICD-10-CM

## 2025-08-07 DIAGNOSIS — D22.9 MULTIPLE BENIGN NEVI: ICD-10-CM

## 2025-08-07 DIAGNOSIS — Z85.820 HISTORY OF MELANOMA: ICD-10-CM

## 2025-08-07 DIAGNOSIS — L82.1 SEBORRHEIC KERATOSIS: Primary | ICD-10-CM

## 2025-08-07 ASSESSMENT — PAIN SCALES - GENERAL: PAINLEVEL_OUTOF10: NO PAIN (0)

## 2025-08-14 LAB
PATH REPORT.COMMENTS IMP SPEC: NORMAL
PATH REPORT.COMMENTS IMP SPEC: NORMAL
PATH REPORT.FINAL DX SPEC: NORMAL
PATH REPORT.GROSS SPEC: NORMAL
PATH REPORT.MICROSCOPIC SPEC OTHER STN: NORMAL
PATH REPORT.RELEVANT HX SPEC: NORMAL

## (undated) DEVICE — SUCTION CANISTER MEDIVAC LINER 3000ML W/LID 65651-530

## (undated) DEVICE — SLING ARM MED 0814-0063

## (undated) DEVICE — DRAPE ARTHROSCOPY SHOULDER BEACHCHAIR 29369

## (undated) DEVICE — RAD G/W INQWIRE .035X260CM J-TIP EXCHANGE IQ35F260J1O5RS

## (undated) DEVICE — TUBING SUCTION DRAINAGE PLEURAL DUAL 8884714200

## (undated) DEVICE — GLOVE GAMMEX NEOPRENE ULTRA SZ 7.5 LF 8515

## (undated) DEVICE — CONNECTOR PERFUSION 3/8X3/8" W/O LL 6023

## (undated) DEVICE — SUCTION IRR SYSTEM W/TIP INTERPULSE

## (undated) DEVICE — PACK SHOULDER

## (undated) DEVICE — BLADE KNIFE SURG 10 371110

## (undated) DEVICE — CANNULA PERFUSION 28/36FR 2 STG VNS 91228

## (undated) DEVICE — SU PROLENE 4-0 RB-1DA 36" 8557H

## (undated) DEVICE — GLOVE BIOGEL PI MICRO INDICATOR UNDERGLOVE SZ 7.5 48975

## (undated) DEVICE — SOL NACL 0.9% IRRIG 3000ML BAG 07972-08

## (undated) DEVICE — SUCTION DRY CHEST DRAIN OASIS 3600-100

## (undated) DEVICE — SOL NACL 0.9% IRRIG 1000ML BOTTLE 07138-09

## (undated) DEVICE — SPONGE LAP 18X18" 1515

## (undated) DEVICE — GLOVE BIOGEL PI ULTRATOUCH G SZ 8.0 42180

## (undated) DEVICE — KIT HAND CONTROL ANGIOTOUCH ACIST 65CM AT-P65

## (undated) DEVICE — ESU PENCIL SMOKE EVAC W/ROCKER SWITCH 0703-047-000

## (undated) DEVICE — SU DEVICE COR-KNOT MINI 4X14MM 031350

## (undated) DEVICE — LINEN TOWEL PACK X5 5464

## (undated) DEVICE — CABLE MYO/LEAD PACING WHITE DISP 019-530

## (undated) DEVICE — PROTECTOR ARM ONE-STEP TRENDELENBURG 40418

## (undated) DEVICE — SHIELD STERADIAN ATTENUATION PAD

## (undated) DEVICE — BONE CEMENT MIXEVAC HI VAC W/CARTRIDGE 0306-563-000

## (undated) DEVICE — CLOSURE SYS SKIN PREMIERPRO EXOFIN FUSION 4X22CM STRL 3472

## (undated) DEVICE — OXYGENATOR PERFUSION AFFINITY FUSION BB841

## (undated) DEVICE — SU MONOCRYL 4-0 PS-1 27'  UND Y935H

## (undated) DEVICE — SOL WATER IRRIG 1000ML BOTTLE 07139-09

## (undated) DEVICE — CATH ANGIO INFINITI JR4 4FRX100CM 538421

## (undated) DEVICE — CANNULA PERFUSION ARTERIAL EOPA 18FR 12" 77418

## (undated) DEVICE — RX SURGIFLO HEMOSTATIC MATRIX W/THROMBIN 8ML 2994

## (undated) DEVICE — CLIP HORIZON MULTI SM YELLOW 001204

## (undated) DEVICE — PACK SURGICAL PROCEDURE CUSTOM 3/8IN X 3/8IN BB11W21R

## (undated) DEVICE — KIT WASH CELL SAVING ATL2001

## (undated) DEVICE — RESERVOIR CELL SAVING BLOOD COLLECTION EL2120

## (undated) DEVICE — GLIDEWIRE TERUMO .035X180CM 1.5,, J-TIP GR3525

## (undated) DEVICE — DRAPE SHEET REV FOLD 3/4 9349

## (undated) DEVICE — GOWN IMPERVIOUS SPECIALTY XLG/XLONG 32474

## (undated) DEVICE — CATH ANGIO 100CM 5FR INFNT JL3.5 CRV COR FEM 534518T

## (undated) DEVICE — SU VICRYL 2-0 CT-1 27" UND J259H

## (undated) DEVICE — BLADE KNIFE BEAVER MINI STR BEAVER6900

## (undated) DEVICE — CANNULA PERFUSION AORTIC ROOT 22FR 20012

## (undated) DEVICE — LINEN LEG ROLL 5489

## (undated) DEVICE — MANIFOLD KIT ANGIO AUTOMATED 014613

## (undated) DEVICE — SUCTION TIP FLEXI CLEAR TIP DISP K62

## (undated) DEVICE — SU STRATAFIX MONOCRYL 3-0 SPIRAL PS-2 30CM SXMP1B106

## (undated) DEVICE — SU PROLENE 8-0 BV130-5 24" M8732

## (undated) DEVICE — DRAIN CHEST TUBE RIGHT ANGLED 28FR 8128

## (undated) DEVICE — INTRO GLIDESHEATH SLENDER 6FR 10X45CM 60-1060

## (undated) DEVICE — Device

## (undated) DEVICE — DEVICE ASSEMBLY SUCTION/ANTI COAG BTC93

## (undated) DEVICE — TOURNIQUET VASCULAR

## (undated) DEVICE — LEAD ELEC MYOCARDIO PACING TEMPORARY MEDTRONIC

## (undated) DEVICE — SOMASENSOR CEREBRAL OXIMETER ADULT SAFB-SM

## (undated) DEVICE — SU ETHIBOND 0 CT-1 CR 8X18" CX21D

## (undated) DEVICE — SOL WATER IRRIG 1000ML BOTTLE 2F7114

## (undated) DEVICE — IMM KIT SHOULDER TMAX MASK FACE 7210559

## (undated) DEVICE — CANNULA PERFUSION 14FRX16" LEFT 12016

## (undated) DEVICE — NDL 22GA 1.5"

## (undated) DEVICE — DEFIB PRO-PADZ LVP LQD GEL ADULT 8900-2105-01

## (undated) DEVICE — SU VICRYL 0 CTX 36" J370H

## (undated) DEVICE — SU VICRYL 2-0 CT-1 36" UND J945H

## (undated) DEVICE — DRAPE IOBAN LG .375X23.5" 6648EZ

## (undated) DEVICE — DRAPE BACK TABLE  44X90" 8377

## (undated) DEVICE — DRSG AQUACEL AG 3.5X9.75" HYDROFIBER 412011

## (undated) DEVICE — SU PROLENE 5-0 RB-2DA 30" 8710H

## (undated) DEVICE — BLANKET BAIR HUGGER LOWER BODY 42568

## (undated) DEVICE — PUMP CP37 QUANTUM CENTRIFUGAL BLOOD CP37V-V0

## (undated) DEVICE — PACK OPEN HEART PV12OH524

## (undated) DEVICE — CABLE MYO/LEAD PACING BLUE DISP 019-535

## (undated) DEVICE — DRSG STERI STRIP 1/2X4" R1547

## (undated) DEVICE — LEAD PACER MYOCARDIAL BIPOLAR TEMPORARY 53CM 6495F

## (undated) DEVICE — BLOWER/MISTER CLEARVIEW 22150

## (undated) DEVICE — BIT DRILL BIOMET CENTRAL SCR 3.2MM SS 405883

## (undated) DEVICE — COVER TABLE POLY 65X90" 8186

## (undated) DEVICE — BLADE SAW SAGITTAL STRK 18X90X1.27MM HD SYS 6 6118-127-090

## (undated) DEVICE — SU ETHIBOND 3-0 BBDA 36" X588H

## (undated) DEVICE — PREP CHLORAPREP 26ML TINTED ORANGE  260815

## (undated) DEVICE — SU SILK 2-0 FSL 18" 677G

## (undated) DEVICE — SU PROLENE 4-0 SHDA 36" 8521H

## (undated) DEVICE — LINEN TOWEL PACK X6 WHITE 5487

## (undated) DEVICE — GLOVE BIOGEL PI ULTRATOUCH G SZ 7.0 42170

## (undated) DEVICE — SU ETHIBOND 2-0 SHDA 30" X563H

## (undated) DEVICE — SU SILK 1 TIE 10X30" SA87G

## (undated) DEVICE — SU NDL MAYO 1824-2

## (undated) DEVICE — DEVICE TISSUE STABILIZATION OCTOBASE 28707

## (undated) DEVICE — SU PROLENE 7-0 BV-1DA 24" 8702H

## (undated) DEVICE — SOL NACL 0.9% IRRIG 1000ML BOTTLE 2F7124

## (undated) DEVICE — SU DEVICE ENDO COR KNOT QUICK LOAD RELOAD 030874

## (undated) DEVICE — TOTE ANGIO CORP PC15AT SAN32CC83O

## (undated) DEVICE — SU PLEDGET SOFT TFE 3/8"X3/26"X1/16" PCP40

## (undated) DEVICE — SUCTION MANIFOLD NEPTUNE 2 SYS 4 PORT 0702-020-000

## (undated) DEVICE — BIT DRILL BIOMET PERIPHERAL SCR 2.7MM SS 405889

## (undated) DEVICE — DRAIN CHEST TUBE 32FR STR 8032

## (undated) DEVICE — VALVE VRV 9156R2

## (undated) DEVICE — DRAPE NEW SLUSH/WARMER HUSHSLUSH 2.0 ESD340 ESD340

## (undated) DEVICE — SUCTION CATH 14FR ORAL TRI-FLO T60

## (undated) DEVICE — SU STEEL 6 CCS 4X18" M654G

## (undated) DEVICE — POSITIONER ASSIST ESTECH 3S T401210S

## (undated) DEVICE — SURGICEL HEMOSTAT 2X14" 1951

## (undated) DEVICE — SLEEVE TR BAND RADIAL COMPRESSION DEVICE 24CM TRB24-REG

## (undated) DEVICE — TUBING SUCTION MEDI-VAC SOFT 3/16"X20' N520A

## (undated) DEVICE — LINEN TOWEL PACK X30 5481

## (undated) DEVICE — HOOD T4 PROTECTIVE STERI FACE SHIELD 400-800

## (undated) DEVICE — PREP CHLORAPREP W/ORANGE TINT 10.5ML 930715

## (undated) DEVICE — SU SNTH BRD NABSB 20MM SH-2 GRN WHT STRL PXX82N

## (undated) DEVICE — GLOVE BIOGEL PI MICRO INDICATOR UNDERGLOVE SZ 8.5 48985

## (undated) RX ORDER — LIDOCAINE HYDROCHLORIDE 10 MG/ML
INJECTION, SOLUTION EPIDURAL; INFILTRATION; INTRACAUDAL; PERINEURAL
Status: DISPENSED
Start: 2023-03-22

## (undated) RX ORDER — VECURONIUM BROMIDE 1 MG/ML
INJECTION, POWDER, LYOPHILIZED, FOR SOLUTION INTRAVENOUS
Status: DISPENSED
Start: 2025-03-14

## (undated) RX ORDER — PAPAVERINE HYDROCHLORIDE 30 MG/ML
INJECTION INTRAMUSCULAR; INTRAVENOUS
Status: DISPENSED
Start: 2025-03-14

## (undated) RX ORDER — POTASSIUM CHLORIDE 1500 MG/1
TABLET, EXTENDED RELEASE ORAL
Status: DISPENSED
Start: 2025-02-24

## (undated) RX ORDER — PROTAMINE SULFATE 10 MG/ML
INJECTION, SOLUTION INTRAVENOUS
Status: DISPENSED
Start: 2025-03-14

## (undated) RX ORDER — DEXAMETHASONE SODIUM PHOSPHATE 4 MG/ML
INJECTION, SOLUTION INTRA-ARTICULAR; INTRALESIONAL; INTRAMUSCULAR; INTRAVENOUS; SOFT TISSUE
Status: DISPENSED
Start: 2023-03-22

## (undated) RX ORDER — ONDANSETRON 2 MG/ML
INJECTION INTRAMUSCULAR; INTRAVENOUS
Status: DISPENSED
Start: 2023-03-22

## (undated) RX ORDER — VERAPAMIL HYDROCHLORIDE 2.5 MG/ML
INJECTION, SOLUTION INTRAVENOUS
Status: DISPENSED
Start: 2025-02-24

## (undated) RX ORDER — HEPARIN SODIUM 1000 [USP'U]/ML
INJECTION, SOLUTION INTRAVENOUS; SUBCUTANEOUS
Status: DISPENSED
Start: 2025-02-24

## (undated) RX ORDER — CEFAZOLIN SODIUM 1 G/3ML
INJECTION, POWDER, FOR SOLUTION INTRAMUSCULAR; INTRAVENOUS
Status: DISPENSED
Start: 2025-03-14

## (undated) RX ORDER — HEPARIN SODIUM 1000 [USP'U]/ML
INJECTION, SOLUTION INTRAVENOUS; SUBCUTANEOUS
Status: DISPENSED
Start: 2025-03-14

## (undated) RX ORDER — CHLORHEXIDINE GLUCONATE ORAL RINSE 1.2 MG/ML
SOLUTION DENTAL
Status: DISPENSED
Start: 2025-03-14

## (undated) RX ORDER — HEPARIN SODIUM 200 [USP'U]/100ML
INJECTION, SOLUTION INTRAVENOUS
Status: DISPENSED
Start: 2025-02-24

## (undated) RX ORDER — FENTANYL CITRATE 0.05 MG/ML
INJECTION, SOLUTION INTRAMUSCULAR; INTRAVENOUS
Status: DISPENSED
Start: 2025-03-14

## (undated) RX ORDER — CEFAZOLIN SODIUM/WATER 2 G/20 ML
SYRINGE (ML) INTRAVENOUS
Status: DISPENSED
Start: 2023-03-22

## (undated) RX ORDER — FENTANYL CITRATE 50 UG/ML
INJECTION, SOLUTION INTRAMUSCULAR; INTRAVENOUS
Status: DISPENSED
Start: 2025-02-24

## (undated) RX ORDER — ACETAMINOPHEN 325 MG/1
TABLET ORAL
Status: DISPENSED
Start: 2023-03-22

## (undated) RX ORDER — GLYCOPYRROLATE 0.2 MG/ML
INJECTION, SOLUTION INTRAMUSCULAR; INTRAVENOUS
Status: DISPENSED
Start: 2023-03-22

## (undated) RX ORDER — PROPOFOL 10 MG/ML
INJECTION, EMULSION INTRAVENOUS
Status: DISPENSED
Start: 2023-03-22

## (undated) RX ORDER — TRANEXAMIC ACID 650 MG/1
TABLET ORAL
Status: DISPENSED
Start: 2023-03-22

## (undated) RX ORDER — ACETAMINOPHEN 325 MG/1
TABLET ORAL
Status: DISPENSED
Start: 2025-03-14

## (undated) RX ORDER — FENTANYL CITRATE 50 UG/ML
INJECTION, SOLUTION INTRAMUSCULAR; INTRAVENOUS
Status: DISPENSED
Start: 2023-03-22

## (undated) RX ORDER — FENTANYL CITRATE 50 UG/ML
INJECTION, SOLUTION INTRAMUSCULAR; INTRAVENOUS
Status: DISPENSED
Start: 2025-03-14

## (undated) RX ORDER — LIDOCAINE HYDROCHLORIDE 10 MG/ML
INJECTION, SOLUTION EPIDURAL; INFILTRATION; INTRACAUDAL; PERINEURAL
Status: DISPENSED
Start: 2025-02-24

## (undated) RX ORDER — EPHEDRINE SULFATE 50 MG/ML
INJECTION, SOLUTION INTRAMUSCULAR; INTRAVENOUS; SUBCUTANEOUS
Status: DISPENSED
Start: 2023-03-22

## (undated) RX ORDER — FENTANYL CITRATE-0.9 % NACL/PF 10 MCG/ML
PLASTIC BAG, INJECTION (ML) INTRAVENOUS
Status: DISPENSED
Start: 2023-03-22

## (undated) RX ORDER — BUPIVACAINE HYDROCHLORIDE 2.5 MG/ML
INJECTION, SOLUTION EPIDURAL; INFILTRATION; INTRACAUDAL
Status: DISPENSED
Start: 2023-03-22